# Patient Record
Sex: MALE | Race: ASIAN | NOT HISPANIC OR LATINO
[De-identification: names, ages, dates, MRNs, and addresses within clinical notes are randomized per-mention and may not be internally consistent; named-entity substitution may affect disease eponyms.]

---

## 2017-01-06 ENCOUNTER — APPOINTMENT (OUTPATIENT)
Dept: HEART AND VASCULAR | Facility: CLINIC | Age: 82
End: 2017-01-06

## 2017-01-06 VITALS
WEIGHT: 170 LBS | HEIGHT: 66 IN | HEART RATE: 71 BPM | BODY MASS INDEX: 27.32 KG/M2 | DIASTOLIC BLOOD PRESSURE: 75 MMHG | OXYGEN SATURATION: 98 % | SYSTOLIC BLOOD PRESSURE: 120 MMHG

## 2017-07-19 ENCOUNTER — APPOINTMENT (OUTPATIENT)
Dept: HEART AND VASCULAR | Facility: CLINIC | Age: 82
End: 2017-07-19

## 2017-07-19 VITALS
OXYGEN SATURATION: 97 % | SYSTOLIC BLOOD PRESSURE: 121 MMHG | DIASTOLIC BLOOD PRESSURE: 70 MMHG | HEIGHT: 66 IN | HEART RATE: 55 BPM

## 2017-07-19 RX ORDER — OXYBUTYNIN CHLORIDE 5 MG/1
5 TABLET ORAL
Refills: 0 | Status: ACTIVE | COMMUNITY

## 2017-07-19 RX ORDER — ZINC SULFATE 50(220)MG
CAPSULE ORAL
Refills: 0 | Status: ACTIVE | COMMUNITY

## 2017-07-19 RX ORDER — VITAMIN E ACID SUCCINATE 268 MG
TABLET ORAL
Refills: 0 | Status: ACTIVE | COMMUNITY

## 2017-07-19 RX ORDER — ASCORBIC ACID 500 MG
TABLET ORAL
Refills: 0 | Status: ACTIVE | COMMUNITY

## 2017-09-22 ENCOUNTER — APPOINTMENT (OUTPATIENT)
Dept: UROLOGY | Facility: CLINIC | Age: 82
End: 2017-09-22
Payer: MEDICARE

## 2017-09-22 VITALS
HEIGHT: 66 IN | DIASTOLIC BLOOD PRESSURE: 74 MMHG | BODY MASS INDEX: 27.32 KG/M2 | TEMPERATURE: 99.2 F | HEART RATE: 71 BPM | WEIGHT: 170 LBS | SYSTOLIC BLOOD PRESSURE: 151 MMHG

## 2017-09-22 PROCEDURE — 99204 OFFICE O/P NEW MOD 45 MIN: CPT | Mod: 25

## 2017-09-22 PROCEDURE — 51798 US URINE CAPACITY MEASURE: CPT

## 2017-09-22 RX ORDER — KETOCONAZOLE 20.5 MG/ML
2 SHAMPOO, SUSPENSION TOPICAL
Qty: 120 | Refills: 0 | Status: ACTIVE | COMMUNITY
Start: 2016-08-25

## 2017-09-22 RX ORDER — DORZOLAMIDE HYDROCHLORIDE TIMOLOL MALEATE 20; 5 MG/ML; MG/ML
22.3-6.8 SOLUTION/ DROPS OPHTHALMIC
Qty: 10 | Refills: 0 | Status: ACTIVE | COMMUNITY
Start: 2017-05-30

## 2017-09-22 RX ORDER — PREDNISOLONE ACETATE 10 MG/ML
1 SUSPENSION/ DROPS OPHTHALMIC
Qty: 5 | Refills: 0 | Status: ACTIVE | COMMUNITY
Start: 2017-04-28

## 2017-09-23 LAB
APPEARANCE: CLEAR
BACTERIA UR CULT: NORMAL
BACTERIA: NEGATIVE
BILIRUBIN URINE: NEGATIVE
BLOOD URINE: ABNORMAL
COLOR: YELLOW
GLUCOSE QUALITATIVE U: NORMAL MG/DL
KETONES URINE: NEGATIVE
LEUKOCYTE ESTERASE URINE: NEGATIVE
MICROSCOPIC-UA: NORMAL
NITRITE URINE: NEGATIVE
PH URINE: 6
PROTEIN URINE: 100 MG/DL
PSA FREE FLD-MCNC: 11.8
PSA FREE SERPL-MCNC: 0.31 NG/ML
PSA SERPL-MCNC: 2.62 NG/ML
RED BLOOD CELLS URINE: 2 /HPF
SPECIFIC GRAVITY URINE: 1.02
SQUAMOUS EPITHELIAL CELLS: 0 /HPF
UROBILINOGEN URINE: NORMAL MG/DL
WHITE BLOOD CELLS URINE: 0 /HPF

## 2017-12-20 ENCOUNTER — APPOINTMENT (OUTPATIENT)
Dept: UROLOGY | Facility: CLINIC | Age: 82
End: 2017-12-20
Payer: MEDICARE

## 2017-12-20 VITALS
BODY MASS INDEX: 27.32 KG/M2 | HEIGHT: 66 IN | DIASTOLIC BLOOD PRESSURE: 76 MMHG | OXYGEN SATURATION: 95 % | TEMPERATURE: 97.7 F | SYSTOLIC BLOOD PRESSURE: 122 MMHG | HEART RATE: 109 BPM | WEIGHT: 170 LBS

## 2017-12-20 PROCEDURE — 99213 OFFICE O/P EST LOW 20 MIN: CPT

## 2018-01-09 ENCOUNTER — APPOINTMENT (OUTPATIENT)
Dept: HEART AND VASCULAR | Facility: CLINIC | Age: 83
End: 2018-01-09
Payer: MEDICARE

## 2018-01-09 VITALS
BODY MASS INDEX: 29.03 KG/M2 | HEIGHT: 67 IN | DIASTOLIC BLOOD PRESSURE: 72 MMHG | OXYGEN SATURATION: 96 % | HEART RATE: 80 BPM | WEIGHT: 185 LBS | RESPIRATION RATE: 18 BRPM | SYSTOLIC BLOOD PRESSURE: 112 MMHG

## 2018-01-09 DIAGNOSIS — Z78.9 OTHER SPECIFIED HEALTH STATUS: ICD-10-CM

## 2018-01-09 DIAGNOSIS — R07.89 OTHER CHEST PAIN: ICD-10-CM

## 2018-01-09 PROCEDURE — 99214 OFFICE O/P EST MOD 30 MIN: CPT

## 2018-02-26 ENCOUNTER — TRANSCRIPTION ENCOUNTER (OUTPATIENT)
Age: 83
End: 2018-02-26

## 2018-06-18 ENCOUNTER — RX RENEWAL (OUTPATIENT)
Age: 83
End: 2018-06-18

## 2018-06-20 ENCOUNTER — APPOINTMENT (OUTPATIENT)
Dept: UROLOGY | Facility: CLINIC | Age: 83
End: 2018-06-20
Payer: MEDICARE

## 2018-06-20 VITALS
HEART RATE: 65 BPM | HEIGHT: 67 IN | TEMPERATURE: 98 F | WEIGHT: 185 LBS | SYSTOLIC BLOOD PRESSURE: 130 MMHG | OXYGEN SATURATION: 97 % | BODY MASS INDEX: 29.03 KG/M2 | DIASTOLIC BLOOD PRESSURE: 62 MMHG

## 2018-06-20 PROCEDURE — 99213 OFFICE O/P EST LOW 20 MIN: CPT

## 2018-07-10 ENCOUNTER — TRANSCRIPTION ENCOUNTER (OUTPATIENT)
Age: 83
End: 2018-07-10

## 2018-07-11 ENCOUNTER — APPOINTMENT (OUTPATIENT)
Dept: HEART AND VASCULAR | Facility: CLINIC | Age: 83
End: 2018-07-11

## 2018-07-12 ENCOUNTER — APPOINTMENT (OUTPATIENT)
Dept: ORTHOPEDIC SURGERY | Facility: CLINIC | Age: 83
End: 2018-07-12
Payer: MEDICARE

## 2018-07-12 VITALS — RESPIRATION RATE: 16 BRPM | WEIGHT: 180 LBS | BODY MASS INDEX: 28.93 KG/M2 | HEIGHT: 66 IN

## 2018-07-12 DIAGNOSIS — Z85.46 PERSONAL HISTORY OF MALIGNANT NEOPLASM OF PROSTATE: ICD-10-CM

## 2018-07-12 DIAGNOSIS — Z87.39 PERSONAL HISTORY OF OTHER DISEASES OF THE MUSCULOSKELETAL SYSTEM AND CONNECTIVE TISSUE: ICD-10-CM

## 2018-07-12 PROCEDURE — 99203 OFFICE O/P NEW LOW 30 MIN: CPT

## 2018-07-12 RX ORDER — CLOPIDOGREL BISULFATE 75 MG/1
75 TABLET, FILM COATED ORAL
Refills: 0 | Status: ACTIVE | COMMUNITY

## 2018-07-26 ENCOUNTER — APPOINTMENT (OUTPATIENT)
Dept: ORTHOPEDIC SURGERY | Facility: CLINIC | Age: 83
End: 2018-07-26
Payer: MEDICARE

## 2018-07-26 PROCEDURE — 99213 OFFICE O/P EST LOW 20 MIN: CPT

## 2018-07-26 PROCEDURE — 73140 X-RAY EXAM OF FINGER(S): CPT | Mod: F2

## 2018-08-01 ENCOUNTER — APPOINTMENT (OUTPATIENT)
Dept: HEART AND VASCULAR | Facility: CLINIC | Age: 83
End: 2018-08-01
Payer: MEDICARE

## 2018-08-01 VITALS
SYSTOLIC BLOOD PRESSURE: 109 MMHG | WEIGHT: 180 LBS | DIASTOLIC BLOOD PRESSURE: 59 MMHG | HEIGHT: 65 IN | HEART RATE: 65 BPM | BODY MASS INDEX: 29.99 KG/M2

## 2018-08-01 PROCEDURE — 99214 OFFICE O/P EST MOD 30 MIN: CPT | Mod: 25

## 2018-08-01 PROCEDURE — 93000 ELECTROCARDIOGRAM COMPLETE: CPT

## 2018-08-17 ENCOUNTER — APPOINTMENT (OUTPATIENT)
Dept: ORTHOPEDIC SURGERY | Facility: CLINIC | Age: 83
End: 2018-08-17
Payer: MEDICARE

## 2018-08-17 VITALS — HEIGHT: 65 IN | WEIGHT: 180 LBS | BODY MASS INDEX: 29.99 KG/M2 | RESPIRATION RATE: 16 BRPM

## 2018-08-17 DIAGNOSIS — M20.012 MALLET FINGER OF LEFT FINGER(S): ICD-10-CM

## 2018-08-17 PROCEDURE — 73140 X-RAY EXAM OF FINGER(S): CPT | Mod: F2

## 2018-08-17 PROCEDURE — 99214 OFFICE O/P EST MOD 30 MIN: CPT

## 2018-12-19 ENCOUNTER — APPOINTMENT (OUTPATIENT)
Dept: UROLOGY | Facility: CLINIC | Age: 83
End: 2018-12-19
Payer: MEDICARE

## 2018-12-19 VITALS
OXYGEN SATURATION: 99 % | SYSTOLIC BLOOD PRESSURE: 145 MMHG | TEMPERATURE: 97.7 F | BODY MASS INDEX: 29.99 KG/M2 | HEART RATE: 60 BPM | DIASTOLIC BLOOD PRESSURE: 72 MMHG | WEIGHT: 180 LBS | HEIGHT: 65 IN

## 2018-12-19 PROCEDURE — 99213 OFFICE O/P EST LOW 20 MIN: CPT

## 2018-12-19 RX ORDER — TAMSULOSIN HYDROCHLORIDE 0.4 MG/1
0.4 CAPSULE ORAL
Qty: 90 | Refills: 3 | Status: ACTIVE | COMMUNITY
Start: 1900-01-01 | End: 1900-01-01

## 2019-06-19 ENCOUNTER — APPOINTMENT (OUTPATIENT)
Dept: UROLOGY | Facility: CLINIC | Age: 84
End: 2019-06-19

## 2019-07-31 ENCOUNTER — APPOINTMENT (OUTPATIENT)
Dept: UROLOGY | Facility: CLINIC | Age: 84
End: 2019-07-31
Payer: MEDICARE

## 2019-07-31 VITALS
HEART RATE: 96 BPM | WEIGHT: 180 LBS | TEMPERATURE: 97.2 F | OXYGEN SATURATION: 97 % | SYSTOLIC BLOOD PRESSURE: 116 MMHG | HEIGHT: 65 IN | DIASTOLIC BLOOD PRESSURE: 72 MMHG | BODY MASS INDEX: 29.99 KG/M2

## 2019-07-31 PROCEDURE — 99213 OFFICE O/P EST LOW 20 MIN: CPT

## 2019-07-31 NOTE — LETTER BODY
[Dear  ___] : Dear  [unfilled], [Courtesy Letter:] : I had the pleasure of seeing your patient, [unfilled], in my office today. [Please see my note below.] : Please see my note below. [Sincerely,] : Sincerely, [Consult Closing:] : Thank you very much for allowing me to participate in the care of this patient.  If you have any questions, please do not hesitate to contact me. [FreeTextEntry3] : Ronal Tijerina MD [FreeTextEntry2] : Meeta Story MD\par [address]\[place] [address]

## 2019-07-31 NOTE — ASSESSMENT
[FreeTextEntry1] : 85yo male with reported history of prostate cancer on watchful waiting for several years, stable PSA\par Significant LUTS but on maximal medical therapy and poor surgical candidate\par Reviewed behavioral modification including avoidance of bladder irritants\par Recent labs reviewed, PSA essentially stable\par F/u 6 months

## 2019-07-31 NOTE — REVIEW OF SYSTEMS
[see HPI] : see HPI [Negative] : Gastrointestinal Paramedian Forehead Flap Text: A decision was made to reconstruct the defect utilizing an interpolation axial flap and a staged reconstruction.  A telfa template was made of the defect.  This telfa template was then used to outline the paramedian forehead pedicle flap.  The donor area for the pedicle flap was then injected with anesthesia.  The flap was excised through the skin and subcutaneous tissue down to the layer of the underlying musculature.  The pedicle flap was carefully excised within this deep plane to maintain its blood supply.  The edges of the donor site were undermined.   The donor site was closed in a primary fashion.  The pedicle was then rotated into position and sutured.  Once the tube was sutured into place, adequate blood supply was confirmed with blanching and refill.  The pedicle was then wrapped with xeroform gauze and dressed appropriately with a telfa and gauze bandage to ensure continued blood supply and protect the attached pedicle.

## 2019-07-31 NOTE — HISTORY OF PRESENT ILLNESS
[FreeTextEntry1] : This is an 83yo male with a history of prostate cancer on watchful waiting and urinary frequency here to establish care. He is here with his son. He was reportedly diagnosed with low risk prostate cancer more than 10 years ago. His PSA has been in 3-4 range. He takes tamsulosin, finasteride and oxybutynin for urinary frequency. He has significant comorbid conditions including a previous MI. \par \par 12/20/17 Here for f/u. No significant change in urinary symptoms, controlled with medications. Last PSA was 2.62, essentially stable. \par \par 6/20/18 Here for f/u. No significant change in urinary symptoms, controlled with medications. Last PSA was 2.62, essentially stable. \par \par 12/19/18 Here for f/u. No significant changes. Recent PSA is 3.8. \par \par 7/31/19 Here for f/u. Recent PSA = 4.9 on finasteride. Primary complaint is urinary frequency. Taking tamsulosin, finasteride, oxybutynin 5mg.  [Urinary Urgency] : urinary urgency [Urinary Frequency] : urinary frequency [Nocturia] : nocturia [Straining] : straining [None] : None

## 2019-07-31 NOTE — PHYSICAL EXAM
[General Appearance - Well Developed] : well developed [General Appearance - Well Nourished] : well nourished [Normal Appearance] : normal appearance [Well Groomed] : well groomed [General Appearance - In No Acute Distress] : no acute distress [Abdomen Soft] : soft [Abdomen Tenderness] : non-tender [Costovertebral Angle Tenderness] : no ~M costovertebral angle tenderness [FreeTextEntry1] : ONEL 6/2018 normal [Oriented To Time, Place, And Person] : oriented to person, place, and time [Affect] : the affect was normal [Mood] : the mood was normal [Not Anxious] : not anxious [Normal Station and Gait] : the gait and station were normal for the patient's age [No Focal Deficits] : no focal deficits

## 2019-11-07 ENCOUNTER — OUTPATIENT (OUTPATIENT)
Dept: OUTPATIENT SERVICES | Facility: HOSPITAL | Age: 84
LOS: 1 days | Discharge: HOME | End: 2019-11-07

## 2019-11-07 VITALS
HEART RATE: 88 BPM | WEIGHT: 154.32 LBS | OXYGEN SATURATION: 98 % | HEIGHT: 65 IN | TEMPERATURE: 97 F | DIASTOLIC BLOOD PRESSURE: 84 MMHG | SYSTOLIC BLOOD PRESSURE: 164 MMHG | RESPIRATION RATE: 18 BRPM

## 2019-11-07 DIAGNOSIS — H26.9 UNSPECIFIED CATARACT: Chronic | ICD-10-CM

## 2019-11-07 DIAGNOSIS — Z98.89 OTHER SPECIFIED POSTPROCEDURAL STATES: Chronic | ICD-10-CM

## 2019-11-07 NOTE — ED ADULT NURSE NOTE - OBJECTIVE STATEMENT
Patient was transfer from [facility] for observation s/p facial surgery with hematoma and bleeding.

## 2019-11-07 NOTE — ED ADULT NURSE NOTE - PMH
Acute myocardial infarction  Patient denies MI  Essential hypertension  Hypertension  Hyperlipidemia  Hyperlipidemia  Malignant neoplasm of prostate  Prostate cancer  Type 2 diabetes mellitus  Diabetes  Type 2 diabetes mellitus  DM (diabetes mellitus), type 2

## 2019-11-07 NOTE — ED CLERICAL - NS ED CLERK NOTE PRE-ARRIVAL INFORMATION; ADDITIONAL PRE-ARRIVAL INFORMATION
[place].  From [facility].  hematoma post blepharoplasty requiring lateral canthotomy.  persistent oozing,  rec elevated HOB, ice packs, obs til am

## 2019-11-08 ENCOUNTER — INPATIENT (INPATIENT)
Facility: HOSPITAL | Age: 84
LOS: 2 days | Discharge: HOME | DRG: 921 | End: 2019-11-11
Attending: SURGERY | Admitting: SURGERY
Payer: MEDICARE

## 2019-11-08 DIAGNOSIS — Z98.89 OTHER SPECIFIED POSTPROCEDURAL STATES: Chronic | ICD-10-CM

## 2019-11-08 DIAGNOSIS — Z98.890 OTHER SPECIFIED POSTPROCEDURAL STATES: Chronic | ICD-10-CM

## 2019-11-08 DIAGNOSIS — H26.9 UNSPECIFIED CATARACT: Chronic | ICD-10-CM

## 2019-11-08 LAB
BLD GP AB SCN SERPL QL: NEGATIVE — SIGNIFICANT CHANGE UP
GLUCOSE BLDC GLUCOMTR-MCNC: 103 MG/DL — HIGH (ref 70–99)
GLUCOSE BLDC GLUCOMTR-MCNC: 133 MG/DL — HIGH (ref 70–99)
GLUCOSE BLDC GLUCOMTR-MCNC: 138 MG/DL — HIGH (ref 70–99)
GLUCOSE BLDC GLUCOMTR-MCNC: 96 MG/DL — SIGNIFICANT CHANGE UP (ref 70–99)
RH IG SCN BLD-IMP: POSITIVE — SIGNIFICANT CHANGE UP

## 2019-11-08 PROCEDURE — 99285 EMERGENCY DEPT VISIT HI MDM: CPT

## 2019-11-08 RX ORDER — OXYCODONE AND ACETAMINOPHEN 5; 325 MG/1; MG/1
1 TABLET ORAL EVERY 6 HOURS
Refills: 0 | Status: DISCONTINUED | OUTPATIENT
Start: 2019-11-08 | End: 2019-11-11

## 2019-11-08 RX ORDER — CEPHALEXIN 500 MG
1000 CAPSULE ORAL EVERY 8 HOURS
Refills: 0 | Status: DISCONTINUED | OUTPATIENT
Start: 2019-11-08 | End: 2019-11-11

## 2019-11-08 RX ORDER — DEXTROSE 50 % IN WATER 50 %
25 SYRINGE (ML) INTRAVENOUS ONCE
Refills: 0 | Status: DISCONTINUED | OUTPATIENT
Start: 2019-11-08 | End: 2019-11-11

## 2019-11-08 RX ORDER — INSULIN LISPRO 100/ML
VIAL (ML) SUBCUTANEOUS
Refills: 0 | Status: DISCONTINUED | OUTPATIENT
Start: 2019-11-08 | End: 2019-11-11

## 2019-11-08 RX ORDER — LORATADINE 10 MG/1
10 TABLET ORAL DAILY
Refills: 0 | Status: DISCONTINUED | OUTPATIENT
Start: 2019-11-08 | End: 2019-11-11

## 2019-11-08 RX ORDER — FINASTERIDE 5 MG/1
5 TABLET, FILM COATED ORAL DAILY
Refills: 0 | Status: DISCONTINUED | OUTPATIENT
Start: 2019-11-08 | End: 2019-11-11

## 2019-11-08 RX ORDER — GLUCAGON INJECTION, SOLUTION 0.5 MG/.1ML
1 INJECTION, SOLUTION SUBCUTANEOUS ONCE
Refills: 0 | Status: DISCONTINUED | OUTPATIENT
Start: 2019-11-08 | End: 2019-11-11

## 2019-11-08 RX ORDER — SODIUM CHLORIDE 9 MG/ML
1000 INJECTION, SOLUTION INTRAVENOUS
Refills: 0 | Status: DISCONTINUED | OUTPATIENT
Start: 2019-11-08 | End: 2019-11-11

## 2019-11-08 RX ORDER — ONDANSETRON 8 MG/1
4 TABLET, FILM COATED ORAL EVERY 6 HOURS
Refills: 0 | Status: DISCONTINUED | OUTPATIENT
Start: 2019-11-08 | End: 2019-11-11

## 2019-11-08 RX ORDER — DEXTROSE 50 % IN WATER 50 %
12.5 SYRINGE (ML) INTRAVENOUS ONCE
Refills: 0 | Status: DISCONTINUED | OUTPATIENT
Start: 2019-11-08 | End: 2019-11-11

## 2019-11-08 RX ORDER — HYDROMORPHONE HYDROCHLORIDE 2 MG/ML
0.5 INJECTION INTRAMUSCULAR; INTRAVENOUS; SUBCUTANEOUS EVERY 6 HOURS
Refills: 0 | Status: DISCONTINUED | OUTPATIENT
Start: 2019-11-08 | End: 2019-11-11

## 2019-11-08 RX ORDER — ATORVASTATIN CALCIUM 80 MG/1
40 TABLET, FILM COATED ORAL AT BEDTIME
Refills: 0 | Status: DISCONTINUED | OUTPATIENT
Start: 2019-11-08 | End: 2019-11-11

## 2019-11-08 RX ORDER — TAMSULOSIN HYDROCHLORIDE 0.4 MG/1
0.4 CAPSULE ORAL AT BEDTIME
Refills: 0 | Status: DISCONTINUED | OUTPATIENT
Start: 2019-11-08 | End: 2019-11-11

## 2019-11-08 RX ORDER — METOPROLOL TARTRATE 50 MG
12.5 TABLET ORAL EVERY 12 HOURS
Refills: 0 | Status: DISCONTINUED | OUTPATIENT
Start: 2019-11-08 | End: 2019-11-11

## 2019-11-08 RX ORDER — DEXTROSE 50 % IN WATER 50 %
15 SYRINGE (ML) INTRAVENOUS ONCE
Refills: 0 | Status: DISCONTINUED | OUTPATIENT
Start: 2019-11-08 | End: 2019-11-11

## 2019-11-08 RX ADMIN — ATORVASTATIN CALCIUM 40 MILLIGRAM(S): 80 TABLET, FILM COATED ORAL at 22:17

## 2019-11-08 RX ADMIN — SODIUM CHLORIDE 50 MILLILITER(S): 9 INJECTION, SOLUTION INTRAVENOUS at 13:40

## 2019-11-08 RX ADMIN — Medication 1000 MILLIGRAM(S): at 15:22

## 2019-11-08 RX ADMIN — SODIUM CHLORIDE 100 MILLILITER(S): 9 INJECTION, SOLUTION INTRAVENOUS at 12:02

## 2019-11-08 RX ADMIN — FINASTERIDE 5 MILLIGRAM(S): 5 TABLET, FILM COATED ORAL at 16:58

## 2019-11-08 RX ADMIN — Medication 12.5 MILLIGRAM(S): at 18:47

## 2019-11-08 RX ADMIN — Medication 1000 MILLIGRAM(S): at 22:17

## 2019-11-08 RX ADMIN — TAMSULOSIN HYDROCHLORIDE 0.4 MILLIGRAM(S): 0.4 CAPSULE ORAL at 22:17

## 2019-11-08 RX ADMIN — SODIUM CHLORIDE 100 MILLILITER(S): 9 INJECTION, SOLUTION INTRAVENOUS at 04:52

## 2019-11-08 NOTE — ED PROVIDER NOTE - CLINICAL SUMMARY MEDICAL DECISION MAKING FREE TEXT BOX
s/p blepharoplasty complicated by hematoma, now with open incision, here for observation of bleeding.    HOB to 30deg, ice to swelling  will monitor

## 2019-11-08 NOTE — PACU DISCHARGE NOTE - COMMENTS
Patient meets criteria for patient discharge, A&Ox4, VSS, Surgical incisions around eyes clean, dry and intact, Small drain to side of left eye, Patient ambulated to the restroom with assistance and passed gas, No BM yet, Patient is eating and drinking with no nausea, Abdomen is round, soft with bowel sounds in all quadrants, No SOB, No chest pain, #22 gauge IV to right hand patent and intact receiving LRS at 50ml/hr, report given to Dmitry on [address], patient transported via bed.

## 2019-11-08 NOTE — ED PROVIDER NOTE - OBJECTIVE STATEMENT
86M hx htn, dm, high chol, prostate CA, transferred from [facility].  pt s/p b/l blepharoplasty, complicated by L periorbital hematoma and lateral canthotomy.  per Dr. Toussaint - sutures left open - pt transferred to ED for observation, recommending HOB elevation and ice.  pt takes plavix however stopped 5 days ago.

## 2019-11-08 NOTE — H&P ADULT - NSHPLABSRESULTS_GEN_ALL_CORE
Vital Signs Last 24 Hrs  T(C): 36.2 (08 Nov 2019 12:38), Max: 36.4 (08 Nov 2019 07:15)  T(F): 97.2 (08 Nov 2019 12:38), Max: 97.6 (08 Nov 2019 07:15)  HR: 81 (08 Nov 2019 13:50) (78 - 92)  BP: 121/73 (08 Nov 2019 13:50) (121/73 - 193/93)  BP(mean): 90 (08 Nov 2019 13:50) (90 - 127)  RR: 16 (08 Nov 2019 13:50) (13 - 24)  SpO2: 96% (08 Nov 2019 13:50) (96% - 100%)

## 2019-11-08 NOTE — H&P ADULT - NSHPPHYSICALEXAM_GEN_ALL_CORE
General: NAD, resting comfortably  Neuro: AAOX3  HEAD:  R orbit soft with moderate swelling, ecchymoses, minimal SS drainage. L orbit soft with moderate swelling, ecchymoses with lateral penrose drain in place.   Pulm: CTAB, Nonlabored breathing  CV: S1/S2 normal, no murmurs  Abdomen: soft, nondistended, nontender, no rebound, no guarding  Extremities: WWP, No LE edema b/l

## 2019-11-08 NOTE — ED PROVIDER NOTE - PROGRESS NOTE DETAILS
worsening hematoma to L superior orbital region.  spoke with Dr. Toussaint- will come back to ED to evaluate pt Dr. Toussaint to take pt back to OR for exploration and evacuation of hematoma

## 2019-11-08 NOTE — ED PROVIDER NOTE - PHYSICAL EXAMINATION
b/l blepharoplasty  R - CDI  L - incision open, blood oozing, +eyelid swelling, periorbital swelling

## 2019-11-08 NOTE — H&P ADULT - HISTORY OF PRESENT ILLNESS
86M PMH previous MI s/p CABG-on Plavix (last taken 5 d ago), HTN, DM2, prostate CA s/p TURP, transferred from [facility] overnight s/p b/l blepharoplasty (Dr. Toussaint) complicated by L periorbital hematoma and lateral canthotomy, transferred to [facility] ED for further observation, now s/p L orbital washout and admitted to Plastic surgery for further observation. Pt seen and examined postoperatively, afebrile, VSS, pain well controlled, denies dyspnea/chest pain, endorses improved vision, tolerating regular diet, denies nausea/emesis. Pt states he lives alone,  poor historian.

## 2019-11-08 NOTE — ED PROVIDER NOTE - PSH
Bilateral cataracts    H/O transurethral destruction of bladder lesion    Other postprocedural status  History of penile implant  S/p bilateral blepharoplasty    Status post aorto-coronary artery bypass graft  S/P CABG (coronary artery bypass graft)

## 2019-11-08 NOTE — H&P ADULT - NSICDXPASTSURGICALHX_GEN_ALL_CORE_FT
PAST SURGICAL HISTORY:  Bilateral cataracts     H/O transurethral destruction of bladder lesion     Other postprocedural status History of penile implant    S/p bilateral blepharoplasty     Status post aorto-coronary artery bypass graft S/P CABG (coronary artery bypass graft)

## 2019-11-08 NOTE — H&P ADULT - NSICDXPASTMEDICALHX_GEN_ALL_CORE_FT
PAST MEDICAL HISTORY:  Acute myocardial infarction Patient denies MI    Essential hypertension Hypertension    Hyperlipidemia Hyperlipidemia    Malignant neoplasm of prostate Prostate cancer    Type 2 diabetes mellitus DM (diabetes mellitus), type 2    Type 2 diabetes mellitus Diabetes

## 2019-11-08 NOTE — H&P ADULT - ASSESSMENT
86M PMH previous MI s/p CABG-on Plavix (last taken 5 d ago), HTN, DM2, prostate CA s/p TURP, transferred from [facility] overnight s/p b/l blepharoplasty (Dr. Toussaint) complicated by L periorbital hematoma and lateral canthotomy, transferred to [facility] ED for further observation, now s/p L orbital washout and admitted to Plastic surgery for further observation    Pain/nausea control as needed  Regular diet  Keep head of bed elevated greater than 45 degrees, cold compress to orbital area bilaterally  Keflex 1g every 8 hrs  Monitor vision b/l  Resume home medications, holding Losartan, Plavix and AC at this time given recent hematoma  Discussed with chief resident and Dr. Cano

## 2019-11-09 LAB
GLUCOSE BLDC GLUCOMTR-MCNC: 104 MG/DL — HIGH (ref 70–99)
GLUCOSE BLDC GLUCOMTR-MCNC: 118 MG/DL — HIGH (ref 70–99)
GLUCOSE BLDC GLUCOMTR-MCNC: 97 MG/DL — SIGNIFICANT CHANGE UP (ref 70–99)
GLUCOSE BLDC GLUCOMTR-MCNC: 99 MG/DL — SIGNIFICANT CHANGE UP (ref 70–99)
HBA1C BLD-MCNC: 5.9 % — HIGH (ref 4–5.6)

## 2019-11-09 RX ADMIN — FINASTERIDE 5 MILLIGRAM(S): 5 TABLET, FILM COATED ORAL at 13:14

## 2019-11-09 RX ADMIN — Medication 1000 MILLIGRAM(S): at 05:49

## 2019-11-09 RX ADMIN — TAMSULOSIN HYDROCHLORIDE 0.4 MILLIGRAM(S): 0.4 CAPSULE ORAL at 21:42

## 2019-11-09 RX ADMIN — Medication 1000 MILLIGRAM(S): at 21:42

## 2019-11-09 RX ADMIN — Medication 12.5 MILLIGRAM(S): at 17:34

## 2019-11-09 RX ADMIN — Medication 12.5 MILLIGRAM(S): at 05:48

## 2019-11-09 RX ADMIN — ATORVASTATIN CALCIUM 40 MILLIGRAM(S): 80 TABLET, FILM COATED ORAL at 21:42

## 2019-11-09 RX ADMIN — Medication 1000 MILLIGRAM(S): at 13:13

## 2019-11-09 NOTE — PROGRESS NOTE ADULT - SUBJECTIVE AND OBJECTIVE BOX
No acute events overnight.  Vision intact bilaterally, swelling stable.  Pain controlled.    NAD  EWOB  RRR  Canthotomy site clean, minimal oozing  Eccymosis and swelling stable  Vision intact b/l  EOMi

## 2019-11-10 LAB
GLUCOSE BLDC GLUCOMTR-MCNC: 102 MG/DL — HIGH (ref 70–99)
GLUCOSE BLDC GLUCOMTR-MCNC: 104 MG/DL — HIGH (ref 70–99)
GLUCOSE BLDC GLUCOMTR-MCNC: 110 MG/DL — HIGH (ref 70–99)
GLUCOSE BLDC GLUCOMTR-MCNC: 111 MG/DL — HIGH (ref 70–99)

## 2019-11-10 RX ORDER — SENNA PLUS 8.6 MG/1
1 TABLET ORAL
Refills: 0 | Status: DISCONTINUED | OUTPATIENT
Start: 2019-11-10 | End: 2019-11-11

## 2019-11-10 RX ORDER — POLYETHYLENE GLYCOL 3350 17 G/17G
17 POWDER, FOR SOLUTION ORAL EVERY 12 HOURS
Refills: 0 | Status: DISCONTINUED | OUTPATIENT
Start: 2019-11-10 | End: 2019-11-11

## 2019-11-10 RX ADMIN — Medication 1000 MILLIGRAM(S): at 21:18

## 2019-11-10 RX ADMIN — POLYETHYLENE GLYCOL 3350 17 GRAM(S): 17 POWDER, FOR SOLUTION ORAL at 06:22

## 2019-11-10 RX ADMIN — SENNA PLUS 1 TABLET(S): 8.6 TABLET ORAL at 06:22

## 2019-11-10 RX ADMIN — Medication 12.5 MILLIGRAM(S): at 17:55

## 2019-11-10 RX ADMIN — Medication 12.5 MILLIGRAM(S): at 06:21

## 2019-11-10 RX ADMIN — Medication 1000 MILLIGRAM(S): at 13:16

## 2019-11-10 RX ADMIN — POLYETHYLENE GLYCOL 3350 17 GRAM(S): 17 POWDER, FOR SOLUTION ORAL at 17:55

## 2019-11-10 RX ADMIN — TAMSULOSIN HYDROCHLORIDE 0.4 MILLIGRAM(S): 0.4 CAPSULE ORAL at 21:18

## 2019-11-10 RX ADMIN — SENNA PLUS 1 TABLET(S): 8.6 TABLET ORAL at 17:55

## 2019-11-10 RX ADMIN — ATORVASTATIN CALCIUM 40 MILLIGRAM(S): 80 TABLET, FILM COATED ORAL at 21:19

## 2019-11-10 RX ADMIN — FINASTERIDE 5 MILLIGRAM(S): 5 TABLET, FILM COATED ORAL at 12:14

## 2019-11-10 RX ADMIN — Medication 1000 MILLIGRAM(S): at 06:22

## 2019-11-11 ENCOUNTER — TRANSCRIPTION ENCOUNTER (OUTPATIENT)
Age: 84
End: 2019-11-11

## 2019-11-11 VITALS
OXYGEN SATURATION: 96 % | DIASTOLIC BLOOD PRESSURE: 73 MMHG | HEART RATE: 69 BPM | TEMPERATURE: 98 F | RESPIRATION RATE: 17 BRPM | SYSTOLIC BLOOD PRESSURE: 157 MMHG

## 2019-11-11 LAB — GLUCOSE BLDC GLUCOMTR-MCNC: 100 MG/DL — HIGH (ref 70–99)

## 2019-11-11 RX ORDER — TAMSULOSIN HYDROCHLORIDE 0.4 MG/1
1 CAPSULE ORAL
Qty: 0 | Refills: 0 | DISCHARGE
Start: 2019-11-11

## 2019-11-11 RX ADMIN — Medication 1000 MILLIGRAM(S): at 05:10

## 2019-11-11 RX ADMIN — FINASTERIDE 5 MILLIGRAM(S): 5 TABLET, FILM COATED ORAL at 11:09

## 2019-11-11 RX ADMIN — Medication 12.5 MILLIGRAM(S): at 05:11

## 2019-11-11 NOTE — DISCHARGE NOTE PROVIDER - NSDCMRMEDTOKEN_GEN_ALL_CORE_FT
atorvastatin 40 mg oral tablet: 1 tab(s) orally every 24 hours  Clarinex 5 mg oral tablet: 1 tab(s) orally once a day  dorzolamide 2% ophthalmic solution: 1 drop(s) to each affected eye 3 times a day  fexofenadine 60 mg oral tablet: 1 tab(s) orally 2 times a day  finasteride 5 mg oral tablet: 1 tab(s) orally once a day  Imdur 30 mg oral tablet, extended release: 0.5 tab(s) orally once a day (in the morning)  latanoprost 0.005% ophthalmic solution: 1 drop(s) to each affected eye once a day (in the evening)  loratadine 10 mg oral tablet: 1 tab(s) orally every 24 hours  losartan 25 mg oral tablet: 1 tab(s) orally once a day  Metoprolol Succinate ER 25 mg oral tablet, extended release: 1 tab(s) orally once a day  Plavix 75 mg oral tablet: 1 tab(s) orally once a day  tamsulosin 0.4 mg oral capsule: 1 cap(s) orally once a day (at bedtime)

## 2019-11-11 NOTE — PROGRESS NOTE ADULT - SUBJECTIVE AND OBJECTIVE BOX
SUBJECTIVE: Afebrile, VSS with no acute events overnight. Pain well controlled, eyelid swelling improved and vision intact.    cephalexin 1000 milliGRAM(s) Oral every 8 hours  metoprolol tartrate 12.5 milliGRAM(s) Oral every 12 hours  tamsulosin 0.4 milliGRAM(s) Oral at bedtime      Vital Signs Last 24 Hrs  T(C): 36.4 (11 Nov 2019 05:08), Max: 36.6 (10 Nov 2019 20:27)  T(F): 97.6 (11 Nov 2019 05:08), Max: 97.9 (10 Nov 2019 20:27)  HR: 71 (11 Nov 2019 05:08) (57 - 80)  BP: 170/81 (11 Nov 2019 05:08) (107/58 - 170/81)  BP(mean): --  RR: 17 (11 Nov 2019 05:08) (16 - 17)  SpO2: 96% (11 Nov 2019 05:08) (96% - 97%)     General: NAD, resting comfortably  	Neuro: AAOX3  	HEAD:  R orbit soft with moderate swelling, ecchymoses, minimal SS drainage. L orbit soft with mild swelling improved since yesterday, ecchymoses improving with lateral penrose drain in place.   	Pulm: CTAB, Nonlabored breathing  	CV: S1/S2 normal, no murmurs  	Abdomen: soft, nondistended, nontender, no rebound, no guarding

## 2019-11-11 NOTE — DISCHARGE NOTE PROVIDER - NSDCCPCAREPLAN_GEN_ALL_CORE_FT
PRINCIPAL DISCHARGE DIAGNOSIS  Diagnosis: Postoperative hematoma of left eye following ophthalmic procedure  Assessment and Plan of Treatment:

## 2019-11-11 NOTE — DISCHARGE NOTE PROVIDER - CARE PROVIDER_API CALL
Reji Toussaint (MD)  Ophthalmology  [address] [place] [id number]  Phone: [phone]  Fax: [phone]  Follow Up Time:

## 2019-11-11 NOTE — PHYSICAL THERAPY INITIAL EVALUATION ADULT - PERTINENT HX OF CURRENT PROBLEM, REHAB EVAL
86M PMH previous MI s/p CABG-on Plavix (last taken 5 d ago), HTN, DM2, prostate CA s/p TURP, transferred from [facility] overnight s/p b/l blepharoplasty (Dr. Toussaint) complicated by L periorbital hematoma and lateral canthotomy, transferred to [facility] ED for further observation, now s/p L orbital washout and admitted to Plastic surgery for further observation

## 2019-11-11 NOTE — PHYSICAL THERAPY INITIAL EVALUATION ADULT - GENERAL OBSERVATIONS, REHAB EVAL
pt received/returned semi-supine in bed +heplock, +B/L Eye swelling and ecchymosis, son present, in NAD

## 2019-11-11 NOTE — PROGRESS NOTE ADULT - ASSESSMENT
POD 3 canthotomy and washout L eye    -Continue postop monitoring, serial eye exams  -PT consult  -Likely DC home today pending PT recs

## 2019-11-11 NOTE — DISCHARGE NOTE NURSING/CASE MANAGEMENT/SOCIAL WORK - PATIENT PORTAL LINK FT
You can access the FollowMyHealth Patient Portal offered by [facility] by registering at the following website: [email or web address]. By joining [facility]’s FollowMyHealth portal, you will also be able to view your health information using other applications (apps) compatible with our system.

## 2019-11-11 NOTE — DISCHARGE NOTE PROVIDER - HOSPITAL COURSE
86M PMH previous MI s/p CABG-on Plavix (last taken 5 d ago), HTN, DM2, prostate CA s/p TURP, transferred from [facility] overnight s/p b/l blepharoplasty (Dr. Toussaint) complicated by L periorbital hematoma and lateral canthotomy, transferred to [facility] ED for further observation. The patient had serial exams over the weekend and his symptoms improved. His swelling went down enough that his vision was no longer impaired. On the day of the discharge, patient was evaluated by our team and PT and deemed in stable condition to be discharged to home. Follow up with Dr. Toussaint as directed.

## 2019-11-11 NOTE — PHYSICAL THERAPY INITIAL EVALUATION ADULT - ADDITIONAL COMMENTS
pt lives alone on the 3rd floor of a walk up apartment building. Uses a SC for ambulation. Son states that pt has had a couple falls over the past year. No home health aide
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
## 2019-11-13 DIAGNOSIS — L76.32 POSTPROCEDURAL HEMATOMA OF SKIN AND SUBCUTANEOUS TISSUE FOLLOWING OTHER PROCEDURE: ICD-10-CM

## 2019-11-13 DIAGNOSIS — Z95.1 PRESENCE OF AORTOCORONARY BYPASS GRAFT: ICD-10-CM

## 2019-11-13 DIAGNOSIS — E78.5 HYPERLIPIDEMIA, UNSPECIFIED: ICD-10-CM

## 2019-11-13 DIAGNOSIS — Z79.02 LONG TERM (CURRENT) USE OF ANTITHROMBOTICS/ANTIPLATELETS: ICD-10-CM

## 2019-11-13 DIAGNOSIS — Z88.6 ALLERGY STATUS TO ANALGESIC AGENT: ICD-10-CM

## 2019-11-13 DIAGNOSIS — I10 ESSENTIAL (PRIMARY) HYPERTENSION: ICD-10-CM

## 2019-11-13 DIAGNOSIS — E11.9 TYPE 2 DIABETES MELLITUS WITHOUT COMPLICATIONS: ICD-10-CM

## 2019-11-13 DIAGNOSIS — I25.2 OLD MYOCARDIAL INFARCTION: ICD-10-CM

## 2019-11-13 DIAGNOSIS — Z79.899 OTHER LONG TERM (CURRENT) DRUG THERAPY: ICD-10-CM

## 2019-11-20 PROCEDURE — 83036 HEMOGLOBIN GLYCOSYLATED A1C: CPT

## 2019-11-20 PROCEDURE — 99285 EMERGENCY DEPT VISIT HI MDM: CPT

## 2019-11-20 PROCEDURE — P9035: CPT

## 2019-11-20 PROCEDURE — 36430 TRANSFUSION BLD/BLD COMPNT: CPT

## 2019-11-20 PROCEDURE — 97161 PT EVAL LOW COMPLEX 20 MIN: CPT

## 2019-11-20 PROCEDURE — 86900 BLOOD TYPING SEROLOGIC ABO: CPT

## 2019-11-20 PROCEDURE — 82962 GLUCOSE BLOOD TEST: CPT

## 2019-11-20 PROCEDURE — 86850 RBC ANTIBODY SCREEN: CPT

## 2019-11-20 PROCEDURE — 36415 COLL VENOUS BLD VENIPUNCTURE: CPT

## 2019-11-20 PROCEDURE — 86901 BLOOD TYPING SEROLOGIC RH(D): CPT

## 2019-12-06 ENCOUNTER — INPATIENT (INPATIENT)
Facility: HOSPITAL | Age: 84
LOS: 4 days | Discharge: HOME | DRG: 645 | End: 2019-12-11
Attending: STUDENT IN AN ORGANIZED HEALTH CARE EDUCATION/TRAINING PROGRAM | Admitting: STUDENT IN AN ORGANIZED HEALTH CARE EDUCATION/TRAINING PROGRAM
Payer: MEDICARE

## 2019-12-06 VITALS
HEIGHT: 66 IN | OXYGEN SATURATION: 97 % | SYSTOLIC BLOOD PRESSURE: 129 MMHG | HEART RATE: 95 BPM | DIASTOLIC BLOOD PRESSURE: 70 MMHG | RESPIRATION RATE: 16 BRPM | WEIGHT: 169.98 LBS | TEMPERATURE: 98 F

## 2019-12-06 DIAGNOSIS — H26.9 UNSPECIFIED CATARACT: Chronic | ICD-10-CM

## 2019-12-06 DIAGNOSIS — Z98.890 OTHER SPECIFIED POSTPROCEDURAL STATES: Chronic | ICD-10-CM

## 2019-12-06 DIAGNOSIS — Z98.89 OTHER SPECIFIED POSTPROCEDURAL STATES: Chronic | ICD-10-CM

## 2019-12-06 LAB
ALBUMIN SERPL ELPH-MCNC: 3.6 G/DL — SIGNIFICANT CHANGE UP (ref 3.3–5)
ALP SERPL-CCNC: 62 U/L — SIGNIFICANT CHANGE UP (ref 40–120)
ALT FLD-CCNC: 27 U/L — SIGNIFICANT CHANGE UP (ref 10–45)
ANION GAP SERPL CALC-SCNC: 12 MMOL/L — SIGNIFICANT CHANGE UP (ref 5–17)
AST SERPL-CCNC: 25 U/L — SIGNIFICANT CHANGE UP (ref 10–40)
BASOPHILS # BLD AUTO: 0.02 K/UL — SIGNIFICANT CHANGE UP (ref 0–0.2)
BASOPHILS NFR BLD AUTO: 0.3 % — SIGNIFICANT CHANGE UP (ref 0–2)
BILIRUB SERPL-MCNC: 0.6 MG/DL — SIGNIFICANT CHANGE UP (ref 0.2–1.2)
BUN SERPL-MCNC: 17 MG/DL — SIGNIFICANT CHANGE UP (ref 7–23)
CALCIUM SERPL-MCNC: 9.5 MG/DL — SIGNIFICANT CHANGE UP (ref 8.4–10.5)
CHLORIDE SERPL-SCNC: 89 MMOL/L — LOW (ref 96–108)
CO2 SERPL-SCNC: 21 MMOL/L — LOW (ref 22–31)
CREAT SERPL-MCNC: 1.03 MG/DL — SIGNIFICANT CHANGE UP (ref 0.5–1.3)
EOSINOPHIL # BLD AUTO: 0.02 K/UL — SIGNIFICANT CHANGE UP (ref 0–0.5)
EOSINOPHIL NFR BLD AUTO: 0.3 % — SIGNIFICANT CHANGE UP (ref 0–6)
EXTRA BLUE TOP TUBE: SIGNIFICANT CHANGE UP
GLUCOSE SERPL-MCNC: 114 MG/DL — HIGH (ref 70–99)
HCT VFR BLD CALC: 35.8 % — LOW (ref 39–50)
HGB BLD-MCNC: 12.6 G/DL — LOW (ref 13–17)
IMM GRANULOCYTES NFR BLD AUTO: 0.7 % — SIGNIFICANT CHANGE UP (ref 0–1.5)
LIDOCAIN IGE QN: 62 U/L — HIGH (ref 7–60)
LYMPHOCYTES # BLD AUTO: 0.77 K/UL — LOW (ref 1–3.3)
LYMPHOCYTES # BLD AUTO: 11.3 % — LOW (ref 13–44)
MCHC RBC-ENTMCNC: 28.8 PG — SIGNIFICANT CHANGE UP (ref 27–34)
MCHC RBC-ENTMCNC: 35.2 GM/DL — SIGNIFICANT CHANGE UP (ref 32–36)
MCV RBC AUTO: 81.9 FL — SIGNIFICANT CHANGE UP (ref 80–100)
MONOCYTES # BLD AUTO: 0.81 K/UL — SIGNIFICANT CHANGE UP (ref 0–0.9)
MONOCYTES NFR BLD AUTO: 11.8 % — SIGNIFICANT CHANGE UP (ref 2–14)
NEUTROPHILS # BLD AUTO: 5.17 K/UL — SIGNIFICANT CHANGE UP (ref 1.8–7.4)
NEUTROPHILS NFR BLD AUTO: 75.6 % — SIGNIFICANT CHANGE UP (ref 43–77)
NRBC # BLD: 0 /100 WBCS — SIGNIFICANT CHANGE UP (ref 0–0)
PLATELET # BLD AUTO: 315 K/UL — SIGNIFICANT CHANGE UP (ref 150–400)
POTASSIUM SERPL-MCNC: 5 MMOL/L — SIGNIFICANT CHANGE UP (ref 3.5–5.3)
POTASSIUM SERPL-SCNC: 5 MMOL/L — SIGNIFICANT CHANGE UP (ref 3.5–5.3)
PROT SERPL-MCNC: 6.9 G/DL — SIGNIFICANT CHANGE UP (ref 6–8.3)
RBC # BLD: 4.37 M/UL — SIGNIFICANT CHANGE UP (ref 4.2–5.8)
RBC # FLD: 12.3 % — SIGNIFICANT CHANGE UP (ref 10.3–14.5)
SODIUM SERPL-SCNC: 122 MMOL/L — LOW (ref 135–145)
WBC # BLD: 6.84 K/UL — SIGNIFICANT CHANGE UP (ref 3.8–10.5)
WBC # FLD AUTO: 6.84 K/UL — SIGNIFICANT CHANGE UP (ref 3.8–10.5)

## 2019-12-06 PROCEDURE — 99222 1ST HOSP IP/OBS MODERATE 55: CPT | Mod: GC

## 2019-12-06 PROCEDURE — 99285 EMERGENCY DEPT VISIT HI MDM: CPT

## 2019-12-06 PROCEDURE — 74176 CT ABD & PELVIS W/O CONTRAST: CPT | Mod: 26

## 2019-12-06 RX ORDER — IOHEXOL 300 MG/ML
30 INJECTION, SOLUTION INTRAVENOUS ONCE
Refills: 0 | Status: COMPLETED | OUTPATIENT
Start: 2019-12-06 | End: 2019-12-06

## 2019-12-06 RX ORDER — SODIUM CHLORIDE 9 MG/ML
500 INJECTION INTRAMUSCULAR; INTRAVENOUS; SUBCUTANEOUS ONCE
Refills: 0 | Status: COMPLETED | OUTPATIENT
Start: 2019-12-06 | End: 2019-12-06

## 2019-12-06 RX ORDER — ENOXAPARIN SODIUM 100 MG/ML
40 INJECTION SUBCUTANEOUS EVERY 24 HOURS
Refills: 0 | Status: DISCONTINUED | OUTPATIENT
Start: 2019-12-06 | End: 2019-12-11

## 2019-12-06 RX ORDER — SODIUM CHLORIDE 9 MG/ML
1000 INJECTION INTRAMUSCULAR; INTRAVENOUS; SUBCUTANEOUS
Refills: 0 | Status: DISCONTINUED | OUTPATIENT
Start: 2019-12-06 | End: 2019-12-07

## 2019-12-06 RX ADMIN — IOHEXOL 30 MILLILITER(S): 300 INJECTION, SOLUTION INTRAVENOUS at 16:17

## 2019-12-06 RX ADMIN — SODIUM CHLORIDE 100 MILLILITER(S): 9 INJECTION INTRAMUSCULAR; INTRAVENOUS; SUBCUTANEOUS at 16:22

## 2019-12-06 RX ADMIN — SODIUM CHLORIDE 1000 MILLILITER(S): 9 INJECTION INTRAMUSCULAR; INTRAVENOUS; SUBCUTANEOUS at 18:36

## 2019-12-06 NOTE — CONSULT NOTE ADULT - ATTENDING COMMENTS
See and examined in ER.  Denies any abdominal complaints at the moment.  Just had a large BM while in ER.  No nausea or vomiting.  Passing flatus.  Abdominal symptoms resolved.  Hyponatremia.  No acute surgical intervention form general surgery at the moment.  Please recall us if any assistance is required.

## 2019-12-06 NOTE — ED PROVIDER NOTE - OBJECTIVE STATEMENT
Pt w/ PMhx CAD s/p PCI / CABG on plavix, HTN, HLD, BPH, DM, PSHx ocular surgery for ingrown eyelashes a few weeks ago w/ susequent bleeding and bruising (still present) p/w 3 day of intermittent diffuse abd pain, abd bloating, constipation. + nausea and dec appetite. No vomiting, diarrhea. + constipation. pt has been unable to move his bowels. denies rectal pain / pressure. Pt has given himself an enema w/o relief. Pt not on pain meds. Pt fell a few weeks ago but was evaluated at OSH at that time, had CT imaging at that time, w/o injuries, but admitted for hyponatremia

## 2019-12-06 NOTE — ED PROVIDER NOTE - CARE PLAN
Principal Discharge DX:	Hyponatremia  Secondary Diagnosis:	Constipation, unspecified constipation type

## 2019-12-06 NOTE — H&P ADULT - PROBLEM SELECTOR PLAN 3
Pt with hx of HTN, presented normotensive then slightly hypertensive prior to BM   CW losartan and Toprol at this time

## 2019-12-06 NOTE — H&P ADULT - HISTORY OF PRESENT ILLNESS
This is a 86 year old male with pmhx of CAD s/p PCI/CABG on plavix, HTN,    86M with PMH of CAD s/p PCI/CABG on plavix, HTN, HLD, BPH, DM, Prostate ca s/p TURP, recent ocular surgery, penile implants presents with     Pt w/ PMhx CAD s/p PCI / CABG on plavix, HTN, HLD, BPH, DM, PSHx ocular surgery for ingrown eyelashes a few weeks ago w/ susequent bleeding and bruising (still present) p/w 3 day of intermittent diffuse abd pain, abd bloating, constipation. + nausea and dec appetite. No vomiting, diarrhea. + constipation. pt has been unable to move his bowels. denies rectal pain / pressure. Pt has given himself an enema w/o relief. Pt not on pain meds. Pt fell a few weeks ago but was evaluated at OSH at that time, had CT imaging at that time, w/o injuries, but admitted for hyponatremia This is a 86 year old male with pmhx of CAD s/p PCI/CABG on plavix, HTN, HLD, BPH     86M with PMH of CAD s/p PCI/CABG on plavix, HTN, HLD, BPH, DM, Prostate ca s/p TURP, recent ocular surgery, penile implants presents with     Pt w/ PMhx CAD s/p PCI / CABG on plavix, HTN, HLD, BPH, DM, PSHx ocular surgery for ingrown eyelashes a few weeks ago w/ susequent bleeding and bruising (still present) p/w 3 day of intermittent diffuse abd pain, abd bloating, constipation. + nausea and dec appetite. No vomiting, diarrhea. + constipation. pt has been unable to move his bowels. denies rectal pain / pressure. Pt has given himself an enema w/o relief. Pt not on pain meds. Pt fell a few weeks ago but was evaluated at OSH at that time, had CT imaging at that time, w/o injuries, but admitted for hyponatremia This is a 86 year old male with pmhx of CAD s/p PCI/CABG on plavix, HTN, HLD, BPH, Prostate ca s/p TURP, recent ocular surgery, penile implants presents with abdominal pain and constipation. A few weeks ago, the patient underwent a lip lid for ingrown eyelashes. He was s/p b/l blepharoplasty (Dr. Toussaint) complicated by L periorbital hematoma and lateral canthotomy and transferred to [facility]  for L orbital washout on 11/08 and discharged on 11/11. He then went home, and one day was walking around when he fell onto his face. He states the fall was mechanical he believed but he went to [facility]. There CT scans were performed of his face and orbit      Pt w/ PMhx CAD s/p PCI / CABG on plavix, HTN, HLD, BPH, DM, PSHx ocular surgery for ingrown eyelashes a few weeks ago w/ susequent bleeding and bruising (still present) p/w 3 day of intermittent diffuse abd pain, abd bloating, constipation. + nausea and dec appetite. No vomiting, diarrhea. + constipation. pt has been unable to move his bowels. denies rectal pain / pressure. Pt has given himself an enema w/o relief. Pt not on pain meds. Pt fell a few weeks ago but was evaluated at OSH at that time, had CT imaging at that time, w/o injuries, but admitted for hyponatremia This is a 86 year old male with pmhx of CAD s/p PCI/CABG on plavix, HTN, HLD, BPH, Prostate ca s/p TURP, recent ocular surgery, penile implants presents with abdominal pain and constipation. A few weeks ago, the patient underwent a lip lid for ingrown eyelashes. He was s/p b/l blepharoplasty (Dr. Toussaint) complicated by L periorbital hematoma and lateral canthotomy and transferred to [facility]  for L orbital washout on 11/08 and discharged on 11/11. He then went home, and one day was walking around when he fell onto his face. He states the fall was mechanical he believed but he went to [facility]. There CT scans were performed of his face and orbit which were negative for acute pathology. Upon admission at [facility] his Na was 123. Renal was following and recommended fluid restriction, and after 4 days Na was 128 and he was discharged with outpt renal follow up on 10/22. The thought was possible 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s. He then went home and was in his general state of health when he noticed that over the last 3 days he has had abdominal pain which was cramping in nature diffusely but worse in lower quadrant, nausea, bloating, constipation. He tried an enema which didnt work. He denies any vomiting, melena, CP, fever, chills, SOB, cough, rhinorrhea, LE edema, urinary changes. He endorses passing gas. He presented to the hospital in the setting of constipation.     At [facility] ED: T 97.6, HR 95, /70, RR 16, SPO2 97%. Labs sig for 12.6 Hgb, Na 122, Cl 89, CO2 21. CT with There is a small bowel obstruction. The obstruction appears low-grade. The cause of obstruction could be adhesions. Severe atherosclerotic disease, including heavy coronary artery calcification. Large prostate. Surgery consulted however had large BM in interval and was then asx. Pt admitted for hyponatremia.

## 2019-12-06 NOTE — H&P ADULT - NSHPLABSRESULTS_GEN_ALL_CORE
.  LABS:                         12.6   6.84  )-----------( 315      ( 06 Dec 2019 16:15 )             35.8     12-06    122<L>  |  89<L>  |  17  ----------------------------<  114<H>  5.0   |  21<L>  |  1.03    Ca    9.5      06 Dec 2019 16:15    TPro  6.9  /  Alb  3.6  /  TBili  0.6  /  DBili  x   /  AST  25  /  ALT  27  /  AlkPhos  62  12-06                  RADIOLOGY, EKG & ADDITIONAL TESTS: Reviewed.

## 2019-12-06 NOTE — H&P ADULT - PROBLEM SELECTOR PLAN 6
Pt with hx of PCI and s/p CABG  NO active CP  FU EKG   CW plavix and statin Pt with hx of PCI and s/p CABG  NO active CP at this time   FU EKG   CW plavix and statin at this time

## 2019-12-06 NOTE — H&P ADULT - NSHPPHYSICALEXAM_GEN_ALL_CORE
.  VITAL SIGNS:  T(F): 98.3 (12-07-19 @ 00:04), Max: 98.3 (12-07-19 @ 00:04)  HR: 87 (12-07-19 @ 00:04) (87 - 95)  BP: 158/80 (12-07-19 @ 00:04) (129/70 - 158/80)  BP(mean): --  RR: 18 (12-07-19 @ 00:04) (16 - 18)  SpO2: 97% (12-07-19 @ 00:04) (97% - 97%)    PHYSICAL EXAM:    Constitutional: WDWN resting comfortably in bed; NAD  HEENT: NC/AT, PERRL, b/l ecchymotic regions under b/l eyes, nontender to touch   Neck: supple; no JVD or thyromegaly  Respiratory: CTA B/L; no W/R/R, no retractions  Cardiac: +S1/S2; RRR; no M/R/G; PMI non-displaced  Gastrointestinal: soft, NT/ND; no rebound or guarding; +BSx4, Suprapubic horizontal incision for penile implants. RUQ incision   Back: spine midline, no bony tenderness or step-offs; no CVAT B/L  Extremities: WWP, no clubbing or cyanosis; no peripheral edema  Musculoskeletal: NROM x4; no joint swelling, tenderness or erythema  Vascular: 2+ radial, DP/PT pulses B/L  Dermatologic: skin warm, dry and intact; no rashes, wounds, or scars  Lymphatic: no submandibular or cervical LAD  Neurologic: AAOx3; CNII-XII grossly intact; no focal deficits

## 2019-12-06 NOTE — ED PROVIDER NOTE - PHYSICAL EXAMINATION
Constitutional: Well appearing, well nourished, awake, alert, oriented to person, place, time/situation and in no apparent distress.  ENMT: Airway patent. Normal MM  Eyes: Clear bilaterally b/l infraorbital ecchymosis, present for weeks a/p family and s/p surgery  Cardiac: Normal rate, regular rhythm.  Heart sounds S1, S2.  No murmurs, rubs or gallops.  Respiratory: Breaths sounds equal and clear b/l. No increased WOB, tachypnea, hypoxia, or accessory mm use. Pt speaks in full sentences.   Gastrointestinal: Abd soft, ND, NABS. mild lower abd ttp b/l to deep palpation. No guarding, rebound, or rigidity. No pulsatile abdominal masses. No organomegaly appreciated. No CVAT   Rectal: no impaction. small amount of brown stool in the vault  Musculoskeletal: Range of motion is not limited  Neuro: Alert and oriented x 3, face symmetric and speech fluent. Strength 5/5 x 4 ext and symmetric, nml gross motor movement, nml gait. No focal deficits noted.  Skin: Skin normal color for race, warm, dry and intact. No evidence of rash.  Psych: Alert and oriented to person, place, time/situation. normal mood and affect. no apparent risk to self or others.

## 2019-12-06 NOTE — H&P ADULT - PROBLEM SELECTOR PLAN 4
Pt with severe BPH, not surgical candidate per outpatient notes   AT this time will continue with home medications to prevent urinary retention

## 2019-12-06 NOTE — H&P ADULT - ASSESSMENT
This is a 86 year old male with pmhx of CAD s/p PCI/CABG on plavix, HTN, HLD, BPH, Prostate ca s/p TURP, recent ocular surgery, penile implants presents with abdominal pain and constipation. Admitted for hyponatremia

## 2019-12-06 NOTE — CONSULT NOTE ADULT - ASSESSMENT
86M with PMH of CAD s/p PCI/CABG on plavix, HTN, HLD, BPH, DM, Prostate ca s/p TURP, recent ocular surgery, penile implants presents with 3 day history of lower abdominal pain with bloating, nausea and inability to defecate.    Plan 86M with PMH of CAD s/p PCI/CABG on plavix, HTN, HLD, BPH, DM, Prostate ca s/p TURP, recent ocular surgery, penile implants presents with 3 day history of lower abdominal pain with bloating, nausea and inability to defecate.    Interval history - pt felt a lot better, no nausea, had a BM and wants to go home    Plan  No acute surgical intervention  Medicine consult for hyponatraemia  Re-consult if needed.

## 2019-12-06 NOTE — CONSULT NOTE ADULT - SUBJECTIVE AND OBJECTIVE BOX
86M with PMH of CAD s/p PCI/CABG on plavix, HTN, HLD, BPH, DM, Prostate ca s/p TURP, recent ocular surgery, penile implants presents with 3 day history of lower abdominal pain with bloating, nausea and inability to defecate. Pain described as crampy pain, lower abd b/l, radiating across the abd, comes and goes, 7/10, associated with nausea and vomiting after drinking contrast. Pt denies fever, chills. Passing flatus this morning but no bowel movement for 3 days. Denies CP, SOB or palpitations, constipation or diarrhoea. Last colonoscopy 10 years ago pt unclear of the result.       PAST MEDICAL & SURGICAL HISTORY:  Essential hypertension: Hypertension  Type 2 diabetes mellitus: Diabetes  Hyperlipidemia: Hyperlipidemia  Type 2 diabetes mellitus: DM (diabetes mellitus), type 2  Malignant neoplasm of prostate: Prostate cancer  Acute myocardial infarction: Patient denies MI  S/p bilateral blepharoplasty  H/O transurethral destruction of bladder lesion  Bilateral cataracts  Status post aorto-coronary artery bypass graft: S/P CABG (coronary artery bypass graft)  Other postprocedural status: History of penile implant    Home Medications:  Clarinex 5 mg oral tablet: 1 tab(s) orally once a day (08 Sep 2016 09:18)  dorzolamide 2% ophthalmic solution: 1 drop(s) to each affected eye 3 times a day (08 Sep 2016 09:18)  fexofenadine 60 mg oral tablet: 1 tab(s) orally 2 times a day (08 Sep 2016 09:18)  finasteride 5 mg oral tablet: 1 tab(s) orally once a day (08 Sep 2016 09:18)  latanoprost 0.005% ophthalmic solution: 1 drop(s) to each affected eye once a day (in the evening) (08 Sep 2016 09:18)  loratadine 10 mg oral tablet: 1 tab(s) orally every 24 hours (09 Sep 2016 11:15)  tamsulosin 0.4 mg oral capsule: 1 cap(s) orally once a day (at bedtime) (11 Nov 2019 12:14)      Allergies    aspirin (Other)  NSAIDs (Unknown)  Originally Entered as [Rash-General] reaction to [Other][[place]] (Rash)  Originally Entered as [Rash-General] reaction to [Other][Ragweed] (Rash)  ragweed and birch (Unknown)    Intolerances        FAMILY HISTORY:  Denies    SH  Denies toxic habits    ROS: otherwise negative.    Vital Signs Last 24 Hrs  T(C): 36.4 (06 Dec 2019 15:05), Max: 36.4 (06 Dec 2019 15:05)  T(F): 97.6 (06 Dec 2019 15:05), Max: 97.6 (06 Dec 2019 15:05)  HR: 95 (06 Dec 2019 15:05) (95 - 95)  BP: 129/70 (06 Dec 2019 15:05) (129/70 - 129/70)  BP(mean): --  RR: 16 (06 Dec 2019 15:05) (16 - 16)  SpO2: 97% (06 Dec 2019 15:05) (97% - 97%)    I&O's Summary      Physical Exam:  General: NAD, resting comfortably in bed  HEENT: MMM  Pulmonary: nonlabored breathing, normal resp effort  Cardiovascular: RRR  Abdominal: soft, nontender, slightly distended, no rebound tenderness. Suprapubic horizontal incision for penile implants. RUQ incision unclear if it's from previous surgery  Extremities: WWP, no edema, no calf tenderness  Neuro: no focal deficits  Psych: pleasant    LABS:                        12.6   6.84  )-----------( 315      ( 06 Dec 2019 16:15 )             35.8     12-06    122<L>  |  89<L>  |  17  ----------------------------<  114<H>  5.0   |  21<L>  |  1.03    Ca    9.5      06 Dec 2019 16:15    TPro  6.9  /  Alb  3.6  /  TBili  0.6  /  DBili  x   /  AST  25  /  ALT  27  /  AlkPhos  62  12-06        CAPILLARY BLOOD GLUCOSE        LIVER FUNCTIONS - ( 06 Dec 2019 16:15 )  Alb: 3.6 g/dL / Pro: 6.9 g/dL / ALK PHOS: 62 U/L / ALT: 27 U/L / AST: 25 U/L / GGT: x             Cultures:      RADIOLOGY & ADDITIONAL STUDIES:  < from: CT Abdomen and Pelvis w/ Oral Cont (12.06.19 @ 18:39) >  Impression: 1. There is a small bowel obstruction. The obstruction   appears low-grade. The cause of obstruction could be adhesions.    2. Severe atherosclerotic disease, including heavy coronary artery   calcification.    3. Large prostate.      < end of copied text >

## 2019-12-06 NOTE — ED PROVIDER NOTE - PROGRESS NOTE DETAILS
Surgery consulted and will see the pt Pt had large BM. Pt seen in the ED by surgical team, attending Dr Chao - venessa. no obstruction. No surgical intervention. Continued hyponatremia, recent prior admission Na 128 on d/c. Will admit for further eval and continued hydration

## 2019-12-06 NOTE — ED ADULT NURSE NOTE - OBJECTIVE STATEMENT
pt is a 85 y/o male who came in to ED for evaluation of abdominal pain and constipation. Pt endorses mild nausea, concern of being obstructed. Pt has cardiac hx of stents and recent fall, noted bruising under eyes. Pt had CT scan previously.

## 2019-12-06 NOTE — ED ADULT TRIAGE NOTE - CHIEF COMPLAINT QUOTE
Patient c/o of abdominal pain and distention w/ no bowel movement X 3 days, no nausea/vomitting.  Went to PMD today and sent to ED to r/o bowel obstruction.

## 2019-12-06 NOTE — H&P ADULT - NSHPSOCIALHISTORY_GEN_ALL_CORE
Lives home alone  Sober for 31 years  No tobacco in 40 years   No illicit drugs   Walks with cane, performs ADLs

## 2019-12-06 NOTE — ED PROVIDER NOTE - NS ED ROS FT
Constitutional: No fever or chills.   Eyes: No pain, blurry vision, or discharge.  ENMT: No hearing changes, pain, discharge or infections. No neck pain or stiffness.  Cardiac: No chest pain, SOB or edema. No chest pain with exertion.  Respiratory: No cough or respiratory distress. No hemoptysis. No history of asthma or RAD.  GI: See HPI  : No dysuria, frequency or burning.  MS: No myalgia, muscle weakness, joint pain or back pain.  Neuro: No headache or weakness. No LOC.  Skin: No skin rash.   Endocrine: No history of thyroid disease or diabetes.  Except as documented in the HPI, all other systems are negative.

## 2019-12-06 NOTE — ED ADULT NURSE NOTE - NSIMPLEMENTINTERV_GEN_ALL_ED
Implemented All Fall Risk Interventions:  [place] to call system. Call bell, personal items and telephone within reach. Instruct patient to call for assistance. Room bathroom lighting operational. Non-slip footwear when patient is off stretcher. Physically safe environment: no spills, clutter or unnecessary equipment. Stretcher in lowest position, wheels locked, appropriate side rails in place. Provide visual cue, wrist band, yellow gown, etc. Monitor gait and stability. Monitor for mental status changes and reorient to person, place, and time. Review medications for side effects contributing to fall risk. Reinforce activity limits and safety measures with patient and family.

## 2019-12-06 NOTE — H&P ADULT - PROBLEM SELECTOR PLAN 1
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 as he is on escitalopram   At this time will:   Fluid restrict as he is euvolemic and assess Na   Follow up urine lytes for etiology of hyponatremia   Continue to monitor BMP  HOld escitalopram and lasix at this time (low liklihood of cause but will hold at this time and restart as tolerated) Patient presented from home with Na of 122  Was recently discharged from [place] with hyponatremia, presented at 123 and dc at 128, with the thought being 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 and was difficult to correct as patient was noncompliant with fluid restriction   Here, patient is euvolemic on exam   Unlikely polydipsia- he does not endorse increased water intake   Unlikely beer potomania as he has a wide range in his diet and it is heavy in soy sauce   Unclear if malignancy present however per collateral obtained from [place], CT head, face was negative for any pathology   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 as he is on escitalopram   At this time will:   Fluid restrict as he is euvolemic and assess Na   Follow up urine lytes for etiology of hyponatremia   Continue to monitor BMP  HOld escitalopram and lasix at this time (low liklihood of cause but will hold at this time and restart as tolerated)  AM cortisol  FU serum osm and urine osm   Renal consult in AM Patient presented from home with Na of 122  Was recently discharged from [place] with hyponatremia, presented at 123 and dc at 128, with the thought being 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 and was difficult to correct as patient was noncompliant with fluid restriction   Here, patient is euvolemic on exam   Unlikely polydipsia- he does not endorse increased water intake   Unlikely beer potomania as he has a wide range in his diet and it is heavy in soy sauce   Unclear if malignancy present however per collateral obtained from [place], CT head, face was negative for any pathology   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

## 2019-12-06 NOTE — ED PROVIDER NOTE - CLINICAL SUMMARY MEDICAL DECISION MAKING FREE TEXT BOX
Pt p/w constipation, int abd pain. Pt reports flatus, and no vomiting, but nausea and loss of appetite. No abd surgeries. DDx includes but not limited to constipation, partial SOB, diverticulitis, colitis, less likely intra-abd injury from fall weeks ago, other pathology. Check labs, CT a/p, IVF. Dispo pending w/u and clinical status

## 2019-12-07 DIAGNOSIS — Z29.9 ENCOUNTER FOR PROPHYLACTIC MEASURES, UNSPECIFIED: ICD-10-CM

## 2019-12-07 DIAGNOSIS — I25.10 ATHEROSCLEROTIC HEART DISEASE OF NATIVE CORONARY ARTERY WITHOUT ANGINA PECTORIS: ICD-10-CM

## 2019-12-07 DIAGNOSIS — I10 ESSENTIAL (PRIMARY) HYPERTENSION: ICD-10-CM

## 2019-12-07 DIAGNOSIS — S05.90XA UNSPECIFIED INJURY OF UNSPECIFIED EYE AND ORBIT, INITIAL ENCOUNTER: ICD-10-CM

## 2019-12-07 DIAGNOSIS — C61 MALIGNANT NEOPLASM OF PROSTATE: ICD-10-CM

## 2019-12-07 DIAGNOSIS — R46.89 OTHER SYMPTOMS AND SIGNS INVOLVING APPEARANCE AND BEHAVIOR: ICD-10-CM

## 2019-12-07 DIAGNOSIS — K59.00 CONSTIPATION, UNSPECIFIED: ICD-10-CM

## 2019-12-07 DIAGNOSIS — E78.5 HYPERLIPIDEMIA, UNSPECIFIED: ICD-10-CM

## 2019-12-07 DIAGNOSIS — K56.609 UNSPECIFIED INTESTINAL OBSTRUCTION, UNSPECIFIED AS TO PARTIAL VERSUS COMPLETE OBSTRUCTION: ICD-10-CM

## 2019-12-07 DIAGNOSIS — Z91.89 OTHER SPECIFIED PERSONAL RISK FACTORS, NOT ELSEWHERE CLASSIFIED: ICD-10-CM

## 2019-12-07 DIAGNOSIS — E87.1 HYPO-OSMOLALITY AND HYPONATREMIA: ICD-10-CM

## 2019-12-07 DIAGNOSIS — R63.8 OTHER SYMPTOMS AND SIGNS CONCERNING FOOD AND FLUID INTAKE: ICD-10-CM

## 2019-12-07 LAB
ANION GAP SERPL CALC-SCNC: 10 MMOL/L — SIGNIFICANT CHANGE UP (ref 5–17)
ANION GAP SERPL CALC-SCNC: 9 MMOL/L — SIGNIFICANT CHANGE UP (ref 5–17)
APPEARANCE UR: CLEAR — SIGNIFICANT CHANGE UP
BILIRUB UR-MCNC: NEGATIVE — SIGNIFICANT CHANGE UP
BUN SERPL-MCNC: 14 MG/DL — SIGNIFICANT CHANGE UP (ref 7–23)
BUN SERPL-MCNC: 16 MG/DL — SIGNIFICANT CHANGE UP (ref 7–23)
CALCIUM SERPL-MCNC: 9 MG/DL — SIGNIFICANT CHANGE UP (ref 8.4–10.5)
CALCIUM SERPL-MCNC: 9 MG/DL — SIGNIFICANT CHANGE UP (ref 8.4–10.5)
CHLORIDE SERPL-SCNC: 90 MMOL/L — LOW (ref 96–108)
CHLORIDE SERPL-SCNC: 94 MMOL/L — LOW (ref 96–108)
CO2 SERPL-SCNC: 21 MMOL/L — LOW (ref 22–31)
CO2 SERPL-SCNC: 21 MMOL/L — LOW (ref 22–31)
COLOR SPEC: YELLOW — SIGNIFICANT CHANGE UP
CORTIS AM PEAK SERPL-MCNC: 8.3 UG/DL — SIGNIFICANT CHANGE UP (ref 3.9–37.5)
CREAT SERPL-MCNC: 0.9 MG/DL — SIGNIFICANT CHANGE UP (ref 0.5–1.3)
CREAT SERPL-MCNC: 0.93 MG/DL — SIGNIFICANT CHANGE UP (ref 0.5–1.3)
DIFF PNL FLD: ABNORMAL
GLUCOSE SERPL-MCNC: 108 MG/DL — HIGH (ref 70–99)
GLUCOSE SERPL-MCNC: 123 MG/DL — HIGH (ref 70–99)
GLUCOSE UR QL: NEGATIVE — SIGNIFICANT CHANGE UP
HCT VFR BLD CALC: 35.5 % — LOW (ref 39–50)
HGB BLD-MCNC: 12.3 G/DL — LOW (ref 13–17)
KETONES UR-MCNC: NEGATIVE — SIGNIFICANT CHANGE UP
LEUKOCYTE ESTERASE UR-ACNC: NEGATIVE — SIGNIFICANT CHANGE UP
MAGNESIUM SERPL-MCNC: 1.7 MG/DL — SIGNIFICANT CHANGE UP (ref 1.6–2.6)
MCHC RBC-ENTMCNC: 28.5 PG — SIGNIFICANT CHANGE UP (ref 27–34)
MCHC RBC-ENTMCNC: 34.6 GM/DL — SIGNIFICANT CHANGE UP (ref 32–36)
MCV RBC AUTO: 82.4 FL — SIGNIFICANT CHANGE UP (ref 80–100)
NITRITE UR-MCNC: NEGATIVE — SIGNIFICANT CHANGE UP
NRBC # BLD: 0 /100 WBCS — SIGNIFICANT CHANGE UP (ref 0–0)
OSMOLALITY SERPL: 256 MOSMOL/KG — LOW (ref 280–301)
OSMOLALITY UR: 422 MOSM/KG — SIGNIFICANT CHANGE UP (ref 50–1200)
PH UR: 7 — SIGNIFICANT CHANGE UP (ref 5–8)
PLATELET # BLD AUTO: 310 K/UL — SIGNIFICANT CHANGE UP (ref 150–400)
POTASSIUM SERPL-MCNC: 4.6 MMOL/L — SIGNIFICANT CHANGE UP (ref 3.5–5.3)
POTASSIUM SERPL-MCNC: 4.8 MMOL/L — SIGNIFICANT CHANGE UP (ref 3.5–5.3)
POTASSIUM SERPL-SCNC: 4.6 MMOL/L — SIGNIFICANT CHANGE UP (ref 3.5–5.3)
POTASSIUM SERPL-SCNC: 4.8 MMOL/L — SIGNIFICANT CHANGE UP (ref 3.5–5.3)
PROT UR-MCNC: 30 MG/DL
RBC # BLD: 4.31 M/UL — SIGNIFICANT CHANGE UP (ref 4.2–5.8)
RBC # FLD: 12.3 % — SIGNIFICANT CHANGE UP (ref 10.3–14.5)
SODIUM SERPL-SCNC: 121 MMOL/L — LOW (ref 135–145)
SODIUM SERPL-SCNC: 124 MMOL/L — LOW (ref 135–145)
SP GR SPEC: 1.01 — SIGNIFICANT CHANGE UP (ref 1–1.03)
UROBILINOGEN FLD QL: 0.2 E.U./DL — SIGNIFICANT CHANGE UP
WBC # BLD: 5.06 K/UL — SIGNIFICANT CHANGE UP (ref 3.8–10.5)
WBC # FLD AUTO: 5.06 K/UL — SIGNIFICANT CHANGE UP (ref 3.8–10.5)

## 2019-12-07 PROCEDURE — 99233 SBSQ HOSP IP/OBS HIGH 50: CPT | Mod: GC

## 2019-12-07 RX ORDER — OXYBUTYNIN CHLORIDE 5 MG
5 TABLET ORAL THREE TIMES A DAY
Refills: 0 | Status: DISCONTINUED | OUTPATIENT
Start: 2019-12-07 | End: 2019-12-11

## 2019-12-07 RX ORDER — MULTIVIT WITH MIN/MFOLATE/K2 340-15/3 G
1 POWDER (GRAM) ORAL ONCE
Refills: 0 | Status: COMPLETED | OUTPATIENT
Start: 2019-12-07 | End: 2019-12-07

## 2019-12-07 RX ORDER — LOSARTAN POTASSIUM 100 MG/1
25 TABLET, FILM COATED ORAL DAILY
Refills: 0 | Status: DISCONTINUED | OUTPATIENT
Start: 2019-12-07 | End: 2019-12-11

## 2019-12-07 RX ORDER — DORZOLAMIDE HYDROCHLORIDE TIMOLOL MALEATE 20; 5 MG/ML; MG/ML
1 SOLUTION/ DROPS OPHTHALMIC
Refills: 0 | Status: DISCONTINUED | OUTPATIENT
Start: 2019-12-07 | End: 2019-12-11

## 2019-12-07 RX ORDER — SODIUM CHLORIDE 9 MG/ML
1 INJECTION INTRAMUSCULAR; INTRAVENOUS; SUBCUTANEOUS EVERY 12 HOURS
Refills: 0 | Status: DISCONTINUED | OUTPATIENT
Start: 2019-12-07 | End: 2019-12-07

## 2019-12-07 RX ORDER — FUROSEMIDE 40 MG
40 TABLET ORAL EVERY 12 HOURS
Refills: 0 | Status: DISCONTINUED | OUTPATIENT
Start: 2019-12-08 | End: 2019-12-08

## 2019-12-07 RX ORDER — TAMSULOSIN HYDROCHLORIDE 0.4 MG/1
0.4 CAPSULE ORAL AT BEDTIME
Refills: 0 | Status: DISCONTINUED | OUTPATIENT
Start: 2019-12-07 | End: 2019-12-11

## 2019-12-07 RX ORDER — BRIMONIDINE TARTRATE 2 MG/MG
1 SOLUTION/ DROPS OPHTHALMIC THREE TIMES A DAY
Refills: 0 | Status: DISCONTINUED | OUTPATIENT
Start: 2019-12-07 | End: 2019-12-11

## 2019-12-07 RX ORDER — ACETAMINOPHEN 500 MG
650 TABLET ORAL EVERY 6 HOURS
Refills: 0 | Status: DISCONTINUED | OUTPATIENT
Start: 2019-12-07 | End: 2019-12-11

## 2019-12-07 RX ORDER — FINASTERIDE 5 MG/1
5 TABLET, FILM COATED ORAL DAILY
Refills: 0 | Status: DISCONTINUED | OUTPATIENT
Start: 2019-12-07 | End: 2019-12-11

## 2019-12-07 RX ORDER — METOPROLOL TARTRATE 50 MG
25 TABLET ORAL DAILY
Refills: 0 | Status: DISCONTINUED | OUTPATIENT
Start: 2019-12-07 | End: 2019-12-11

## 2019-12-07 RX ORDER — SODIUM CHLORIDE 9 MG/ML
2 INJECTION INTRAMUSCULAR; INTRAVENOUS; SUBCUTANEOUS EVERY 12 HOURS
Refills: 0 | Status: DISCONTINUED | OUTPATIENT
Start: 2019-12-08 | End: 2019-12-08

## 2019-12-07 RX ORDER — ATORVASTATIN CALCIUM 80 MG/1
40 TABLET, FILM COATED ORAL AT BEDTIME
Refills: 0 | Status: DISCONTINUED | OUTPATIENT
Start: 2019-12-07 | End: 2019-12-11

## 2019-12-07 RX ORDER — CLOPIDOGREL BISULFATE 75 MG/1
75 TABLET, FILM COATED ORAL DAILY
Refills: 0 | Status: DISCONTINUED | OUTPATIENT
Start: 2019-12-07 | End: 2019-12-11

## 2019-12-07 RX ORDER — FUROSEMIDE 40 MG
40 TABLET ORAL DAILY
Refills: 0 | Status: DISCONTINUED | OUTPATIENT
Start: 2019-12-07 | End: 2019-12-07

## 2019-12-07 RX ORDER — SODIUM CHLORIDE 9 MG/ML
1 INJECTION INTRAMUSCULAR; INTRAVENOUS; SUBCUTANEOUS ONCE
Refills: 0 | Status: COMPLETED | OUTPATIENT
Start: 2019-12-07 | End: 2019-12-07

## 2019-12-07 RX ORDER — POLYETHYLENE GLYCOL 3350 17 G/17G
17 POWDER, FOR SOLUTION ORAL DAILY
Refills: 0 | Status: DISCONTINUED | OUTPATIENT
Start: 2019-12-07 | End: 2019-12-10

## 2019-12-07 RX ORDER — SENNA PLUS 8.6 MG/1
2 TABLET ORAL AT BEDTIME
Refills: 0 | Status: DISCONTINUED | OUTPATIENT
Start: 2019-12-07 | End: 2019-12-11

## 2019-12-07 RX ORDER — FUROSEMIDE 40 MG
20 TABLET ORAL DAILY
Refills: 0 | Status: DISCONTINUED | OUTPATIENT
Start: 2019-12-07 | End: 2019-12-07

## 2019-12-07 RX ADMIN — TAMSULOSIN HYDROCHLORIDE 0.4 MILLIGRAM(S): 0.4 CAPSULE ORAL at 22:49

## 2019-12-07 RX ADMIN — BRIMONIDINE TARTRATE 1 DROP(S): 2 SOLUTION/ DROPS OPHTHALMIC at 06:38

## 2019-12-07 RX ADMIN — Medication 650 MILLIGRAM(S): at 09:17

## 2019-12-07 RX ADMIN — CLOPIDOGREL BISULFATE 75 MILLIGRAM(S): 75 TABLET, FILM COATED ORAL at 11:03

## 2019-12-07 RX ADMIN — Medication 5 MILLIGRAM(S): at 14:03

## 2019-12-07 RX ADMIN — LOSARTAN POTASSIUM 25 MILLIGRAM(S): 100 TABLET, FILM COATED ORAL at 06:36

## 2019-12-07 RX ADMIN — SODIUM CHLORIDE 1 GRAM(S): 9 INJECTION INTRAMUSCULAR; INTRAVENOUS; SUBCUTANEOUS at 17:05

## 2019-12-07 RX ADMIN — DORZOLAMIDE HYDROCHLORIDE TIMOLOL MALEATE 1 DROP(S): 20; 5 SOLUTION/ DROPS OPHTHALMIC at 17:05

## 2019-12-07 RX ADMIN — Medication 1 BOTTLE: at 17:05

## 2019-12-07 RX ADMIN — Medication 650 MILLIGRAM(S): at 09:45

## 2019-12-07 RX ADMIN — ENOXAPARIN SODIUM 40 MILLIGRAM(S): 100 INJECTION SUBCUTANEOUS at 06:36

## 2019-12-07 RX ADMIN — Medication 5 MILLIGRAM(S): at 22:49

## 2019-12-07 RX ADMIN — Medication 40 MILLIGRAM(S): at 17:05

## 2019-12-07 RX ADMIN — POLYETHYLENE GLYCOL 3350 17 GRAM(S): 17 POWDER, FOR SOLUTION ORAL at 11:03

## 2019-12-07 RX ADMIN — FINASTERIDE 5 MILLIGRAM(S): 5 TABLET, FILM COATED ORAL at 11:03

## 2019-12-07 RX ADMIN — Medication 5 MILLIGRAM(S): at 06:36

## 2019-12-07 RX ADMIN — ATORVASTATIN CALCIUM 40 MILLIGRAM(S): 80 TABLET, FILM COATED ORAL at 22:49

## 2019-12-07 RX ADMIN — SODIUM CHLORIDE 1 GRAM(S): 9 INJECTION INTRAMUSCULAR; INTRAVENOUS; SUBCUTANEOUS at 18:32

## 2019-12-07 RX ADMIN — BRIMONIDINE TARTRATE 1 DROP(S): 2 SOLUTION/ DROPS OPHTHALMIC at 14:04

## 2019-12-07 RX ADMIN — DORZOLAMIDE HYDROCHLORIDE TIMOLOL MALEATE 1 DROP(S): 20; 5 SOLUTION/ DROPS OPHTHALMIC at 06:38

## 2019-12-07 RX ADMIN — Medication 25 MILLIGRAM(S): at 06:36

## 2019-12-07 NOTE — CONSULT NOTE ADULT - ASSESSMENT
A/p  85 y/o M with PMHx of CAD s/p CABG, HTN, BPH and prostate Ca s/p TURP currently admitted for abdominal pain and constipation, nephrology consulted for Hyponatremia.

## 2019-12-07 NOTE — CONSULT NOTE ADULT - SUBJECTIVE AND OBJECTIVE BOX
HPI:  85 y/o m with pmhx of CAD s/p PCI/CABG, HTN, BPH, Prostate ca s/p TURP admitted for abdominal pain and constipation. nephrology consulted for hyponatremia, patient had a recent admission in [place] last week after sustaining a mechanical fall, he was followed by Renal there as his sNa was 123 upon admission with impression of SIADH( unclear etiology) his Na improved to 128 with fluid restriction in 4 days and he was discharged home with plan to continue with fluid restriction and nephrology follow up  currently his Na is down to 122/ he is asymptomatic and mentally intact, lives alone and does not exactly know his meds, he is on Lexapro 10 mg which he does not even know he is taking it/ also is taking 20 mg Laix, his Kidney function is almost intact. his bp is 160/70 mmhg and appears euvolemic on exam.    ROS: as above      Allergies and Intolerances:        Allergies:  	aspirin: Drug, Other, GI BLEED  	NSAIDs: Drug Category, Unknown  	ragweed and birch: Miscellaneous, Unknown, ragweed and birch    Home Medications:   not reconciled     Patient History:    Past Medical, Past Surgical, and Family History:  PAST MEDICAL HISTORY:  Acute myocardial infarction Patient denies MI  Essential hypertension   Hyperlipidemia  Malignant neoplasm of prostate Prostate cancer  Type 2 diabetes mellitus DM (diabetes mellitus), type 2    PAST SURGICAL HISTORY:  Bilateral cataracts   H/O transurethral destruction of bladder lesion   History of penile implant  S/p bilateral blepharoplasty   CABG     Social History:  Social History (marital status, living situation, occupation, tobacco use, alcohol and drug use, and sexual history): Lives home alone, Sober 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 years, No tobacco in 40 years   No illicit drugs 	    LABS:                         12.3   5.06  )-----------( 310      ( 07 Dec 2019 10:55 )             35.5     12-    121<L>  |  90<L>  |  14  ----------------------------<  123<H>  4.6   |  21<L>  |  0.90    Ca    9.0      07 Dec 2019 10:55  Mg     1.7     12-    TPro  6.9  /  Alb  3.6  /  TBili  0.6  /  DBili  x   /  AST  25  /  ALT  27  /  AlkPhos  62  12-      Urinalysis Basic - ( 07 Dec 2019 02:01 )    Color: Yellow / Appearance: Clear / S.015 / pH: x  Gluc: x / Ketone: NEGATIVE  / Bili: Negative / Urobili: 0.2 E.U./dL   Blood: x / Protein: 30 mg/dL / Nitrite: NEGATIVE   Leuk Esterase: NEGATIVE / RBC: < 5 /HPF / WBC < 5 /HPF   Sq Epi: x / Non Sq Epi: 0-5 /HPF / Bacteria: None /HPF    RADIOLOGY, EKG & ADDITIONAL TESTS: Reviewed. HPI:  85 y/o m with pmhx of CAD s/p PCI/CABG, HTN, BPH, Prostate ca s/p TURP admitted for abdominal pain and constipation. nephrology consulted for hyponatremia, patient had a recent admission in [place] last week after sustaining a mechanical fall, he was followed by Renal there as his sNa was 123 upon admission with impression of SIADH( unclear etiology) his Na improved to 128 with fluid restriction in 4 days and he was discharged home with plan to continue with fluid restriction and nephrology follow up  currently his Na is down to 122/ he is asymptomatic and mentally intact, lives alone and does not exactly know his meds, he is on Lexapro 10 mg which he does not even know he is taking it/ also is taking 20 mg Laix, his Kidney function is almost intact. his bp is 160/70 mmhg and appears euvolemic on exam.    ROS: as above      Allergies and Intolerances:        Allergies:  	aspirin: Drug, Other, GI BLEED  	NSAIDs: Drug Category, Unknown  	ragweed and birch: Miscellaneous, Unknown, ragweed and birch    Home Medications:   not reconciled     Patient History:    Past Medical, Past Surgical, and Family History:  PAST MEDICAL HISTORY:  Acute myocardial infarction Patient denies MI  Essential hypertension   Hyperlipidemia  Malignant neoplasm of prostate Prostate cancer  Type 2 diabetes mellitus DM (diabetes mellitus), type 2    PAST SURGICAL HISTORY:  Bilateral cataracts   H/O transurethral destruction of bladder lesion   History of penile implant  S/p bilateral blepharoplasty   CABG     Social History:  Social History (marital status, living situation, occupation, tobacco use, alcohol and drug use, and sexual history): Lives home alone, Sober for 31 years, No tobacco in 40 years   No illicit drugs 	    Physical exam:  General: A&Ox 3; NAD  Neck:no JVD  Respiratory: CTA B/L  Cardiovascular: Regular rhythm/rate; S1/S2, no MGR  Gastrointestinal: Soft; distended mildly tender, no rebound or guarding   Extremities: WWP; no edema   Neurological: non focal    LABS:                         12.3   5.06  )-----------( 310      ( 07 Dec 2019 10:55 )             35.5     12-    121<L>  |  90<L>  |  14  ----------------------------<  123<H>  4.6   |  21<L>  |  0.90    Ca    9.0      07 Dec 2019 10:55  Mg     1.7         TPro  6.9  /  Alb  3.6  /  TBili  0.6  /  DBili  x   /  AST  25  /  ALT  27  /  AlkPhos  62        Urinalysis Basic - ( 07 Dec 2019 02:01 )    Color: Yellow / Appearance: Clear / S.015 / pH: x  Gluc: x / Ketone: NEGATIVE  / Bili: Negative / Urobili: 0.2 E.U./dL   Blood: x / Protein: 30 mg/dL / Nitrite: NEGATIVE   Leuk Esterase: NEGATIVE / RBC: < 5 /HPF / WBC < 5 /HPF   Sq Epi: x / Non Sq Epi: 0-5 /HPF / Bacteria: None /HPF    RADIOLOGY, EKG & ADDITIONAL TESTS: Reviewed.

## 2019-12-07 NOTE — PROGRESS NOTE ADULT - SUBJECTIVE AND OBJECTIVE BOX
Patient is a 86y old  Male who presents with a chief complaint of Hyponatremia (07 Dec 2019 18:18)      INTERVAL HPI/OVERNIGHT EVENTS:    feels better  wants to leave  no abd pain, n/v, but does feel constipated still      ROS  (- ) headache  ( -  )fevers/chills  ( - ) dyspnea  (  - ) cough  (  - ) chest pain  (  - ) palpatations  ( - ) dizziness/lightheadedness  (  - ) nausea/vomiting  (  - ) abd pain  (  - ) diarrhea  (  - ) melena  (  - ) hematochezia  (  - ) dysuria   ( - ) hematuria     ROS: 12 point review of systems otherwise negative              MEDICATIONS  (STANDING):  atorvastatin 40 milliGRAM(s) Oral at bedtime  brimonidine 0.2% Ophthalmic Solution 1 Drop(s) Both EYES three times a day  clopidogrel Tablet 75 milliGRAM(s) Oral daily  dorzolamide 2%/timolol 0.5% Ophthalmic Solution 1 Drop(s) Both EYES two times a day  enoxaparin Injectable 40 milliGRAM(s) SubCutaneous every 24 hours  finasteride 5 milliGRAM(s) Oral daily  losartan 25 milliGRAM(s) Oral daily  metoprolol succinate ER 25 milliGRAM(s) Oral daily  oxybutynin 5 milliGRAM(s) Oral three times a day  polyethylene glycol 3350 17 Gram(s) Oral daily  senna 2 Tablet(s) Oral at bedtime  tamsulosin 0.4 milliGRAM(s) Oral at bedtime    MEDICATIONS  (PRN):  acetaminophen   Tablet .. 650 milliGRAM(s) Oral every 6 hours PRN Moderate Pain (4 - 6)      Allergies    aspirin (Other)  NSAIDs (Unknown)  Originally Entered as [Rash-General] reaction to [Other][[place]] (Rash)  Originally Entered as [Rash-General] reaction to [Other][Ragweed] (Rash)  ragweed and birch (Unknown)    Intolerances          Vital Signs Last 24 Hrs  T(C): 36.6 (07 Dec 2019 11:10), Max: 36.8 (07 Dec 2019 00:04)  T(F): 97.8 (07 Dec 2019 11:10), Max: 98.3 (07 Dec 2019 00:04)  HR: 73 (07 Dec 2019 11:10) (73 - 88)  BP: 162/77 (07 Dec 2019 11:10) (158/80 - 166/80)  BP(mean): --  RR: 16 (07 Dec 2019 11:10) (16 - 18)  SpO2: 96% (07 Dec 2019 11:10) (96% - 99%)  CAPILLARY BLOOD GLUCOSE      POCT Blood Glucose.: 122 mg/dL (07 Dec 2019 17:16)  POCT Blood Glucose.: 111 mg/dL (07 Dec 2019 12:03)       @ 07:01  -   @ 07:00  --------------------------------------------------------  IN: 240 mL / OUT: 0 mL / NET: 240 mL        Physical Exam:    Daily Height in cm: 167.64 (07 Dec 2019 03:17)    Daily Weight in k.2 (07 Dec 2019 03:17)  General:  Well appearing   HEENT:  periorbital ecchymosis, Nonicteric, PERRLA, oral pharynx w/o erythema/exudate,  neck supple  CV:  [id number] , RRR,  no JVD  Lungs: rales at bases bilat, breathing comfortably, no w/r   Abdomen:  positive BS, Soft, non-tender, non distended, no hepatosplenomegaly  Extremities:  2+ DP/radial pulses b/l, no cyanosis, no edema  Back: no cva or midline tenderness  Skin:  Warm and dry   :  No laguna  Neuro:  AAOx3,  CN II-XII grossly intact, 5/5 str all 4 ext, sensation intact, (-) dysmetria b/l (-) dysdiadochokinesia bl  No Restraints    LABS:                        12.3   5.06  )-----------( 310      ( 07 Dec 2019 10:55 )             35.5         121<L>  |  90<L>  |  14  ----------------------------<  123<H>  4.6   |  21<L>  |  0.90    Ca    9.0      07 Dec 2019 10:55  Mg     1.7         TPro  6.9  /  Alb  3.6  /  TBili  0.6  /  DBili  x   /  AST  25  /  ALT  27  /  AlkPhos  62  12-06      Urinalysis Basic - ( 07 Dec 2019 02:01 )    Color: Yellow / Appearance: Clear / S.015 / pH: x  Gluc: x / Ketone: NEGATIVE  / Bili: Negative / Urobili: 0.2 E.U./dL   Blood: x / Protein: 30 mg/dL / Nitrite: NEGATIVE   Leuk Esterase: NEGATIVE / RBC: < 5 /HPF / WBC < 5 /HPF   Sq Epi: x / Non Sq Epi: 0-5 /HPF / Bacteria: None /HPF          RADIOLOGY & ADDITIONAL TESTS:

## 2019-12-07 NOTE — CONSULT NOTE ADULT - PROBLEM SELECTOR RECOMMENDATION 9
based on urine Na and Osm, cw SIADH  ? Hx of SIADH with recent admission at [place]  unclear cause, could be from SSRI, currently on hold/ TSH WNL/ not on thiazides   - cw fluid restriction 1 L per day  - please start lasix 40 mg PO BID and Salt tabs 2g BID  - repeat pm labs  - please obtain psychiatry consult if plan to hold lexapro     will follow

## 2019-12-07 NOTE — BEHAVIORAL HEALTH ASSESSMENT NOTE - NSBHCHARTREVIEWVS_PSY_A_CORE FT
Vital Signs Last 24 Hrs  T(C): 36.8 (08 Dec 2019 05:15), Max: 36.8 (08 Dec 2019 05:15)  T(F): 98.2 (08 Dec 2019 05:15), Max: 98.2 (08 Dec 2019 05:15)  HR: 78 (08 Dec 2019 05:15) (58 - 78)  BP: 169/81 (08 Dec 2019 05:15) (146/70 - 169/81)  BP(mean): --  RR: 17 (08 Dec 2019 05:15) (16 - 17)  SpO2: 98% (08 Dec 2019 05:15) (98% - 98%)

## 2019-12-07 NOTE — BEHAVIORAL HEALTH ASSESSMENT NOTE - HPI (INCLUDE ILLNESS QUALITY, SEVERITY, DURATION, TIMING, CONTEXT, MODIFYING FACTORS, ASSOCIATED SIGNS AND SYMPTOMS)
Patient is an 86 year old domiciled, , male with no past psych hx, no IPP/SA hx, extensive medical history including CAD s/p PCI/CABG on plavix, HTN, HLD, BPH, Prostate ca s/p TURP, recent ocular surgery, recent fall causing injury to face admitted to the hospital for hyponatremia. Psychiatry consult called for evaluation of suicidality. Upon approach patient is calm, cooperative lying down in hospital bed. Patient reports that he "doesn't know how I fell." Patient reports that he lives alone and cares for himself. Patient reports that he receives meals "once a day from a service." Patient reports that his son is involved with his care but does not see him daily. Patient also reports that his girlfriend passed a number of years ago patient has been feeling "lonely, hopeless and sad." Patient denies any active or passive SI. Patient denies any changes in sleep and/or appetite. Patient reports that he feels that he often has mood swings. He reports that one minute he is calm and the next he can become angry and has been having trouble being patient with things. Patient denies any episodes of grandiosity, decreased need for sleep and increased risk taking behavior. Patient denies any HI/AVH. Patient reports that in past about 20 years ago he saw a psychiatrist outpatient but did not follow up regularly. Patient denies any substance use.

## 2019-12-07 NOTE — BEHAVIORAL HEALTH ASSESSMENT NOTE - SUMMARY
Patient is an 86 year old male with no reported past psych history, home meds of Lexapro 10 mg q24, extensive medical conditions, admitted to the hospital for hyponatremia and seen by Psychiatry CL for evaluation of suicidality. Upon evaluation although patient denies any SI, patient does report depressive symptoms such as low mood, hopelessness and mood swings. Upon exam, patient is linear, congruent with no clear evidence for thought/perceptual abnormalities. It is likely that patient's mood swings may be secondary to anxiety given active medical issues. Patient may benefit from increasing SSRI dose to target anxiety symptoms once hyponatremia/underlying medical issues resolve. C/L team to follow up patient, collateral information needed from son.

## 2019-12-07 NOTE — PROGRESS NOTE ADULT - PROBLEM SELECTOR PLAN 1
suspect siadh  fluid restrict  d/w nephro - lasix + salt tabs  trend na  will need collateral re: anti-depressant meds

## 2019-12-08 DIAGNOSIS — F41.9 ANXIETY DISORDER, UNSPECIFIED: ICD-10-CM

## 2019-12-08 LAB
ANION GAP SERPL CALC-SCNC: 10 MMOL/L — SIGNIFICANT CHANGE UP (ref 5–17)
ANION GAP SERPL CALC-SCNC: 12 MMOL/L — SIGNIFICANT CHANGE UP (ref 5–17)
ANION GAP SERPL CALC-SCNC: 12 MMOL/L — SIGNIFICANT CHANGE UP (ref 5–17)
BUN SERPL-MCNC: 14 MG/DL — SIGNIFICANT CHANGE UP (ref 7–23)
BUN SERPL-MCNC: 19 MG/DL — SIGNIFICANT CHANGE UP (ref 7–23)
BUN SERPL-MCNC: 23 MG/DL — SIGNIFICANT CHANGE UP (ref 7–23)
CALCIUM SERPL-MCNC: 8.8 MG/DL — SIGNIFICANT CHANGE UP (ref 8.4–10.5)
CALCIUM SERPL-MCNC: 9.2 MG/DL — SIGNIFICANT CHANGE UP (ref 8.4–10.5)
CALCIUM SERPL-MCNC: 9.2 MG/DL — SIGNIFICANT CHANGE UP (ref 8.4–10.5)
CHLORIDE SERPL-SCNC: 94 MMOL/L — LOW (ref 96–108)
CHLORIDE SERPL-SCNC: 96 MMOL/L — SIGNIFICANT CHANGE UP (ref 96–108)
CHLORIDE SERPL-SCNC: 98 MMOL/L — SIGNIFICANT CHANGE UP (ref 96–108)
CO2 SERPL-SCNC: 20 MMOL/L — LOW (ref 22–31)
CO2 SERPL-SCNC: 22 MMOL/L — SIGNIFICANT CHANGE UP (ref 22–31)
CO2 SERPL-SCNC: 22 MMOL/L — SIGNIFICANT CHANGE UP (ref 22–31)
CREAT SERPL-MCNC: 0.95 MG/DL — SIGNIFICANT CHANGE UP (ref 0.5–1.3)
CREAT SERPL-MCNC: 1.08 MG/DL — SIGNIFICANT CHANGE UP (ref 0.5–1.3)
CREAT SERPL-MCNC: 1.16 MG/DL — SIGNIFICANT CHANGE UP (ref 0.5–1.3)
GLUCOSE BLDC GLUCOMTR-MCNC: 121 MG/DL — HIGH (ref 70–99)
GLUCOSE BLDC GLUCOMTR-MCNC: 122 MG/DL — HIGH (ref 70–99)
GLUCOSE BLDC GLUCOMTR-MCNC: 122 MG/DL — HIGH (ref 70–99)
GLUCOSE SERPL-MCNC: 110 MG/DL — HIGH (ref 70–99)
GLUCOSE SERPL-MCNC: 119 MG/DL — HIGH (ref 70–99)
GLUCOSE SERPL-MCNC: 129 MG/DL — HIGH (ref 70–99)
HCT VFR BLD CALC: 35 % — LOW (ref 39–50)
HGB BLD-MCNC: 12 G/DL — LOW (ref 13–17)
MAGNESIUM SERPL-MCNC: 2 MG/DL — SIGNIFICANT CHANGE UP (ref 1.6–2.6)
MCHC RBC-ENTMCNC: 28.5 PG — SIGNIFICANT CHANGE UP (ref 27–34)
MCHC RBC-ENTMCNC: 34.3 GM/DL — SIGNIFICANT CHANGE UP (ref 32–36)
MCV RBC AUTO: 83.1 FL — SIGNIFICANT CHANGE UP (ref 80–100)
NRBC # BLD: 0 /100 WBCS — SIGNIFICANT CHANGE UP (ref 0–0)
OSMOLALITY SERPL: 256 MOSMOL/KG — LOW (ref 280–301)
PLATELET # BLD AUTO: 312 K/UL — SIGNIFICANT CHANGE UP (ref 150–400)
POTASSIUM SERPL-MCNC: 4.1 MMOL/L — SIGNIFICANT CHANGE UP (ref 3.5–5.3)
POTASSIUM SERPL-MCNC: 4.6 MMOL/L — SIGNIFICANT CHANGE UP (ref 3.5–5.3)
POTASSIUM SERPL-MCNC: 4.7 MMOL/L — SIGNIFICANT CHANGE UP (ref 3.5–5.3)
POTASSIUM SERPL-SCNC: 4.1 MMOL/L — SIGNIFICANT CHANGE UP (ref 3.5–5.3)
POTASSIUM SERPL-SCNC: 4.6 MMOL/L — SIGNIFICANT CHANGE UP (ref 3.5–5.3)
POTASSIUM SERPL-SCNC: 4.7 MMOL/L — SIGNIFICANT CHANGE UP (ref 3.5–5.3)
RBC # BLD: 4.21 M/UL — SIGNIFICANT CHANGE UP (ref 4.2–5.8)
RBC # FLD: 12.4 % — SIGNIFICANT CHANGE UP (ref 10.3–14.5)
SODIUM SERPL-SCNC: 126 MMOL/L — LOW (ref 135–145)
SODIUM SERPL-SCNC: 130 MMOL/L — LOW (ref 135–145)
SODIUM SERPL-SCNC: 130 MMOL/L — LOW (ref 135–145)
WBC # BLD: 4.14 K/UL — SIGNIFICANT CHANGE UP (ref 3.8–10.5)
WBC # FLD AUTO: 4.14 K/UL — SIGNIFICANT CHANGE UP (ref 3.8–10.5)

## 2019-12-08 PROCEDURE — 99222 1ST HOSP IP/OBS MODERATE 55: CPT | Mod: GC

## 2019-12-08 PROCEDURE — 99233 SBSQ HOSP IP/OBS HIGH 50: CPT | Mod: GC

## 2019-12-08 RX ADMIN — BRIMONIDINE TARTRATE 1 DROP(S): 2 SOLUTION/ DROPS OPHTHALMIC at 14:41

## 2019-12-08 RX ADMIN — LOSARTAN POTASSIUM 25 MILLIGRAM(S): 100 TABLET, FILM COATED ORAL at 06:27

## 2019-12-08 RX ADMIN — Medication 25 MILLIGRAM(S): at 06:27

## 2019-12-08 RX ADMIN — SENNA PLUS 2 TABLET(S): 8.6 TABLET ORAL at 23:00

## 2019-12-08 RX ADMIN — CLOPIDOGREL BISULFATE 75 MILLIGRAM(S): 75 TABLET, FILM COATED ORAL at 12:21

## 2019-12-08 RX ADMIN — SODIUM CHLORIDE 2 GRAM(S): 9 INJECTION INTRAMUSCULAR; INTRAVENOUS; SUBCUTANEOUS at 06:27

## 2019-12-08 RX ADMIN — FINASTERIDE 5 MILLIGRAM(S): 5 TABLET, FILM COATED ORAL at 12:21

## 2019-12-08 RX ADMIN — DORZOLAMIDE HYDROCHLORIDE TIMOLOL MALEATE 1 DROP(S): 20; 5 SOLUTION/ DROPS OPHTHALMIC at 17:46

## 2019-12-08 RX ADMIN — DORZOLAMIDE HYDROCHLORIDE TIMOLOL MALEATE 1 DROP(S): 20; 5 SOLUTION/ DROPS OPHTHALMIC at 07:35

## 2019-12-08 RX ADMIN — Medication 650 MILLIGRAM(S): at 09:47

## 2019-12-08 RX ADMIN — BRIMONIDINE TARTRATE 1 DROP(S): 2 SOLUTION/ DROPS OPHTHALMIC at 00:08

## 2019-12-08 RX ADMIN — TAMSULOSIN HYDROCHLORIDE 0.4 MILLIGRAM(S): 0.4 CAPSULE ORAL at 23:00

## 2019-12-08 RX ADMIN — Medication 5 MILLIGRAM(S): at 14:42

## 2019-12-08 RX ADMIN — BRIMONIDINE TARTRATE 1 DROP(S): 2 SOLUTION/ DROPS OPHTHALMIC at 06:27

## 2019-12-08 RX ADMIN — Medication 5 MILLIGRAM(S): at 06:28

## 2019-12-08 RX ADMIN — Medication 5 MILLIGRAM(S): at 23:00

## 2019-12-08 RX ADMIN — BRIMONIDINE TARTRATE 1 DROP(S): 2 SOLUTION/ DROPS OPHTHALMIC at 23:00

## 2019-12-08 RX ADMIN — Medication 40 MILLIGRAM(S): at 06:27

## 2019-12-08 RX ADMIN — ATORVASTATIN CALCIUM 40 MILLIGRAM(S): 80 TABLET, FILM COATED ORAL at 23:00

## 2019-12-08 RX ADMIN — ENOXAPARIN SODIUM 40 MILLIGRAM(S): 100 INJECTION SUBCUTANEOUS at 06:27

## 2019-12-08 RX ADMIN — Medication 650 MILLIGRAM(S): at 08:37

## 2019-12-08 NOTE — PROGRESS NOTE ADULT - PROBLEM SELECTOR PLAN 1
cw SIADH  ? Hx of SIADH with recent admission at [place]  unclear cause, could be from SSRI, currently on hold/ TSH WNL/ not on thiazides   - cw fluid restriction 1 L per day  - please stop lasix and salt tabs and obtain urine Na and Urine osm  - please obtain psychiatry consult if plan to hold lexapro     will follow

## 2019-12-08 NOTE — PROGRESS NOTE ADULT - PROBLEM SELECTOR PLAN 1
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 Patient presented from home with Na of 122  Was recently discharged from [facility] with hyponatremia, presented at 123 and dc at 128, with the thought being 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 and was difficult to correct as patient was noncompliant with fluid restriction   Here, patient is euvolemic on exam   Unlikely polydipsia- he does not endorse increased water intake   Unlikely beer potomania as he has a wide range in his diet and it is heavy in soy sauce   Unclear if malignancy present however per collateral obtained from [place], CT head, face was negative for any pathology   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 Placed on 1:1 observations   -Pyjerman consulted, may need to increase dose of SSRI, add back SSRI once hyponatremia resolves

## 2019-12-08 NOTE — PROGRESS NOTE ADULT - SUBJECTIVE AND OBJECTIVE BOX
OVERNIGHT EVENTS:    SUBJECTIVE / INTERVAL HPI: Patient seen and examined at bedside.     VITAL SIGNS:  Vital Signs Last 24 Hrs  T(C): 36.8 (08 Dec 2019 05:15), Max: 36.8 (08 Dec 2019 05:15)  T(F): 98.2 (08 Dec 2019 05:15), Max: 98.2 (08 Dec 2019 05:15)  HR: 78 (08 Dec 2019 05:15) (58 - 78)  BP: 169/81 (08 Dec 2019 05:15) (146/70 - 169/81)  BP(mean): --  RR: 17 (08 Dec 2019 05:15) (16 - 17)  SpO2: 98% (08 Dec 2019 05:15) (96% - 99%)    PHYSICAL EXAM:    General: WDWN  HEENT: NC/AT; PERRL, anicteric sclera; MMM  Neck: supple  Cardiovascular: +S1/S2; RRR  Respiratory: CTA B/L; no W/R/R  Gastrointestinal: soft, NT/ND; +BSx4  Extremities: WWP; no edema, clubbing or cyanosis  Vascular: 2+ radial, DP/PT pulses B/L  Neurological: AAOx3; no focal deficits    MEDICATIONS:  MEDICATIONS  (STANDING):  atorvastatin 40 milliGRAM(s) Oral at bedtime  brimonidine 0.2% Ophthalmic Solution 1 Drop(s) Both EYES three times a day  clopidogrel Tablet 75 milliGRAM(s) Oral daily  dorzolamide 2%/timolol 0.5% Ophthalmic Solution 1 Drop(s) Both EYES two times a day  enoxaparin Injectable 40 milliGRAM(s) SubCutaneous every 24 hours  finasteride 5 milliGRAM(s) Oral daily  furosemide    Tablet 40 milliGRAM(s) Oral every 12 hours  losartan 25 milliGRAM(s) Oral daily  metoprolol succinate ER 25 milliGRAM(s) Oral daily  oxybutynin 5 milliGRAM(s) Oral three times a day  polyethylene glycol 3350 17 Gram(s) Oral daily  senna 2 Tablet(s) Oral at bedtime  sodium chloride 2 Gram(s) Oral every 12 hours  tamsulosin 0.4 milliGRAM(s) Oral at bedtime    MEDICATIONS  (PRN):  acetaminophen   Tablet .. 650 milliGRAM(s) Oral every 6 hours PRN Moderate Pain (4 - 6)      ALLERGIES:  Allergies    aspirin (Other)  NSAIDs (Unknown)  Originally Entered as [Rash-General] reaction to [Other][[place]] (Rash)  Originally Entered as [Rash-General] reaction to [Other][Ragweed] (Rash)  ragweed and birch (Unknown)    Intolerances        LABS:                        12.3   5.06  )-----------( 310      ( 07 Dec 2019 10:55 )             35.5     12    124<L>  |  94<L>  |  16  ----------------------------<  108<H>  4.8   |  21<L>  |  0.93    Ca    9.0      07 Dec 2019 20:25  Mg     1.7         TPro  6.9  /  Alb  3.6  /  TBili  0.6  /  DBili  x   /  AST  25  /  ALT  27  /  AlkPhos  62        Urinalysis Basic - ( 07 Dec 2019 02:01 )    Color: Yellow / Appearance: Clear / S.015 / pH: x  Gluc: x / Ketone: NEGATIVE  / Bili: Negative / Urobili: 0.2 E.U./dL   Blood: x / Protein: 30 mg/dL / Nitrite: NEGATIVE   Leuk Esterase: NEGATIVE / RBC: < 5 /HPF / WBC < 5 /HPF   Sq Epi: x / Non Sq Epi: 0-5 /HPF / Bacteria: None /HPF      CAPILLARY BLOOD GLUCOSE      POCT Blood Glucose.: 104 mg/dL (07 Dec 2019 21:20)      RADIOLOGY & ADDITIONAL TESTS: Reviewed. OVERNIGHT EVENTS:    SUBJECTIVE / INTERVAL HPI: Patient seen and examined at bedside.     VITAL SIGNS:  Vital Signs Last 24 Hrs  T(C): 36.8 (08 Dec 2019 05:15), Max: 36.8 (08 Dec 2019 05:15)  T(F): 98.2 (08 Dec 2019 05:15), Max: 98.2 (08 Dec 2019 05:15)  HR: 78 (08 Dec 2019 05:15) (58 - 78)  BP: 169/81 (08 Dec 2019 05:15) (146/70 - 169/81)  BP(mean): --  RR: 17 (08 Dec 2019 05:15) (16 - 17)  SpO2: 98% (08 Dec 2019 05:15) (96% - 99%)    PHYSICAL EXAM:    General: WDWN  HEENT: NC/AT; PERRL, anicteric sclera; MMM  Neck: supple  Cardiovascular: +S1/S2; RRR  Respiratory: CTA B/L; no W/R/R  Gastrointestinal: soft, NT/ND; +BSx4  Extremities: WWP; no edema, clubbing or cyanosis  Vascular: 2+ radial, DP/PT pulses B/L  Neurological: AAOx3; cn ii-xii intact, 5/5 str all 4 ext, sens intact, no dysmetria,  no focal deficits    MEDICATIONS:  MEDICATIONS  (STANDING):  atorvastatin 40 milliGRAM(s) Oral at bedtime  brimonidine 0.2% Ophthalmic Solution 1 Drop(s) Both EYES three times a day  clopidogrel Tablet 75 milliGRAM(s) Oral daily  dorzolamide 2%/timolol 0.5% Ophthalmic Solution 1 Drop(s) Both EYES two times a day  enoxaparin Injectable 40 milliGRAM(s) SubCutaneous every 24 hours  finasteride 5 milliGRAM(s) Oral daily  furosemide    Tablet 40 milliGRAM(s) Oral every 12 hours  losartan 25 milliGRAM(s) Oral daily  metoprolol succinate ER 25 milliGRAM(s) Oral daily  oxybutynin 5 milliGRAM(s) Oral three times a day  polyethylene glycol 3350 17 Gram(s) Oral daily  senna 2 Tablet(s) Oral at bedtime  sodium chloride 2 Gram(s) Oral every 12 hours  tamsulosin 0.4 milliGRAM(s) Oral at bedtime    MEDICATIONS  (PRN):  acetaminophen   Tablet .. 650 milliGRAM(s) Oral every 6 hours PRN Moderate Pain (4 - 6)      ALLERGIES:  Allergies    aspirin (Other)  NSAIDs (Unknown)  Originally Entered as [Rash-General] reaction to [Other][[place]] (Rash)  Originally Entered as [Rash-General] reaction to [Other][Ragweed] (Rash)  ragweed and birch (Unknown)    Intolerances        LABS:                        12.3   5.06  )-----------( 310      ( 07 Dec 2019 10:55 )             35.5     12    124<L>  |  94<L>  |  16  ----------------------------<  108<H>  4.8   |  21<L>  |  0.93    Ca    9.0      07 Dec 2019 20:25  Mg     1.7         TPro  6.9  /  Alb  3.6  /  TBili  0.6  /  DBili  x   /  AST  25  /  ALT  27  /  AlkPhos  62        Urinalysis Basic - ( 07 Dec 2019 02:01 )    Color: Yellow / Appearance: Clear / S.015 / pH: x  Gluc: x / Ketone: NEGATIVE  / Bili: Negative / Urobili: 0.2 E.U./dL   Blood: x / Protein: 30 mg/dL / Nitrite: NEGATIVE   Leuk Esterase: NEGATIVE / RBC: < 5 /HPF / WBC < 5 /HPF   Sq Epi: x / Non Sq Epi: 0-5 /HPF / Bacteria: None /HPF      CAPILLARY BLOOD GLUCOSE      POCT Blood Glucose.: 104 mg/dL (07 Dec 2019 21:20)      RADIOLOGY & ADDITIONAL TESTS: Reviewed. OVERNIGHT EVENTS: None     SUBJECTIVE / INTERVAL HPI: Patient seen and examined at bedside. Patient resting in bed saying he feels good. Denies abdominal pain, had a BM yesterday and reports diarrhea in the AM. Also reporting L shoulder pain. Denies chest pain, sob, nausea, vomiting or thoughts of hurting himself.     VITAL SIGNS:  Vital Signs Last 24 Hrs  T(C): 36.8 (08 Dec 2019 05:15), Max: 36.8 (08 Dec 2019 05:15)  T(F): 98.2 (08 Dec 2019 05:15), Max: 98.2 (08 Dec 2019 05:15)  HR: 78 (08 Dec 2019 05:15) (58 - 78)  BP: 169/81 (08 Dec 2019 05:15) (146/70 - 169/81)  BP(mean): --  RR: 17 (08 Dec 2019 05:15) (16 - 17)  SpO2: 98% (08 Dec 2019 05:15) (96% - 99%)    PHYSICAL EXAM:    General: WDWN, resting comfortably in bed, NAD   HEENT: NC/AT; PERRL, anicteric sclera; MMM  Neck: supple  Cardiovascular: +S1/S2; RRR  Respiratory: CTA B/L; no W/R/R  Gastrointestinal: soft, NT/ND; +BSx4  Extremities: WWP; no edema, clubbing or cyanosis  Vascular: 2+ radial, DP/PT pulses B/L  Neurological: AAOx3; cn ii-xii intact, 5/5 str all 4 ext, sens intact, no dysmetria,  no focal deficits    MEDICATIONS:  MEDICATIONS  (STANDING):  atorvastatin 40 milliGRAM(s) Oral at bedtime  brimonidine 0.2% Ophthalmic Solution 1 Drop(s) Both EYES three times a day  clopidogrel Tablet 75 milliGRAM(s) Oral daily  dorzolamide 2%/timolol 0.5% Ophthalmic Solution 1 Drop(s) Both EYES two times a day  enoxaparin Injectable 40 milliGRAM(s) SubCutaneous every 24 hours  finasteride 5 milliGRAM(s) Oral daily  furosemide    Tablet 40 milliGRAM(s) Oral every 12 hours  losartan 25 milliGRAM(s) Oral daily  metoprolol succinate ER 25 milliGRAM(s) Oral daily  oxybutynin 5 milliGRAM(s) Oral three times a day  polyethylene glycol 3350 17 Gram(s) Oral daily  senna 2 Tablet(s) Oral at bedtime  sodium chloride 2 Gram(s) Oral every 12 hours  tamsulosin 0.4 milliGRAM(s) Oral at bedtime    MEDICATIONS  (PRN):  acetaminophen   Tablet .. 650 milliGRAM(s) Oral every 6 hours PRN Moderate Pain (4 - 6)      ALLERGIES:  Allergies    aspirin (Other)  NSAIDs (Unknown)  Originally Entered as [Rash-General] reaction to [Other][[place]] (Rash)  Originally Entered as [Rash-General] reaction to [Other][Ragweed] (Rash)  ragweed and birch (Unknown)    Intolerances        LABS:                        12.3   5.06  )-----------( 310      ( 07 Dec 2019 10:55 )             35.5     12    124<L>  |  94<L>  |  16  ----------------------------<  108<H>  4.8   |  21<L>  |  0.93    Ca    9.0      07 Dec 2019 20:25  Mg     1.7         TPro  6.9  /  Alb  3.6  /  TBili  0.6  /  DBili  x   /  AST  25  /  ALT  27  /  AlkPhos  62  12      Urinalysis Basic - ( 07 Dec 2019 02:01 )    Color: Yellow / Appearance: Clear / S.015 / pH: x  Gluc: x / Ketone: NEGATIVE  / Bili: Negative / Urobili: 0.2 E.U./dL   Blood: x / Protein: 30 mg/dL / Nitrite: NEGATIVE   Leuk Esterase: NEGATIVE / RBC: < 5 /HPF / WBC < 5 /HPF   Sq Epi: x / Non Sq Epi: 0-5 /HPF / Bacteria: None /HPF      CAPILLARY BLOOD GLUCOSE      POCT Blood Glucose.: 104 mg/dL (07 Dec 2019 21:20)      RADIOLOGY & ADDITIONAL TESTS: Reviewed.

## 2019-12-08 NOTE — PROGRESS NOTE ADULT - PROBLEM SELECTOR PLAN 6
Pt with hx of PCI and s/p CABG  NO active CP at this time   FU EKG   CW plavix and statin at this time Pt with hx of PCI and s/p CABG  NO active CP at this time   CW plavix and statin at this time

## 2019-12-08 NOTE — PROGRESS NOTE BEHAVIORAL HEALTH - SUMMARY
Patient is an 86 year old male with no reported past psych history, home meds of Lexapro 10 mg q24, extensive medical conditions, admitted to the hospital for hyponatremia and seen by Psychiatry CL for evaluation of suicidality. Upon evaluation although patient denies any SI, patient does report depressive symptoms such as low mood, hopelessness and mood swings. Upon exam, patient is linear, congruent with no clear evidence for thought/perceptual abnormalities. It is likely that patient's mood swings may be secondary to anxiety given active medical issues. Patient may benefit from increasing SSRI dose to target anxiety symptoms once hyponatremia/underlying medical issues resolve.    - Patient likely not an acute suicidal risk, however would consider obtaining collateral from family member prior to discharge if any concerns for safety

## 2019-12-08 NOTE — PROGRESS NOTE ADULT - PROBLEM SELECTOR PLAN 2
Pt presented with Constipation and CT with SBO however had large BM in ED and no longer symptomatic in nature   Benign abdomen on exam  CW bowel regimen

## 2019-12-08 NOTE — PROGRESS NOTE ADULT - SUBJECTIVE AND OBJECTIVE BOX
O/N Events: PAUL  Subjective:  feels good, denies any complaints, sNa improved to 126 in am and 130 in the afternoon  abdominal pain improving, having small BMs    VITALS  Vital Signs Last 24 Hrs  T(C): 36.7 (08 Dec 2019 13:56), Max: 36.8 (08 Dec 2019 05:15)  T(F): 98 (08 Dec 2019 13:56), Max: 98.2 (08 Dec 2019 05:15)  HR: 70 (08 Dec 2019 13:56) (58 - 78)  BP: 143/64 (08 Dec 2019 13:56) (143/64 - 169/81)  RR: 17 (08 Dec 2019 13:56) (16 - 17)  SpO2: 97% (08 Dec 2019 13:56) (97% - 98%)    PHYSICAL EXAM  General: A&Ox 3; NAD  Neck:no JVD  Respiratory: CTA B/L  Cardiovascular: Regular rhythm/rate; S1/S2, no MGR  Gastrointestinal: Soft; mildly distended, non tender   Extremities: WWP; no edema   Neurological: non focal    MEDICATIONS  (STANDING):  atorvastatin 40 milliGRAM(s) Oral at bedtime  brimonidine 0.2% Ophthalmic Solution 1 Drop(s) Both EYES three times a day  clopidogrel Tablet 75 milliGRAM(s) Oral daily  dorzolamide 2%/timolol 0.5% Ophthalmic Solution 1 Drop(s) Both EYES two times a day  enoxaparin Injectable 40 milliGRAM(s) SubCutaneous every 24 hours  finasteride 5 milliGRAM(s) Oral daily  furosemide    Tablet 40 milliGRAM(s) Oral every 12 hours  losartan 25 milliGRAM(s) Oral daily  metoprolol succinate ER 25 milliGRAM(s) Oral daily  oxybutynin 5 milliGRAM(s) Oral three times a day  polyethylene glycol 3350 17 Gram(s) Oral daily  senna 2 Tablet(s) Oral at bedtime  sodium chloride 2 Gram(s) Oral every 12 hours  tamsulosin 0.4 milliGRAM(s) Oral at bedtime    MEDICATIONS  (PRN):  acetaminophen   Tablet .. 650 milliGRAM(s) Oral every 6 hours PRN Moderate Pain (4 - 6)      LABS                        12.0   4.14  )-----------( 312      ( 08 Dec 2019 07:09 )             35.0     12-08    130<L>  |  96  |  19  ----------------------------<  110<H>  4.6   |  22  |  1.08    Ca    9.2      08 Dec 2019 15:24  Mg     2.0     12-08          Urinalysis Basic - ( 07 Dec 2019 02:01 )    Color: Yellow / Appearance: Clear / S.015 / pH: x  Gluc: x / Ketone: NEGATIVE  / Bili: Negative / Urobili: 0.2 E.U./dL   Blood: x / Protein: 30 mg/dL / Nitrite: NEGATIVE   Leuk Esterase: NEGATIVE / RBC: < 5 /HPF / WBC < 5 /HPF   Sq Epi: x / Non Sq Epi: 0-5 /HPF / Bacteria: None /HPF

## 2019-12-08 NOTE — PROGRESS NOTE BEHAVIORAL HEALTH - NSBHFUPINTERVALHXFT_PSY_A_CORE
Patient calm, cooperative, pleasant, in no acute distress. Denies SI/HI/AVH/PI, denies psychiatric symptoms, endorses that he does at times feels frustrated with his medical condition but denies overt thoughts of wanting to hurt himself or others.

## 2019-12-09 DIAGNOSIS — F10.11 ALCOHOL ABUSE, IN REMISSION: ICD-10-CM

## 2019-12-09 DIAGNOSIS — F43.21 ADJUSTMENT DISORDER WITH DEPRESSED MOOD: ICD-10-CM

## 2019-12-09 LAB
ANION GAP SERPL CALC-SCNC: 10 MMOL/L — SIGNIFICANT CHANGE UP (ref 5–17)
ANION GAP SERPL CALC-SCNC: 11 MMOL/L — SIGNIFICANT CHANGE UP (ref 5–17)
ANION GAP SERPL CALC-SCNC: 9 MMOL/L — SIGNIFICANT CHANGE UP (ref 5–17)
BASOPHILS # BLD AUTO: 0.03 K/UL — SIGNIFICANT CHANGE UP (ref 0–0.2)
BASOPHILS NFR BLD AUTO: 0.6 % — SIGNIFICANT CHANGE UP (ref 0–2)
BUN SERPL-MCNC: 18 MG/DL — SIGNIFICANT CHANGE UP (ref 7–23)
BUN SERPL-MCNC: 20 MG/DL — SIGNIFICANT CHANGE UP (ref 7–23)
BUN SERPL-MCNC: 21 MG/DL — SIGNIFICANT CHANGE UP (ref 7–23)
CALCIUM SERPL-MCNC: 8.8 MG/DL — SIGNIFICANT CHANGE UP (ref 8.4–10.5)
CALCIUM SERPL-MCNC: 8.9 MG/DL — SIGNIFICANT CHANGE UP (ref 8.4–10.5)
CALCIUM SERPL-MCNC: 9.1 MG/DL — SIGNIFICANT CHANGE UP (ref 8.4–10.5)
CHLORIDE SERPL-SCNC: 91 MMOL/L — LOW (ref 96–108)
CHLORIDE SERPL-SCNC: 92 MMOL/L — LOW (ref 96–108)
CHLORIDE SERPL-SCNC: 96 MMOL/L — SIGNIFICANT CHANGE UP (ref 96–108)
CO2 SERPL-SCNC: 19 MMOL/L — LOW (ref 22–31)
CO2 SERPL-SCNC: 19 MMOL/L — LOW (ref 22–31)
CO2 SERPL-SCNC: 23 MMOL/L — SIGNIFICANT CHANGE UP (ref 22–31)
CREAT ?TM UR-MCNC: 83 MG/DL — SIGNIFICANT CHANGE UP
CREAT SERPL-MCNC: 0.91 MG/DL — SIGNIFICANT CHANGE UP (ref 0.5–1.3)
CREAT SERPL-MCNC: 0.96 MG/DL — SIGNIFICANT CHANGE UP (ref 0.5–1.3)
CREAT SERPL-MCNC: 0.97 MG/DL — SIGNIFICANT CHANGE UP (ref 0.5–1.3)
EOSINOPHIL # BLD AUTO: 0.12 K/UL — SIGNIFICANT CHANGE UP (ref 0–0.5)
EOSINOPHIL NFR BLD AUTO: 2.4 % — SIGNIFICANT CHANGE UP (ref 0–6)
GLUCOSE BLDC GLUCOMTR-MCNC: 107 MG/DL — HIGH (ref 70–99)
GLUCOSE BLDC GLUCOMTR-MCNC: 124 MG/DL — HIGH (ref 70–99)
GLUCOSE BLDC GLUCOMTR-MCNC: 96 MG/DL — SIGNIFICANT CHANGE UP (ref 70–99)
GLUCOSE SERPL-MCNC: 115 MG/DL — HIGH (ref 70–99)
GLUCOSE SERPL-MCNC: 139 MG/DL — HIGH (ref 70–99)
GLUCOSE SERPL-MCNC: 99 MG/DL — SIGNIFICANT CHANGE UP (ref 70–99)
HCT VFR BLD CALC: 34 % — LOW (ref 39–50)
HGB BLD-MCNC: 11.7 G/DL — LOW (ref 13–17)
IMM GRANULOCYTES NFR BLD AUTO: 0.6 % — SIGNIFICANT CHANGE UP (ref 0–1.5)
LYMPHOCYTES # BLD AUTO: 0.99 K/UL — LOW (ref 1–3.3)
LYMPHOCYTES # BLD AUTO: 19.5 % — SIGNIFICANT CHANGE UP (ref 13–44)
MAGNESIUM SERPL-MCNC: 1.9 MG/DL — SIGNIFICANT CHANGE UP (ref 1.6–2.6)
MCHC RBC-ENTMCNC: 28.7 PG — SIGNIFICANT CHANGE UP (ref 27–34)
MCHC RBC-ENTMCNC: 34.4 GM/DL — SIGNIFICANT CHANGE UP (ref 32–36)
MCV RBC AUTO: 83.3 FL — SIGNIFICANT CHANGE UP (ref 80–100)
MONOCYTES # BLD AUTO: 0.56 K/UL — SIGNIFICANT CHANGE UP (ref 0–0.9)
MONOCYTES NFR BLD AUTO: 11 % — SIGNIFICANT CHANGE UP (ref 2–14)
NEUTROPHILS # BLD AUTO: 3.35 K/UL — SIGNIFICANT CHANGE UP (ref 1.8–7.4)
NEUTROPHILS NFR BLD AUTO: 65.9 % — SIGNIFICANT CHANGE UP (ref 43–77)
NRBC # BLD: 0 /100 WBCS — SIGNIFICANT CHANGE UP (ref 0–0)
OSMOLALITY UR: 474 MOSM/KG — SIGNIFICANT CHANGE UP (ref 50–1200)
PLATELET # BLD AUTO: 311 K/UL — SIGNIFICANT CHANGE UP (ref 150–400)
POTASSIUM SERPL-MCNC: 4.3 MMOL/L — SIGNIFICANT CHANGE UP (ref 3.5–5.3)
POTASSIUM SERPL-MCNC: 4.3 MMOL/L — SIGNIFICANT CHANGE UP (ref 3.5–5.3)
POTASSIUM SERPL-MCNC: 4.6 MMOL/L — SIGNIFICANT CHANGE UP (ref 3.5–5.3)
POTASSIUM SERPL-SCNC: 4.3 MMOL/L — SIGNIFICANT CHANGE UP (ref 3.5–5.3)
POTASSIUM SERPL-SCNC: 4.3 MMOL/L — SIGNIFICANT CHANGE UP (ref 3.5–5.3)
POTASSIUM SERPL-SCNC: 4.6 MMOL/L — SIGNIFICANT CHANGE UP (ref 3.5–5.3)
RBC # BLD: 4.08 M/UL — LOW (ref 4.2–5.8)
RBC # FLD: 12.3 % — SIGNIFICANT CHANGE UP (ref 10.3–14.5)
SODIUM SERPL-SCNC: 121 MMOL/L — LOW (ref 135–145)
SODIUM SERPL-SCNC: 123 MMOL/L — LOW (ref 135–145)
SODIUM SERPL-SCNC: 126 MMOL/L — LOW (ref 135–145)
SODIUM UR-SCNC: 64 MMOL/L — SIGNIFICANT CHANGE UP
WBC # BLD: 5.08 K/UL — SIGNIFICANT CHANGE UP (ref 3.8–10.5)
WBC # FLD AUTO: 5.08 K/UL — SIGNIFICANT CHANGE UP (ref 3.8–10.5)

## 2019-12-09 PROCEDURE — 99233 SBSQ HOSP IP/OBS HIGH 50: CPT | Mod: GC

## 2019-12-09 PROCEDURE — 99233 SBSQ HOSP IP/OBS HIGH 50: CPT

## 2019-12-09 PROCEDURE — 73030 X-RAY EXAM OF SHOULDER: CPT | Mod: 26,LT

## 2019-12-09 RX ORDER — FUROSEMIDE 40 MG
40 TABLET ORAL ONCE
Refills: 0 | Status: COMPLETED | OUTPATIENT
Start: 2019-12-09 | End: 2019-12-09

## 2019-12-09 RX ORDER — FUROSEMIDE 40 MG
20 TABLET ORAL ONCE
Refills: 0 | Status: COMPLETED | OUTPATIENT
Start: 2019-12-09 | End: 2019-12-09

## 2019-12-09 RX ORDER — SODIUM CHLORIDE 9 MG/ML
2 INJECTION INTRAMUSCULAR; INTRAVENOUS; SUBCUTANEOUS EVERY 12 HOURS
Refills: 0 | Status: DISCONTINUED | OUTPATIENT
Start: 2019-12-09 | End: 2019-12-11

## 2019-12-09 RX ADMIN — Medication 5 MILLIGRAM(S): at 22:24

## 2019-12-09 RX ADMIN — Medication 650 MILLIGRAM(S): at 00:07

## 2019-12-09 RX ADMIN — Medication 650 MILLIGRAM(S): at 07:03

## 2019-12-09 RX ADMIN — CLOPIDOGREL BISULFATE 75 MILLIGRAM(S): 75 TABLET, FILM COATED ORAL at 13:12

## 2019-12-09 RX ADMIN — ENOXAPARIN SODIUM 40 MILLIGRAM(S): 100 INJECTION SUBCUTANEOUS at 05:28

## 2019-12-09 RX ADMIN — DORZOLAMIDE HYDROCHLORIDE TIMOLOL MALEATE 1 DROP(S): 20; 5 SOLUTION/ DROPS OPHTHALMIC at 05:31

## 2019-12-09 RX ADMIN — Medication 20 MILLIGRAM(S): at 10:15

## 2019-12-09 RX ADMIN — LOSARTAN POTASSIUM 25 MILLIGRAM(S): 100 TABLET, FILM COATED ORAL at 05:28

## 2019-12-09 RX ADMIN — SENNA PLUS 2 TABLET(S): 8.6 TABLET ORAL at 22:24

## 2019-12-09 RX ADMIN — FINASTERIDE 5 MILLIGRAM(S): 5 TABLET, FILM COATED ORAL at 13:12

## 2019-12-09 RX ADMIN — Medication 650 MILLIGRAM(S): at 01:00

## 2019-12-09 RX ADMIN — BRIMONIDINE TARTRATE 1 DROP(S): 2 SOLUTION/ DROPS OPHTHALMIC at 13:13

## 2019-12-09 RX ADMIN — Medication 650 MILLIGRAM(S): at 05:28

## 2019-12-09 RX ADMIN — Medication 5 MILLIGRAM(S): at 13:13

## 2019-12-09 RX ADMIN — DORZOLAMIDE HYDROCHLORIDE TIMOLOL MALEATE 1 DROP(S): 20; 5 SOLUTION/ DROPS OPHTHALMIC at 17:33

## 2019-12-09 RX ADMIN — POLYETHYLENE GLYCOL 3350 17 GRAM(S): 17 POWDER, FOR SOLUTION ORAL at 13:17

## 2019-12-09 RX ADMIN — SODIUM CHLORIDE 2 GRAM(S): 9 INJECTION INTRAMUSCULAR; INTRAVENOUS; SUBCUTANEOUS at 17:33

## 2019-12-09 RX ADMIN — Medication 25 MILLIGRAM(S): at 05:28

## 2019-12-09 RX ADMIN — BRIMONIDINE TARTRATE 1 DROP(S): 2 SOLUTION/ DROPS OPHTHALMIC at 05:31

## 2019-12-09 RX ADMIN — BRIMONIDINE TARTRATE 1 DROP(S): 2 SOLUTION/ DROPS OPHTHALMIC at 22:23

## 2019-12-09 RX ADMIN — Medication 5 MILLIGRAM(S): at 05:28

## 2019-12-09 RX ADMIN — TAMSULOSIN HYDROCHLORIDE 0.4 MILLIGRAM(S): 0.4 CAPSULE ORAL at 22:26

## 2019-12-09 RX ADMIN — Medication 40 MILLIGRAM(S): at 17:33

## 2019-12-09 RX ADMIN — ATORVASTATIN CALCIUM 40 MILLIGRAM(S): 80 TABLET, FILM COATED ORAL at 22:24

## 2019-12-09 NOTE — PHYSICAL THERAPY INITIAL EVALUATION ADULT - ACTIVE RANGE OF MOTION EXAMINATION, REHAB EVAL
bilateral  lower extremity Active ROM was WFL (within functional limits)/bilateral upper extremity Active ROM was WFL (within functional limits)/except L shoulder flexion/abduction ~90 degrees + shoulder shrug

## 2019-12-09 NOTE — PROGRESS NOTE ADULT - PROBLEM SELECTOR PLAN 2
Pt presented with Constipation and CT with SBO however had large BM in ED and no longer symptomatic in nature   Benign abdomen on exam  CW bowel regimen Pt presented with constipation and CT with SBO however had large BM in ED and no longer symptomatic in nature   Benign abdomen on exam  CW bowel regimen

## 2019-12-09 NOTE — PROGRESS NOTE BEHAVIORAL HEALTH - SUMMARY
Patient is an 86 year old male with no reported past psych history, home meds of Lexapro 10 mg q24, extensive medical conditions, admitted to the hospital for hyponatremia and seen by Psychiatry CL for evaluation of suicidality. Upon evaluation although patient denies any SI, patient does report depressive symptoms such as low mood, hopelessness and mood swings. Upon exam, patient is linear, congruent with no clear evidence for thought/perceptual abnormalities. It is likely that patient's mood swings may be secondary to anxiety given active medical issues. Patient may benefit from increasing SSRI dose to target anxiety symptoms once hyponatremia/underlying medical issues resolve. C/L team to follow up patient, collateral information needed from son.    12/9: Patient given referral for [facility] for talk therapy. Antidepressant should not be restarted in setting of persistent hyponatremia. Patient is an 86 year old male with no reported past psych history, home meds of Lexapro 10 mg q24, extensive medical conditions, admitted to the hospital for hyponatremia and seen by Psychiatry CL for evaluation of suicidality. Upon evaluation although patient denies any SI, patient does report depressive symptoms such as low mood, hopelessness and mood swings.  It is likely that patient's mood swings may be secondary to anxiety given active medical issues. Due to ongoing hyponatremia, patient is not a candidate for an SSRI at this time.    Plan:  -patient is not currently a candidate for an antidepressant due to hyponatremia  - Patient given referral for [facility] for outpatient mental health treatment

## 2019-12-09 NOTE — PROGRESS NOTE ADULT - PROBLEM SELECTOR PLAN 1
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 Placed on 1:1 observations   -Pyjerman consulted, may need to increase dose of SSRI, add back SSRI once hyponatremia resolves Patient presented from home with Na of 122  Was recently discharged from [place] with hyponatremia, presented at 123 and dc at 128, with the thought being 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 and was difficult to correct as patient was noncompliant with fluid restriction   Here, patient is euvolemic on exam   Unlikely polydipsia- he does not endorse increased water intake   Unlikely beer potomania as he has a wide range in his diet and it is heavy in soy sauce   Unclear if malignancy present however per collateral obtained from [place], CT head, face was negative for any pathology   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 Placed on 1:1 observations. Denies SI, can DC 1:1.   -Pyjerman consulted, SSRIs should not be restarted or uptitrated in setting of persistent hypernatremia. [phone]).

## 2019-12-09 NOTE — PROGRESS NOTE ADULT - ASSESSMENT
This is a 86 year old male with pmhx of CAD s/p PCI/CABG on plavix, HTN, HLD, BPH, Prostate ca s/p TURP, recent ocular surgery, penile implants presents with abdominal pain and constipation. Admitted for hyponatremia This is a 86 year old male with pmhx of CAD s/p PCI/CABG on plavix, HTN, HLD, BPH, Prostate ca s/p TURP, recent ocular surgery, penile implants presents with abdominal pain and constipation. Admitted for hyponatremia.

## 2019-12-09 NOTE — CONSULT NOTE ADULT - ASSESSMENT
per Internal Medicine    This is a 86 year old male with pmhx of CAD s/p PCI/CABG on plavix, HTN, HLD, BPH, Prostate ca s/p TURP, recent ocular surgery, penile implants presents with abdominal pain and constipation. Admitted for hyponatremia    Problem/Plan - 1:  ·  Problem: Hyponatremia.  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 Placed on 1:1 observations   -Pyjerman consulted, may need to increase dose of SSRI, add back SSRI once hyponatremia resolves.     Problem/Plan - 2:  ·  Problem: Constipation, unspecified constipation type.  Plan: Pt presented with Constipation and CT with SBO however had large BM in ED and no longer symptomatic in nature   Benign abdomen on exam  CW bowel regimen.     Problem/Plan - 3:  ·  Problem: Essential hypertension.  Plan: Pt with hx of HTN, presented normotensive then slightly hypertensive prior to BM   CW losartan and Toprol at this time.     Problem/Plan - 4:  ·  Problem: Malignant neoplasm of prostate.  Plan: Pt with severe BPH, not surgical candidate per outpatient notes   AT this time will continue with home medications to prevent urinary retention.     Problem/Plan - 5:  ·  Problem: Hyperlipidemia.  Plan: Pt with hx HLD, WIll continue with home medications.     Problem/Plan - 6:  Problem: CAD (coronary artery disease). Plan: Pt with hx of PCI and s/p CABG  NO active CP at this time   CW plavix and statin at this time.    Problem/Plan - 7:  ·  Problem: Ocular injury.  Plan: Pt with ocular surgery  No active bleeding or changes present   Nothing to do, outpatient follow up.     Problem/Plan - 8:  ·  Problem: Nutrition, metabolism, and development symptoms.  Plan: F: No IVF, Fluid restriction   E: Replete PRN  N: Regular Diet/ Fluid Restriction.     Problem/Plan - 9:  ·  Problem: Prophylactic measure.  Plan: DVT: Lovenox QD    FULL CODE.

## 2019-12-09 NOTE — PROGRESS NOTE BEHAVIORAL HEALTH - NSBHCHARTREVIEWLAB_PSY_A_CORE FT
Comprehensive Metabolic Panel (12.06.19 @ 16:15)    Sodium, Serum: 122 mmol/L    Potassium, Serum: 5.0 mmol/L    Chloride, Serum: 89 mmol/L    Carbon Dioxide, Serum: 21 mmol/L    Anion Gap, Serum: 12 mmol/L    Blood Urea Nitrogen, Serum: 17 mg/dL    Creatinine, Serum: 1.03 mg/dL    Glucose, Serum: 114 mg/dL    Calcium, Total Serum: 9.5 mg/dL    Protein Total, Serum: 6.9 g/dL    Albumin, Serum: 3.6 g/dL    Bilirubin Total, Serum: 0.6 mg/dL    Alkaline Phosphatase, Serum: 62 U/L    Aspartate Aminotransferase (AST/SGOT): 25 U/L    Alanine Aminotransferase (ALT/SGPT): 27 U/L

## 2019-12-09 NOTE — PROGRESS NOTE ADULT - PROBLEM SELECTOR PLAN 5
Pt with hx HLD, WIll continue with home medications Pt with hx HLD, WIll continue with home medications    #L Shoulder Pain   Patient reports recent injury when reaching for something at home. Tenderness to palpation at the biceps tendon.   -F/u L shoulder xray. Should f/u outpt with PCP for this

## 2019-12-09 NOTE — PROGRESS NOTE ADULT - PROBLEM SELECTOR PLAN 1
cw SIADH  ? Hx of SIADH with recent admission at [facility]  unclear cause, could be from SSRI, currently on hold/ TSH WNL/ not on thiazides   - cw fluid restriction 1 L per day  -resume home lasix 20 mg PO daily  - resume salt tabs 2g BID   - repeat BMP at 4 pm  - please obtain psychiatry input if plan to hold lexapro     Will follow

## 2019-12-09 NOTE — PROGRESS NOTE ADULT - ASSESSMENT
A/p  85 y/o M with PMHx of CAD s/p CABG, HTN, BPH and prostate Ca s/p TURP currently admitted for abdominal pain and constipation, nephrology consulted for Hyponatremia

## 2019-12-09 NOTE — PHYSICAL THERAPY INITIAL EVALUATION ADULT - ADDITIONAL COMMENTS
Pt reports living alone in a 4th floor walk up apartment with HRs. Pt reports being completely independent with all forms of mobility using a SAC and ADL's, however pt's son performs grocery shopping. Pt reports recent difficulty with L shoulder movement, claiming he was reaching for something overhead and injured it, however never had it examined.

## 2019-12-09 NOTE — PROGRESS NOTE ADULT - SUBJECTIVE AND OBJECTIVE BOX
OVERNIGHT EVENTS:    SUBJECTIVE / INTERVAL HPI: Patient seen and examined at bedside.     VITAL SIGNS:  Vital Signs Last 24 Hrs  T(C): 36.3 (09 Dec 2019 10:12), Max: 36.3 (09 Dec 2019 10:12)  T(F): 97.4 (09 Dec 2019 10:12), Max: 97.4 (09 Dec 2019 10:12)  HR: 64 (09 Dec 2019 10:12) (60 - 64)  BP: 147/66 (09 Dec 2019 10:12) (135/66 - 147/66)  BP(mean): --  RR: 15 (09 Dec 2019 10:12) (15 - 18)  SpO2: 98% (09 Dec 2019 10:12) (97% - 98%)    PHYSICAL EXAM:    General: WDWN  HEENT: NC/AT; PERRL, anicteric sclera; MMM  Neck: supple  Cardiovascular: +S1/S2; RRR  Respiratory: CTA B/L; no W/R/R  Gastrointestinal: soft, NT/ND; +BSx4  Extremities: WWP; no edema, clubbing or cyanosis  Vascular: 2+ radial, DP/PT pulses B/L  Neurological: AAOx3; no focal deficits    MEDICATIONS:  MEDICATIONS  (STANDING):  atorvastatin 40 milliGRAM(s) Oral at bedtime  brimonidine 0.2% Ophthalmic Solution 1 Drop(s) Both EYES three times a day  clopidogrel Tablet 75 milliGRAM(s) Oral daily  dorzolamide 2%/timolol 0.5% Ophthalmic Solution 1 Drop(s) Both EYES two times a day  enoxaparin Injectable 40 milliGRAM(s) SubCutaneous every 24 hours  finasteride 5 milliGRAM(s) Oral daily  losartan 25 milliGRAM(s) Oral daily  metoprolol succinate ER 25 milliGRAM(s) Oral daily  oxybutynin 5 milliGRAM(s) Oral three times a day  polyethylene glycol 3350 17 Gram(s) Oral daily  senna 2 Tablet(s) Oral at bedtime  sodium chloride 2 Gram(s) Oral every 12 hours  tamsulosin 0.4 milliGRAM(s) Oral at bedtime    MEDICATIONS  (PRN):  acetaminophen   Tablet .. 650 milliGRAM(s) Oral every 6 hours PRN Moderate Pain (4 - 6)      ALLERGIES:  Allergies    aspirin (Other)  NSAIDs (Unknown)  Originally Entered as [Rash-General] reaction to [Other][[place]] (Rash)  Originally Entered as [Rash-General] reaction to [Other][Ragweed] (Rash)  ragweed and birch (Unknown)    Intolerances        LABS:                        11.7   5.08  )-----------( 311      ( 09 Dec 2019 06:36 )             34.0     12-09    126<L>  |  96  |  21  ----------------------------<  99  4.3   |  19<L>  |  0.97    Ca    8.9      09 Dec 2019 06:36  Mg     1.9     12-09          CAPILLARY BLOOD GLUCOSE      POCT Blood Glucose.: 107 mg/dL (09 Dec 2019 12:03)      RADIOLOGY & ADDITIONAL TESTS: Reviewed. OVERNIGHT EVENTS: None     SUBJECTIVE / INTERVAL HPI: Patient seen and examined at bedside. Patient seen eating breakfast, reports abdominal bloating and constipation. His last BM was yesterday morning. He is requesting more laxatives. Denies chest pain, sob, abdominal pain, nausea, vomiting.     VITAL SIGNS:  Vital Signs Last 24 Hrs  T(C): 36.3 (09 Dec 2019 10:12), Max: 36.3 (09 Dec 2019 10:12)  T(F): 97.4 (09 Dec 2019 10:12), Max: 97.4 (09 Dec 2019 10:12)  HR: 64 (09 Dec 2019 10:12) (60 - 64)  BP: 147/66 (09 Dec 2019 10:12) (135/66 - 147/66)  BP(mean): --  RR: 15 (09 Dec 2019 10:12) (15 - 18)  SpO2: 98% (09 Dec 2019 10:12) (97% - 98%)    PHYSICAL EXAM:    General: WDWN, sitting up comfortably in bed, NAD   HEENT: NC/AT; PERRL, anicteric sclera; MMM  Neck: supple  Cardiovascular: +S1/S2; RRR  Respiratory: CTA B/L; no W/R/R  Gastrointestinal: soft, NT/ND; hypoactive bowel sounds   Extremities: WWP; no edema, clubbing or cyanosis  Vascular: 2+ radial, DP/PT pulses B/L  Neurological: AAOx3     MEDICATIONS:  MEDICATIONS  (STANDING):  atorvastatin 40 milliGRAM(s) Oral at bedtime  brimonidine 0.2% Ophthalmic Solution 1 Drop(s) Both EYES three times a day  clopidogrel Tablet 75 milliGRAM(s) Oral daily  dorzolamide 2%/timolol 0.5% Ophthalmic Solution 1 Drop(s) Both EYES two times a day  enoxaparin Injectable 40 milliGRAM(s) SubCutaneous every 24 hours  finasteride 5 milliGRAM(s) Oral daily  losartan 25 milliGRAM(s) Oral daily  metoprolol succinate ER 25 milliGRAM(s) Oral daily  oxybutynin 5 milliGRAM(s) Oral three times a day  polyethylene glycol 3350 17 Gram(s) Oral daily  senna 2 Tablet(s) Oral at bedtime  sodium chloride 2 Gram(s) Oral every 12 hours  tamsulosin 0.4 milliGRAM(s) Oral at bedtime    MEDICATIONS  (PRN):  acetaminophen   Tablet .. 650 milliGRAM(s) Oral every 6 hours PRN Moderate Pain (4 - 6)      ALLERGIES:  Allergies    aspirin (Other)  NSAIDs (Unknown)  Originally Entered as [Rash-General] reaction to [Other][[place]] (Rash)  Originally Entered as [Rash-General] reaction to [Other][Ragweed] (Rash)  ragweed and birch (Unknown)    Intolerances        LABS:                        11.7   5.08  )-----------( 311      ( 09 Dec 2019 06:36 )             34.0     12-09    126<L>  |  96  |  21  ----------------------------<  99  4.3   |  19<L>  |  0.97    Ca    8.9      09 Dec 2019 06:36  Mg     1.9     12-09          CAPILLARY BLOOD GLUCOSE      POCT Blood Glucose.: 107 mg/dL (09 Dec 2019 12:03)      RADIOLOGY & ADDITIONAL TESTS: Reviewed.

## 2019-12-09 NOTE — PROGRESS NOTE BEHAVIORAL HEALTH - NSBHCHARTREVIEWVS_PSY_A_CORE FT
Vital Signs Last 24 Hrs  T(C): 36.3 (09 Dec 2019 10:12), Max: 36.3 (09 Dec 2019 10:12)  T(F): 97.4 (09 Dec 2019 10:12), Max: 97.4 (09 Dec 2019 10:12)  HR: 64 (09 Dec 2019 10:12) (60 - 64)  BP: 147/66 (09 Dec 2019 10:12) (135/66 - 147/66)  BP(mean): --  RR: 15 (09 Dec 2019 10:12) (15 - 18)  SpO2: 98% (09 Dec 2019 10:12) (97% - 98%)

## 2019-12-09 NOTE — PROGRESS NOTE BEHAVIORAL HEALTH - NSBHCONSULTRECOMMENDOTHER_PSY_A_CORE FT
- Referral to [facility] [phone]  - Antidepressants should not be uptitrated or restarted in setting of persistent hyponatremia

## 2019-12-09 NOTE — CONSULT NOTE ADULT - SUBJECTIVE AND OBJECTIVE BOX
Patient is a 86y old  Male who presents with a chief complaint of Hyponatremia (09 Dec 2019 14:05)       HPI:  This is a 86 year old male with pmhx of CAD s/p PCI/CABG on plavix, HTN, HLD, BPH, Prostate ca s/p TURP, recent ocular surgery, penile implants presents with abdominal pain and constipation. A few weeks ago, the patient underwent a lip lid for ingrown eyelashes. He was s/p b/l blepharoplasty (Dr. Toussaint) complicated by L periorbital hematoma and lateral canthotomy and transferred to [facility]  for L orbital washout on 11/08 and discharged on 11/11. He then went home, and one day was walking around when he fell onto his face. He states the fall was mechanical he believed but he went to [facility]. There CT scans were performed of his face and orbit which were negative for acute pathology. Upon admission at [facility] his Na was 123. Renal was following and recommended fluid restriction, and after 4 days Na was 128 and he was discharged with outpt renal follow up on 10/22. The thought was possible 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s. He then went home and was in his general state of health when he noticed that over the last 3 days he has had abdominal pain which was cramping in nature diffusely but worse in lower quadrant, nausea, bloating, constipation. He tried an enema which didnt work. He denies any vomiting, melena, CP, fever, chills, SOB, cough, rhinorrhea, LE edema, urinary changes. He endorses passing gas. He presented to the hospital in the setting of constipation.     At [facility] ED: T 97.6, HR 95, /70, RR 16, SPO2 97%. Labs sig for 12.6 Hgb, Na 122, Cl 89, CO2 21. CT with There is a small bowel obstruction. The obstruction appears low-grade. The cause of obstruction could be adhesions. Severe atherosclerotic disease, including heavy coronary artery calcification. Large prostate. Surgery consulted however had large BM in interval and was then asx. Pt admitted for hyponatremia. (06 Dec 2019 23:19)      PAST MEDICAL & SURGICAL HISTORY:  Essential hypertension: Hypertension  Type 2 diabetes mellitus: Diabetes  Hyperlipidemia: Hyperlipidemia  Type 2 diabetes mellitus: DM (diabetes mellitus), type 2  Malignant neoplasm of prostate: Prostate cancer  Acute myocardial infarction: Patient denies MI  S/p bilateral blepharoplasty  H/O transurethral destruction of bladder lesion  Bilateral cataracts  Status post aorto-coronary artery bypass graft: S/P CABG (coronary artery bypass graft)  Other postprocedural status: History of penile implant      MEDICATIONS  (STANDING):  atorvastatin 40 milliGRAM(s) Oral at bedtime  brimonidine 0.2% Ophthalmic Solution 1 Drop(s) Both EYES three times a day  clopidogrel Tablet 75 milliGRAM(s) Oral daily  dorzolamide 2%/timolol 0.5% Ophthalmic Solution 1 Drop(s) Both EYES two times a day  enoxaparin Injectable 40 milliGRAM(s) SubCutaneous every 24 hours  finasteride 5 milliGRAM(s) Oral daily  losartan 25 milliGRAM(s) Oral daily  metoprolol succinate ER 25 milliGRAM(s) Oral daily  oxybutynin 5 milliGRAM(s) Oral three times a day  polyethylene glycol 3350 17 Gram(s) Oral daily  senna 2 Tablet(s) Oral at bedtime  sodium chloride 2 Gram(s) Oral every 12 hours  tamsulosin 0.4 milliGRAM(s) Oral at bedtime    MEDICATIONS  (PRN):  acetaminophen   Tablet .. 650 milliGRAM(s) Oral every 6 hours PRN Moderate Pain (4 - 6)      Social History:            -  Occupation: X           -  Home Living Status: lives alone in a 4th floor walkup apartment           -  Prior Home Care Services:  X    Functional Level Prior to Admission:           - ADL's:  independent           - ambulates with a cane    FAMILY HISTORY:      CBC Full  -  ( 09 Dec 2019 06:36 )  WBC Count : 5.08 K/uL  RBC Count : 4.08 M/uL  Hemoglobin : 11.7 g/dL  Hematocrit : 34.0 %  Platelet Count - Automated : 311 K/uL  Mean Cell Volume : 83.3 fl  Mean Cell Hemoglobin : 28.7 pg  Mean Cell Hemoglobin Concentration : 34.4 gm/dL  Auto Neutrophil # : 3.35 K/uL  Auto Lymphocyte # : 0.99 K/uL  Auto Monocyte # : 0.56 K/uL  Auto Eosinophil # : 0.12 K/uL  Auto Basophil # : 0.03 K/uL  Auto Neutrophil % : 65.9 %  Auto Lymphocyte % : 19.5 %  Auto Monocyte % : 11.0 %  Auto Eosinophil % : 2.4 %  Auto Basophil % : 0.6 %      12-09    126<L>  |  96  |  21  ----------------------------<  99  4.3   |  19<L>  |  0.97    Ca    8.9      09 Dec 2019 06:36  Mg     1.9     12-09              Radiology:    < from: CT Abdomen and Pelvis w/ Oral Cont (12.06.19 @ 18:39) >    EXAM:  CT ABDOMEN AND PELVIS OC                          PROCEDURE DATE:  12/06/2019          INTERPRETATION:  CT SCAN OF ABDOMEN AND PELVIS    History: Abdominal pain. Constipation.    Technique: CT scan of abdomen and pelvis was performed from lung bases   through symphysis pubis. Intravenous contrast material not administered,   as per ordering physician. Oral contrast was utilized. Axial, sagittal   and coronal reformatted images were reviewed.    Comparison: CT scan of the coronary arteries from 5/1/2014.    Findings:     Lower chest: There is heavy coronary artery calcification. Bronchiectasis   and mild emphysema again noted.     Liver:  Normal.    Gallbladder: No radiopaque stones gallbladder.    Spleen:  Normal.    Pancreas:  Thereare two punctate calcifications in the pancreatic head.   It is uncertain if these are vascular or due to chronic pancreatitis.    Adrenal glands:  Normal.    Kidneys: 3.2 cm low-density lesion upper pole left kidney is probably a   cyst. Right kidney unremarkable.    Adenopathy:  No lymphadenopathy in abdomen or pelvis.    Ascites: None.    Gastrointestinal tract: There are several mildly dilated (3.4 cm) small   bowel loops with transition point to nondilated small bowel in the   midabdomen, probably in the mid ileum (series 3, image 63). No mass   identified. Normal appendix. Stool present throughout colon. No abscess   or free air.    Vessels: Large amount of calcified plaque present.    Pelvic organs: Enlarged prostate projects into baseof bladder.    Soft tissues: Normal.    Bones: Degenerative changes of spine. Mild scoliosis.    Impression: 1. There is a small bowel obstruction. The obstruction   appears low-grade. The cause of obstruction could be adhesions.    2. Severe atherosclerotic disease, including heavy coronary artery   calcification.    3. Large prostate.            Vital Signs Last 24 Hrs  T(C): 36.3 (09 Dec 2019 10:12), Max: 36.3 (09 Dec 2019 10:12)  T(F): 97.4 (09 Dec 2019 10:12), Max: 97.4 (09 Dec 2019 10:12)  HR: 64 (09 Dec 2019 10:12) (60 - 64)  BP: 147/66 (09 Dec 2019 10:12) (135/66 - 147/66)  BP(mean): --  RR: 15 (09 Dec 2019 10:12) (15 - 18)  SpO2: 98% (09 Dec 2019 10:12) (97% - 98%)    REVIEW OF SYSTEMS:    CONSTITUTIONAL:   fatigue  EYES: No eye pain, visual disturbances, or discharge  ENMT:  No difficulty hearing, tinnitus, vertigo; No sinus or throat pain  NECK: No pain or stiffness  BREASTS: No pain, masses, or nipple discharge  RESPIRATORY: No cough, wheezing, chills or hemoptysis; No shortness of breath  CARDIOVASCULAR: No chest pain, palpitations, dizziness, or leg swelling  GASTROINTESTINAL: No abdominal or epigastric pain. No nausea, vomiting, or hematemesis; No diarrhea or constipation. No melena or hematochezia.  GENITOURINARY: No dysuria, frequency, hematuria, or incontinence  NEUROLOGICAL: No headaches, memory loss, loss of strength, numbness, or tremors  SKIN: No itching, burning, rashes, or lesions   LYMPH NODES: No enlarged glands  ENDOCRINE: No heat or cold intolerance; No hair loss  MUSCULOSKELETAL: left shoulder pain  PSYCHIATRIC: No depression, anxiety, mood swings, or difficulty sleeping  HEME/LYMPH: No easy bruising, or bleeding gums  ALLERGY AND IMMUNOLOGIC: No hives or eczema  VASCULAR: no swelling, erythema        Physical Exam:  87 yo  gentleman lying in semi Quinonez's position, c/o left shoulder pain    Head: normocephalic, periorbital ecchymosis    Eyes: PERRLA, EOMI, no nystagmus, sclera anicteric    ENT: nasal discharge, uvula midline, no oropharyngeal erythema/exudate    Neck: supple, negative JVD, negative carotid bruits, no thyromegaly    Chest: CTA bilaterally, neg wheeze/ rhonchi/ rales/ crackles/ egophany    Cardiovascular: regular rate and rhythm, neg murmurs/rubs/gallops    Abdomen: soft, non distended, non tender, negative rebound/guarding, normal bowel sounds, neg hepatosplenomegaly    Extremities: WWP, neg cyanosis/clubbing/edema, negative calf tenderness to palpation, negative Tori's sign    Left shoulder: limited ROM sec to pain    :     Neurologic Exam:    Alert and oriented to person, place, date/year, speech fluent w/o dysarthria, recent and remote memory intact, repetition intact, comprehension intact    Cranial Nerves:     II:                       pupils equal, round and reactive to light, visual fields intact   III/ IV/VI:            extraocular movements intact, neg nystagmus, neg ptosis  V:                       facial sensation intact, V1-3 normal  VII:                     face symmetric, no droop, normal eye closure and smile  VIII:                    hearing intact to finger rub bilaterally  IX/ X:                 soft palate rise symmetrical  XI:                      head turning, shoulder shrug normal  XII:                     tongue midline    Motor Exam:    Upper Extremities:     RIght:   5/5   /intrinsics               5/5  wrist flexors/extensors               5/5  biceps/triceps               5/5  deltoid               negative drift    Left :    5/5  /intrinsics               5/5  wrist flexors/extensors               5/5 biceps/triceps               3+/5 deltoid sec to pain               negative drift    Lower Extremities:                 Right:     5/5  DF/PF/ evertors/ invertors                5/5  Quad/ hamstrings                5/5  hip flexors/adductors/abductors                 Left:       5/5  DF/PF/ evertors/ invertors                5/5  Quad/ hamstrings                5/5  hip flexors/adductors/abductors                       Sensory:    intact to LT/PP in all UE/LE dermatomes    DTR:            = biceps/     triceps/     brachioradialis                      = patella/   medial hamstring/ankle                      neg clonus                      neg Babinski                        Finger to Nose:  wnl    Heel to Shin:  wnl    Rapid Alternating movements:  wnl    Joint Position Sense:  intact    Romberg:  not tested    Tandem Walking:  not tested    Gait:  not tested        PM&R Impression:    1) deconditioned  2) no focal weakness  3) L shoulder pain/ rec XR L shoulder        Recommendations:    1) Physical therapy focusing on therapeutic exercises, bed mobility/transfer out of bed evaluation, progressive ambulation with assistive devices prn.    2) Disposition Plan/Recs:  d/c home, outpatient physical therapy

## 2019-12-09 NOTE — PROGRESS NOTE BEHAVIORAL HEALTH - CASE SUMMARY
86M with history of alcohol use disorder in full sustained remission and  extensive PMH currently admitted for hyponatremia. Psych consulted for expression of SI. Patient reports that he expressed passive SI in context of feeling frustrated with his current medical problems. He acknowledges some depressive symptoms in context of losing his GF 3 years ago and impact of medical problems on his functioning. He is not currently a candidate for an antidepressant due to ongoing hyponatremia but is interested in starting outpatient mental health treatment (referral info provided).

## 2019-12-09 NOTE — PROGRESS NOTE ADULT - SUBJECTIVE AND OBJECTIVE BOX
O/N Events: PAUL  Subjective:  appears good, working with PT, salt tabs and lasix stopped yesteday afternoon as sNa corrected to 24 hour goal and wanted to avoid overcorrection  trended back down in am to 126    VITALS  Vital Signs Last 24 Hrs  T(C): 36.3 (09 Dec 2019 10:12), Max: 36.7 (08 Dec 2019 13:56)  T(F): 97.4 (09 Dec 2019 10:12), Max: 98 (08 Dec 2019 13:56)  HR: 64 (09 Dec 2019 10:12) (60 - 70)  BP: 147/66 (09 Dec 2019 10:12) (135/66 - 147/66)  RR: 15 (09 Dec 2019 10:12) (15 - 18)  SpO2: 98% (09 Dec 2019 10:12) (97% - 98%)    PHYSICAL EXAM  General: A&Ox 3; NAD  Neck:no JVD  Respiratory: CTA B/L  Cardiovascular: Regular rhythm/rate; S1/S2, no MGR  Gastrointestinal: Soft; mildly distended, non tender   Extremities: WWP; no edema   Neurological: non focal    MEDICATIONS  (STANDING):  atorvastatin 40 milliGRAM(s) Oral at bedtime  brimonidine 0.2% Ophthalmic Solution 1 Drop(s) Both EYES three times a day  clopidogrel Tablet 75 milliGRAM(s) Oral daily  dorzolamide 2%/timolol 0.5% Ophthalmic Solution 1 Drop(s) Both EYES two times a day  enoxaparin Injectable 40 milliGRAM(s) SubCutaneous every 24 hours  finasteride 5 milliGRAM(s) Oral daily  losartan 25 milliGRAM(s) Oral daily  metoprolol succinate ER 25 milliGRAM(s) Oral daily  oxybutynin 5 milliGRAM(s) Oral three times a day  polyethylene glycol 3350 17 Gram(s) Oral daily  senna 2 Tablet(s) Oral at bedtime  sodium chloride 2 Gram(s) Oral every 12 hours  tamsulosin 0.4 milliGRAM(s) Oral at bedtime    MEDICATIONS  (PRN):  acetaminophen   Tablet .. 650 milliGRAM(s) Oral every 6 hours PRN Moderate Pain (4 - 6)      LABS                        11.7   5.08  )-----------( 311      ( 09 Dec 2019 06:36 )             34.0     12-09    126<L>  |  96  |  21  ----------------------------<  99  4.3   |  19<L>  |  0.97    Ca    8.9      09 Dec 2019 06:36  Mg     1.9     12-09

## 2019-12-10 ENCOUNTER — TRANSCRIPTION ENCOUNTER (OUTPATIENT)
Age: 84
End: 2019-12-10

## 2019-12-10 LAB
ANION GAP SERPL CALC-SCNC: 10 MMOL/L — SIGNIFICANT CHANGE UP (ref 5–17)
ANION GAP SERPL CALC-SCNC: 11 MMOL/L — SIGNIFICANT CHANGE UP (ref 5–17)
ANION GAP SERPL CALC-SCNC: 8 MMOL/L — SIGNIFICANT CHANGE UP (ref 5–17)
BUN SERPL-MCNC: 16 MG/DL — SIGNIFICANT CHANGE UP (ref 7–23)
BUN SERPL-MCNC: 17 MG/DL — SIGNIFICANT CHANGE UP (ref 7–23)
BUN SERPL-MCNC: 18 MG/DL — SIGNIFICANT CHANGE UP (ref 7–23)
CALCIUM SERPL-MCNC: 8.5 MG/DL — SIGNIFICANT CHANGE UP (ref 8.4–10.5)
CALCIUM SERPL-MCNC: 8.6 MG/DL — SIGNIFICANT CHANGE UP (ref 8.4–10.5)
CALCIUM SERPL-MCNC: 8.9 MG/DL — SIGNIFICANT CHANGE UP (ref 8.4–10.5)
CHLORIDE SERPL-SCNC: 94 MMOL/L — LOW (ref 96–108)
CHLORIDE SERPL-SCNC: 94 MMOL/L — LOW (ref 96–108)
CHLORIDE SERPL-SCNC: 96 MMOL/L — SIGNIFICANT CHANGE UP (ref 96–108)
CO2 SERPL-SCNC: 19 MMOL/L — LOW (ref 22–31)
CO2 SERPL-SCNC: 20 MMOL/L — LOW (ref 22–31)
CO2 SERPL-SCNC: 21 MMOL/L — LOW (ref 22–31)
CREAT SERPL-MCNC: 0.8 MG/DL — SIGNIFICANT CHANGE UP (ref 0.5–1.3)
CREAT SERPL-MCNC: 0.83 MG/DL — SIGNIFICANT CHANGE UP (ref 0.5–1.3)
CREAT SERPL-MCNC: 0.93 MG/DL — SIGNIFICANT CHANGE UP (ref 0.5–1.3)
GLUCOSE SERPL-MCNC: 122 MG/DL — HIGH (ref 70–99)
GLUCOSE SERPL-MCNC: 125 MG/DL — HIGH (ref 70–99)
GLUCOSE SERPL-MCNC: 98 MG/DL — SIGNIFICANT CHANGE UP (ref 70–99)
HCT VFR BLD CALC: 34.3 % — LOW (ref 39–50)
HGB BLD-MCNC: 11.7 G/DL — LOW (ref 13–17)
MAGNESIUM SERPL-MCNC: 1.7 MG/DL — SIGNIFICANT CHANGE UP (ref 1.6–2.6)
MCHC RBC-ENTMCNC: 28.5 PG — SIGNIFICANT CHANGE UP (ref 27–34)
MCHC RBC-ENTMCNC: 34.1 GM/DL — SIGNIFICANT CHANGE UP (ref 32–36)
MCV RBC AUTO: 83.5 FL — SIGNIFICANT CHANGE UP (ref 80–100)
NRBC # BLD: 0 /100 WBCS — SIGNIFICANT CHANGE UP (ref 0–0)
OSMOLALITY UR: 295 MOSM/KG — SIGNIFICANT CHANGE UP (ref 50–1200)
PLATELET # BLD AUTO: 284 K/UL — SIGNIFICANT CHANGE UP (ref 150–400)
POTASSIUM SERPL-MCNC: 4 MMOL/L — SIGNIFICANT CHANGE UP (ref 3.5–5.3)
POTASSIUM SERPL-MCNC: 4.6 MMOL/L — SIGNIFICANT CHANGE UP (ref 3.5–5.3)
POTASSIUM SERPL-MCNC: SIGNIFICANT CHANGE UP MMOL/L (ref 3.5–5.3)
POTASSIUM SERPL-SCNC: 4 MMOL/L — SIGNIFICANT CHANGE UP (ref 3.5–5.3)
POTASSIUM SERPL-SCNC: 4.6 MMOL/L — SIGNIFICANT CHANGE UP (ref 3.5–5.3)
POTASSIUM SERPL-SCNC: SIGNIFICANT CHANGE UP MMOL/L (ref 3.5–5.3)
RBC # BLD: 4.11 M/UL — LOW (ref 4.2–5.8)
RBC # FLD: 12.1 % — SIGNIFICANT CHANGE UP (ref 10.3–14.5)
SODIUM SERPL-SCNC: 123 MMOL/L — LOW (ref 135–145)
SODIUM SERPL-SCNC: 125 MMOL/L — LOW (ref 135–145)
SODIUM SERPL-SCNC: 125 MMOL/L — LOW (ref 135–145)
SODIUM UR-SCNC: 89 MMOL/L — SIGNIFICANT CHANGE UP
WBC # BLD: 4.51 K/UL — SIGNIFICANT CHANGE UP (ref 3.8–10.5)
WBC # FLD AUTO: 4.51 K/UL — SIGNIFICANT CHANGE UP (ref 3.8–10.5)

## 2019-12-10 PROCEDURE — 99233 SBSQ HOSP IP/OBS HIGH 50: CPT | Mod: GC

## 2019-12-10 RX ORDER — MAGNESIUM SULFATE 500 MG/ML
2 VIAL (ML) INJECTION ONCE
Refills: 0 | Status: COMPLETED | OUTPATIENT
Start: 2019-12-10 | End: 2019-12-10

## 2019-12-10 RX ORDER — FUROSEMIDE 40 MG
40 TABLET ORAL ONCE
Refills: 0 | Status: COMPLETED | OUTPATIENT
Start: 2019-12-10 | End: 2019-12-10

## 2019-12-10 RX ORDER — POLYETHYLENE GLYCOL 3350 17 G/17G
17 POWDER, FOR SOLUTION ORAL EVERY 12 HOURS
Refills: 0 | Status: DISCONTINUED | OUTPATIENT
Start: 2019-12-10 | End: 2019-12-11

## 2019-12-10 RX ORDER — LANOLIN ALCOHOL/MO/W.PET/CERES
3 CREAM (GRAM) TOPICAL ONCE
Refills: 0 | Status: COMPLETED | OUTPATIENT
Start: 2019-12-10 | End: 2019-12-10

## 2019-12-10 RX ADMIN — Medication 25 MILLIGRAM(S): at 06:19

## 2019-12-10 RX ADMIN — Medication 50 GRAM(S): at 14:05

## 2019-12-10 RX ADMIN — Medication 5 MILLIGRAM(S): at 14:04

## 2019-12-10 RX ADMIN — TAMSULOSIN HYDROCHLORIDE 0.4 MILLIGRAM(S): 0.4 CAPSULE ORAL at 23:35

## 2019-12-10 RX ADMIN — Medication 5 MILLIGRAM(S): at 23:36

## 2019-12-10 RX ADMIN — ENOXAPARIN SODIUM 40 MILLIGRAM(S): 100 INJECTION SUBCUTANEOUS at 06:19

## 2019-12-10 RX ADMIN — Medication 3 MILLIGRAM(S): at 02:26

## 2019-12-10 RX ADMIN — Medication 5 MILLIGRAM(S): at 06:18

## 2019-12-10 RX ADMIN — POLYETHYLENE GLYCOL 3350 17 GRAM(S): 17 POWDER, FOR SOLUTION ORAL at 12:22

## 2019-12-10 RX ADMIN — LOSARTAN POTASSIUM 25 MILLIGRAM(S): 100 TABLET, FILM COATED ORAL at 06:19

## 2019-12-10 RX ADMIN — DORZOLAMIDE HYDROCHLORIDE TIMOLOL MALEATE 1 DROP(S): 20; 5 SOLUTION/ DROPS OPHTHALMIC at 06:19

## 2019-12-10 RX ADMIN — Medication 40 MILLIGRAM(S): at 19:06

## 2019-12-10 RX ADMIN — SODIUM CHLORIDE 2 GRAM(S): 9 INJECTION INTRAMUSCULAR; INTRAVENOUS; SUBCUTANEOUS at 19:06

## 2019-12-10 RX ADMIN — DORZOLAMIDE HYDROCHLORIDE TIMOLOL MALEATE 1 DROP(S): 20; 5 SOLUTION/ DROPS OPHTHALMIC at 19:06

## 2019-12-10 RX ADMIN — BRIMONIDINE TARTRATE 1 DROP(S): 2 SOLUTION/ DROPS OPHTHALMIC at 06:19

## 2019-12-10 RX ADMIN — ATORVASTATIN CALCIUM 40 MILLIGRAM(S): 80 TABLET, FILM COATED ORAL at 23:35

## 2019-12-10 RX ADMIN — FINASTERIDE 5 MILLIGRAM(S): 5 TABLET, FILM COATED ORAL at 12:22

## 2019-12-10 RX ADMIN — SENNA PLUS 2 TABLET(S): 8.6 TABLET ORAL at 23:36

## 2019-12-10 RX ADMIN — BRIMONIDINE TARTRATE 1 DROP(S): 2 SOLUTION/ DROPS OPHTHALMIC at 23:40

## 2019-12-10 RX ADMIN — CLOPIDOGREL BISULFATE 75 MILLIGRAM(S): 75 TABLET, FILM COATED ORAL at 12:22

## 2019-12-10 RX ADMIN — SODIUM CHLORIDE 2 GRAM(S): 9 INJECTION INTRAMUSCULAR; INTRAVENOUS; SUBCUTANEOUS at 06:20

## 2019-12-10 NOTE — PROGRESS NOTE ADULT - SUBJECTIVE AND OBJECTIVE BOX
OVERNIGHT EVENTS:    SUBJECTIVE / INTERVAL HPI: Patient seen and examined at bedside.     VITAL SIGNS:  Vital Signs Last 24 Hrs  T(C): 36.9 (10 Dec 2019 06:05), Max: 36.9 (10 Dec 2019 06:05)  T(F): 98.4 (10 Dec 2019 06:05), Max: 98.4 (10 Dec 2019 06:05)  HR: 69 (10 Dec 2019 06:05) (64 - 84)  BP: 157/78 (10 Dec 2019 06:05) (138/72 - 163/80)  BP(mean): --  RR: 15 (10 Dec 2019 06:05) (15 - 16)  SpO2: 95% (10 Dec 2019 06:05) (95% - 99%)    PHYSICAL EXAM:    General: WDWN  HEENT: NC/AT; PERRL, anicteric sclera; MMM  Neck: supple  Cardiovascular: +S1/S2; RRR  Respiratory: CTA B/L; no W/R/R  Gastrointestinal: soft, NT/ND; +BSx4  Extremities: WWP; no edema, clubbing or cyanosis  Vascular: 2+ radial, DP/PT pulses B/L  Neurological: AAOx3; no focal deficits    MEDICATIONS:  MEDICATIONS  (STANDING):  atorvastatin 40 milliGRAM(s) Oral at bedtime  brimonidine 0.2% Ophthalmic Solution 1 Drop(s) Both EYES three times a day  clopidogrel Tablet 75 milliGRAM(s) Oral daily  dorzolamide 2%/timolol 0.5% Ophthalmic Solution 1 Drop(s) Both EYES two times a day  enoxaparin Injectable 40 milliGRAM(s) SubCutaneous every 24 hours  finasteride 5 milliGRAM(s) Oral daily  losartan 25 milliGRAM(s) Oral daily  metoprolol succinate ER 25 milliGRAM(s) Oral daily  oxybutynin 5 milliGRAM(s) Oral three times a day  polyethylene glycol 3350 17 Gram(s) Oral daily  senna 2 Tablet(s) Oral at bedtime  sodium chloride 2 Gram(s) Oral every 12 hours  tamsulosin 0.4 milliGRAM(s) Oral at bedtime    MEDICATIONS  (PRN):  acetaminophen   Tablet .. 650 milliGRAM(s) Oral every 6 hours PRN Moderate Pain (4 - 6)      ALLERGIES:  Allergies    aspirin (Other)  NSAIDs (Unknown)  Originally Entered as [Rash-General] reaction to [Other][[place]] (Rash)  Originally Entered as [Rash-General] reaction to [Other][Ragweed] (Rash)  ragweed and birch (Unknown)    Intolerances        LABS:                        11.7   4.51  )-----------( 284      ( 10 Dec 2019 06:25 )             34.3     12-09    121<L>  |  92<L>  |  18  ----------------------------<  115<H>  4.3   |  19<L>  |  0.91    Ca    8.8      09 Dec 2019 22:51  Mg     1.9     12-09          CAPILLARY BLOOD GLUCOSE      POCT Blood Glucose.: 124 mg/dL (09 Dec 2019 17:22)      RADIOLOGY & ADDITIONAL TESTS: Reviewed. OVERNIGHT EVENTS: 10 Na+ 121. Debora increased. Asymptomatic, renal fellow called, no intervention for now. Given Melatonin.     SUBJECTIVE / INTERVAL HPI: Patient seen and examined at bedside. Patient resting in bed saying he still feels constipated, no BM yesterday or this morning. Denies abdominal pain and has been passing gas.     VITAL SIGNS:  Vital Signs Last 24 Hrs  T(C): 36.9 (10 Dec 2019 06:05), Max: 36.9 (10 Dec 2019 06:05)  T(F): 98.4 (10 Dec 2019 06:05), Max: 98.4 (10 Dec 2019 06:05)  HR: 69 (10 Dec 2019 06:05) (64 - 84)  BP: 157/78 (10 Dec 2019 06:05) (138/72 - 163/80)  BP(mean): --  RR: 15 (10 Dec 2019 06:05) (15 - 16)  SpO2: 95% (10 Dec 2019 06:05) (95% - 99%)    PHYSICAL EXAM:    General: WDWN, resting comfortably in bed, NAD   HEENT: NC/AT; PERRL, anicteric sclera; MMM  Neck: supple  Cardiovascular: +S1/S2; RRR  Respiratory: CTA B/L; no W/R/R  Gastrointestinal: soft, NT, distended; +BSx4  Extremities: WWP; no edema, clubbing or cyanosis; L shoulder pain at biceps muscle with likely "daniela sign"; pain with active/passive ROM of shoulder (worse in flexion)   Vascular: 2+ radial, DP/PT pulses B/L  Neurological: AAOx3, sensation intact in extremities     MEDICATIONS:  MEDICATIONS  (STANDING):  atorvastatin 40 milliGRAM(s) Oral at bedtime  brimonidine 0.2% Ophthalmic Solution 1 Drop(s) Both EYES three times a day  clopidogrel Tablet 75 milliGRAM(s) Oral daily  dorzolamide 2%/timolol 0.5% Ophthalmic Solution 1 Drop(s) Both EYES two times a day  enoxaparin Injectable 40 milliGRAM(s) SubCutaneous every 24 hours  finasteride 5 milliGRAM(s) Oral daily  losartan 25 milliGRAM(s) Oral daily  metoprolol succinate ER 25 milliGRAM(s) Oral daily  oxybutynin 5 milliGRAM(s) Oral three times a day  polyethylene glycol 3350 17 Gram(s) Oral daily  senna 2 Tablet(s) Oral at bedtime  sodium chloride 2 Gram(s) Oral every 12 hours  tamsulosin 0.4 milliGRAM(s) Oral at bedtime    MEDICATIONS  (PRN):  acetaminophen   Tablet .. 650 milliGRAM(s) Oral every 6 hours PRN Moderate Pain (4 - 6)      ALLERGIES:  Allergies    aspirin (Other)  NSAIDs (Unknown)  Originally Entered as [Rash-General] reaction to [Other][[place]] (Rash)  Originally Entered as [Rash-General] reaction to [Other][Ragweed] (Rash)  ragweed and birch (Unknown)    Intolerances        LABS:                        11.7   4.51  )-----------( 284      ( 10 Dec 2019 06:25 )             34.3     12-09    121<L>  |  92<L>  |  18  ----------------------------<  115<H>  4.3   |  19<L>  |  0.91    Ca    8.8      09 Dec 2019 22:51  Mg     1.9     12-09          CAPILLARY BLOOD GLUCOSE      POCT Blood Glucose.: 124 mg/dL (09 Dec 2019 17:22)      RADIOLOGY & ADDITIONAL TESTS: Reviewed.

## 2019-12-10 NOTE — PROGRESS NOTE ADULT - PROBLEM SELECTOR PLAN 1
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 Placed on 1:1 observations. Denies SI, can DC 1:1.   -Pyjerman consulted, SSRIs should not be restarted or uptitrated in setting of persistent hypernatremia. Should f/u outpt at [facility] for psychotherapy ([phone]). 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 ,continue to monitor   - Sosm 256, Debora 76- euvolemic hypotonic hyponatremia (SIADH)  - Continue to monitor BMP   - Hold escitalopram  - AM cortisol 8.3   - Per Dr. Story's office, he had a recent f/u for a CT chest that showed a ground glass nodule 3.1cm in right apex, in setting of SIADH, malignancy will need to be ruled out, will f/u outpt for this   - Nephro consulted, f/u recs, C/w salt tabs 2g BID, Lasix 40mg x1    #Depression   Depressive symptoms with thoughts of hurting self. Placed on 1:1 observations. Denies SI, can DC 1:1.   -Marce consulted, SSRIs should not be restarted or uptitrated in setting of persistent hypernatremia. All anxiolytics can cause some degree of hyponatremia so will not start any for now. Should f/u outpt at [facility] for psychotherapy ([phone]).

## 2019-12-10 NOTE — DISCHARGE NOTE PROVIDER - CARE PROVIDERS DIRECT ADDRESSES
[email or web address]

## 2019-12-10 NOTE — PROGRESS NOTE ADULT - PROBLEM SELECTOR PLAN 1
cw SIADH  ? Hx of SIADH with recent admission at [facility]  unclear cause, could be from SSRI, currently on hold vs pain from recent shoulder injury/ TSH WNL  - cw fluid restriction 1 L per day  - increase lasix to 40 mg PO BID   - please repeat Pm BMP with urine Na and Urine Osm   Will follow

## 2019-12-10 NOTE — PROGRESS NOTE ADULT - ATTENDING COMMENTS
I independently performed the key portions of the evaluation and management service provided. I agree with the above history, physical, and plan which I have reviewed and edited where appropriate. I find hyponatrema, improved today. Follow SNa. Hold salt tablets/lasix for now. Follow SNa. Fluid restrict.  See full note. (Patient seen earlier in day.) (Admission note also reviewed/confirmed.)
feels well  mild left upper arm pain   no dizziness/ha/cp/dyspnea/dysuria/cough    p/  na down trending this afternoon ---will restart lasix 40 mg po bid and cont 2g nacl BID  trend Na levels  will need ortho follow up for shoulder /arm pain --possible bicep tear  cont metop/losartan   f/u nephrology recs  bowel regimen
s/  feels well  anxious to get home  no SI/HI  no dizziness  ambulated with PT today    p/  na 126 this AM  back on  lasix and salt tabs  check Debora/Uosm  repeat na   ssri held  on 1:1 CO - can dc as per psych  check left shoulder xrays
s/  feels well  anxious to get home  no SI/HI    p/  na 130 this afternoon  dc lasix and salt tabs  check Debora/Uosm  repeat na level this evening  ssri held  on 1:1 CO  f/u psych recs

## 2019-12-10 NOTE — PROGRESS NOTE ADULT - ASSESSMENT
This is a 86 year old male with pmhx of CAD s/p PCI/CABG on plavix, HTN, HLD, BPH, Prostate ca s/p TURP, recent ocular surgery, penile implants presents with abdominal pain and constipation. Admitted for hyponatremia.

## 2019-12-10 NOTE — DISCHARGE NOTE PROVIDER - NSDCFUADDAPPT_GEN_ALL_CORE_FT
Primary care phyisican: Please follow up with Dr. Story on Friday 12/13/19 at 2:00pm. Primary care phyisican: Please follow up with Dr. Story on Friday 12/13/19 at 2:00pm.    Orthopedic physician: Please follow up with Dr. Plata.     Outpatient mental health clinic: [phone] Please call this number to schedule an appointment.

## 2019-12-10 NOTE — DISCHARGE NOTE PROVIDER - HOSPITAL COURSE
Patient is 87 yo M with past medical history of CAD s/p PCI/CABG on plavix, HTN, HLD, BPH, Prostate ca s/p TURP, recent ocular surgery, penile implants presents with abdominal pain and constipation. CT abd/pelvis                         Presented with _____, found to have _____    Problem List/Main Diagnoses (system-based):     Inpatient treatment course:     New medications:     Labs to be followed outpatient:     Exam to be followed outpatient: Patient is 87 yo M with past medical history of CAD s/p PCI/CABG on plavix, HTN, HLD, BPH, Prostate ca s/p TURP, recent ocular surgery, penile implants presents with abdominal pain and constipation. CT abd/pelvis with low-grade SBO. Surgery consulted, however pt had a large BM in interval and became asymptomatic. Found to have Na+ 122, admitted for hyponatremia.         Problem List/Main Diagnoses (system-based):     #Hyponatremia     -Patient presented from home with Na of 122    Was recently discharged from [place] with hyponatremia, presented at 123 and dc at 128, with the thought being 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 and was difficult to correct as patient was noncompliant with fluid restriction     Here, patient is euvolemic on exam     Unlikely polydipsia- he does not endorse increased water intake     Unlikely beer potomania as he has a wide range in his diet and it is heavy in soy sauce     Unclear if malignancy present however per collateral obtained from [place], CT head, face was negative for any pathology     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 as he is on escitalopram and lasix    -Continue to fluid restrict as he is euvolemic    -->130->130->126->123,continue to monitor     - Sosm 256, Debora 76- euvolemic hypotonic hyponatremia (SIADH)    - Continue to monitor BMP    - Hold escitalopram    - AM cortisol 8.3     - Nephro consulted, f/u recs, Restart salt tabs 2g BID, Lasix 20mg, Lasix 40mg x1                            Inpatient treatment course:     New medications:     Labs to be followed outpatient:     Exam to be followed outpatient: Patient is 87 yo M with past medical history of CAD s/p PCI/CABG on plavix, HTN, HLD, BPH, Prostate ca s/p TURP, recent ocular surgery, penile implants presents with abdominal pain and constipation. CT abd/pelvis with low-grade SBO. Surgery consulted, however pt had a large BM in interval and became asymptomatic. Found to have Na+ 122, admitted for hyponatremia.         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 Sosm 256, Debora 76- euvolemic hypotonic hyponatremia (SIADH)    -Per Dr. Story's office he had a recent f/u for a CT chest that showed a ground glass nodule 3.1cm in right apex, in setting of SIADH, malignancy will need to be ruled out, will f/u outpt for this    -Fluid restrict 1L     -BMP monitored throughout day with frequent fluctuations in Na+, asymptomatic but will need to be monitored closely outpt     - Nephro consulted, started on salt tabs 2g BID, and given additional doses of Lasix as needed         Depression     Depressive symptoms with thoughts of hurting self. Placed on 1:1 observations. Now denies SI or depressive thoughts    -Pysch consulted, SSRIs should not be restarted or uptitrated in setting of persistent hypernatremia. All anxiolytics can cause some degree of hyponatremia so will not start any for now. Should f/u outpt at [facility] for psychotherapy ([phone]).        Constipation    -Pt presented with constipation and CT with SBO however had large BM in ED and no longer symptomatic in nature     -Benign abdomen on exam    -Miralax         #Essential hypertension     -Losartan and Toprol         #Malignant neoplasm of prostate    -Severe BPH, not surgical candidate per outpatient notes     -C/w home medications to prevent urinary retention.         #Hyperlipidemia    -continue with home medications        #L Shoulder Pain     Patient reports recent injury when reaching for something at home. Tenderness to palpation at the biceps tendon. Possible "daniela sign" seen on exam which is indicative of a biceps tendon rupture.     -L shoulder xray without fracture. Should f/u outpt with PCP for this. Appointment made with Dr. Story for Friday 12/13 @ 2:00pm.        #CAD (coronary artery disease)    -hx of PCI and s/p CABG    -CW plavix and statin at this time.        #Ocular injury    -Pt with ocular surgery, no active bleeding or changes present, nothing to do, outpatient follow up.         New medications:         Labs to be followed outpatient: BMP         Exam to be followed outpatient: Follow up with Dr. Story and psychotherapy at [facility]. Patient is 87 yo M with past medical history of CAD s/p PCI/CABG on plavix, HTN, HLD, BPH, Prostate ca s/p TURP, recent ocular surgery, penile implants presents with abdominal pain and constipation. CT abd/pelvis with low-grade SBO. Surgery consulted, however pt had a large BM in interval and became asymptomatic. Found to have Na+ 122, admitted for hyponatremia.         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 Sosm 256, Debora 76- euvolemic hypotonic hyponatremia (SIADH)    -Per Dr. Story's office he had a recent f/u for a CT chest that showed a ground glass nodule 3.1cm in right apex, in setting of SIADH, malignancy will need to be ruled out, will f/u outpt for this    -Fluid restrict 1L     -BMP monitored throughout day with frequent fluctuations in Na+, asymptomatic but will need to be monitored closely outpt     - Nephro consulted, started on salt tabs 2g BID, and given additional doses of Lasix as needed         Depression     Depressive symptoms with thoughts of hurting self. Placed on 1:1 observations. Now denies SI or depressive thoughts    -Pysch consulted, SSRIs should not be restarted or uptitrated in setting of persistent hypernatremia. All anxiolytics can cause some degree of hyponatremia so will not start any for now. Should f/u outpt at [facility] for psychotherapy ([phone]).        Constipation    -Pt presented with constipation and CT with SBO however had large BM in ED and no longer symptomatic in nature     -Benign abdomen on exam    -Miralax         #Essential hypertension     -Losartan and Toprol         #Malignant neoplasm of prostate    -Severe BPH, not surgical candidate per outpatient notes     -C/w home medications to prevent urinary retention.         #Hyperlipidemia    -continue with home medications        #L Shoulder Pain     Patient reports recent injury when reaching for something at home. Tenderness to palpation at the biceps tendon. Possible "daniela sign" seen on exam which is indicative of a biceps tendon rupture.     -L shoulder xray without fracture. Should f/u outpt with PCP for this. Appointment made with Dr. Story for Friday 12/13 @ 2:00pm. Can follow up with Dr. Plata orthopedist.         #CAD (coronary artery disease)    -hx of PCI and s/p CABG    -CW plavix and statin at this time.        #Ocular injury    -Pt with ocular surgery, no active bleeding or changes present, nothing to do, outpatient follow up.         New medications: Lasix 40mg BID, Sodium chloride 2g BID         Labs to be followed outpatient: BMP         Exam to be followed outpatient: Follow up with Dr. Story, psychotherapy at [facility] and Dr. Plata

## 2019-12-10 NOTE — PROGRESS NOTE ADULT - PROBLEM SELECTOR PLAN 2
Pt presented with constipation and CT with SBO however had large BM in ED and no longer symptomatic in nature   Benign abdomen on exam  CW bowel regimen Pt presented with constipation and CT with SBO however had large BM in ED and no longer symptomatic in nature   Benign abdomen on exam  CW bowel regimen, Miralax increased to BID

## 2019-12-10 NOTE — PROGRESS NOTE ADULT - PROBLEM SELECTOR PLAN 5
Pt with hx HLD, WIll continue with home medications    #L Shoulder Pain   Patient reports recent injury when reaching for something at home. Tenderness to palpation at the biceps tendon.   -F/u L shoulder xray. Should f/u outpt with PCP for this Pt with hx HLD, WIll continue with home medications    #L Shoulder Pain   Patient reports recent injury when reaching for something at home. Tenderness to palpation at the biceps tendon. Possible "daniela sign" seen on exam which is indicative of a biceps tendon rupture.   -L shoulder xray without fracture. Should f/u outpt with PCP for this. Appointment made with Dr. Story for Friday 12/13 @ 2:00pm.

## 2019-12-10 NOTE — DISCHARGE NOTE PROVIDER - NSDCMRMEDTOKEN_GEN_ALL_CORE_FT
atorvastatin 40 mg oral tablet: 1 tab(s) orally once a day  brimonidine 0.2% ophthalmic solution: 1 drop(s) to each affected eye 3 times a day  clopidogrel 75 mg oral tablet: 1 tab(s) orally once a day  desloratadine 5 mg oral tablet: 1 tab(s) orally once a day  dorzolamide-timolol 2%-0.5% preservative-free ophthalmic solution: 1 drop(s) to each affected eye 2 times a day  escitalopram 10 mg oral tablet: 1 tab(s) orally once a day  finasteride 5 mg oral tablet: 1 tab(s) orally once a day  furosemide 20 mg oral tablet: 1 tab(s) orally once a day  latanoprost 0.005% ophthalmic solution: 1 drop(s) to each affected eye once a day (in the evening)  losartan 25 mg oral tablet: 1 tab(s) orally once a day  metoprolol succinate 25 mg oral tablet, extended release: 1 tab(s) orally once a day  oxybutynin 5 mg oral tablet: 1 tab(s) orally 3 times a day  tamsulosin 0.4 mg oral capsule: 1 cap(s) orally once a day atorvastatin 40 mg oral tablet: 1 tab(s) orally once a day  brimonidine 0.2% ophthalmic solution: 1 drop(s) to each affected eye 3 times a day  clopidogrel 75 mg oral tablet: 1 tab(s) orally once a day  desloratadine 5 mg oral tablet: 1 tab(s) orally once a day  dorzolamide-timolol 2%-0.5% preservative-free ophthalmic solution: 1 drop(s) to each affected eye 2 times a day  finasteride 5 mg oral tablet: 1 tab(s) orally once a day  furosemide 20 mg oral tablet: 1 tab(s) orally once a day  Lasix 40 mg oral tablet: 40 milligram(s) orally 2 times a day   latanoprost 0.005% ophthalmic solution: 1 drop(s) to each affected eye once a day (in the evening)  losartan 25 mg oral tablet: 1 tab(s) orally once a day  metoprolol succinate 25 mg oral tablet, extended release: 1 tab(s) orally once a day  oxybutynin 5 mg oral tablet: 1 tab(s) orally 3 times a day  polyethylene glycol 3350 oral powder for reconstitution: 17 gram(s) orally every 12 hours  sodium chloride 1 g oral tablet: 2 tab(s) orally every 12 hours  tamsulosin 0.4 mg oral capsule: 1 cap(s) orally once a day atorvastatin 40 mg oral tablet: 1 tab(s) orally once a day  brimonidine 0.2% ophthalmic solution: 1 drop(s) to each affected eye 3 times a day  clopidogrel 75 mg oral tablet: 1 tab(s) orally once a day  desloratadine 5 mg oral tablet: 1 tab(s) orally once a day  dorzolamide-timolol 2%-0.5% preservative-free ophthalmic solution: 1 drop(s) to each affected eye 2 times a day  finasteride 5 mg oral tablet: 1 tab(s) orally once a day  Lasix 40 mg oral tablet: 40 milligram(s) orally 2 times a day   latanoprost 0.005% ophthalmic solution: 1 drop(s) to each affected eye once a day (in the evening)  losartan 25 mg oral tablet: 1 tab(s) orally once a day  metoprolol succinate 25 mg oral tablet, extended release: 1 tab(s) orally once a day  oxybutynin 5 mg oral tablet: 1 tab(s) orally 3 times a day  polyethylene glycol 3350 oral powder for reconstitution: 17 gram(s) orally every 12 hours  sodium chloride 1 g oral tablet: 2 tab(s) orally every 12 hours  tamsulosin 0.4 mg oral capsule: 1 cap(s) orally once a day

## 2019-12-10 NOTE — PROGRESS NOTE ADULT - ASSESSMENT
A/p  87 y/o M with PMHx of CAD s/p CABG, HTN, BPH and prostate Ca s/p TURP currently admitted for abdominal pain and constipation, nephrology consulted for Hyponatremia

## 2019-12-10 NOTE — DISCHARGE NOTE PROVIDER - NSDCCPCAREPLAN_GEN_ALL_CORE_FT
PRINCIPAL DISCHARGE DIAGNOSIS  Diagnosis: Hyponatremia  Assessment and Plan of Treatment: You were found to have a low sodium. You were given salt tablets, Lasix and we restricted your fluid intake. Your sodium levels were monitored during your hospital stay. We think you have low sodium as a side effect of the Lexarpo (Escitalopram) which you take for depression and anxiety. We have stopped this medication. Please follow up with Dr. Story on Friday 12/13/19 at 2pm. An appointment has been made for you. His office informed us that a recent CT of your chest showed a 3.1cm nodule in your right lung. This should be followed up as this nodule may be causing  your low sodium level.      SECONDARY DISCHARGE DIAGNOSES  Diagnosis: CAD (coronary artery disease)  Assessment and Plan of Treatment: Continue your Plavix and Statin    Diagnosis: Left shoulder pain  Assessment and Plan of Treatment: You reported a recent injury to your left shoulder afte reaching for something at home. It is likely your strained biceps tendon. An xray of your shoulder was negative for a fracture. You were seen by the physical therapists who recommended you continue phyiscal therapy outside the hospital. Please follow upwith Dr. Story for this.    Diagnosis: Benign prostate hyperplasia  Assessment and Plan of Treatment: Continue your home medications.    Diagnosis: BPH (benign prostatic hyperplasia)  Assessment and Plan of Treatment:     Diagnosis: Hypertension  Assessment and Plan of Treatment: Continue Toprol and Losartan.    Diagnosis: Depression, major  Assessment and Plan of Treatment: You have a history of depression and you were having thoughts of hurting yourself. This appears to hvae improved. You were seen by the psychiatrist who recommended holding off on all treatment for now as all medications used for anxiety/depression can cause some decrease in your sodium and we are concerend in that setting. Please follow up at the outpatient center for mental health for psychotherapy. This was discussed with you.    Diagnosis: Constipation, unspecified constipation type  Assessment and Plan of Treatment: You presented for constipation. A CT abdomen/pelvis showed a mild small bowel obstruction which resolved after you had a large bowel movement. We have been giving you Miralax. You may continue taking Miralax at home. PRINCIPAL DISCHARGE DIAGNOSIS  Diagnosis: Hyponatremia  Assessment and Plan of Treatment: You were found to have a low sodium. You were given salt tablets, Lasix and we restricted your fluid intake. Your sodium levels were monitored during your hospital stay. We think you have low sodium as a side effect of the Lexarpo (Escitalopram) which you take for depression and anxiety. We have stopped this medication. Please follow up with Dr. Story on Friday 12/13/19 at 2pm. An appointment has been made for you. His office informed us that a recent CT of your chest showed a 3.1cm nodule in your right lung. This should be followed up as this nodule may be causing  your low sodium level. To help increase your sodium we will be discharging you on Lasix and salt tablets. Please continue Lasix 40mg 2 times per day. This medication should be decreased when you see Dr. Story so please discuss this with him. Please continue to take 2 salt tablets 2 times per day. These medications have been sent to your pharmacy.      SECONDARY DISCHARGE DIAGNOSES  Diagnosis: CAD (coronary artery disease)  Assessment and Plan of Treatment: Continue your Plavix and Statin    Diagnosis: Left shoulder pain  Assessment and Plan of Treatment: You reported a recent injury to your left shoulder afte reaching for something at home. It is likely your strained biceps tendon. An xray of your shoulder was negative for a fracture. You were seen by the physical therapists who recommended you continue phyiscal therapy outside the hospital. Please follow upwith Dr. Story for this. You may also follow up with orthopedic physican Dr. Plata who we contacted for you regarding your left shoulder.    Diagnosis: Benign prostate hyperplasia  Assessment and Plan of Treatment: Continue your home medications.    Diagnosis: Hypertension  Assessment and Plan of Treatment: Continue Toprol and Losartan.    Diagnosis: Depression, major  Assessment and Plan of Treatment: You have a history of depression and you were having thoughts of hurting yourself. This appears to hvae improved. You were seen by the psychiatrist who recommended holding off on all treatment for now as all medications used for anxiety/depression can cause some decrease in your sodium and we are concerend in that setting. Please follow up at the outpatient center for mental health for psychotherapy. This was discussed with you.    Diagnosis: Constipation, unspecified constipation type  Assessment and Plan of Treatment: You presented for constipation. A CT abdomen/pelvis showed a mild small bowel obstruction which resolved after you had a large bowel movement. We have been giving you Miralax. You may continue taking Miralax at home.

## 2019-12-10 NOTE — DISCHARGE NOTE PROVIDER - NSDCFUSCHEDAPPT_GEN_ALL_CORE_FT
СВЕТЛАНА PARRA ; 01/29/2020 ; [facility] [address] [place] [address] СВЕТЛАНА PARRA ; 01/29/2020 ; [facility] [address] [place] [address] СВЕТЛАНА PARRA ; 01/29/2020 ; [facility] [address] [place] [address] СВЕТЛАНА PARRA ; 01/29/2020 ; [facility] [address] [place] [address] СВЕТЛАНА PARRA ; 01/29/2020 ; [facility] [address] [place] [address]

## 2019-12-10 NOTE — PROGRESS NOTE ADULT - ASSESSMENT
per Internal Medicine    This is a 86 year old male with pmhx of CAD s/p PCI/CABG on plavix, HTN, HLD, BPH, Prostate ca s/p TURP, recent ocular surgery, penile implants presents with abdominal pain and constipation. Admitted for hyponatremia.    Problem/Plan - 1:  ·  Problem: Hyponatremia.  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 Placed on 1:1 observations. Denies SI, can DC 1:1.   -Pysch consulted, SSRIs should not be restarted or uptitrated in setting of persistent hypernatremia. Should f/u outpt at [facility] for psychotherapy ([phone]).     Problem/Plan - 2:  ·  Problem: Constipation, unspecified constipation type.  Plan: Pt presented with constipation and CT with SBO however had large BM in ED and no longer symptomatic in nature   Benign abdomen on exam  CW bowel regimen.     Problem/Plan - 3:  ·  Problem: Essential hypertension.  Plan: Pt with hx of HTN, presented normotensive then slightly hypertensive prior to BM   CW losartan and Toprol at this time.     Problem/Plan - 4:  ·  Problem: Malignant neoplasm of prostate.  Plan: Pt with severe BPH, not surgical candidate per outpatient notes   AT this time will continue with home medications to prevent urinary retention.     Problem/Plan - 5:  ·  Problem: Hyperlipidemia.  Plan: Pt with hx HLD, WIll continue with home medications    #L Shoulder Pain   Patient reports recent injury when reaching for something at home. Tenderness to palpation at the biceps tendon.   -F/u L shoulder xray. Should f/u outpt with PCP for this.     Problem/Plan - 6:  Problem: CAD (coronary artery disease). Plan: Pt with hx of PCI and s/p CABG  NO active CP at this time   CW plavix and statin at this time.    Problem/Plan - 7:  ·  Problem: Ocular injury.  Plan: Pt with ocular surgery  No active bleeding or changes present   Nothing to do, outpatient follow up.     Problem/Plan - 8:  ·  Problem: Nutrition, metabolism, and development symptoms.  Plan: F: No IVF, Fluid restriction   E: Replete PRN  N: Regular Diet/ Fluid Restriction.     Problem/Plan - 9:  ·  Problem: Prophylactic measure.  Plan: DVT: Lovenox QD    FULL CODE.

## 2019-12-10 NOTE — DISCHARGE NOTE PROVIDER - PROVIDER TOKENS
PROVIDER:[TOKEN:[[id number]]] PROVIDER:[TOKEN:[[id number]]],PROVIDER:[TOKEN:[[id number]]]

## 2019-12-10 NOTE — DISCHARGE NOTE PROVIDER - CARE PROVIDER_API CALL
Meeta Story)  Internal Medicine  [address] [place] [address]  Phone: [phone]  Fax: [phone]  Follow Up Time: Meeta Story)  Internal Medicine  [address] [address]  Phone: [phone]  Fax: [phone]  Follow Up Time:     Giorgi Plata)  Orthopaedic Surgery Surgery  [address] [address]  Phone: [phone]  Fax: [phone]  Follow Up Time:

## 2019-12-10 NOTE — PROGRESS NOTE ADULT - SUBJECTIVE AND OBJECTIVE BOX
O/N Events: PAUL  Subjective:  sNa fluctuating, concern for non compliance with fluid restriction, as Na improves overnight when he is sleeping     VITALS  Vital Signs Last 24 Hrs  T(C): 36.3 (10 Dec 2019 12:26), Max: 36.9 (10 Dec 2019 06:05)  T(F): 97.4 (10 Dec 2019 12:26), Max: 98.4 (10 Dec 2019 06:05)  HR: 70 (10 Dec 2019 12:26) (69 - 84)  BP: 126/70 (10 Dec 2019 12:26) (126/70 - 163/80)  RR: 16 (10 Dec 2019 12:26) (15 - 16)  SpO2: 98% (10 Dec 2019 12:26) (95% - 98%)    PHYSICAL EXAM  General: A&Ox 3; NAD  Neck:no JVD  Respiratory: CTA B/L  Cardiovascular: Regular rhythm/rate; S1/S2, no MGR  Gastrointestinal: Soft; mildly distended, non tender   Extremities: WWP; no edema   Neurological: non focal    MEDICATIONS  (STANDING):  atorvastatin 40 milliGRAM(s) Oral at bedtime  brimonidine 0.2% Ophthalmic Solution 1 Drop(s) Both EYES three times a day  clopidogrel Tablet 75 milliGRAM(s) Oral daily  dorzolamide 2%/timolol 0.5% Ophthalmic Solution 1 Drop(s) Both EYES two times a day  enoxaparin Injectable 40 milliGRAM(s) SubCutaneous every 24 hours  finasteride 5 milliGRAM(s) Oral daily  losartan 25 milliGRAM(s) Oral daily  metoprolol succinate ER 25 milliGRAM(s) Oral daily  oxybutynin 5 milliGRAM(s) Oral three times a day  polyethylene glycol 3350 17 Gram(s) Oral daily  senna 2 Tablet(s) Oral at bedtime  sodium chloride 2 Gram(s) Oral every 12 hours  tamsulosin 0.4 milliGRAM(s) Oral at bedtime    MEDICATIONS  (PRN):  acetaminophen   Tablet .. 650 milliGRAM(s) Oral every 6 hours PRN Moderate Pain (4 - 6)      LABS                        11.7   4.51  )-----------( 284      ( 10 Dec 2019 06:25 )             34.3     12-10    123<L>  |  94<L>  |  17  ----------------------------<  122<H>  see note   |  19<L>  |  0.80    Ca    8.6      10 Dec 2019 15:10  Mg     1.7     12-10

## 2019-12-10 NOTE — PROGRESS NOTE ADULT - SUBJECTIVE AND OBJECTIVE BOX
Physical Medicine and Rehabilitation Progress Note:    Patient is a 86y old  Male who presents with a chief complaint of Hyponatremia (10 Dec 2019 14:50)      HPI:  This is a 86 year old male with pmhx of CAD s/p PCI/CABG on plavix, HTN, HLD, BPH, Prostate ca s/p TURP, recent ocular surgery, penile implants presents with abdominal pain and constipation. A few weeks ago, the patient underwent a lip lid for ingrown eyelashes. He was s/p b/l blepharoplasty (Dr. Toussaint) complicated by L periorbital hematoma and lateral canthotomy and transferred to [facility]  for L orbital washout on 11/08 and discharged on 11/11. He then went home, and one day was walking around when he fell onto his face. He states the fall was mechanical he believed but he went to [facility]. There CT scans were performed of his face and orbit which were negative for acute pathology. Upon admission at [facility] his Na was 123. Renal was following and recommended fluid restriction, and after 4 days Na was 128 and he was discharged with outpt renal follow up on 10/22. The thought was possible 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s. He then went home and was in his general state of health when he noticed that over the last 3 days he has had abdominal pain which was cramping in nature diffusely but worse in lower quadrant, nausea, bloating, constipation. He tried an enema which didnt work. He denies any vomiting, melena, CP, fever, chills, SOB, cough, rhinorrhea, LE edema, urinary changes. He endorses passing gas. He presented to the hospital in the setting of constipation.     At [facility] ED: T 97.6, HR 95, /70, RR 16, SPO2 97%. Labs sig for 12.6 Hgb, Na 122, Cl 89, CO2 21. CT with There is a small bowel obstruction. The obstruction appears low-grade. The cause of obstruction could be adhesions. Severe atherosclerotic disease, including heavy coronary artery calcification. Large prostate. Surgery consulted however had large BM in interval and was then asx. Pt admitted for hyponatremia. (06 Dec 2019 23:19)                            11.7   4.51  )-----------( 284      ( 10 Dec 2019 06:25 )             34.3       12-10    123<L>  |  94<L>  |  17  ----------------------------<  122<H>  see note   |  19<L>  |  0.80    Ca    8.6      10 Dec 2019 15:10  Mg     1.7     12-10      Vital Signs Last 24 Hrs  T(C): 36.3 (10 Dec 2019 12:26), Max: 36.9 (10 Dec 2019 06:05)  T(F): 97.4 (10 Dec 2019 12:26), Max: 98.4 (10 Dec 2019 06:05)  HR: 70 (10 Dec 2019 12:26) (69 - 84)  BP: 126/70 (10 Dec 2019 12:26) (126/70 - 163/80)  BP(mean): --  RR: 16 (10 Dec 2019 12:26) (15 - 16)  SpO2: 98% (10 Dec 2019 12:26) (95% - 99%)    MEDICATIONS  (STANDING):  atorvastatin 40 milliGRAM(s) Oral at bedtime  brimonidine 0.2% Ophthalmic Solution 1 Drop(s) Both EYES three times a day  clopidogrel Tablet 75 milliGRAM(s) Oral daily  dorzolamide 2%/timolol 0.5% Ophthalmic Solution 1 Drop(s) Both EYES two times a day  enoxaparin Injectable 40 milliGRAM(s) SubCutaneous every 24 hours  finasteride 5 milliGRAM(s) Oral daily  furosemide    Tablet 40 milliGRAM(s) Oral once  losartan 25 milliGRAM(s) Oral daily  metoprolol succinate ER 25 milliGRAM(s) Oral daily  oxybutynin 5 milliGRAM(s) Oral three times a day  polyethylene glycol 3350 17 Gram(s) Oral daily  senna 2 Tablet(s) Oral at bedtime  sodium chloride 2 Gram(s) Oral every 12 hours  tamsulosin 0.4 milliGRAM(s) Oral at bedtime    MEDICATIONS  (PRN):  acetaminophen   Tablet .. 650 milliGRAM(s) Oral every 6 hours PRN Moderate Pain (4 - 6)    Currently Undergoing Physical Therapy at bedside.    Functional Status Assessment: 12/9/2019    Previous Level of Function:     · Ambulation Skills	independent; needs device	  · Transfer Skills	independent; needs device	  · ADL Skills	needed assist	  · Additional Comments	Pt reports living alone in a 4th floor walk up apartment with HRs. Pt reports being completely independent with all forms of mobility using a SAC and ADL's, however pt's son performs grocery shopping. Pt reports recent difficulty with L shoulder movement, claiming he was reaching for something overhead and injured it, however never had it examined.	    Cognitive Status Examination:   · Orientation	oriented to person, place, time and situation	  · Level of Consciousness	alert	  · Follows Commands and Answers Questions	100% of the time; able to follow single-step instructions	  · Personal Safety and Judgment	at risk behaviors demonstrated; pt can be impulsive at times	    Range of Motion Exam:   · Active Range of Motion Examination	bilateral upper extremity Active ROM was WFL (within functional limits); bilateral  lower extremity Active ROM was WFL (within functional limits); except L shoulder flexion/abduction ~90 degrees + shoulder shrug	    Manual Muscle Testing:   · Manual Muscle Testing Results	no strength deficits were identified  except L shoulder grossly ~2+/5	    Bed Mobility: Scooting/Bridging:     · Level of [place]	independent	    Bed Mobility: Sit to Supine:     · Level of [place]	Not assessed, pt left seated OOB in chair	    Bed Mobility: Supine to Sit:     · Level of [place]	independent	    Transfer: Sit to Stand:     · Level of [place]	independent	  · Weight-Bearing Restrictions	weight-bearing as tolerated; L UE in sling	  · Assistive Device	straight cane	    Transfer: Stand to Sit:     · Level of [place]	independent	  · Weight-Bearing Restrictions	weight-bearing as tolerated; L UE in sling	  · Assistive Device	straight cane	    Gait Skills:     · Level of [place]	independent	  · Weight-Bearing Restrictions	weight-bearing as tolerated	  · Assistive Device	straight cane	  · Gait Distance	150 feet	  · Brace/Orthotics	L UE in sling	    Gait Analysis:     · Gait Pattern Used	2-point gait	    Stair Negotiation:     · Level of [place]	Modified Independent	  · Weight-Bearing Restrictions	L UE in sling; weight-bearing as tolerated	  · Assistive Device	right rail up; right rail down	  · Stair Pattern	step to step	  · Number of Stairs	48	    Balance Skills Assessment:     · Sitting Balance: Static	normal balance	  · Sitting Balance: Dynamic	normal balance	  · Sit-to-Stand Balance	normal balance	  · Standing Balance: Static	normal balance	  · Standing Balance: Dynamic	normal balance	    Treatment Location:   · Comments	+ muscular knot noted by L bicipital groove	        PM&R Impression: as above    Current Disposition Plan Recommendations: d/c home, outpatient physical therapy

## 2019-12-10 NOTE — CHART NOTE - NSCHARTNOTEFT_GEN_A_CORE
Patient's 10pm bmp was 121 (decreased from 123 at 4pm.) Patient remained asymptomatic and continued to look euvolemic on exam. Called renal fellow overnight who recommended continuing to monitor bmp, next one with am labs, without further interventions at this moment.

## 2019-12-11 ENCOUNTER — TRANSCRIPTION ENCOUNTER (OUTPATIENT)
Age: 84
End: 2019-12-11

## 2019-12-11 VITALS — WEIGHT: 163.58 LBS

## 2019-12-11 DIAGNOSIS — I71.9 AORTIC ANEURYSM OF UNSPECIFIED SITE, W/OUT RUPTURE: ICD-10-CM

## 2019-12-11 LAB
ANION GAP SERPL CALC-SCNC: 11 MMOL/L — SIGNIFICANT CHANGE UP (ref 5–17)
BUN SERPL-MCNC: 18 MG/DL — SIGNIFICANT CHANGE UP (ref 7–23)
CALCIUM SERPL-MCNC: 9.2 MG/DL — SIGNIFICANT CHANGE UP (ref 8.4–10.5)
CHLORIDE SERPL-SCNC: 95 MMOL/L — LOW (ref 96–108)
CO2 SERPL-SCNC: 18 MMOL/L — LOW (ref 22–31)
CREAT SERPL-MCNC: 0.95 MG/DL — SIGNIFICANT CHANGE UP (ref 0.5–1.3)
GLUCOSE SERPL-MCNC: 110 MG/DL — HIGH (ref 70–99)
HCT VFR BLD CALC: 35.5 % — LOW (ref 39–50)
HGB BLD-MCNC: 12.2 G/DL — LOW (ref 13–17)
MAGNESIUM SERPL-MCNC: 1.7 MG/DL — SIGNIFICANT CHANGE UP (ref 1.6–2.6)
MCHC RBC-ENTMCNC: 28.4 PG — SIGNIFICANT CHANGE UP (ref 27–34)
MCHC RBC-ENTMCNC: 34.4 GM/DL — SIGNIFICANT CHANGE UP (ref 32–36)
MCV RBC AUTO: 82.8 FL — SIGNIFICANT CHANGE UP (ref 80–100)
NRBC # BLD: 0 /100 WBCS — SIGNIFICANT CHANGE UP (ref 0–0)
PLATELET # BLD AUTO: 295 K/UL — SIGNIFICANT CHANGE UP (ref 150–400)
POTASSIUM SERPL-MCNC: 4 MMOL/L — SIGNIFICANT CHANGE UP (ref 3.5–5.3)
POTASSIUM SERPL-SCNC: 4 MMOL/L — SIGNIFICANT CHANGE UP (ref 3.5–5.3)
RBC # BLD: 4.29 M/UL — SIGNIFICANT CHANGE UP (ref 4.2–5.8)
RBC # FLD: 12.2 % — SIGNIFICANT CHANGE UP (ref 10.3–14.5)
SODIUM SERPL-SCNC: 124 MMOL/L — LOW (ref 135–145)
WBC # BLD: 5.96 K/UL — SIGNIFICANT CHANGE UP (ref 3.8–10.5)
WBC # FLD AUTO: 5.96 K/UL — SIGNIFICANT CHANGE UP (ref 3.8–10.5)

## 2019-12-11 PROCEDURE — 82570 ASSAY OF URINE CREATININE: CPT

## 2019-12-11 PROCEDURE — 82533 TOTAL CORTISOL: CPT

## 2019-12-11 PROCEDURE — 97161 PT EVAL LOW COMPLEX 20 MIN: CPT

## 2019-12-11 PROCEDURE — 83690 ASSAY OF LIPASE: CPT

## 2019-12-11 PROCEDURE — 99231 SBSQ HOSP IP/OBS SF/LOW 25: CPT

## 2019-12-11 PROCEDURE — 81001 URINALYSIS AUTO W/SCOPE: CPT

## 2019-12-11 PROCEDURE — 80048 BASIC METABOLIC PNL TOTAL CA: CPT

## 2019-12-11 PROCEDURE — 99239 HOSP IP/OBS DSCHRG MGMT >30: CPT | Mod: GC

## 2019-12-11 PROCEDURE — 83735 ASSAY OF MAGNESIUM: CPT

## 2019-12-11 PROCEDURE — 80053 COMPREHEN METABOLIC PANEL: CPT

## 2019-12-11 PROCEDURE — 73030 X-RAY EXAM OF SHOULDER: CPT

## 2019-12-11 PROCEDURE — 83930 ASSAY OF BLOOD OSMOLALITY: CPT

## 2019-12-11 PROCEDURE — 36415 COLL VENOUS BLD VENIPUNCTURE: CPT

## 2019-12-11 PROCEDURE — 85025 COMPLETE CBC W/AUTO DIFF WBC: CPT

## 2019-12-11 PROCEDURE — 82962 GLUCOSE BLOOD TEST: CPT

## 2019-12-11 PROCEDURE — 74176 CT ABD & PELVIS W/O CONTRAST: CPT

## 2019-12-11 PROCEDURE — 85027 COMPLETE CBC AUTOMATED: CPT

## 2019-12-11 PROCEDURE — 99285 EMERGENCY DEPT VISIT HI MDM: CPT

## 2019-12-11 PROCEDURE — 84550 ASSAY OF BLOOD/URIC ACID: CPT

## 2019-12-11 PROCEDURE — 84443 ASSAY THYROID STIM HORMONE: CPT

## 2019-12-11 PROCEDURE — 83935 ASSAY OF URINE OSMOLALITY: CPT

## 2019-12-11 PROCEDURE — 83036 HEMOGLOBIN GLYCOSYLATED A1C: CPT

## 2019-12-11 PROCEDURE — 84300 ASSAY OF URINE SODIUM: CPT

## 2019-12-11 RX ORDER — MAGNESIUM SULFATE 500 MG/ML
2 VIAL (ML) INJECTION ONCE
Refills: 0 | Status: COMPLETED | OUTPATIENT
Start: 2019-12-11 | End: 2019-12-11

## 2019-12-11 RX ORDER — FUROSEMIDE 40 MG
1 TABLET ORAL
Qty: 0 | Refills: 0 | DISCHARGE

## 2019-12-11 RX ORDER — ESCITALOPRAM OXALATE 10 MG/1
1 TABLET, FILM COATED ORAL
Qty: 0 | Refills: 0 | DISCHARGE

## 2019-12-11 RX ORDER — SODIUM CHLORIDE 9 MG/ML
2 INJECTION INTRAMUSCULAR; INTRAVENOUS; SUBCUTANEOUS
Qty: 120 | Refills: 0
Start: 2019-12-11 | End: 2020-01-09

## 2019-12-11 RX ORDER — POLYETHYLENE GLYCOL 3350 17 G/17G
17 POWDER, FOR SOLUTION ORAL
Qty: 0 | Refills: 0 | DISCHARGE
Start: 2019-12-11

## 2019-12-11 RX ORDER — FUROSEMIDE 40 MG
40 TABLET ORAL EVERY 12 HOURS
Refills: 0 | Status: DISCONTINUED | OUTPATIENT
Start: 2019-12-11 | End: 2019-12-11

## 2019-12-11 RX ORDER — FUROSEMIDE 40 MG
40 TABLET ORAL
Qty: 20 | Refills: 0
Start: 2019-12-11 | End: 2019-12-20

## 2019-12-11 RX ADMIN — FINASTERIDE 5 MILLIGRAM(S): 5 TABLET, FILM COATED ORAL at 11:23

## 2019-12-11 RX ADMIN — CLOPIDOGREL BISULFATE 75 MILLIGRAM(S): 75 TABLET, FILM COATED ORAL at 11:30

## 2019-12-11 RX ADMIN — BRIMONIDINE TARTRATE 1 DROP(S): 2 SOLUTION/ DROPS OPHTHALMIC at 06:51

## 2019-12-11 RX ADMIN — Medication 5 MILLIGRAM(S): at 06:51

## 2019-12-11 RX ADMIN — Medication 650 MILLIGRAM(S): at 04:22

## 2019-12-11 RX ADMIN — DORZOLAMIDE HYDROCHLORIDE TIMOLOL MALEATE 1 DROP(S): 20; 5 SOLUTION/ DROPS OPHTHALMIC at 06:51

## 2019-12-11 RX ADMIN — LOSARTAN POTASSIUM 25 MILLIGRAM(S): 100 TABLET, FILM COATED ORAL at 06:50

## 2019-12-11 RX ADMIN — Medication 650 MILLIGRAM(S): at 04:49

## 2019-12-11 RX ADMIN — SODIUM CHLORIDE 2 GRAM(S): 9 INJECTION INTRAMUSCULAR; INTRAVENOUS; SUBCUTANEOUS at 06:50

## 2019-12-11 RX ADMIN — POLYETHYLENE GLYCOL 3350 17 GRAM(S): 17 POWDER, FOR SOLUTION ORAL at 06:51

## 2019-12-11 RX ADMIN — ENOXAPARIN SODIUM 40 MILLIGRAM(S): 100 INJECTION SUBCUTANEOUS at 06:51

## 2019-12-11 RX ADMIN — Medication 25 MILLIGRAM(S): at 06:50

## 2019-12-11 RX ADMIN — Medication 50 GRAM(S): at 11:22

## 2019-12-11 NOTE — PROGRESS NOTE ADULT - PROBLEM SELECTOR PLAN 1
cw SIADH  ? Hx of SIADH with recent admission at [facility]  unclear cause, could be from SSRI, currently on hold vs pain from recent shoulder injury/ TSH WNL  - cw fluid restriction 1 L per day/lasix 40 mg PO BID and 2 G of sodium chloride BID   ok to discharge on above regimen, would need close follow early next week for Na check as SIADH would wear off if related to pain or SSRI.

## 2019-12-11 NOTE — PROGRESS NOTE BEHAVIORAL HEALTH - SUMMARY
Patient is an 86 year old male with no reported past psych history, home meds of Lexapro 10 mg q24, extensive medical conditions, admitted to the hospital for hyponatremia and seen by Psychiatry CL for evaluation of suicidality and depression. Patient currently presents with significantly improved mood. He denies any SI/HI. He is in agreement with the plan to hold the SSRI until sodium level is stable and to f/u at [facility].     Plan:  -referral provided for outpatient follow up  -patient is not currently a candidate for an antidepressant due to the two recent episodes of hyponatremia. he is not a candidate for benzodiazepines due to the risk of falls and cognitive impairment

## 2019-12-11 NOTE — DIETITIAN INITIAL EVALUATION ADULT. - PROBLEM SELECTOR PLAN 6
Pt with hx of PCI and s/p CABG  NO active CP at this time   FU EKG   CW plavix and statin at this time

## 2019-12-11 NOTE — PROGRESS NOTE BEHAVIORAL HEALTH - NSBHATTESTSEENBY_PSY_A_CORE
Trainee with telephonic supervision from Attending Psychiatrist
attending Psychiatrist without NP/Trainee
Attending Psychiatrist supervising NP/Trainee, meeting pt...

## 2019-12-11 NOTE — PROGRESS NOTE BEHAVIORAL HEALTH - NSBHFUPINTERVALHXFT_PSY_A_CORE
Patient seen at bedside. Patient's son was present in the room. Patient reports that he feels better as he is close to being discharged. He denies any depressive symptoms or anxiety. He denies any SI/HI.   Patient and son and in agreement with the plan to hold the antidepressant until sodium level stabilizes.

## 2019-12-11 NOTE — DIETITIAN INITIAL EVALUATION ADULT. - ENERGY NEEDS
Ht: 5'6", Wt: 74.2kg, IBW: 64.5kg, 115.0%IBW.   Needs calculated based on [facility] standards of care. Needs adjusted for age.  Defer fluids to team 2/2 hyponatremia.

## 2019-12-11 NOTE — PROGRESS NOTE ADULT - SUBJECTIVE AND OBJECTIVE BOX
O/N Events: PAUL  Subjective:  sNa remained almost stable, Urine Osm responding to lasix     VITALS  Vital Signs Last 24 Hrs  T(C): 36.3 (11 Dec 2019 11:30), Max: 36.6 (10 Dec 2019 20:47)  T(F): 97.4 (11 Dec 2019 11:30), Max: 97.9 (10 Dec 2019 20:47)  HR: 83 (11 Dec 2019 11:30) (60 - 83)  BP: 108/63 (11 Dec 2019 11:30) (108/63 - 149/71)  RR: 16 (11 Dec 2019 11:30) (16 - 17)  SpO2: 97% (11 Dec 2019 11:30) (95% - 97%)    PHYSICAL EXAM  General: A&Ox 3; NAD  Respiratory: CTA B/L  Cardiovascular: Regular rhythm/rate; S1/S2, no MGR  Gastrointestinal: Soft; mildly distended, non tender   Extremities: WWP; no edema   Neurological: non focal    MEDICATIONS  (STANDING):  atorvastatin 40 milliGRAM(s) Oral at bedtime  brimonidine 0.2% Ophthalmic Solution 1 Drop(s) Both EYES three times a day  clopidogrel Tablet 75 milliGRAM(s) Oral daily  dorzolamide 2%/timolol 0.5% Ophthalmic Solution 1 Drop(s) Both EYES two times a day  enoxaparin Injectable 40 milliGRAM(s) SubCutaneous every 24 hours  finasteride 5 milliGRAM(s) Oral daily  furosemide    Tablet 40 milliGRAM(s) Oral every 12 hours  losartan 25 milliGRAM(s) Oral daily  metoprolol succinate ER 25 milliGRAM(s) Oral daily  oxybutynin 5 milliGRAM(s) Oral three times a day  polyethylene glycol 3350 17 Gram(s) Oral every 12 hours  senna 2 Tablet(s) Oral at bedtime  sodium chloride 2 Gram(s) Oral every 12 hours  tamsulosin 0.4 milliGRAM(s) Oral at bedtime    MEDICATIONS  (PRN):  acetaminophen   Tablet .. 650 milliGRAM(s) Oral every 6 hours PRN Moderate Pain (4 - 6)      LABS                        12.2   5.96  )-----------( 295      ( 11 Dec 2019 07:03 )             35.5     12-11    124<L>  |  95<L>  |  18  ----------------------------<  110<H>  4.0   |  18<L>  |  0.95    Ca    9.2      11 Dec 2019 07:03  Mg     1.7     12-11

## 2019-12-11 NOTE — PROGRESS NOTE BEHAVIORAL HEALTH - RISK ASSESSMENT
Patient denies any SI/HI, no hx of SA/IPP admissions, is future oriented.
Patient denies any SI/HI, no hx of SA/IPP admissions, is future oriented.
Patient denies any SI/HI, no hx of SA/IPP admissions, is future oriented.    Static: multiple ongoing medical issues, loss of girlfriend, lives alone  Modifiable: depressed mood  Protective: stable housing, involved and supportive friends and family  Risk: Low

## 2019-12-11 NOTE — DIETITIAN INITIAL EVALUATION ADULT. - PROBLEM SELECTOR PLAN 2
Trial hydroxyzine 25mg 1 po q hs  #30 0rf   Pt presented with Constipation and CT with SBO however had large BM in ED and no longer symptomatic in nature   Benign abdomen on exam  CW bowel regimen

## 2019-12-11 NOTE — DISCHARGE NOTE NURSING/CASE MANAGEMENT/SOCIAL WORK - PATIENT PORTAL LINK FT
You can access the FollowMyHealth Patient Portal offered by [facility] by registering at the following website: [email or web address]. By joining [employer]’s FollowMyHealth portal, you will also be able to view your health information using other applications (apps) compatible with our system.

## 2019-12-11 NOTE — PROGRESS NOTE BEHAVIORAL HEALTH - AXIS III
CAD s/p PCI/CABG on plavix, HTN, HLD, BPH, Prostate ca s/p TURP, recent ocular surgery, recent fall

## 2019-12-11 NOTE — DISCHARGE NOTE NURSING/CASE MANAGEMENT/SOCIAL WORK - NSDCFUADDAPPT_GEN_ALL_CORE_FT
Primary care phyisican: Please follow up with Dr. Story on Friday 12/13/19 at 2:00pm.    Orthopedic physician: Please follow up with Dr. Plata.     Outpatient mental health clinic: [phone] Please call this number to schedule an appointment.

## 2019-12-11 NOTE — DIETITIAN INITIAL EVALUATION ADULT. - OTHER INFO
Pt seen for initial assessment. 86 year old male with pmhx of CAD s/p PCI/CABG on plavix, HTN, HLD, BPH, Prostate ca s/p TURP, recent ocular surgery, penile implants presents with abdominal pain and constipation. Admitted for hyponatremia. Being followed by behavioral health due to anxiety, also noted to have depressive symptoms with thoughts of hurting self. Placed on 1:1 observations. Skin: no edema, no pressure injury. Pt seen resting in bed, awake, alert, son at bedside assisting w/ assessment. Pt is on DASH diet w/ 1000ml FR w/ good PO intake, consuming >75% of meal trays. Reports good appetite PTA, no dietary restrictions, no ONS (pt lives alone in 4th floor walk up apt, recently began receiving meals on wheels). NKFA. Denies GI distress, last BM 12/10, denies difficulty chewing/swallowing. Not reporting pain at this time. RD to follow up per protocol.

## 2019-12-11 NOTE — DIETITIAN INITIAL EVALUATION ADULT. - PROBLEM SELECTOR PLAN 1
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

## 2019-12-11 NOTE — PROGRESS NOTE BEHAVIORAL HEALTH - NSBHADMITCOUNSEL_PSY_A_CORE
importance of adherence to chosen treatment/diagnostic results/impressions and/or recommended studies
diagnostic results/impressions and/or recommended studies

## 2019-12-12 ENCOUNTER — FORM ENCOUNTER (OUTPATIENT)
Age: 84
End: 2019-12-12

## 2019-12-12 PROBLEM — I71.9 AORTIC ANEURYSM: Status: ACTIVE | Noted: 2019-12-12

## 2019-12-13 ENCOUNTER — OUTPATIENT (OUTPATIENT)
Dept: OUTPATIENT SERVICES | Facility: HOSPITAL | Age: 84
LOS: 1 days | End: 2019-12-13
Payer: MEDICARE

## 2019-12-13 DIAGNOSIS — Z98.89 OTHER SPECIFIED POSTPROCEDURAL STATES: Chronic | ICD-10-CM

## 2019-12-13 DIAGNOSIS — H26.9 UNSPECIFIED CATARACT: Chronic | ICD-10-CM

## 2019-12-13 DIAGNOSIS — Z98.890 OTHER SPECIFIED POSTPROCEDURAL STATES: Chronic | ICD-10-CM

## 2019-12-13 DIAGNOSIS — I71.9 AORTIC ANEURYSM OF UNSPECIFIED SITE, WITHOUT RUPTURE: ICD-10-CM

## 2019-12-13 PROCEDURE — 93306 TTE W/DOPPLER COMPLETE: CPT | Mod: 26

## 2019-12-13 PROCEDURE — 93306 TTE W/DOPPLER COMPLETE: CPT

## 2019-12-16 DIAGNOSIS — Z95.1 PRESENCE OF AORTOCORONARY BYPASS GRAFT: ICD-10-CM

## 2019-12-16 DIAGNOSIS — Z79.02 LONG TERM (CURRENT) USE OF ANTITHROMBOTICS/ANTIPLATELETS: ICD-10-CM

## 2019-12-16 DIAGNOSIS — E87.1 HYPO-OSMOLALITY AND HYPONATREMIA: ICD-10-CM

## 2019-12-16 DIAGNOSIS — F41.9 ANXIETY DISORDER, UNSPECIFIED: ICD-10-CM

## 2019-12-16 DIAGNOSIS — I10 ESSENTIAL (PRIMARY) HYPERTENSION: ICD-10-CM

## 2019-12-16 DIAGNOSIS — K59.00 CONSTIPATION, UNSPECIFIED: ICD-10-CM

## 2019-12-16 DIAGNOSIS — N40.0 BENIGN PROSTATIC HYPERPLASIA WITHOUT LOWER URINARY TRACT SYMPTOMS: ICD-10-CM

## 2019-12-16 DIAGNOSIS — M25.512 PAIN IN LEFT SHOULDER: ICD-10-CM

## 2019-12-16 DIAGNOSIS — E22.2 SYNDROME OF INAPPROPRIATE SECRETION OF ANTIDIURETIC HORMONE: ICD-10-CM

## 2019-12-16 DIAGNOSIS — E78.5 HYPERLIPIDEMIA, UNSPECIFIED: ICD-10-CM

## 2019-12-16 DIAGNOSIS — F32.9 MAJOR DEPRESSIVE DISORDER, SINGLE EPISODE, UNSPECIFIED: ICD-10-CM

## 2019-12-16 DIAGNOSIS — R91.1 SOLITARY PULMONARY NODULE: ICD-10-CM

## 2019-12-16 DIAGNOSIS — F10.11 ALCOHOL ABUSE, IN REMISSION: ICD-10-CM

## 2019-12-16 DIAGNOSIS — E11.9 TYPE 2 DIABETES MELLITUS WITHOUT COMPLICATIONS: ICD-10-CM

## 2019-12-16 DIAGNOSIS — C61 MALIGNANT NEOPLASM OF PROSTATE: ICD-10-CM

## 2019-12-16 DIAGNOSIS — Z95.5 PRESENCE OF CORONARY ANGIOPLASTY IMPLANT AND GRAFT: ICD-10-CM

## 2019-12-16 DIAGNOSIS — I25.10 ATHEROSCLEROTIC HEART DISEASE OF NATIVE CORONARY ARTERY WITHOUT ANGINA PECTORIS: ICD-10-CM

## 2019-12-16 DIAGNOSIS — F43.21 ADJUSTMENT DISORDER WITH DEPRESSED MOOD: ICD-10-CM

## 2019-12-18 ENCOUNTER — APPOINTMENT (OUTPATIENT)
Dept: CARDIOTHORACIC SURGERY | Facility: CLINIC | Age: 84
End: 2019-12-18
Payer: MEDICARE

## 2019-12-18 VITALS
TEMPERATURE: 97.3 F | HEIGHT: 65 IN | HEART RATE: 106 BPM | SYSTOLIC BLOOD PRESSURE: 99 MMHG | DIASTOLIC BLOOD PRESSURE: 58 MMHG | WEIGHT: 175 LBS | OXYGEN SATURATION: 98 % | RESPIRATION RATE: 18 BRPM | BODY MASS INDEX: 29.16 KG/M2

## 2019-12-18 PROCEDURE — 99205 OFFICE O/P NEW HI 60 MIN: CPT

## 2019-12-26 ENCOUNTER — APPOINTMENT (OUTPATIENT)
Dept: PULMONOLOGY | Facility: CLINIC | Age: 84
End: 2019-12-26
Payer: MEDICARE

## 2019-12-26 VITALS
TEMPERATURE: 99.2 F | HEART RATE: 99 BPM | SYSTOLIC BLOOD PRESSURE: 120 MMHG | BODY MASS INDEX: 29.16 KG/M2 | HEIGHT: 65 IN | DIASTOLIC BLOOD PRESSURE: 70 MMHG | OXYGEN SATURATION: 98 % | WEIGHT: 175 LBS

## 2019-12-26 DIAGNOSIS — R91.8 OTHER NONSPECIFIC ABNORMAL FINDING OF LUNG FIELD: ICD-10-CM

## 2019-12-26 DIAGNOSIS — R91.1 SOLITARY PULMONARY NODULE: ICD-10-CM

## 2019-12-26 PROCEDURE — 99204 OFFICE O/P NEW MOD 45 MIN: CPT | Mod: 25

## 2019-12-26 PROCEDURE — 94010 BREATHING CAPACITY TEST: CPT

## 2019-12-26 NOTE — ASSESSMENT
[FreeTextEntry1] : I reviewed the CT from 12/2/19 at [facility]. There is a ground glass opacity at the right lung apex measuring about 3 cm and has thickened interstitial strands traversing thru it, which appears as a scar. Pt does have a cough and is likely chronic bronchitis but given location of the opacity and recent siadh, as well as pt personal history, will send for afb stain and quantiferon. If negative, would consider JESSENIA and recommend f/u imaging in 6 months as per Fleischner guidelines. if we rule out infection, then siadh is unlikely related to the opacity as it would be very rarely associated with lung primary other than small cell and small cell does not present as ground glass. Will f/iu after labs and sputum are back.

## 2019-12-26 NOTE — REVIEW OF SYSTEMS
[Sputum] : sputum  [Cough] : cough [Dyspnea] : dyspnea [Negative] : Hematologic [Chest Tightness] : no chest tightness [Pleuritic Pain] : no pleuritic pain [Wheezing] : no wheezing

## 2019-12-26 NOTE — CONSULT LETTER
[Dear  ___] : Dear  [unfilled], [Consult Letter:] : I had the pleasure of evaluating your patient, [unfilled]. [Please see my note below.] : Please see my note below. [Consult Closing:] : Thank you very much for allowing me to participate in the care of this patient.  If you have any questions, please do not hesitate to contact me. [Sincerely,] : Sincerely, [FreeTextEntry3] : Madhavi Patel MD, [facility]

## 2019-12-26 NOTE — HISTORY OF PRESENT ILLNESS
[FreeTextEntry1] : 86M here for evaluation of abnormal imaging, referred by Dr Story. Pt has been twice hospitalized in the last month after falling and found to be hyponatremic with SIADH. ON imaging he was noted to have a RUL opacity and is referred to assess whether there is a link and how this opacity should be managed. Pt is a very remote former smoker. He Reports a phlegm productive cough chronically but worse in the last few months. He has no fevers, chills, night sweats or weight loss. He gets fatigued when walking but reports no breathing trouble, no wheezing. His father had TB when pt was young.

## 2019-12-26 NOTE — PHYSICAL EXAM
[General Appearance - Well Developed] : well developed [Normal Conjunctiva] : the conjunctiva exhibited no abnormalities [Normal Oropharynx] : normal oropharynx [General Appearance - Well Nourished] : well nourished [Neck Appearance] : the appearance of the neck was normal [Heart Rate And Rhythm] : heart rate and rhythm were normal [Heart Sounds] : normal S1 and S2 [Arterial Pulses Normal] : the arterial pulses were normal [Murmurs] : no murmurs present [Auscultation Breath Sounds / Voice Sounds] : lungs were clear to auscultation bilaterally [Respiration, Rhythm And Depth] : normal respiratory rhythm and effort [Abdomen Tenderness] : non-tender [Bowel Sounds] : normal bowel sounds [Abdomen Soft] : soft [Skin Color & Pigmentation] : normal skin color and pigmentation [Cyanosis, Localized] : no localized cyanosis [Nail Clubbing] : no clubbing of the fingernails [Skin Turgor] : normal skin turgor [] : no rash [Oriented To Time, Place, And Person] : oriented to person, place, and time [Affect] : the affect was normal [Impaired Insight] : insight and judgment were intact

## 2019-12-27 ENCOUNTER — MOBILE ON CALL (OUTPATIENT)
Age: 84
End: 2019-12-27

## 2019-12-27 LAB
ALBUMIN SERPL ELPH-MCNC: 4.2 G/DL
ALP BLD-CCNC: 97 U/L
ALT SERPL-CCNC: 33 U/L
ANION GAP SERPL CALC-SCNC: 12 MMOL/L
APTT BLD: 34 SEC
AST SERPL-CCNC: 27 U/L
BASOPHILS # BLD AUTO: 0.02 K/UL
BASOPHILS NFR BLD AUTO: 0.4 %
BILIRUB SERPL-MCNC: 0.6 MG/DL
BUN SERPL-MCNC: 10 MG/DL
CALCIUM SERPL-MCNC: 9.3 MG/DL
CHLORIDE SERPL-SCNC: 94 MMOL/L
CO2 SERPL-SCNC: 20 MMOL/L
CREAT SERPL-MCNC: 0.9 MG/DL
EOSINOPHIL # BLD AUTO: 0.08 K/UL
EOSINOPHIL NFR BLD AUTO: 1.6 %
GLUCOSE SERPL-MCNC: 112 MG/DL
HCT VFR BLD CALC: 35.9 %
HGB BLD-MCNC: 12.2 G/DL
IMM GRANULOCYTES NFR BLD AUTO: 0.4 %
INR PPP: 1.02 RATIO
LYMPHOCYTES # BLD AUTO: 0.74 K/UL
LYMPHOCYTES NFR BLD AUTO: 14.7 %
MAN DIFF?: NORMAL
MCHC RBC-ENTMCNC: 28.4 PG
MCHC RBC-ENTMCNC: 34 GM/DL
MCV RBC AUTO: 83.5 FL
MONOCYTES # BLD AUTO: 0.66 K/UL
MONOCYTES NFR BLD AUTO: 13.1 %
NEUTROPHILS # BLD AUTO: 3.5 K/UL
NEUTROPHILS NFR BLD AUTO: 69.8 %
NT-PROBNP SERPL-MCNC: 1013 PG/ML
PLATELET # BLD AUTO: 343 K/UL
POTASSIUM SERPL-SCNC: 5.3 MMOL/L
PROT SERPL-MCNC: 7.1 G/DL
PT BLD: 11.5 SEC
RBC # BLD: 4.3 M/UL
RBC # FLD: 13.2 %
SODIUM SERPL-SCNC: 126 MMOL/L
WBC # FLD AUTO: 5.02 K/UL

## 2019-12-28 ENCOUNTER — EMERGENCY (EMERGENCY)
Facility: HOSPITAL | Age: 84
LOS: 1 days | Discharge: HOME | End: 2019-12-28
Attending: EMERGENCY MEDICINE | Admitting: EMERGENCY MEDICINE
Payer: MEDICARE

## 2019-12-28 VITALS
OXYGEN SATURATION: 98 % | HEART RATE: 90 BPM | RESPIRATION RATE: 16 BRPM | SYSTOLIC BLOOD PRESSURE: 154 MMHG | DIASTOLIC BLOOD PRESSURE: 78 MMHG

## 2019-12-28 VITALS
OXYGEN SATURATION: 98 % | SYSTOLIC BLOOD PRESSURE: 143 MMHG | TEMPERATURE: 98 F | HEART RATE: 90 BPM | RESPIRATION RATE: 19 BRPM | DIASTOLIC BLOOD PRESSURE: 75 MMHG | WEIGHT: 160.5 LBS | HEIGHT: 66 IN

## 2019-12-28 DIAGNOSIS — Z98.890 OTHER SPECIFIED POSTPROCEDURAL STATES: Chronic | ICD-10-CM

## 2019-12-28 DIAGNOSIS — H26.9 UNSPECIFIED CATARACT: Chronic | ICD-10-CM

## 2019-12-28 DIAGNOSIS — Z98.89 OTHER SPECIFIED POSTPROCEDURAL STATES: Chronic | ICD-10-CM

## 2019-12-28 LAB
ALBUMIN SERPL ELPH-MCNC: 3.7 G/DL — SIGNIFICANT CHANGE UP (ref 3.3–5)
ALP SERPL-CCNC: 101 U/L — SIGNIFICANT CHANGE UP (ref 40–120)
ALT FLD-CCNC: 33 U/L — SIGNIFICANT CHANGE UP (ref 10–45)
ANION GAP SERPL CALC-SCNC: 13 MMOL/L — SIGNIFICANT CHANGE UP (ref 5–17)
APPEARANCE UR: ABNORMAL
APTT BLD: 27.6 SEC — SIGNIFICANT CHANGE UP (ref 27.5–36.3)
AST SERPL-CCNC: 25 U/L — SIGNIFICANT CHANGE UP (ref 10–40)
BASOPHILS # BLD AUTO: 0.01 K/UL — SIGNIFICANT CHANGE UP (ref 0–0.2)
BASOPHILS NFR BLD AUTO: 0.2 % — SIGNIFICANT CHANGE UP (ref 0–2)
BILIRUB SERPL-MCNC: 0.6 MG/DL — SIGNIFICANT CHANGE UP (ref 0.2–1.2)
BILIRUB UR-MCNC: NEGATIVE — SIGNIFICANT CHANGE UP
BUN SERPL-MCNC: 9 MG/DL — SIGNIFICANT CHANGE UP (ref 7–23)
CALCIUM SERPL-MCNC: 9.1 MG/DL — SIGNIFICANT CHANGE UP (ref 8.4–10.5)
CHLORIDE SERPL-SCNC: 96 MMOL/L — SIGNIFICANT CHANGE UP (ref 96–108)
CO2 SERPL-SCNC: 18 MMOL/L — LOW (ref 22–31)
COLOR SPEC: YELLOW — SIGNIFICANT CHANGE UP
CREAT SERPL-MCNC: 0.82 MG/DL — SIGNIFICANT CHANGE UP (ref 0.5–1.3)
DIFF PNL FLD: ABNORMAL
EOSINOPHIL # BLD AUTO: 0.12 K/UL — SIGNIFICANT CHANGE UP (ref 0–0.5)
EOSINOPHIL NFR BLD AUTO: 2.6 % — SIGNIFICANT CHANGE UP (ref 0–6)
GLUCOSE SERPL-MCNC: 117 MG/DL — HIGH (ref 70–99)
GLUCOSE UR QL: NEGATIVE — SIGNIFICANT CHANGE UP
HCT VFR BLD CALC: 33.9 % — LOW (ref 39–50)
HGB BLD-MCNC: 12 G/DL — LOW (ref 13–17)
IMM GRANULOCYTES NFR BLD AUTO: 0.4 % — SIGNIFICANT CHANGE UP (ref 0–1.5)
INR BLD: 1.13 — SIGNIFICANT CHANGE UP (ref 0.88–1.16)
KETONES UR-MCNC: NEGATIVE — SIGNIFICANT CHANGE UP
LEUKOCYTE ESTERASE UR-ACNC: NEGATIVE — SIGNIFICANT CHANGE UP
LYMPHOCYTES # BLD AUTO: 0.67 K/UL — LOW (ref 1–3.3)
LYMPHOCYTES # BLD AUTO: 14.3 % — SIGNIFICANT CHANGE UP (ref 13–44)
MCHC RBC-ENTMCNC: 28.6 PG — SIGNIFICANT CHANGE UP (ref 27–34)
MCHC RBC-ENTMCNC: 35.4 GM/DL — SIGNIFICANT CHANGE UP (ref 32–36)
MCV RBC AUTO: 80.7 FL — SIGNIFICANT CHANGE UP (ref 80–100)
MONOCYTES # BLD AUTO: 0.61 K/UL — SIGNIFICANT CHANGE UP (ref 0–0.9)
MONOCYTES NFR BLD AUTO: 13 % — SIGNIFICANT CHANGE UP (ref 2–14)
NEUTROPHILS # BLD AUTO: 3.25 K/UL — SIGNIFICANT CHANGE UP (ref 1.8–7.4)
NEUTROPHILS NFR BLD AUTO: 69.5 % — SIGNIFICANT CHANGE UP (ref 43–77)
NITRITE UR-MCNC: NEGATIVE — SIGNIFICANT CHANGE UP
NRBC # BLD: 0 /100 WBCS — SIGNIFICANT CHANGE UP (ref 0–0)
PH UR: 7 — SIGNIFICANT CHANGE UP (ref 5–8)
PLATELET # BLD AUTO: 369 K/UL — SIGNIFICANT CHANGE UP (ref 150–400)
POTASSIUM SERPL-MCNC: 4.5 MMOL/L — SIGNIFICANT CHANGE UP (ref 3.5–5.3)
POTASSIUM SERPL-SCNC: 4.5 MMOL/L — SIGNIFICANT CHANGE UP (ref 3.5–5.3)
PROT SERPL-MCNC: 7.1 G/DL — SIGNIFICANT CHANGE UP (ref 6–8.3)
PROT UR-MCNC: 30 MG/DL
PROTHROM AB SERPL-ACNC: 12.8 SEC — SIGNIFICANT CHANGE UP (ref 10–12.9)
RBC # BLD: 4.2 M/UL — SIGNIFICANT CHANGE UP (ref 4.2–5.8)
RBC # FLD: 12.8 % — SIGNIFICANT CHANGE UP (ref 10.3–14.5)
SODIUM SERPL-SCNC: 127 MMOL/L — LOW (ref 135–145)
SP GR SPEC: 1.02 — SIGNIFICANT CHANGE UP (ref 1–1.03)
UROBILINOGEN FLD QL: 0.2 E.U./DL — SIGNIFICANT CHANGE UP
WBC # BLD: 4.68 K/UL — SIGNIFICANT CHANGE UP (ref 3.8–10.5)
WBC # FLD AUTO: 4.68 K/UL — SIGNIFICANT CHANGE UP (ref 3.8–10.5)

## 2019-12-28 PROCEDURE — 85025 COMPLETE CBC W/AUTO DIFF WBC: CPT

## 2019-12-28 PROCEDURE — 81001 URINALYSIS AUTO W/SCOPE: CPT

## 2019-12-28 PROCEDURE — 80053 COMPREHEN METABOLIC PANEL: CPT

## 2019-12-28 PROCEDURE — 93010 ELECTROCARDIOGRAM REPORT: CPT

## 2019-12-28 PROCEDURE — 85610 PROTHROMBIN TIME: CPT

## 2019-12-28 PROCEDURE — 36415 COLL VENOUS BLD VENIPUNCTURE: CPT

## 2019-12-28 PROCEDURE — 85730 THROMBOPLASTIN TIME PARTIAL: CPT

## 2019-12-28 PROCEDURE — 93005 ELECTROCARDIOGRAM TRACING: CPT

## 2019-12-28 PROCEDURE — 99283 EMERGENCY DEPT VISIT LOW MDM: CPT | Mod: 25

## 2019-12-28 PROCEDURE — 99284 EMERGENCY DEPT VISIT MOD MDM: CPT

## 2019-12-28 RX ORDER — SODIUM CHLORIDE 9 MG/ML
1000 INJECTION INTRAMUSCULAR; INTRAVENOUS; SUBCUTANEOUS
Refills: 0 | Status: DISCONTINUED | OUTPATIENT
Start: 2019-12-28 | End: 2020-01-03

## 2019-12-28 RX ORDER — SODIUM CHLORIDE 9 MG/ML
500 INJECTION INTRAMUSCULAR; INTRAVENOUS; SUBCUTANEOUS ONCE
Refills: 0 | Status: COMPLETED | OUTPATIENT
Start: 2019-12-28 | End: 2019-12-28

## 2019-12-28 RX ORDER — FUROSEMIDE 40 MG
1 TABLET ORAL
Qty: 60 | Refills: 0
Start: 2019-12-28 | End: 2020-01-26

## 2019-12-28 RX ORDER — FUROSEMIDE 40 MG
40 TABLET ORAL ONCE
Refills: 0 | Status: COMPLETED | OUTPATIENT
Start: 2019-12-28 | End: 2019-12-28

## 2019-12-28 RX ADMIN — SODIUM CHLORIDE 1000 MILLILITER(S): 9 INJECTION INTRAMUSCULAR; INTRAVENOUS; SUBCUTANEOUS at 16:53

## 2019-12-28 RX ADMIN — SODIUM CHLORIDE 1000 MILLILITER(S): 9 INJECTION INTRAMUSCULAR; INTRAVENOUS; SUBCUTANEOUS at 16:30

## 2019-12-28 RX ADMIN — SODIUM CHLORIDE 100 MILLILITER(S): 9 INJECTION INTRAMUSCULAR; INTRAVENOUS; SUBCUTANEOUS at 16:54

## 2019-12-28 RX ADMIN — Medication 40 MILLIGRAM(S): at 19:07

## 2019-12-28 NOTE — ED PROVIDER NOTE - PROGRESS NOTE DETAILS
D/w hospitalist [place] and renal on call Golmai - restart lasix, no admission required D/w hospitalist Dr Reeves and renal on call Dr Reeves in the setting of possible readmission for hyponatremia (122-130, 124 on recent discharge, now improved)- NA improved, chronic w/ SIADH. Restart lasix, no re-admission required. D/w family and pt - pt wants to go home. Continue salt tabs, water restriction, Rx for Lasix, f/u PCP / renal. Demonstrates understanding of plan

## 2019-12-28 NOTE — ED PROVIDER NOTE - NSFOLLOWUPINSTRUCTIONS_ED_ALL_ED_FT
Please restart your Lasix (Furosemide). You were given the first dose in the ED and a prescription has been sent to your pharmacy. Continue your salt tabs. Follow up with your regular medical doctor and nephrology (kidney doctor). You may also call our referrals coordinator at [phone] Monday - Friday 11 am - 7 pm for assistance in making an appointment.    Hyponatremia  Hyponatremia is when the amount of salt (sodium) in your blood is too low. When sodium levels are low, your cells absorb extra water and they swell. The swelling happens throughout the body, but it mostly affects the brain.  What are the causes?  This condition may be caused by:  Heart, kidney, or liver problems.Thyroid problems.Adrenal gland problems.Metabolic conditions, such as syndrome of inappropriate antidiuretic hormone (SIADH).Severe vomiting and diarrhea.Certain medicines or illegal drugs.Dehydration.Drinking too much water.Eating a diet that is low in sodium.Large burns on your body.Sweating.What increases the risk?  This condition is more likely to develop in people who:  Have long-term (chronic) kidney disease.Have heart failure.Have a medical condition that causes frequent or excessive diarrhea.Have metabolic conditions, such as [place] disease or SIADH.Take certain medicines that affect the sodium and fluid balance in the blood. Some of these medicine types include:  Diuretics.NSAIDs.Some opioid pain medicines.Some antidepressants.Some seizure prevention medicines.What are the signs or symptoms?  Symptoms of this condition include:  Nausea and vomiting.Confusion.Lethargy.Agitation.Headache.Seizures.Unconsciousness.Appetite loss.Muscle weakness and cramping.Feeling weak or light-headed.Having a rapid heart rate.Fainting, in severe cases.How is this diagnosed?  This condition is diagnosed with a medical history and physical exam. You will also have other tests, including:  Blood tests.Urine tests.How is this treated?  Treatment for this condition depends on the cause. Treatment may include:  Fluids given through an IV tube that is inserted into one of your veins.Medicines to correct the sodium imbalance. If medicines are causing the condition, the medicines will need to be adjusted.Limiting water or fluid intake to get the correct sodium balance.Follow these instructions at home:  Take medicines only as directed by your health care provider. Many medicines can make this condition worse. Talk with your health care provider about any medicines that you are currently taking.Carefully follow a recommended diet as directed by your health care provider.Carefully follow instructions from your health care provider about fluid restrictions.Keep all follow-up visits as directed by your health care provider. This is important.Do not drink alcohol.Contact a health care provider if:  You develop worsening nausea, fatigue, headache, confusion, or weakness.Your symptoms go away and then return.You have problems following the recommended diet.Get help right away if:  You have a seizure.You faint.You have ongoing diarrhea or vomiting.This information is not intended to replace advice given to you by your health care provider. Make sure you discuss any questions you have with your health care provider.    Syndrome of Inappropriate Antidiuretic Hormone Secretion    WHAT YOU NEED TO KNOW:    The syndrome of inappropriate antidiuretic hormone secretion (SIADH) is a condition that causes your body to make too much antidiuretic hormone (ADH). ADH is a chemical that helps keep the right balance of fluids in your body. Increased ADH may cause too much water to remain inside your body. Chemicals in your blood, such as salt, may decrease. This may prevent your organs from working properly.    DISCHARGE INSTRUCTIONS:    Medicines:     Medicines will decrease the amount of fluid in your body. You will urinate more often when you take these medicines.      Take your medicine as directed. Contact your healthcare provider if you think your medicine is not helping or if you have side effects. Tell him of her if you are allergic to any medicine. Keep a list of the medicines, vitamins, and herbs you take. Include the amounts, and when and why you take them. Bring the list or the pill bottles to follow-up visits. Carry your medicine list with you in case of an emergency.    Eat a variety of healthy foods: You may need to increase the amount of salt you eat. You may also need to increase the amount of protein you eat. Some foods that are high in protein are beans, nuts, eggs, poultry (such as chicken and turkey), and fish. Ask if you need to be on a special diet.    Drink liquids as directed: Ask your healthcare provider how much liquid to drink each day and which liquids are best for you. You may need to limit the amount of liquid you drink to balance the fluid and chemicals in your body.    Follow up with your healthcare provider as directed: Write down your questions so you remember to ask them during your visits.     Contact your healthcare provider if:     You feel weak or have muscle cramps most of the time.      You feel like vomiting when you eat.      Your urine is darker than usual.      You urinate less than usual.      You have trouble staying awake.      You have questions or concerns about your condition or care.    Return to the emergency department if:     You have a sudden, severe headache.      You see or hear things that are not there.      You cannot think clearly.      You have swelling in your arms or legs.      You have a seizure.

## 2019-12-28 NOTE — ED PROVIDER NOTE - PATIENT PORTAL LINK FT
You can access the FollowMyHealth Patient Portal offered by [facility] by registering at the following website: [email or web address]. By joining [facility]’s FollowMyHealth portal, you will also be able to view your health information using other applications (apps) compatible with our system.

## 2019-12-28 NOTE — ED PROVIDER NOTE - PHYSICAL EXAMINATION
Constitutional: Well appearing, well nourished, awake, alert, oriented to person, place, time/situation and in no apparent distress.  ENMT: Airway patent. Normal MM  Eyes: Clear bilaterally, old b/l infraorbital bruising improving compared to prior visit  Cardiac: Normal rate, regular rhythm.  Heart sounds S1, S2.  No murmurs, rubs or gallops.  Respiratory: Breaths sounds equal and clear b/l. No increased WOB, tachypnea, hypoxia, or accessory mm use. Pt speaks in full sentences.   Gastrointestinal: Abd soft, NT, ND, NABS. No guarding, rebound, or rigidity. No pulsatile abdominal masses. No organomegaly appreciated. No CVAT   Musculoskeletal: Range of motion is not limited  Neuro: Alert & Oriented x 3. CN II-XII intact. No facial droop. Clear speech. TOMPKINS w/ 5/5 strength x 4 ext. Normal sensation. No pronator drift. No dysdidokinesia nor dysmetria. steady gait  Skin: Skin normal color for race, warm, dry and intact. No evidence of rash.  Psych: Alert and oriented to person, place, time/situation. normal mood and affect. no apparent risk to self or others.

## 2019-12-28 NOTE — ED PROVIDER NOTE - CLINICAL SUMMARY MEDICAL DECISION MAKING FREE TEXT BOX
Hyponatremia, chronic, hx SIADH. Ran out of Lasix 2 days ago. Chronic complaints. No acute sx hyponatremia. Start IVF, check labs, EKG. D/w renal whom pt is known to. Dispo pending w/u and clinical status

## 2019-12-28 NOTE — ED PROVIDER NOTE - OBJECTIVE STATEMENT
Pt w/ PMHx CAD s/p PCI/CABG on plavix, HTN, HLD, BPH, Prostate ca s/p TURP, recent admission for Na+ 122 (SIADH, seen by nephrology, started on salt tabs 2g BID, and increased dose of Lasix to 40 mg BID, recent CT chest that showed a ground glass nodule 3.1cm in right apex, and in the setting of SIADH, referred to pulm to r/o malignancy), f/u this week, was advised no concern for malignancy and had repeat labs drawn 4 days ago. Pt was called today, advised Na 126, and told to come to the hospital. Pt ran out of Lasix approx 48 hours ago and has been unable to get the Rx refilled. Pt has not yet f/u w/ renal as outpt. Pt is eating. No abd pain, vomiting, diarrhea. + constipation, last BM yesterday. No falls. No confusion. No HA. No seizures. Pt reports he feels off balance when he walks, but states it is secondary to pain w/ walking 2/2 R lower chronic back pain, for which he's scheduled an appointment w/ pain management. No vertigo.

## 2019-12-28 NOTE — ED ADULT NURSE NOTE - CHPI ED NUR SYMPTOMS NEG
no vomiting/no fever/no loss of consciousness/no change in level of consciousness/no numbness/no blurred vision/no confusion

## 2019-12-28 NOTE — ED ADULT NURSE REASSESSMENT NOTE - NS ED NURSE REASSESS COMMENT FT1
client states he is feeling better and would like to go home -- walked with cane and steady gait; dressed self

## 2019-12-28 NOTE — ED ADULT NURSE NOTE - NSIMPLEMENTINTERV_GEN_ALL_ED
Implemented All Fall Risk Interventions:  [place] to call system. Call bell, personal items and telephone within reach. Instruct patient to call for assistance. Room bathroom lighting operational. Non-slip footwear when patient is off stretcher. Physically safe environment: no spills, clutter or unnecessary equipment. Stretcher in lowest position, wheels locked, appropriate side rails in place. Provide visual cue, wrist band, yellow gown, etc. Monitor gait and stability. Monitor for mental status changes and reorient to person, place, and time. Review medications for side effects contributing to fall risk. Reinforce activity limits and safety measures with patient and family. Implemented All Fall with Harm Risk Interventions:  [place] to call system. Call bell, personal items and telephone within reach. Instruct patient to call for assistance. Room bathroom lighting operational. Non-slip footwear when patient is off stretcher. Physically safe environment: no spills, clutter or unnecessary equipment. Stretcher in lowest position, wheels locked, appropriate side rails in place. Provide visual cue, wrist band, yellow gown, etc. Monitor gait and stability. Monitor for mental status changes and reorient to person, place, and time. Review medications for side effects contributing to fall risk. Reinforce activity limits and safety measures with patient and family. Provide visual clues: red socks.

## 2019-12-29 LAB
M TB IFN-G BLD-IMP: NEGATIVE
QUANTIFERON TB PLUS MITOGEN MINUS NIL: 0.88 IU/ML
QUANTIFERON TB PLUS NIL: 0.03 IU/ML
QUANTIFERON TB PLUS TB1 MINUS NIL: 0.01 IU/ML
QUANTIFERON TB PLUS TB2 MINUS NIL: 0.01 IU/ML

## 2019-12-30 ENCOUNTER — OTHER (OUTPATIENT)
Age: 84
End: 2019-12-30

## 2020-01-01 DIAGNOSIS — E78.5 HYPERLIPIDEMIA, UNSPECIFIED: ICD-10-CM

## 2020-01-01 DIAGNOSIS — R42 DIZZINESS AND GIDDINESS: ICD-10-CM

## 2020-01-01 DIAGNOSIS — I10 ESSENTIAL (PRIMARY) HYPERTENSION: ICD-10-CM

## 2020-01-01 DIAGNOSIS — E22.2 SYNDROME OF INAPPROPRIATE SECRETION OF ANTIDIURETIC HORMONE: ICD-10-CM

## 2020-01-01 DIAGNOSIS — Z79.899 OTHER LONG TERM (CURRENT) DRUG THERAPY: ICD-10-CM

## 2020-01-01 DIAGNOSIS — E87.1 HYPO-OSMOLALITY AND HYPONATREMIA: ICD-10-CM

## 2020-01-03 NOTE — PHYSICAL EXAM
[General Appearance - In No Acute Distress] : in no acute distress [General Appearance - Alert] : alert [Sclera] : the sclera and conjunctiva were normal [PERRL With Normal Accommodation] : pupils were equal in size, round, and reactive to light [Extraocular Movements] : extraocular movements were intact [Outer Ear] : the ears and nose were normal in appearance [Neck Appearance] : the appearance of the neck was normal [Oropharynx] : the oropharynx was normal [Jugular Venous Distention Increased] : there was no jugular-venous distention [Neck Cervical Mass (___cm)] : no neck mass was observed [Thyroid Diffuse Enlargement] : the thyroid was not enlarged [Thyroid Nodule] : there were no palpable thyroid nodules [Auscultation Breath Sounds / Voice Sounds] : lungs were clear to auscultation bilaterally [Heart Rate And Rhythm] : heart rate was normal and rhythm regular [Heart Sounds Gallop] : no gallops [Murmurs] : no murmurs [Heart Sounds] : normal S1 and S2 [Examination Of The Chest] : the chest was normal in appearance [Heart Sounds Pericardial Friction Rub] : no pericardial rub [Diminished Respiratory Excursion] : normal chest expansion [Chest Visual Inspection Thoracic Asymmetry] : no chest asymmetry [2+] : left 2+ [No Abnormalities] : the abdominal aorta was not enlarged and no bruit was heard [Abdomen Soft] : soft [Bowel Sounds] : normal bowel sounds [Abdomen Tenderness] : non-tender [Abdomen Mass (___ Cm)] : no abdominal mass palpated [Cervical Lymph Nodes Enlarged Posterior Bilaterally] : posterior cervical [No CVA Tenderness] : no ~M costovertebral angle tenderness [No Spinal Tenderness] : no spinal tenderness [Abnormal Walk] : normal gait [Nail Clubbing] : no clubbing  or cyanosis of the fingernails [Musculoskeletal - Swelling] : no joint swelling seen [Motor Tone] : muscle strength and tone were normal [Skin Color & Pigmentation] : normal skin color and pigmentation [Sensation] : the sensory exam was normal to light touch and pinprick [Skin Turgor] : normal skin turgor [Deep Tendon Reflexes (DTR)] : deep tendon reflexes were 2+ and symmetric [] : no rash [Oriented To Time, Place, And Person] : oriented to person, place, and time [No Focal Deficits] : no focal deficits [Impaired Insight] : insight and judgment were intact [Affect] : the affect was normal [FreeTextEntry1] : deferred

## 2020-01-03 NOTE — PROCEDURE
[FreeTextEntry1] : Dr. Palacio reviewed the indications for surgery, and used our webpage [email or web address] <[email or web address]> to illustrate the aorta and anatomy of the heart. Those indications are the following: size greater than 5.0 cm, symptomatic aneurysms, family history of aortic dissection or aneurysm death with a size greater than 4.5 cm, other necessary cardiac procedures such as coronary artery bypass grafting or valvular disorders with an aneurysm greater than 4.5 cm, or connective tissue disorders with an aneurysm size greater than 4.5 cm. The patient does not meet size criteria for surgical intervention at the time.\par \par Dr. Palacio discussed activity restrictions with the patient, and would advise exercise at a moderate amount with no heavy lifting over one third of body weight, and avoiding heart rates that exceed 140 beats per minute. In addition, every patient should abstain from tobacco abuse and to avoid all illicit drug use, especially stimulants such as cocaine or methamphetamine. Dr. Palacio also counseled regarding maintaining a healthy heart diet, and losing any excessive weight as this also put undue stress on both the aorta and entire cardiovascular system. First degree family members should be screened for bicuspid valve disease, and ascending aortic aneurysms. \par \par

## 2020-01-03 NOTE — HISTORY OF PRESENT ILLNESS
[FreeTextEntry1] : 86 year old male with a past medical history of prostate cancer s/p TURP, CAD (s/p PCI and MIDCAB 6/5/2014), HTN, HLD, DM, presents today for an initial evaluation of an ascending aortic aneurysm and aortic valve regurgitation. He was referred by Meeta Story MD. \par \par Echocardiogram 10/22/18: EF60%, mild AS, severe AR.\par \par Echocardiogram 12/13/2019:\par 1. Normal left and right ventricular size and function. \par  2. Grade I left ventricular diastolic dysfunction.\par  3. Mild aortic regurgitation. \par  4. Mild aortic stenosis. \par  5. The aortic root is 3.9 cm. The ascending aorta is moderately dilated and measures 4.5 cm. \par  6. The aortic root is 3.9 cm. The ascending aorta is moderately dilated and measures 4.5 cm. \par  7. No pericardial effusion.\par \par CT chest non contrast 12/2/19:4.5 cm ascending aortic aneurysm, 3.1cm ground glass nodule in the right apex, recommending followup in 6-12 months, severe coronary artery calcifications. (Patient has an appointment with Dr. Madhavi Patel for evaluation of pulmonary nodule). \par \par Patient was recently admitted to the hospital 12/6/19 for abdominal pain with constipation. CT abd/pelvis with low-grade SBO. Surgery consulted, however pt had a large BM in interval and became asymptomatic. Found to have Na+ 122, admitted for hyponatremia. Patient is doing well otherwise. He  denies fever, chills, fatigue, headache, blurred vision, dizziness, syncope, chest pain, palpitations, shortness of breath, orthopnea, paroxysmal nocturnal dyspnea, nausea, vomiting, abdominal pain, back pain, BRBPR or swelling to legs.

## 2020-01-03 NOTE — END OF VISIT
[FreeTextEntry3] : Scribe Attestation:\par I personally scribed for BERTO TYSON on Dec 18 2019  1:15PM . \par \par \par \par \par Physician Attestation:\par Documented by DANIEL JEFF acting as a scribe for BERTO TYSON 12/18/2019 . \par                 All medical record entries made by the Scribe were at my, BERTO TYSON , direction and personally dictated by me on 12/18/2019 . I have reviewed the chart and agree that the record accurately reflects my personal performance of the history, physical exam, assessment and plan. \par \par

## 2020-01-03 NOTE — CONSULT LETTER
[Dear  ___] : Dear  [unfilled], [FreeTextEntry2] : Genevieve Story MD\par [address], \[place] [address]\par  [FreeTextEntry1] : I had the pleasure of seeing your patient, СВЕТЛАНА PARRA , in my office today. We take a multidisciplinary team approach to patient care and consider you, the referring physician, an extension of our team. We will maintain an open line of communication with you throughout your patient's treatment course.  \par \par Mr. PARRA presents for an initial evaluation and management of thoracic aortic aneurysm. I have reviewed all of his  medical records and diagnostic images at the time of his office consultation. I have enclosed a copy for your records. \par \par I have also reviewed the indications for surgery, and used our webpage <<[email or web address]>> to illustrate the aorta and anatomy of the heart. The patient does not meet size criteria for surgical intervention at the time. \par \par Therefore, I have recommended that the patient will require a follow up appointment in one year with CTA chest, abdomen and pelvis to monitor his aortic pathology. My office will assist the patient with his upcoming appointment and I will update you on his progress at that time.\par \par I have discussed with the patient that we will continue to monitor his  aortic pathology closely at the [facility] at [facility] that encompasses the entire health care system and is one of the largest in the nation at this point.\par \par It is our commitment to provide your patient with the highest quality of advanced therapeutic options. On behalf of the Cardiothoracic Surgery Team, thank you for sending your patient to the [facility] based at [facility].  If there are any questions or concerns, please call me directly at [phone].  \par \par Please see my note below. \par \par Sincerely, \par \par \par \par \par John Palacio M.D.\par Professor of Cardiovascular and Thoracic Surgery\par Minimally Invasive Valve Surgeon\par Director of Aortic Surgery, [facility]\par Cell: [phone]\par Email: [email or web address] \par \par [facility]:\par [address], [address] Floor, [place] [address]\par Office: [phone]\par Fax: [phone]\par \par [facility]:\par Department of Cardiovascular and Thoracic Surgery\par [address] [place] [address], [address]\par Office: [phone]\par Fax: [phone]\par \par Practice Manager: Ms. Suha Katz\[facility] Email: [email or web address] Phone: [phone]

## 2020-01-03 NOTE — ASSESSMENT
[FreeTextEntry1] : 86 year old male with a past medical history of prostate cancer s/p TURP, CAD (s/p PCI and MIDCAB 6/5/2014), HTN, HLD, DM, presents today for an initial evaluation of an ascending aortic aneurysm and aortic valve regurgitation.\par \par Echocardiogram 10/22/18: EF60%, mild AS, severe AR.\par \par Echocardiogram 12/13/2019:\par 1. Normal left and right ventricular size and function. \par  2. Grade I left ventricular diastolic dysfunction.\par  3. Mild aortic regurgitation. \par  4. Mild aortic stenosis. \par  5. The aortic root is 3.9 cm. The ascending aorta is moderately dilated and measures 4.5 cm. \par  6. The aortic root is 3.9 cm. The ascending aorta is moderately dilated and measures 4.5 cm. \par  7. No pericardial effusion.\par \par CT chest non contrast 12/2/19:4.5 cm ascending aortic aneurysm, 3.1cm ground glass nodule in the right apex, recommending followup in 6-12 months, severe coronary artery calcifications. (Patient has an appointment with Dr. Madhavi Patel for evaluation of pulmonary nodule). \par \par \par The patient's medical records and diagnostic images were reviewed at the time of this office visit, and the following recommendation was made. Patient does not meet criteria for surgical intervention at the time and will be entered into the aortic registry surveillance program.\par Plan\par \par 1. Follow up in [facility] in one year with 1 year CTA chest, abdomen and pelvis. \par 2. Continue medication regimen.\par 3. Follow up with cardiologist and PCP.

## 2020-01-11 ENCOUNTER — INPATIENT (INPATIENT)
Facility: HOSPITAL | Age: 85
LOS: 5 days | Discharge: HOME HEALTH | DRG: 552 | End: 2020-01-17
Attending: STUDENT IN AN ORGANIZED HEALTH CARE EDUCATION/TRAINING PROGRAM | Admitting: STUDENT IN AN ORGANIZED HEALTH CARE EDUCATION/TRAINING PROGRAM
Payer: MEDICARE

## 2020-01-11 VITALS
SYSTOLIC BLOOD PRESSURE: 95 MMHG | TEMPERATURE: 97 F | DIASTOLIC BLOOD PRESSURE: 61 MMHG | HEART RATE: 111 BPM | WEIGHT: 169.98 LBS | OXYGEN SATURATION: 98 % | HEIGHT: 67 IN | RESPIRATION RATE: 18 BRPM

## 2020-01-11 DIAGNOSIS — Z98.890 OTHER SPECIFIED POSTPROCEDURAL STATES: Chronic | ICD-10-CM

## 2020-01-11 DIAGNOSIS — Z98.89 OTHER SPECIFIED POSTPROCEDURAL STATES: Chronic | ICD-10-CM

## 2020-01-11 DIAGNOSIS — M54.9 DORSALGIA, UNSPECIFIED: ICD-10-CM

## 2020-01-11 DIAGNOSIS — M46.46 DISCITIS, UNSPECIFIED, LUMBAR REGION: ICD-10-CM

## 2020-01-11 DIAGNOSIS — I10 ESSENTIAL (PRIMARY) HYPERTENSION: ICD-10-CM

## 2020-01-11 DIAGNOSIS — N17.9 ACUTE KIDNEY FAILURE, UNSPECIFIED: ICD-10-CM

## 2020-01-11 DIAGNOSIS — C61 MALIGNANT NEOPLASM OF PROSTATE: ICD-10-CM

## 2020-01-11 DIAGNOSIS — R63.8 OTHER SYMPTOMS AND SIGNS CONCERNING FOOD AND FLUID INTAKE: ICD-10-CM

## 2020-01-11 DIAGNOSIS — I25.10 ATHEROSCLEROTIC HEART DISEASE OF NATIVE CORONARY ARTERY WITHOUT ANGINA PECTORIS: ICD-10-CM

## 2020-01-11 DIAGNOSIS — E78.5 HYPERLIPIDEMIA, UNSPECIFIED: ICD-10-CM

## 2020-01-11 DIAGNOSIS — Z91.89 OTHER SPECIFIED PERSONAL RISK FACTORS, NOT ELSEWHERE CLASSIFIED: ICD-10-CM

## 2020-01-11 DIAGNOSIS — E11.9 TYPE 2 DIABETES MELLITUS WITHOUT COMPLICATIONS: ICD-10-CM

## 2020-01-11 DIAGNOSIS — H26.9 UNSPECIFIED CATARACT: Chronic | ICD-10-CM

## 2020-01-11 LAB
ALBUMIN SERPL ELPH-MCNC: 3.6 G/DL — SIGNIFICANT CHANGE UP (ref 3.3–5)
ALP SERPL-CCNC: 101 U/L — SIGNIFICANT CHANGE UP (ref 40–120)
ALT FLD-CCNC: 37 U/L — SIGNIFICANT CHANGE UP (ref 10–45)
ANION GAP SERPL CALC-SCNC: 14 MMOL/L — SIGNIFICANT CHANGE UP (ref 5–17)
APPEARANCE UR: CLEAR — SIGNIFICANT CHANGE UP
AST SERPL-CCNC: 17 U/L — SIGNIFICANT CHANGE UP (ref 10–40)
BILIRUB SERPL-MCNC: 0.7 MG/DL — SIGNIFICANT CHANGE UP (ref 0.2–1.2)
BILIRUB UR-MCNC: NEGATIVE — SIGNIFICANT CHANGE UP
BUN SERPL-MCNC: 33 MG/DL — HIGH (ref 7–23)
CALCIUM SERPL-MCNC: 9.1 MG/DL — SIGNIFICANT CHANGE UP (ref 8.4–10.5)
CHLORIDE SERPL-SCNC: 104 MMOL/L — SIGNIFICANT CHANGE UP (ref 96–108)
CO2 SERPL-SCNC: 19 MMOL/L — LOW (ref 22–31)
COLOR SPEC: YELLOW — SIGNIFICANT CHANGE UP
CREAT SERPL-MCNC: 1.02 MG/DL — SIGNIFICANT CHANGE UP (ref 0.5–1.3)
DIFF PNL FLD: ABNORMAL
GLUCOSE BLDC GLUCOMTR-MCNC: 168 MG/DL — HIGH (ref 70–99)
GLUCOSE SERPL-MCNC: 166 MG/DL — HIGH (ref 70–99)
GLUCOSE UR QL: NEGATIVE — SIGNIFICANT CHANGE UP
HCT VFR BLD CALC: 39.6 % — SIGNIFICANT CHANGE UP (ref 39–50)
HGB BLD-MCNC: 13.5 G/DL — SIGNIFICANT CHANGE UP (ref 13–17)
KETONES UR-MCNC: NEGATIVE — SIGNIFICANT CHANGE UP
LEUKOCYTE ESTERASE UR-ACNC: NEGATIVE — SIGNIFICANT CHANGE UP
LIDOCAIN IGE QN: 64 U/L — HIGH (ref 7–60)
MCHC RBC-ENTMCNC: 28.4 PG — SIGNIFICANT CHANGE UP (ref 27–34)
MCHC RBC-ENTMCNC: 34.1 GM/DL — SIGNIFICANT CHANGE UP (ref 32–36)
MCV RBC AUTO: 83.4 FL — SIGNIFICANT CHANGE UP (ref 80–100)
NITRITE UR-MCNC: NEGATIVE — SIGNIFICANT CHANGE UP
NRBC # BLD: 0 /100 WBCS — SIGNIFICANT CHANGE UP (ref 0–0)
PH UR: 6 — SIGNIFICANT CHANGE UP (ref 5–8)
PLATELET # BLD AUTO: 262 K/UL — SIGNIFICANT CHANGE UP (ref 150–400)
POTASSIUM SERPL-MCNC: 4.8 MMOL/L — SIGNIFICANT CHANGE UP (ref 3.5–5.3)
POTASSIUM SERPL-SCNC: 4.8 MMOL/L — SIGNIFICANT CHANGE UP (ref 3.5–5.3)
PROT SERPL-MCNC: 6.8 G/DL — SIGNIFICANT CHANGE UP (ref 6–8.3)
PROT UR-MCNC: NEGATIVE MG/DL — SIGNIFICANT CHANGE UP
RBC # BLD: 4.75 M/UL — SIGNIFICANT CHANGE UP (ref 4.2–5.8)
RBC # FLD: 13.9 % — SIGNIFICANT CHANGE UP (ref 10.3–14.5)
SODIUM SERPL-SCNC: 137 MMOL/L — SIGNIFICANT CHANGE UP (ref 135–145)
SP GR SPEC: 1.01 — SIGNIFICANT CHANGE UP (ref 1–1.03)
UROBILINOGEN FLD QL: 0.2 E.U./DL — SIGNIFICANT CHANGE UP
WBC # BLD: 8.88 K/UL — SIGNIFICANT CHANGE UP (ref 3.8–10.5)
WBC # FLD AUTO: 8.88 K/UL — SIGNIFICANT CHANGE UP (ref 3.8–10.5)

## 2020-01-11 PROCEDURE — 99223 1ST HOSP IP/OBS HIGH 75: CPT | Mod: GC

## 2020-01-11 PROCEDURE — 74177 CT ABD & PELVIS W/CONTRAST: CPT | Mod: 26

## 2020-01-11 PROCEDURE — 99285 EMERGENCY DEPT VISIT HI MDM: CPT

## 2020-01-11 RX ORDER — SENNA PLUS 8.6 MG/1
2 TABLET ORAL AT BEDTIME
Refills: 0 | Status: DISCONTINUED | OUTPATIENT
Start: 2020-01-11 | End: 2020-01-17

## 2020-01-11 RX ORDER — METOCLOPRAMIDE HCL 10 MG
10 TABLET ORAL ONCE
Refills: 0 | Status: COMPLETED | OUTPATIENT
Start: 2020-01-11 | End: 2020-01-11

## 2020-01-11 RX ORDER — FUROSEMIDE 40 MG
40 TABLET ORAL EVERY 12 HOURS
Refills: 0 | Status: DISCONTINUED | OUTPATIENT
Start: 2020-01-11 | End: 2020-01-11

## 2020-01-11 RX ORDER — TAMSULOSIN HYDROCHLORIDE 0.4 MG/1
0.4 CAPSULE ORAL AT BEDTIME
Refills: 0 | Status: DISCONTINUED | OUTPATIENT
Start: 2020-01-11 | End: 2020-01-17

## 2020-01-11 RX ORDER — OXYBUTYNIN CHLORIDE 5 MG
5 TABLET ORAL THREE TIMES A DAY
Refills: 0 | Status: DISCONTINUED | OUTPATIENT
Start: 2020-01-11 | End: 2020-01-17

## 2020-01-11 RX ORDER — DEXTROSE 50 % IN WATER 50 %
25 SYRINGE (ML) INTRAVENOUS ONCE
Refills: 0 | Status: DISCONTINUED | OUTPATIENT
Start: 2020-01-11 | End: 2020-01-17

## 2020-01-11 RX ORDER — DORZOLAMIDE HYDROCHLORIDE TIMOLOL MALEATE 20; 5 MG/ML; MG/ML
1 SOLUTION/ DROPS OPHTHALMIC
Refills: 0 | Status: DISCONTINUED | OUTPATIENT
Start: 2020-01-11 | End: 2020-01-17

## 2020-01-11 RX ORDER — OXYCODONE AND ACETAMINOPHEN 5; 325 MG/1; MG/1
1 TABLET ORAL EVERY 6 HOURS
Refills: 0 | Status: DISCONTINUED | OUTPATIENT
Start: 2020-01-11 | End: 2020-01-17

## 2020-01-11 RX ORDER — GLUCAGON INJECTION, SOLUTION 0.5 MG/.1ML
1 INJECTION, SOLUTION SUBCUTANEOUS ONCE
Refills: 0 | Status: DISCONTINUED | OUTPATIENT
Start: 2020-01-11 | End: 2020-01-17

## 2020-01-11 RX ORDER — DEXTROSE 50 % IN WATER 50 %
15 SYRINGE (ML) INTRAVENOUS ONCE
Refills: 0 | Status: DISCONTINUED | OUTPATIENT
Start: 2020-01-11 | End: 2020-01-17

## 2020-01-11 RX ORDER — DEXTROSE 50 % IN WATER 50 %
12.5 SYRINGE (ML) INTRAVENOUS ONCE
Refills: 0 | Status: DISCONTINUED | OUTPATIENT
Start: 2020-01-11 | End: 2020-01-17

## 2020-01-11 RX ORDER — IOHEXOL 300 MG/ML
30 INJECTION, SOLUTION INTRAVENOUS ONCE
Refills: 0 | Status: COMPLETED | OUTPATIENT
Start: 2020-01-11 | End: 2020-01-11

## 2020-01-11 RX ORDER — MORPHINE SULFATE 50 MG/1
2 CAPSULE, EXTENDED RELEASE ORAL ONCE
Refills: 0 | Status: DISCONTINUED | OUTPATIENT
Start: 2020-01-11 | End: 2020-01-11

## 2020-01-11 RX ORDER — POLYETHYLENE GLYCOL 3350 17 G/17G
17 POWDER, FOR SOLUTION ORAL DAILY
Refills: 0 | Status: DISCONTINUED | OUTPATIENT
Start: 2020-01-11 | End: 2020-01-17

## 2020-01-11 RX ORDER — SODIUM CHLORIDE 9 MG/ML
1 INJECTION INTRAMUSCULAR; INTRAVENOUS; SUBCUTANEOUS EVERY 12 HOURS
Refills: 0 | Status: DISCONTINUED | OUTPATIENT
Start: 2020-01-11 | End: 2020-01-17

## 2020-01-11 RX ORDER — METOPROLOL TARTRATE 50 MG
25 TABLET ORAL DAILY
Refills: 0 | Status: DISCONTINUED | OUTPATIENT
Start: 2020-01-11 | End: 2020-01-17

## 2020-01-11 RX ORDER — INSULIN LISPRO 100/ML
VIAL (ML) SUBCUTANEOUS
Refills: 0 | Status: DISCONTINUED | OUTPATIENT
Start: 2020-01-11 | End: 2020-01-17

## 2020-01-11 RX ORDER — SODIUM CHLORIDE 9 MG/ML
500 INJECTION INTRAMUSCULAR; INTRAVENOUS; SUBCUTANEOUS ONCE
Refills: 0 | Status: COMPLETED | OUTPATIENT
Start: 2020-01-11 | End: 2020-01-11

## 2020-01-11 RX ORDER — LATANOPROST 0.05 MG/ML
1 SOLUTION/ DROPS OPHTHALMIC; TOPICAL AT BEDTIME
Refills: 0 | Status: DISCONTINUED | OUTPATIENT
Start: 2020-01-11 | End: 2020-01-17

## 2020-01-11 RX ORDER — LOSARTAN POTASSIUM 100 MG/1
25 TABLET, FILM COATED ORAL DAILY
Refills: 0 | Status: DISCONTINUED | OUTPATIENT
Start: 2020-01-11 | End: 2020-01-17

## 2020-01-11 RX ORDER — FINASTERIDE 5 MG/1
5 TABLET, FILM COATED ORAL DAILY
Refills: 0 | Status: DISCONTINUED | OUTPATIENT
Start: 2020-01-11 | End: 2020-01-17

## 2020-01-11 RX ORDER — LORATADINE 10 MG/1
10 TABLET ORAL DAILY
Refills: 0 | Status: DISCONTINUED | OUTPATIENT
Start: 2020-01-11 | End: 2020-01-17

## 2020-01-11 RX ORDER — KETOROLAC TROMETHAMINE 30 MG/ML
15 SYRINGE (ML) INJECTION ONCE
Refills: 0 | Status: DISCONTINUED | OUTPATIENT
Start: 2020-01-11 | End: 2020-01-11

## 2020-01-11 RX ORDER — BRIMONIDINE TARTRATE 2 MG/MG
1 SOLUTION/ DROPS OPHTHALMIC THREE TIMES A DAY
Refills: 0 | Status: DISCONTINUED | OUTPATIENT
Start: 2020-01-11 | End: 2020-01-17

## 2020-01-11 RX ORDER — ATORVASTATIN CALCIUM 80 MG/1
40 TABLET, FILM COATED ORAL AT BEDTIME
Refills: 0 | Status: DISCONTINUED | OUTPATIENT
Start: 2020-01-11 | End: 2020-01-17

## 2020-01-11 RX ORDER — CLOPIDOGREL BISULFATE 75 MG/1
75 TABLET, FILM COATED ORAL DAILY
Refills: 0 | Status: DISCONTINUED | OUTPATIENT
Start: 2020-01-11 | End: 2020-01-11

## 2020-01-11 RX ORDER — SODIUM CHLORIDE 9 MG/ML
1000 INJECTION, SOLUTION INTRAVENOUS
Refills: 0 | Status: DISCONTINUED | OUTPATIENT
Start: 2020-01-11 | End: 2020-01-17

## 2020-01-11 RX ADMIN — Medication 15 MILLIGRAM(S): at 13:27

## 2020-01-11 RX ADMIN — Medication 10 MILLIGRAM(S): at 21:15

## 2020-01-11 RX ADMIN — OXYCODONE AND ACETAMINOPHEN 1 TABLET(S): 5; 325 TABLET ORAL at 21:57

## 2020-01-11 RX ADMIN — Medication 15 MILLIGRAM(S): at 13:42

## 2020-01-11 RX ADMIN — LATANOPROST 1 DROP(S): 0.05 SOLUTION/ DROPS OPHTHALMIC; TOPICAL at 21:16

## 2020-01-11 RX ADMIN — ATORVASTATIN CALCIUM 40 MILLIGRAM(S): 80 TABLET, FILM COATED ORAL at 21:57

## 2020-01-11 RX ADMIN — SODIUM CHLORIDE 500 MILLILITER(S): 9 INJECTION INTRAMUSCULAR; INTRAVENOUS; SUBCUTANEOUS at 14:30

## 2020-01-11 RX ADMIN — SENNA PLUS 2 TABLET(S): 8.6 TABLET ORAL at 21:15

## 2020-01-11 RX ADMIN — OXYCODONE AND ACETAMINOPHEN 1 TABLET(S): 5; 325 TABLET ORAL at 22:50

## 2020-01-11 RX ADMIN — TAMSULOSIN HYDROCHLORIDE 0.4 MILLIGRAM(S): 0.4 CAPSULE ORAL at 21:57

## 2020-01-11 RX ADMIN — IOHEXOL 30 MILLILITER(S): 300 INJECTION, SOLUTION INTRAVENOUS at 13:23

## 2020-01-11 RX ADMIN — FINASTERIDE 5 MILLIGRAM(S): 5 TABLET, FILM COATED ORAL at 22:55

## 2020-01-11 RX ADMIN — DORZOLAMIDE HYDROCHLORIDE TIMOLOL MALEATE 1 DROP(S): 20; 5 SOLUTION/ DROPS OPHTHALMIC at 21:15

## 2020-01-11 RX ADMIN — SODIUM CHLORIDE 500 MILLILITER(S): 9 INJECTION INTRAMUSCULAR; INTRAVENOUS; SUBCUTANEOUS at 13:16

## 2020-01-11 RX ADMIN — Medication 2: at 22:50

## 2020-01-11 RX ADMIN — BRIMONIDINE TARTRATE 1 DROP(S): 2 SOLUTION/ DROPS OPHTHALMIC at 21:16

## 2020-01-11 RX ADMIN — Medication 5 MILLIGRAM(S): at 21:16

## 2020-01-11 NOTE — H&P ADULT - PROBLEM SELECTOR PLAN 2
Hx of low back pain and R hip after a fall in early December, which persists at severe intensity despite treatment by pain management with hydrocodone PRN and steroid injections in the epidural lumbar spine and R hip 2 weeks ago. Pain exacerbated by ambulation and movement in general, relieved when laying down. Etiology likely 2/2 ...  - pain regimen with ...  - collateral from Dr. Deleon Hx of low back pain and R hip after a fall in early December, which persists at severe intensity despite treatment by pain management with hydrocodone PRN and steroid injections in the epidural lumbar spine and R hip 2 weeks ago. Pain exacerbated by ambulation and movement in general, relieved when laying down.  - pain regimen with percocet 5/325 1 tab q4-6 hrs PRN  - collateral from Dr. Deleon  - PT assessment  - fall precautions

## 2020-01-11 NOTE — ED PROVIDER NOTE - CLINICAL SUMMARY MEDICAL DECISION MAKING FREE TEXT BOX
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 Seen by pain specialist, had epidural and hip injection for pain few weeks. No weakness, no numbness, no loss bowel/bladder control, no fevers or chills, no numbness. Taking all medications as directed. Tolerating PO, no n/v. Pt endorses increased frustration with pain, but pain w/o sig change. Has noted some abd discomfort in past few days and last BM 3d prior. No black/bloody stools. Last ambulated today 2 blocks w/ cane, which is increased from his usual activity. However, pain returned after opiates wore off. Exam noted for tachycardia at arrival, min RLQ ttp. Concern chronic back pain, sbo, no recent trauma to suggest fx, doubt cauda equina or epidural abscess as pt w/ increased movement and no diminished sensation or leg weakness and pain has not increased but is persistent for past month. 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 Seen by pain specialist, had epidural and hip injection for pain few weeks. No weakness, no numbness, no loss bowel/bladder control, no fevers or chills, no numbness. Taking all medications as directed. Tolerating PO, no n/v. Pt endorses increased frustration with pain, but pain w/o sig change. Has noted some abd discomfort in past few days and last BM 3d prior. No black/bloody stools. Last ambulated today 2 blocks w/ cane, which is increased from his usual activity. However, pain returned after opiates wore off. Exam noted for tachycardia at arrival, min RLQ ttp. Concern chronic back pain, sbo, no recent trauma to suggest fx, doubt cauda equina or epidural abscess as pt w/ increased movement and no diminished sensation or leg weakness and pain has not increased but is persistent for past month. CT notes no sbo, but concern osteomyelitis Lumbar spine. NSG recs mri w/w/o, blood culture, admit medicine, ?IR biopsy, no abx at this time.

## 2020-01-11 NOTE — ED ADULT NURSE NOTE - OBJECTIVE STATEMENT
85 y/o male presents to ED accompanied by son with c/o lower back pain worsening x3 days. Per son, pt had a fall approx 1 month ago and has had pain since. Received an epidural injection 2 weeks ago and 3 days ago. Has been taking hydrocodone for pain, states it temporarily relieves pain. Denies fever/chills, nausea/vomiting, urinary sx. Abdomen soft and nontender. Also endorses constipation, no BM x3 days but +passing gas. Pt states he has been taking bulk laxatives for a week.

## 2020-01-11 NOTE — ED PROVIDER NOTE - PHYSICAL EXAMINATION
VITAL SIGNS: I have reviewed nursing notes and confirm.  CONSTITUTIONAL: Well-developed; well-nourished; mildly agitated but otherwise no acute distress.  SKIN: Skin is warm and dry, no acute rash.  HEAD: Normocephalic; atraumatic.  EYES:  EOM intact; conjunctiva and sclera clear.  ENT: No nasal discharge; airway clear.  NECK: Supple; Voluntary FROM  CARD: No rubs appreciated, tachycardic rate and rhythm.  RESP: No wheezes, no rales. No respiratory distress  ABD: Soft; nondistended, min RLQ tender; no rebound or guarding  Back: no C/T/L spine ttp. Bandaid noted on L SI joint region, no erythema/swelling/discharge.   EXT: Normal ROM. No cyanosis or edema.  NEURO: Alert, oriented. Grossly unremarkable.  PSYCH: Cooperative, appropriate.

## 2020-01-11 NOTE — ED PROVIDER NOTE - PROGRESS NOTE DETAILS
Pt and son deny any hx fo allergies to medications. Endorses hx of remote GI bleed sx "long long ago" after aspirin. NSG recs admit medicine for MRI w/w/o, blood cultures, schedule for IR biopsy

## 2020-01-11 NOTE — H&P ADULT - PROBLEM SELECTOR PLAN 1
Presenting with back pain with w/u (CT abd/pelvis) showing progressive erosive change and sclerosis of the L2/3 interspace with persistent degenerative change of the lower lumbar spine. Exam n/f R lumbar paraspinal muscle tenderness on R and R hip tenderness, No spinous process tenderness, or LE numbness, decreased sensation, 5/5 LE strength, negative straight leg raise b/l. Neurosurgery consulted and now following.  - MRI Lumbar spine w/wo contrast  - plan for IR bx of L2/3 to rule out infection  - NPO after MN on Sunday, hold Plavix on Saturday 1/11/20, Sunday 1/12/20  - f/u blood cultures  - f/u neurosurgery recommendations

## 2020-01-11 NOTE — H&P ADULT - ASSESSMENT
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 Found to have findings concerning for L2/3 osteomyelitis/discitis, admitted for further w/u including MRI and possible IR guided bx.

## 2020-01-11 NOTE — ED ADULT NURSE NOTE - DRUG PRE-SCREENING (DAST -1)
[facility]  @FLOW([id number])@      Community Health Worker Note      Patient: Meg Diallo  Date: April 6, 2018  Preferred Name: Meg    Previous PHQ-9:   PHQ-9 SCORE 11/9/2017 12/11/2017 12/21/2017   Total Score 24 22 26     Previous RUFINA-7:   RUFINA-7 SCORE 11/9/2017 12/11/2017 12/21/2017   Total Score 21 20 19     BRIANNA LEVEL:  BRIANNA Score (Last Two) 9/20/2017   BRIANNA Raw Score 28   Activation Score 50   BRIANNA Level 2       Preferred Contact: @[facility]([phone])@  Type of Contact Today: Phone call (not reached/unavailable)      Data: (subjective / Objective):  Patient Goals Areas: @FLOW([id number])@  Patient Stated Goals: @FLOW([id number])@  Recent ED/IP Admission or Discharge?   None  Attempted to reach patient, but was unsuccessful.  Plan to attempt again.  Meg Ye        
Statement Selected

## 2020-01-11 NOTE — H&P ADULT - NSHPPHYSICALEXAM_GEN_ALL_CORE
.  VITAL SIGNS:  T(F): 98.6 (01-11-20 @ 18:45), Max: 98.6 (01-11-20 @ 18:45)  HR: 84 (01-11-20 @ 18:45) (82 - 111)  BP: 130/72 (01-11-20 @ 18:45) (95/61 - 130/72)  RR: 16 (01-11-20 @ 18:45) (16 - 18)  SpO2: 96% (01-11-20 @ 18:45) (96% - 98%)    PHYSICAL EXAM:  Constitutional: Elderly male, laying on back comfortable, NAD  HEENT: NC/AT, PERRL, EOMI, anicteric sclera, no nasal discharge; uvula midline, no oropharyngeal erythema or exudates; MMM  Neck: supple; no JVD or thyromegaly  Respiratory: CTA B/L; no W/R/R, no retractions  Cardiac: +S1/S2; RRR; no M/R/G; PMI non-displaced  Gastrointestinal: soft, NT/ND; no rebound or guarding; +BSx4  Back: spine midline, no bony tenderness or step-offs; no CVAT B/L  Extremities: WWP, no clubbing or cyanosis; no peripheral edema  Musculoskeletal: NROM x4; no joint swelling, tenderness or erythema  Vascular: 2+ radial, femoral, DP/PT pulses B/L  Dermatologic: skin warm, dry and intact; no rashes, wounds, or scars  Lymphatic: no submandibular or cervical LAD  Neurologic: AAOx3; CNII-XII grossly intact; no focal deficits  Psychiatric: affect and characteristics of appearance, verbalizations, behaviors are appropriate, denies SI/HI/AH/VH .  VITAL SIGNS:  T(F): 98.6 (01-11-20 @ 18:45), Max: 98.6 (01-11-20 @ 18:45)  HR: 84 (01-11-20 @ 18:45) (82 - 111)  BP: 130/72 (01-11-20 @ 18:45) (95/61 - 130/72)  RR: 16 (01-11-20 @ 18:45) (16 - 18)  SpO2: 96% (01-11-20 @ 18:45) (96% - 98%)    PHYSICAL EXAM:  Constitutional: Elderly male, laying on back comfortable, NAD  HEENT: NC/AT, PERRL, arcus senilis b/l, EOMI, dry MM  Neck: supple; no JVD or thyromegaly  Respiratory: CTA B/L; no W/R/R, no retractions  Cardiac: +S1/S2; 2/6 systolic murmur LSB w/o radiation  Gastrointestinal: soft, NT/ND; no rebound or guarding; +BSx4  Back: spine midline, no bony tenderness or step-offs, paraspinal muscle tenderness in lumbar spine on R, full active and passive ROM of lumbar spine  Extremities: WWP, no clubbing or cyanosis; no peripheral edema  Musculoskeletal: NROM passive and active of LE, figure 4 without pain at hips or back, negative straight leg raise b/l, no joint tenderness, erythema or edema  Vascular: 2+ radial, DP/PT pulses B/L  Dermatologic: skin warm, dry and intact; no rashes, wounds, or scars  Lymphatic: no submandibular or cervical LAD  Neurologic: AAOx3; CNII-XII grossly intact; no sensory deficits, 5/5 strength b/l hip, knee and ankle without pain, downgoing toes b/l

## 2020-01-11 NOTE — ED PROVIDER NOTE - OBJECTIVE STATEMENT
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 Last BM 3d prior. 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 Seen by pain specialist, had epidural and hip injection for pain few weeks. No weakness, no numbness, no loss bowel/bladder control, no fevers or chills, no numbness. Taking all medications as directed. Tolerating PO, no n/v. Pt endorses increased frustration with pain, but pain w/o sig change. Has noted some abd discomfort in past few days and last BM 3d prior. No black/bloody stools. Last ambulated today 2 blocks w/ cane, which is increased from his usual activity. However, pain returned after opiates wore off.   PMD advised pt to use walker, but Pt uses cane because is on 3rd story walk up. Advised utility of home walker and consider leaving one at apt doorway.   Has not called his pain specialist or PMD in past 2 weeks to discuss symptoms.

## 2020-01-11 NOTE — ED ADULT NURSE NOTE - NSIMPLEMENTINTERV_GEN_ALL_ED
Implemented All Fall with Harm Risk Interventions:  Vincent to call system. Call bell, personal items and telephone within reach. Instruct patient to call for assistance. Room bathroom lighting operational. Non-slip footwear when patient is off stretcher. Physically safe environment: no spills, clutter or unnecessary equipment. Stretcher in lowest position, wheels locked, appropriate side rails in place. Provide visual cue, wrist band, yellow gown, etc. Monitor gait and stability. Monitor for mental status changes and reorient to person, place, and time. Review medications for side effects contributing to fall risk. Reinforce activity limits and safety measures with patient and family. Provide visual clues: red socks.

## 2020-01-11 NOTE — H&P ADULT - ATTENDING COMMENTS
patient seen and examined a/f   reviewed pertinent data, h&p   PE  findings    a/p:   1. back pain    rest of plan as above patient seen and examined a/f back pain , pending MRI for possible discitis   reviewed pertinent data, h&p     PE  findings as above     a/p:   1. back pain r/o dicitis; followup MRI, IR bx , neurosurgery recs, pain control prn  2. lasix held in setting of slight MARIYA, monitor renal function, restart lasix based on renal function, may need lower dose (dcd on 40mg bid from last hospitalization).     rest of plan as above

## 2020-01-11 NOTE — H&P ADULT - NSHPLABSRESULTS_GEN_ALL_CORE
.  LABS:                         13.5   8.88  )-----------( 262      ( 2020 12:44 )             39.6         137  |  104  |  33<H>  ----------------------------<  166<H>  4.8   |  19<L>  |  1.02    Ca    9.1      2020 12:44    TPro  6.8  /  Alb  3.6  /  TBili  0.7  /  DBili  x   /  AST  17  /  ALT  37  /  AlkPhos  101        Urinalysis Basic - ( 2020 14:41 )  Color: Yellow / Appearance: Clear / S.015 / pH: x  Gluc: x / Ketone: NEGATIVE  / Bili: Negative / Urobili: 0.2 E.U./dL   Blood: x / Protein: NEGATIVE mg/dL / Nitrite: NEGATIVE   Leuk Esterase: NEGATIVE / RBC: < 5 /HPF / WBC < 5 /HPF   Sq Epi: x / Non Sq Epi: 0-5 /HPF / Bacteria: Present /HPF          RADIOLOGY, EKG & ADDITIONAL TESTS: Reviewed.    CT Abdomen and Pelvis w/ Oral Cont and w/ IV Cont (20 @ 15:23) >    FINDINGS: CT of the abdomen and pelvis was performed with the administration of intravenous contrast. Reconstructions were performed in the sagittal and coronal planes. Comparison is made to prior study dated 2019.    Evaluation of the lower chest demonstrates normal heart size. Severe calcification of aortic valve, which correlates with degree of aortic stenosis. Severe coronary arterial calcification. No pleural and no pericardial effusion. Lower lung parenchyma is unremarkable. Evaluation of the abdomen demonstrates mild focal biliary ductal dilatation localized to the lateral segment of the left lobe of liver of unknown etiology. There is hepatic steatosis. The pancreas, spleen, bilateral adrenal glands and bilateral kidneys are normal in appearance aside from several right renal cysts, pancreatic calcifications and a few scattered hepatic hypodensities too small to characterize. Evaluation of the gastrointestinal tract demonstrates fecal loading of stool within the rectum and sigmoid colon no bowel obstruction. No bowel thickening. Normal appearance of the appendix. Evaluation of the pelvis demonstrates an avidly enhancing nodule within the peripheral zone of the left lobe of the prostate gland measuring 11 mm and an additional avidly enhancing nodule centrally measuring 3 cm extending to the posterior wall of the bladder, consistent with known prostate carcinoma. No free fluid. No adenopathy. 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 Opacified aspects of the portal, hepatic, splenic, superior mesenteric vein and bilateral renal veins demonstrates no salbador intraluminal thrombus.  Penile implant. Evaluation of the osseous structures demonstrates progressive erosive change and sclerosis of the L2/3 interspace with persistent degenerative change of the lower lumbar spine.      IMPRESSION:  Findings worrisome for interval development of osteomyelitis/discitis at L2/3.  Findingsof prostate carcinoma.

## 2020-01-11 NOTE — H&P ADULT - PROBLEM SELECTOR PLAN 9
F: Fluid restriction given hx of SIADH  E: replete K<4, Mg<2  N: Dash/TLC    VTE Prophylaxis: HSQ 5000 q8.  C: Full Code  D: LLOYD

## 2020-01-11 NOTE — H&P ADULT - PROBLEM SELECTOR PLAN 4
Hx of CAD s/p PCI/CABG and hx of PAD. No chest pain, SOB, palpitations.  - continue aspirin 81mg daily, plavix 75mg daily, atorvastatin 40mg qhs  - continue home Toprol 25mg XL daily Hx of CAD s/p PCI/CABG and hx of PAD. No chest pain, SOB, palpitations.  - continue atorvastatin 40mg qhs  - continue home Toprol 25mg XL daily

## 2020-01-11 NOTE — H&P ADULT - NSHPSOCIALHISTORY_GEN_ALL_CORE
Tobacco use: former smoker, quit 40 years ago  ETOH use: Denies  Illicit drug use: Denies    Social Hx: Lives in 4th floor walk-up apartment building alone. Rx walker, however, patient mostly ambulates with cane.

## 2020-01-11 NOTE — H&P ADULT - PROBLEM SELECTOR PLAN 3
Elevated BUN/Scr (33/1.02) when compared to last admission Elevated BUN/Scr (33/1.02) when compared to last admission. Likely 2/2 small degree of dehydration, given that patient on fluid restriction and on Lasix for SIADH.  - hold off on home Lasix 40mg BID for now, resume tomorrow if Scr improves to baseline  - encourage PO intake  - no IVF as patient appears euvolemic on exam  - avoid nephrotoxic agents Elevated BUN/Scr (33/1.02) when compared to last admission. Likely 2/2 small degree of dehydration, given that patient on fluid restriction and on Lasix for SIADH.  - hold off on home Lasix 40mg BID for now, resume tomorrow if Scr improves to baseline  - consider resuming at reduced dose and monitoring response   - encourage PO intake  - no IVF as patient appears euvolemic on exam  - avoid nephrotoxic agents Elevated BUN/Scr (33/1.02) when compared to last admission. Likely 2/2 small degree of dehydration, given that patient on fluid restriction and on Lasix for SIADH.  - hold off on home Lasix 40mg BID for now, resume tomorrow if Scr improves to baseline  - consider resuming at reduced dose and monitoring response   - encourage PO intake  - no IVF as patient appears euvolemic on exam  - avoid nephrotoxic agents    #Hyponatremia 2/2 SIADH- Na now normal on Lasix and salt tabs  - monitor Scr  - continue salt tabs BID  - holding Lasix for now, resume if Scr returns to baseline

## 2020-01-11 NOTE — H&P ADULT - PROBLEM SELECTOR PLAN 8
DM2 not on any medications. A1c 6 in 12/19.  - ISS    #Constipation- reports contipation, last BM 3 days. CT with fecal loading of stool in rectum without obstruction  - given senna 2 tabs and dulcolax 1 suppository on admission  - continue daily senna and Miralax PRN  - continue bowel regimen while on opioid medications

## 2020-01-11 NOTE — ED ADULT TRIAGE NOTE - CHIEF COMPLAINT QUOTE
patient c/o abdominal pain and lower back pain for 2 weeks , also constipation for 3 days . Denies any nausea , vomiting , chest pain nor dizziness .

## 2020-01-11 NOTE — H&P ADULT - HISTORY OF PRESENT ILLNESS
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 Seen by pain specialist, had epidural and hip injection for pain few weeks. No weakness, no numbness, no loss bowel/bladder control, no fevers or chills, no numbness. Taking all medications as directed. Tolerating PO, no n/v. Pt endorses increased frustration with pain, but pain w/o sig change. Has noted some abd discomfort in past few days and last BM 3d prior. No black/bloody stools. Last ambulated today 2 blocks w/ cane, which is increased from his usual activity. However, pain returned after opiates wore off. PMD advised pt to use walker, but Pt uses cane because is on 3rd story walk up. Advised utility of home walker and consider leaving one at apt doorway. Has not called his pain specialist or PMD in past 2 weeks to discuss symptoms.    In the ED, vitals were T 97.2F,  (), BP /70s, RR 18, SpO2 98% on room air. Labs s/f CBC unremarkable, elevation in BUN/Cr (compared to 12/19) to 33/1.02, lipase 64. UA with trace blood, bacteria present and few hyaline casts. CT abd/pelvis with findings worrisome for development of osteomyelitis/discitis at L2/3 and findings of prostate malignancy (known). Neurosurgery was consulted who recommended IR guided of L2/3, MRI w/wo contrast, blood cultures. Patient given Toradol 15mg IV, and 500cc NS. 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 Pain difficult to describe and just described as severe. States that pain is somewhat relieved by hydrocodone but then returns once hydrocodone wears off. Seen by pain specialist (Dr. Deleon) and had lumbar epidural and L hip injection 2 weeks ago without relief. No weakness, numbness, paresthesias, bowel/bladder incontinence, fevers or chills, n/v/d/c. Endorses constipation, last BM 3 days ago. No black/bloody stools. Last ambulated today 2 blocks w/ cane, which is increased from his usual activity. Per notes, patient has been advised to use walker by PMD, however patient has been using cane since he lives on a 4th story walk-up. Has not called his pain specialist or PMD in past 2 weeks to discuss symptoms.    In the ED, vitals were T 97.2F,  (), BP /70s, RR 18, SpO2 98% on room air. Labs s/f CBC unremarkable, elevation in BUN/Cr (compared to 12/19) to 33/1.02, lipase 64. UA with trace blood, bacteria present and few hyaline casts. CT abd/pelvis with findings worrisome for development of osteomyelitis/discitis at L2/3 and findings of prostate malignancy (known). Neurosurgery was consulted who recommended IR guided of L2/3, MRI w/wo contrast, blood cultures. Patient given Toradol 15mg IV, and 500cc NS.

## 2020-01-11 NOTE — H&P ADULT - PROBLEM SELECTOR PLAN 7
Hx of prostate cancer, s/p TURP, no CTx or RTx. CT abd/pelvis showing avidly enhancing nodule within the peripheral zone of the left lobe of the prostate gland measuring 11 mm and an additional avidly enhancing nodule centrally measuring 3 cm extending to the posterior wall of the bladder, consistent with known prostate carcinoma, no free fluid, no adenopathy.  - continue home Finasteride 5mg daily  - continue home Tamsulosin 0.4mg qhs  - continue home oxybutynin 5mg TID

## 2020-01-11 NOTE — H&P ADULT - PROBLEM SELECTOR PLAN 6
Hx of HLD.  - continue home atorvastatin 40mg qhs    #Hyponatremia 2/2 SIADH- Na now normal on Lasix and salt tabs  - monitor Scr  - continue salt tabs BID  - holding Lasix for now, resume if Scr returns to baseline Hx of HLD.  - continue home atorvastatin 40mg qhs

## 2020-01-12 DIAGNOSIS — F10.10 ALCOHOL ABUSE, UNCOMPLICATED: Chronic | ICD-10-CM

## 2020-01-12 LAB
ANION GAP SERPL CALC-SCNC: 10 MMOL/L — SIGNIFICANT CHANGE UP (ref 5–17)
BASOPHILS # BLD AUTO: 0.01 K/UL — SIGNIFICANT CHANGE UP (ref 0–0.2)
BASOPHILS NFR BLD AUTO: 0.1 % — SIGNIFICANT CHANGE UP (ref 0–2)
BUN SERPL-MCNC: 34 MG/DL — HIGH (ref 7–23)
CALCIUM SERPL-MCNC: 8.6 MG/DL — SIGNIFICANT CHANGE UP (ref 8.4–10.5)
CHLORIDE SERPL-SCNC: 103 MMOL/L — SIGNIFICANT CHANGE UP (ref 96–108)
CO2 SERPL-SCNC: 18 MMOL/L — LOW (ref 22–31)
CREAT ?TM UR-MCNC: 74 MG/DL — SIGNIFICANT CHANGE UP
CREAT SERPL-MCNC: 0.91 MG/DL — SIGNIFICANT CHANGE UP (ref 0.5–1.3)
EOSINOPHIL # BLD AUTO: 0.01 K/UL — SIGNIFICANT CHANGE UP (ref 0–0.5)
EOSINOPHIL NFR BLD AUTO: 0.1 % — SIGNIFICANT CHANGE UP (ref 0–6)
GLUCOSE BLDC GLUCOMTR-MCNC: 124 MG/DL — HIGH (ref 70–99)
GLUCOSE BLDC GLUCOMTR-MCNC: 129 MG/DL — HIGH (ref 70–99)
GLUCOSE BLDC GLUCOMTR-MCNC: 149 MG/DL — HIGH (ref 70–99)
GLUCOSE BLDC GLUCOMTR-MCNC: 188 MG/DL — HIGH (ref 70–99)
GLUCOSE SERPL-MCNC: 173 MG/DL — HIGH (ref 70–99)
HCT VFR BLD CALC: 36.3 % — LOW (ref 39–50)
HGB BLD-MCNC: 11.9 G/DL — LOW (ref 13–17)
IMM GRANULOCYTES NFR BLD AUTO: 1.3 % — SIGNIFICANT CHANGE UP (ref 0–1.5)
LYMPHOCYTES # BLD AUTO: 0.83 K/UL — LOW (ref 1–3.3)
LYMPHOCYTES # BLD AUTO: 10.5 % — LOW (ref 13–44)
MAGNESIUM SERPL-MCNC: 2 MG/DL — SIGNIFICANT CHANGE UP (ref 1.6–2.6)
MCHC RBC-ENTMCNC: 28.1 PG — SIGNIFICANT CHANGE UP (ref 27–34)
MCHC RBC-ENTMCNC: 32.8 GM/DL — SIGNIFICANT CHANGE UP (ref 32–36)
MCV RBC AUTO: 85.8 FL — SIGNIFICANT CHANGE UP (ref 80–100)
MONOCYTES # BLD AUTO: 0.6 K/UL — SIGNIFICANT CHANGE UP (ref 0–0.9)
MONOCYTES NFR BLD AUTO: 7.6 % — SIGNIFICANT CHANGE UP (ref 2–14)
NEUTROPHILS # BLD AUTO: 6.35 K/UL — SIGNIFICANT CHANGE UP (ref 1.8–7.4)
NEUTROPHILS NFR BLD AUTO: 80.4 % — HIGH (ref 43–77)
NRBC # BLD: 0 /100 WBCS — SIGNIFICANT CHANGE UP (ref 0–0)
OSMOLALITY UR: 692 MOSMOL/KG — HIGH (ref 100–650)
PLATELET # BLD AUTO: 195 K/UL — SIGNIFICANT CHANGE UP (ref 150–400)
POTASSIUM SERPL-MCNC: 4.6 MMOL/L — SIGNIFICANT CHANGE UP (ref 3.5–5.3)
POTASSIUM SERPL-SCNC: 4.6 MMOL/L — SIGNIFICANT CHANGE UP (ref 3.5–5.3)
RBC # BLD: 4.23 M/UL — SIGNIFICANT CHANGE UP (ref 4.2–5.8)
RBC # FLD: 13.9 % — SIGNIFICANT CHANGE UP (ref 10.3–14.5)
SODIUM SERPL-SCNC: 131 MMOL/L — LOW (ref 135–145)
SODIUM UR-SCNC: 78 MMOL/L — SIGNIFICANT CHANGE UP
WBC # BLD: 7.9 K/UL — SIGNIFICANT CHANGE UP (ref 3.8–10.5)
WBC # FLD AUTO: 7.9 K/UL — SIGNIFICANT CHANGE UP (ref 3.8–10.5)

## 2020-01-12 PROCEDURE — 99233 SBSQ HOSP IP/OBS HIGH 50: CPT | Mod: GC

## 2020-01-12 RX ORDER — ENOXAPARIN SODIUM 100 MG/ML
40 INJECTION SUBCUTANEOUS EVERY 24 HOURS
Refills: 0 | Status: DISCONTINUED | OUTPATIENT
Start: 2020-01-12 | End: 2020-01-12

## 2020-01-12 RX ADMIN — BRIMONIDINE TARTRATE 1 DROP(S): 2 SOLUTION/ DROPS OPHTHALMIC at 21:27

## 2020-01-12 RX ADMIN — Medication 10 MILLIGRAM(S): at 12:59

## 2020-01-12 RX ADMIN — LOSARTAN POTASSIUM 25 MILLIGRAM(S): 100 TABLET, FILM COATED ORAL at 06:37

## 2020-01-12 RX ADMIN — OXYCODONE AND ACETAMINOPHEN 1 TABLET(S): 5; 325 TABLET ORAL at 23:02

## 2020-01-12 RX ADMIN — ATORVASTATIN CALCIUM 40 MILLIGRAM(S): 80 TABLET, FILM COATED ORAL at 21:28

## 2020-01-12 RX ADMIN — OXYCODONE AND ACETAMINOPHEN 1 TABLET(S): 5; 325 TABLET ORAL at 17:00

## 2020-01-12 RX ADMIN — OXYCODONE AND ACETAMINOPHEN 1 TABLET(S): 5; 325 TABLET ORAL at 09:20

## 2020-01-12 RX ADMIN — SODIUM CHLORIDE 1 GRAM(S): 9 INJECTION INTRAMUSCULAR; INTRAVENOUS; SUBCUTANEOUS at 17:18

## 2020-01-12 RX ADMIN — BRIMONIDINE TARTRATE 1 DROP(S): 2 SOLUTION/ DROPS OPHTHALMIC at 06:37

## 2020-01-12 RX ADMIN — OXYCODONE AND ACETAMINOPHEN 1 TABLET(S): 5; 325 TABLET ORAL at 16:24

## 2020-01-12 RX ADMIN — SENNA PLUS 2 TABLET(S): 8.6 TABLET ORAL at 21:28

## 2020-01-12 RX ADMIN — LATANOPROST 1 DROP(S): 0.05 SOLUTION/ DROPS OPHTHALMIC; TOPICAL at 21:28

## 2020-01-12 RX ADMIN — OXYCODONE AND ACETAMINOPHEN 1 TABLET(S): 5; 325 TABLET ORAL at 07:24

## 2020-01-12 RX ADMIN — Medication 5 MILLIGRAM(S): at 21:29

## 2020-01-12 RX ADMIN — TAMSULOSIN HYDROCHLORIDE 0.4 MILLIGRAM(S): 0.4 CAPSULE ORAL at 21:28

## 2020-01-12 RX ADMIN — SODIUM CHLORIDE 1 GRAM(S): 9 INJECTION INTRAMUSCULAR; INTRAVENOUS; SUBCUTANEOUS at 08:00

## 2020-01-12 RX ADMIN — ENOXAPARIN SODIUM 40 MILLIGRAM(S): 100 INJECTION SUBCUTANEOUS at 17:18

## 2020-01-12 RX ADMIN — FINASTERIDE 5 MILLIGRAM(S): 5 TABLET, FILM COATED ORAL at 09:07

## 2020-01-12 RX ADMIN — Medication 5 MILLIGRAM(S): at 06:37

## 2020-01-12 RX ADMIN — LORATADINE 10 MILLIGRAM(S): 10 TABLET ORAL at 12:04

## 2020-01-12 RX ADMIN — DORZOLAMIDE HYDROCHLORIDE TIMOLOL MALEATE 1 DROP(S): 20; 5 SOLUTION/ DROPS OPHTHALMIC at 09:07

## 2020-01-12 RX ADMIN — Medication 5 MILLIGRAM(S): at 12:57

## 2020-01-12 RX ADMIN — Medication 25 MILLIGRAM(S): at 06:37

## 2020-01-12 RX ADMIN — DORZOLAMIDE HYDROCHLORIDE TIMOLOL MALEATE 1 DROP(S): 20; 5 SOLUTION/ DROPS OPHTHALMIC at 21:27

## 2020-01-12 RX ADMIN — Medication 2: at 12:57

## 2020-01-12 RX ADMIN — BRIMONIDINE TARTRATE 1 DROP(S): 2 SOLUTION/ DROPS OPHTHALMIC at 12:56

## 2020-01-12 NOTE — PROGRESS NOTE ADULT - PROBLEM SELECTOR PLAN 10
1) PCP Contacted on Admission: (Y/N) --> Name & Phone #:  2) Date of Contact with PCP:  3) PCP Contacted at Discharge: (Y/N)  4) Summary of Handoff Given to PCP:   5) Post-Discharge Appointment Date and Location: Bilateral Helical Rim Advancement Flap Text: The defect edges were debeveled with a #15 blade scalpel.  Given the location of the defect and the proximity to free margins (helical rim) a bilateral helical rim advancement flap was deemed most appropriate.  Using a sterile surgical marker, the appropriate advancement flaps were drawn incorporating the defect and placing the expected incisions between the helical rim and antihelix where possible.  The area thus outlined was incised through and through with a #15 scalpel blade.  With a skin hook and iris scissors, the flaps were gently and sharply undermined and freed up.

## 2020-01-12 NOTE — PROGRESS NOTE ADULT - PROBLEM SELECTOR PLAN 3
Elevated BUN/Scr (33/1.02) when compared to last admission. Likely 2/2 small degree of dehydration, given that patient on fluid restriction and on Lasix for SIADH.  - hold off on home Lasix 40mg BID for now, resume tomorrow if Scr improves to baseline  - consider resuming at reduced dose and monitoring response   - encourage PO intake  - no IVF as patient appears euvolemic on exam  - avoid nephrotoxic agents Elevated BUN/Scr (33/1.02) when compared to last admission. Likely 2/2 small degree of dehydration, given that patient on fluid restriction and on Lasix for SIADH.  - hold off on home Lasix 40mg BID for now, resume tomorrow if Scr improves to baseline  - consider resuming at reduced dose and monitoring response   - encourage PO intake  - no IVF as patient appears euvolemic on exam  - avoid nephrotoxic agents  -Cr improved from yesterday, today 0.91

## 2020-01-12 NOTE — PROGRESS NOTE ADULT - PROBLEM SELECTOR PLAN 4
Hx of CAD s/p PCI/CABG and hx of PAD. No chest pain, SOB, palpitations.  - continue atorvastatin 40mg qhs  - continue home Toprol 25mg XL daily

## 2020-01-12 NOTE — PROGRESS NOTE ADULT - PROBLEM SELECTOR PLAN 2
Hx of low back pain and R hip after a fall in early December, which persists at severe intensity despite treatment by pain management with hydrocodone PRN and steroid injections in the epidural lumbar spine and R hip 2 weeks ago. Pain exacerbated by ambulation and movement in general, relieved when laying down.  - pain regimen with percocet 5/325 1 tab q4-6 hrs PRN  - collateral from Dr. Deleon  - PT assessment  - fall precautions

## 2020-01-12 NOTE — PROGRESS NOTE ADULT - SUBJECTIVE AND OBJECTIVE BOX
*** INCOMPLETE ***    OVERNIGHT / INTERVAL EVENTS: As per overnight staff, there were no acute events overnight    SUBJECTIVE HPI: Patient seen and examined at bedside. Patient resting comfortably, no complaints at this time. Patient denies: fever, chills, weakness, dizziness, headaches, changes in vision, chest pain, palpitations, shortness of breath, cough, N/V, diarrhea or constipation, dysuria, LE edema. ROS otherwise negative.      VITAL SIGNS:  Vital Signs Last 24 Hrs  T(C): 36.6 (2020 09:04), Max: 37.2 (2020 20:08)  T(F): 97.9 (2020 09:04), Max: 98.9 (2020 20:08)  HR: 90 (2020 09:04) (82 - 111)  BP: 100/59 (2020 09:04) (95/61 - 166/82)  BP(mean): --  RR: 16 (2020 09:04) (16 - 18)  SpO2: 95% (2020 09:04) (95% - 99%)        PHYSICAL EXAM:  General: WDWN, comfortable in bed, NAD  Neurological: AAOx3, no focal deficits  HEENT: NC/AT; EOMI, PERRL, anicteric sclera, no discharge or exudate from eyes or nose; MMM  Neck: supple, no cervical or post-auricular lymphadenopathy  Cardiovascular: +S1/S2, no murmurs/rubs/gallops, RRR  Respiratory: CTA B/L, no diminished breath sounds, no wheezes/rales/rhonchi, no increased work of breathing or accessory muscle use  Gastrointestinal: soft, NT/ND; active BSx4 quadrants  Genitourinary: no suprapubic tenderness, no CVA tenderness  Extremities: WWP; no edema, clubbing or cyanosis  Vascular: 2+ radial, DP/PT pulses B/L  Skin: no rashes  Lines/Drains:       MEDICATIONS:  MEDICATIONS  (STANDING):  atorvastatin 40 milliGRAM(s) Oral at bedtime  brimonidine 0.2% Ophthalmic Solution 1 Drop(s) Both EYES three times a day  dextrose 5%. 1000 milliLiter(s) (50 mL/Hr) IV Continuous <Continuous>  dextrose 50% Injectable 12.5 Gram(s) IV Push once  dextrose 50% Injectable 25 Gram(s) IV Push once  dextrose 50% Injectable 25 Gram(s) IV Push once  dorzolamide 2%/timolol 0.5% Ophthalmic Solution 1 Drop(s) Both EYES two times a day  finasteride 5 milliGRAM(s) Oral daily  insulin lispro (HumaLOG) corrective regimen sliding scale   SubCutaneous Before meals and at bedtime  latanoprost 0.005% Ophthalmic Solution 1 Drop(s) Both EYES at bedtime  loratadine 10 milliGRAM(s) Oral daily  losartan 25 milliGRAM(s) Oral daily  metoprolol succinate ER 25 milliGRAM(s) Oral daily  oxybutynin 5 milliGRAM(s) Oral three times a day  senna 2 Tablet(s) Oral at bedtime  sodium chloride 1 Gram(s) Oral every 12 hours  tamsulosin 0.4 milliGRAM(s) Oral at bedtime    MEDICATIONS  (PRN):  dextrose 40% Gel 15 Gram(s) Oral once PRN Blood Glucose LESS THAN 70 milliGRAM(s)/deciliter  glucagon  Injectable 1 milliGRAM(s) IntraMuscular once PRN Glucose LESS THAN 70 milligrams/deciliter  oxycodone    5 mG/acetaminophen 325 mG 1 Tablet(s) Oral every 6 hours PRN Severe Pain (7 - 10)  polyethylene glycol 3350 17 Gram(s) Oral daily PRN Constipation      ALLERGIES/INTOLERANCES:  Allergies    No Known Drug Allergies  Originally Entered as [Rash-General] reaction to [Other][[place]] (Rash)  Originally Entered as [Rash-General] reaction to [Other][Ragweed] (Rash)  ragweed and birch (Unknown)    Intolerances        LABS:                        11.9   7.90  )-----------( 195      ( 2020 07:03 )             36.3     01-12    131<L>  |  103  |  34<H>  ----------------------------<  173<H>  4.6   |  18<L>  |  0.91    Ca    8.6      2020 07:03  Mg     2.0     -12    TPro  6.8  /  Alb  3.6  /  TBili  0.7  /  DBili  x   /  AST  17  /  ALT  37  /  AlkPhos  101  -      CAPILLARY BLOOD GLUCOSE      POCT Blood Glucose.: 124 mg/dL (2020 08:20)            Urinalysis Basic - ( 2020 14:41 )    Color: Yellow / Appearance: Clear / S.015 / pH: x  Gluc: x / Ketone: NEGATIVE  / Bili: Negative / Urobili: 0.2 E.U./dL   Blood: x / Protein: NEGATIVE mg/dL / Nitrite: NEGATIVE   Leuk Esterase: NEGATIVE / RBC: < 5 /HPF / WBC < 5 /HPF   Sq Epi: x / Non Sq Epi: 0-5 /HPF / Bacteria: Present /HPF            Microbiology:      RADIOLOGY, EKG AND ADDITIONAL TESTS: Reviewed. OVERNIGHT / INTERVAL EVENTS: As per overnight staff, there were no acute events overnight.    SUBJECTIVE HPI: Patient seen and examined at bedside. Patient resting comfortably. He endorses bilateral hip pain. ROS otherwise negative.      VITAL SIGNS:  Vital Signs Last 24 Hrs  T(C): 36.6 (2020 09:04), Max: 37.2 (2020 20:08)  T(F): 97.9 (2020 09:04), Max: 98.9 (2020 20:08)  HR: 90 (2020 09:04) (82 - 111)  BP: 100/59 (2020 09:04) (95/61 - 166/82)  BP(mean): --  RR: 16 (2020 09:04) (16 - 18)  SpO2: 95% (2020 09:04) (95% - 99%)      PHYSICAL EXAM:  General: comfortable in bed, NAD  Neurological: AAOx3  HEENT: NC/AT; MMM  Neck: supple, no cervical or post-auricular lymphadenopathy  Cardiovascular: +S1/S2, holosystolic murmur best heard in RSB  Respiratory: CTA B/L, no diminished breath sounds, no increased work of breathing or accessory muscle use  Gastrointestinal: soft, NT/ND; active BSx4 quadrants  Genitourinary: no suprapubic tenderness  Back: no bony tenderness, no pain on palpation of iliacs  Extremities: WWP; no edema, clubbing or cyanosis  Vascular: 2+ radial, DP/PT pulses B/L  Skin: no rashes      MEDICATIONS:  MEDICATIONS  (STANDING):  atorvastatin 40 milliGRAM(s) Oral at bedtime  brimonidine 0.2% Ophthalmic Solution 1 Drop(s) Both EYES three times a day  dextrose 5%. 1000 milliLiter(s) (50 mL/Hr) IV Continuous <Continuous>  dextrose 50% Injectable 12.5 Gram(s) IV Push once  dextrose 50% Injectable 25 Gram(s) IV Push once  dextrose 50% Injectable 25 Gram(s) IV Push once  dorzolamide 2%/timolol 0.5% Ophthalmic Solution 1 Drop(s) Both EYES two times a day  finasteride 5 milliGRAM(s) Oral daily  insulin lispro (HumaLOG) corrective regimen sliding scale   SubCutaneous Before meals and at bedtime  latanoprost 0.005% Ophthalmic Solution 1 Drop(s) Both EYES at bedtime  loratadine 10 milliGRAM(s) Oral daily  losartan 25 milliGRAM(s) Oral daily  metoprolol succinate ER 25 milliGRAM(s) Oral daily  oxybutynin 5 milliGRAM(s) Oral three times a day  senna 2 Tablet(s) Oral at bedtime  sodium chloride 1 Gram(s) Oral every 12 hours  tamsulosin 0.4 milliGRAM(s) Oral at bedtime    MEDICATIONS  (PRN):  dextrose 40% Gel 15 Gram(s) Oral once PRN Blood Glucose LESS THAN 70 milliGRAM(s)/deciliter  glucagon  Injectable 1 milliGRAM(s) IntraMuscular once PRN Glucose LESS THAN 70 milligrams/deciliter  oxycodone    5 mG/acetaminophen 325 mG 1 Tablet(s) Oral every 6 hours PRN Severe Pain (7 - 10)  polyethylene glycol 3350 17 Gram(s) Oral daily PRN Constipation      ALLERGIES/INTOLERANCES:  Allergies    No Known Drug Allergies  Originally Entered as [Rash-General] reaction to [Other][[place]] (Rash)  Originally Entered as [Rash-General] reaction to [Other][Ragweed] (Rash)  ragweed and birch (Unknown)    Intolerances        LABS:                        11.9   7.90  )-----------( 195      ( 2020 07:03 )             36.3     01-12    131<L>  |  103  |  34<H>  ----------------------------<  173<H>  4.6   |  18<L>  |  0.91    Ca    8.6      2020 07:03  Mg     2.0     -12    TPro  6.8  /  Alb  3.6  /  TBili  0.7  /  DBili  x   /  AST  17  /  ALT  37  /  AlkPhos  101  01-11      CAPILLARY BLOOD GLUCOSE  POCT Blood Glucose.: 124 mg/dL (2020 08:20)      Urinalysis Basic - ( 2020 14:41 )  Color: Yellow / Appearance: Clear / S.015 / pH: x  Gluc: x / Ketone: NEGATIVE  / Bili: Negative / Urobili: 0.2 E.U./dL   Blood: x / Protein: NEGATIVE mg/dL / Nitrite: NEGATIVE   Leuk Esterase: NEGATIVE / RBC: < 5 /HPF / WBC < 5 /HPF   Sq Epi: x / Non Sq Epi: 0-5 /HPF / Bacteria: Present /HPF      RADIOLOGY, EKG AND ADDITIONAL TESTS: Reviewed.

## 2020-01-13 LAB
-  COAGULASE NEGATIVE STAPHYLOCOCCUS: SIGNIFICANT CHANGE UP
ANION GAP SERPL CALC-SCNC: 11 MMOL/L — SIGNIFICANT CHANGE UP (ref 5–17)
BUN SERPL-MCNC: 28 MG/DL — HIGH (ref 7–23)
CALCIUM SERPL-MCNC: 8.7 MG/DL — SIGNIFICANT CHANGE UP (ref 8.4–10.5)
CHLORIDE SERPL-SCNC: 102 MMOL/L — SIGNIFICANT CHANGE UP (ref 96–108)
CO2 SERPL-SCNC: 20 MMOL/L — LOW (ref 22–31)
CREAT SERPL-MCNC: 0.87 MG/DL — SIGNIFICANT CHANGE UP (ref 0.5–1.3)
CRP SERPL-MCNC: 10.68 MG/DL — HIGH (ref 0–0.4)
ERYTHROCYTE [SEDIMENTATION RATE] IN BLOOD: 61 MM/HR — HIGH
GLUCOSE BLDC GLUCOMTR-MCNC: 114 MG/DL — HIGH (ref 70–99)
GLUCOSE BLDC GLUCOMTR-MCNC: 114 MG/DL — HIGH (ref 70–99)
GLUCOSE BLDC GLUCOMTR-MCNC: 93 MG/DL — SIGNIFICANT CHANGE UP (ref 70–99)
GLUCOSE BLDC GLUCOMTR-MCNC: 94 MG/DL — SIGNIFICANT CHANGE UP (ref 70–99)
GLUCOSE SERPL-MCNC: 118 MG/DL — HIGH (ref 70–99)
GRAM STN FLD: SIGNIFICANT CHANGE UP
GRAM STN FLD: SIGNIFICANT CHANGE UP
HCT VFR BLD CALC: 34.5 % — LOW (ref 39–50)
HGB BLD-MCNC: 11.4 G/DL — LOW (ref 13–17)
MAGNESIUM SERPL-MCNC: 2.1 MG/DL — SIGNIFICANT CHANGE UP (ref 1.6–2.6)
MCHC RBC-ENTMCNC: 28.1 PG — SIGNIFICANT CHANGE UP (ref 27–34)
MCHC RBC-ENTMCNC: 33 GM/DL — SIGNIFICANT CHANGE UP (ref 32–36)
MCV RBC AUTO: 85.2 FL — SIGNIFICANT CHANGE UP (ref 80–100)
METHOD TYPE: SIGNIFICANT CHANGE UP
NRBC # BLD: 0 /100 WBCS — SIGNIFICANT CHANGE UP (ref 0–0)
PLATELET # BLD AUTO: 209 K/UL — SIGNIFICANT CHANGE UP (ref 150–400)
POTASSIUM SERPL-MCNC: 5.2 MMOL/L — SIGNIFICANT CHANGE UP (ref 3.5–5.3)
POTASSIUM SERPL-SCNC: 5.2 MMOL/L — SIGNIFICANT CHANGE UP (ref 3.5–5.3)
RBC # BLD: 4.05 M/UL — LOW (ref 4.2–5.8)
RBC # FLD: 13.8 % — SIGNIFICANT CHANGE UP (ref 10.3–14.5)
SODIUM SERPL-SCNC: 133 MMOL/L — LOW (ref 135–145)
WBC # BLD: 8.5 K/UL — SIGNIFICANT CHANGE UP (ref 3.8–10.5)
WBC # FLD AUTO: 8.5 K/UL — SIGNIFICANT CHANGE UP (ref 3.8–10.5)

## 2020-01-13 PROCEDURE — 72158 MRI LUMBAR SPINE W/O & W/DYE: CPT | Mod: 26

## 2020-01-13 PROCEDURE — 99233 SBSQ HOSP IP/OBS HIGH 50: CPT | Mod: GC

## 2020-01-13 PROCEDURE — 20225 BONE BIOPSY TROCAR/NDL DEEP: CPT

## 2020-01-13 PROCEDURE — 99232 SBSQ HOSP IP/OBS MODERATE 35: CPT

## 2020-01-13 PROCEDURE — 77012 CT SCAN FOR NEEDLE BIOPSY: CPT | Mod: 26

## 2020-01-13 RX ORDER — FUROSEMIDE 40 MG
40 TABLET ORAL
Refills: 0 | Status: DISCONTINUED | OUTPATIENT
Start: 2020-01-13 | End: 2020-01-17

## 2020-01-13 RX ORDER — ENOXAPARIN SODIUM 100 MG/ML
40 INJECTION SUBCUTANEOUS EVERY 24 HOURS
Refills: 0 | Status: DISCONTINUED | OUTPATIENT
Start: 2020-01-13 | End: 2020-01-16

## 2020-01-13 RX ADMIN — BRIMONIDINE TARTRATE 1 DROP(S): 2 SOLUTION/ DROPS OPHTHALMIC at 06:28

## 2020-01-13 RX ADMIN — Medication 5 MILLIGRAM(S): at 22:52

## 2020-01-13 RX ADMIN — LATANOPROST 1 DROP(S): 0.05 SOLUTION/ DROPS OPHTHALMIC; TOPICAL at 22:54

## 2020-01-13 RX ADMIN — Medication 5 MILLIGRAM(S): at 06:28

## 2020-01-13 RX ADMIN — Medication 40 MILLIGRAM(S): at 17:47

## 2020-01-13 RX ADMIN — Medication 5 MILLIGRAM(S): at 13:17

## 2020-01-13 RX ADMIN — LORATADINE 10 MILLIGRAM(S): 10 TABLET ORAL at 12:26

## 2020-01-13 RX ADMIN — OXYCODONE AND ACETAMINOPHEN 1 TABLET(S): 5; 325 TABLET ORAL at 00:02

## 2020-01-13 RX ADMIN — ATORVASTATIN CALCIUM 40 MILLIGRAM(S): 80 TABLET, FILM COATED ORAL at 22:52

## 2020-01-13 RX ADMIN — BRIMONIDINE TARTRATE 1 DROP(S): 2 SOLUTION/ DROPS OPHTHALMIC at 22:53

## 2020-01-13 RX ADMIN — OXYCODONE AND ACETAMINOPHEN 1 TABLET(S): 5; 325 TABLET ORAL at 18:41

## 2020-01-13 RX ADMIN — SENNA PLUS 2 TABLET(S): 8.6 TABLET ORAL at 22:52

## 2020-01-13 RX ADMIN — FINASTERIDE 5 MILLIGRAM(S): 5 TABLET, FILM COATED ORAL at 12:26

## 2020-01-13 RX ADMIN — ENOXAPARIN SODIUM 40 MILLIGRAM(S): 100 INJECTION SUBCUTANEOUS at 22:51

## 2020-01-13 RX ADMIN — BRIMONIDINE TARTRATE 1 DROP(S): 2 SOLUTION/ DROPS OPHTHALMIC at 13:20

## 2020-01-13 RX ADMIN — Medication 25 MILLIGRAM(S): at 06:27

## 2020-01-13 RX ADMIN — OXYCODONE AND ACETAMINOPHEN 1 TABLET(S): 5; 325 TABLET ORAL at 07:27

## 2020-01-13 RX ADMIN — LOSARTAN POTASSIUM 25 MILLIGRAM(S): 100 TABLET, FILM COATED ORAL at 06:27

## 2020-01-13 RX ADMIN — SODIUM CHLORIDE 1 GRAM(S): 9 INJECTION INTRAMUSCULAR; INTRAVENOUS; SUBCUTANEOUS at 17:47

## 2020-01-13 RX ADMIN — OXYCODONE AND ACETAMINOPHEN 1 TABLET(S): 5; 325 TABLET ORAL at 19:40

## 2020-01-13 RX ADMIN — DORZOLAMIDE HYDROCHLORIDE TIMOLOL MALEATE 1 DROP(S): 20; 5 SOLUTION/ DROPS OPHTHALMIC at 13:18

## 2020-01-13 RX ADMIN — TAMSULOSIN HYDROCHLORIDE 0.4 MILLIGRAM(S): 0.4 CAPSULE ORAL at 22:52

## 2020-01-13 RX ADMIN — SODIUM CHLORIDE 1 GRAM(S): 9 INJECTION INTRAMUSCULAR; INTRAVENOUS; SUBCUTANEOUS at 06:28

## 2020-01-13 NOTE — PROGRESS NOTE ADULT - PROBLEM SELECTOR PLAN 8
DM2 not on any medications. A1c 6 in 12/19.  - ISS    #Constipation- reports contipation, last BM 3 days. CT with fecal loading of stool in rectum without obstruction  - given senna 2 tabs and dulcolax 1 suppository on admission  - continue daily senna and Miralax PRN  - continue bowel regimen while on opioid medications DM2 not on any medications. A1c 6 in 12/19.  - ISS    #Constipation- reports constipation.  - continue senna and Miralax PRN daily  - continue bowel regimen while on opioid medications DM2 not on any medications. A1c 6 in 12/19.  - ISS    #Constipation- reports constipation.  - continue senna and Miralax PRN, c/w dulcolax suppository PRN daily  - continue bowel regimen while on opioid medications

## 2020-01-13 NOTE — PROGRESS NOTE ADULT - SUBJECTIVE AND OBJECTIVE BOX
******* INCOMPLETE ***************************************    OVERNIGHT EVENTS: No acute events overnight.    SUBJECTIVE HPI: Patient seen and examined at bedside. Patient resting comfortably. He endorses lower back and right hip pain 4-5/10 in severity, unchanged since yesterday. Denies changes in urination, dysuria, or incontinence. Reports having bowel movement yesterday. Ambulating with cane in his room, but is limited by pain. Denies chest pain, palpitations, SOB, HA, N/V, fever, LE pain. ROS otherwise negative.    VITAL SIGNS:  Vital Signs Last 24 Hrs  T(C): 37 (2020 08:25), Max: 37 (2020 08:25)  T(F): 98.6 (2020 08:25), Max: 98.6 (2020 08:25)  HR: 83 (2020 08:25) (83 - 97)  BP: 145/76 (2020 08:25) (121/67 - 145/76)  BP(mean): --  RR: 18 (2020 08:25) (16 - 18)  SpO2: 98% (2020 08:25) (95% - 98%)    PHYSICAL EXAM:    General: NAD, lying comfortably in bed  HEENT: NC/AT; mucous membranes dry  Cardiovascular: +S1/S2, RRR, grade 2 holosystolic murmur loudest over RSB  Respiratory: CTAB; no wheezes/rales/rhonchi  Gastrointestinal: soft, NT/ND; +BSx4  Extremities: WWP; no edema, clubbing or cyanosis; no calf tenderness  Vascular: 2+ radial, DP/PT pulses B/L  Neurological: AAOx3; lower extremities strength 5/5 b/l    MEDICATIONS:  MEDICATIONS  (STANDING):  atorvastatin 40 milliGRAM(s) Oral at bedtime  brimonidine 0.2% Ophthalmic Solution 1 Drop(s) Both EYES three times a day  dextrose 5%. 1000 milliLiter(s) (50 mL/Hr) IV Continuous <Continuous>  dextrose 50% Injectable 12.5 Gram(s) IV Push once  dextrose 50% Injectable 25 Gram(s) IV Push once  dextrose 50% Injectable 25 Gram(s) IV Push once  dorzolamide 2%/timolol 0.5% Ophthalmic Solution 1 Drop(s) Both EYES two times a day  finasteride 5 milliGRAM(s) Oral daily  insulin lispro (HumaLOG) corrective regimen sliding scale   SubCutaneous Before meals and at bedtime  latanoprost 0.005% Ophthalmic Solution 1 Drop(s) Both EYES at bedtime  loratadine 10 milliGRAM(s) Oral daily  losartan 25 milliGRAM(s) Oral daily  metoprolol succinate ER 25 milliGRAM(s) Oral daily  oxybutynin 5 milliGRAM(s) Oral three times a day  senna 2 Tablet(s) Oral at bedtime  sodium chloride 1 Gram(s) Oral every 12 hours  tamsulosin 0.4 milliGRAM(s) Oral at bedtime    MEDICATIONS  (PRN):  dextrose 40% Gel 15 Gram(s) Oral once PRN Blood Glucose LESS THAN 70 milliGRAM(s)/deciliter  glucagon  Injectable 1 milliGRAM(s) IntraMuscular once PRN Glucose LESS THAN 70 milligrams/deciliter  oxycodone    5 mG/acetaminophen 325 mG 1 Tablet(s) Oral every 6 hours PRN Severe Pain (7 - 10)  polyethylene glycol 3350 17 Gram(s) Oral daily PRN Constipation      ALLERGIES:  Allergies    No Known Drug Allergies  Originally Entered as [Rash-General] reaction to [Other][[place]] (Rash)  Originally Entered as [Rash-General] reaction to [Other][Ragweed] (Rash)  ragweed and birch (Unknown)    Intolerances        LABS:                        11.4   8.50  )-----------( 209      ( 2020 05:48 )             34.5     -    133<L>  |  102  |  28<H>  ----------------------------<  118<H>  5.2   |  20<L>  |  0.87    Ca    8.7      2020 05:48  Mg     2.1         TPro  6.8  /  Alb  3.6  /  TBili  0.7  /  DBili  x   /  AST  17  /  ALT  37  /  AlkPhos  101  01-      Urinalysis Basic - ( 2020 14:41 )    Color: Yellow / Appearance: Clear / S.015 / pH: x  Gluc: x / Ketone: NEGATIVE  / Bili: Negative / Urobili: 0.2 E.U./dL   Blood: x / Protein: NEGATIVE mg/dL / Nitrite: NEGATIVE   Leuk Esterase: NEGATIVE / RBC: < 5 /HPF / WBC < 5 /HPF   Sq Epi: x / Non Sq Epi: 0-5 /HPF / Bacteria: Present /HPF      CAPILLARY BLOOD GLUCOSE      POCT Blood Glucose.: 114 mg/dL (2020 08:39)      RADIOLOGY & ADDITIONAL TESTS: Reviewed.    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 Epidural enhancement without evidence of epidural abscess. Severe degenerative changes at L2-3 resulting in severe spinal stenosis and thecal sac compression and bilateral neural foraminal narrowing with impingement on the exiting L2 nerve roots.    Additional moderate to severe multilevel degenerative changes at L3-4 through L5-S1 as described above.    Abnormal signal within the L4-L5 disc space with preservation of the endplates, likely representing degenerative changes of the early discitis cannot be excluded. ******* INCOMPLETE ***************************************    OVERNIGHT EVENTS: No acute events overnight.    SUBJECTIVE HPI: Patient seen and examined at bedside. Patient resting comfortably. He endorses lower back and right hip pain 4-5/10 in severity, unchanged since yesterday. Denies changes in urination, dysuria, or incontinence. Reports having bowel movement yesterday. Ambulating with cane in his room, but is limited by pain. Denies chest pain, palpitations, SOB, HA, N/V, fever, LE pain. ROS otherwise negative.    VITAL SIGNS:  Vital Signs Last 24 Hrs  T(C): 37 (2020 08:25), Max: 37 (2020 08:25)  T(F): 98.6 (2020 08:25), Max: 98.6 (2020 08:25)  HR: 83 (2020 08:25) (83 - 97)  BP: 145/76 (2020 08:25) (121/67 - 145/76)  BP(mean): --  RR: 18 (2020 08:25) (16 - 18)  SpO2: 98% (2020 08:25) (95% - 98%)    PHYSICAL EXAM:    General: NAD, lying comfortably in bed  HEENT: NC/AT; mucous membranes dry  Cardiovascular: +S1/S2, RRR, grade 2 holosystolic murmur loudest over RSB  Respiratory: CTAB; no wheezes/rales/rhonchi  Gastrointestinal: soft, NT/ND; +BSx4  Extremities: WWP; no edema, clubbing or cyanosis; no calf tenderness  Vascular: 2+ radial, DP/PT pulses B/L  Neurological: AAOx3; lower extremities strength 5/5 b/l    MEDICATIONS:  MEDICATIONS  (STANDING):  atorvastatin 40 milliGRAM(s) Oral at bedtime  brimonidine 0.2% Ophthalmic Solution 1 Drop(s) Both EYES three times a day  dextrose 5%. 1000 milliLiter(s) (50 mL/Hr) IV Continuous <Continuous>  dextrose 50% Injectable 12.5 Gram(s) IV Push once  dextrose 50% Injectable 25 Gram(s) IV Push once  dextrose 50% Injectable 25 Gram(s) IV Push once  dorzolamide 2%/timolol 0.5% Ophthalmic Solution 1 Drop(s) Both EYES two times a day  finasteride 5 milliGRAM(s) Oral daily  insulin lispro (HumaLOG) corrective regimen sliding scale   SubCutaneous Before meals and at bedtime  latanoprost 0.005% Ophthalmic Solution 1 Drop(s) Both EYES at bedtime  loratadine 10 milliGRAM(s) Oral daily  losartan 25 milliGRAM(s) Oral daily  metoprolol succinate ER 25 milliGRAM(s) Oral daily  oxybutynin 5 milliGRAM(s) Oral three times a day  senna 2 Tablet(s) Oral at bedtime  sodium chloride 1 Gram(s) Oral every 12 hours  tamsulosin 0.4 milliGRAM(s) Oral at bedtime    MEDICATIONS  (PRN):  dextrose 40% Gel 15 Gram(s) Oral once PRN Blood Glucose LESS THAN 70 milliGRAM(s)/deciliter  glucagon  Injectable 1 milliGRAM(s) IntraMuscular once PRN Glucose LESS THAN 70 milligrams/deciliter  oxycodone    5 mG/acetaminophen 325 mG 1 Tablet(s) Oral every 6 hours PRN Severe Pain (7 - 10)  polyethylene glycol 3350 17 Gram(s) Oral daily PRN Constipation      ALLERGIES:  Allergies    No Known Drug Allergies  Originally Entered as [Rash-General] reaction to [Other][[place]] (Rash)  Originally Entered as [Rash-General] reaction to [Other][Ragweed] (Rash)  ragweed and birch (Unknown)    Intolerances        LABS:                        11.4   8.50  )-----------( 209      ( 2020 05:48 )             34.5     -    133<L>  |  102  |  28<H>  ----------------------------<  118<H>  5.2   |  20<L>  |  0.87    Ca    8.7      2020 05:48  Mg     2.1         TPro  6.8  /  Alb  3.6  /  TBili  0.7  /  DBili  x   /  AST  17  /  ALT  37  /  AlkPhos  101  01-      Urinalysis Basic - ( 2020 14:41 )    Color: Yellow / Appearance: Clear / S.015 / pH: x  Gluc: x / Ketone: NEGATIVE  / Bili: Negative / Urobili: 0.2 E.U./dL   Blood: x / Protein: NEGATIVE mg/dL / Nitrite: NEGATIVE   Leuk Esterase: NEGATIVE / RBC: < 5 /HPF / WBC < 5 /HPF   Sq Epi: x / Non Sq Epi: 0-5 /HPF / Bacteria: Present /HPF      CAPILLARY BLOOD GLUCOSE    POCT Blood Glucose.: 114 mg/dL (2020 08:39)    Culture - Blood (20 @ 22:39)  Coagulase negative Staphylococcus: Detec  Gram Stain: Anaerobic Bottle: Gram Positive Cocci in Clusters    RADIOLOGY & ADDITIONAL TESTS: Reviewed.    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 Epidural enhancement without evidence of epidural abscess. Severe degenerative changes at L2-3 resulting in severe spinal stenosis and thecal sac compression and bilateral neural foraminal narrowing with impingement on the exiting L2 nerve roots.    Additional moderate to severe multilevel degenerative changes at L3-4 through L5-S1 as described above.    Abnormal signal within the L4-L5 disc space with preservation of the endplates, likely representing degenerative changes of the early discitis cannot be excluded. OVERNIGHT EVENTS: No acute events overnight.    SUBJECTIVE HPI: Patient seen and examined at bedside. Patient resting comfortably. He endorses lower back and right hip pain 4-5/10 in severity, unchanged since yesterday. Denies changes in urination, dysuria, or incontinence. Reports having bowel movement yesterday. Ambulating with cane in his room, but is limited by pain. Denies chest pain, palpitations, SOB, HA, N/V, fever, LE pain. ROS otherwise negative.    VITAL SIGNS:  Vital Signs Last 24 Hrs  T(C): 37 (2020 08:25), Max: 37 (2020 08:25)  T(F): 98.6 (2020 08:25), Max: 98.6 (2020 08:25)  HR: 83 (2020 08:25) (83 - 97)  BP: 145/76 (2020 08:25) (121/67 - 145/76)  BP(mean): --  RR: 18 (2020 08:25) (16 - 18)  SpO2: 98% (2020 08:25) (95% - 98%)    PHYSICAL EXAM:  General: NAD, lying comfortably in bed  HEENT: NC/AT; mucous membranes dry  Cardiovascular: +S1/S2, RRR, grade 2 holosystolic murmur loudest over RSB  Respiratory: CTAB; no wheezes/rales/rhonchi  Gastrointestinal: soft, NT/ND; +BSx4  Extremities: WWP; no edema, clubbing or cyanosis; no calf tenderness  Vascular: 2+ radial, DP/PT pulses B/L  Neurological: AAOx3; lower extremities strength 5/5 b/l      MEDICATIONS:  MEDICATIONS  (STANDING):  atorvastatin 40 milliGRAM(s) Oral at bedtime  brimonidine 0.2% Ophthalmic Solution 1 Drop(s) Both EYES three times a day  dextrose 5%. 1000 milliLiter(s) (50 mL/Hr) IV Continuous <Continuous>  dextrose 50% Injectable 12.5 Gram(s) IV Push once  dextrose 50% Injectable 25 Gram(s) IV Push once  dextrose 50% Injectable 25 Gram(s) IV Push once  dorzolamide 2%/timolol 0.5% Ophthalmic Solution 1 Drop(s) Both EYES two times a day  finasteride 5 milliGRAM(s) Oral daily  insulin lispro (HumaLOG) corrective regimen sliding scale   SubCutaneous Before meals and at bedtime  latanoprost 0.005% Ophthalmic Solution 1 Drop(s) Both EYES at bedtime  loratadine 10 milliGRAM(s) Oral daily  losartan 25 milliGRAM(s) Oral daily  metoprolol succinate ER 25 milliGRAM(s) Oral daily  oxybutynin 5 milliGRAM(s) Oral three times a day  senna 2 Tablet(s) Oral at bedtime  sodium chloride 1 Gram(s) Oral every 12 hours  tamsulosin 0.4 milliGRAM(s) Oral at bedtime    MEDICATIONS  (PRN):  dextrose 40% Gel 15 Gram(s) Oral once PRN Blood Glucose LESS THAN 70 milliGRAM(s)/deciliter  glucagon  Injectable 1 milliGRAM(s) IntraMuscular once PRN Glucose LESS THAN 70 milligrams/deciliter  oxycodone    5 mG/acetaminophen 325 mG 1 Tablet(s) Oral every 6 hours PRN Severe Pain (7 - 10)  polyethylene glycol 3350 17 Gram(s) Oral daily PRN Constipation      ALLERGIES:  Allergies    No Known Drug Allergies  Originally Entered as [Rash-General] reaction to [Other][[place]] (Rash)  Originally Entered as [Rash-General] reaction to [Other][Ragweed] (Rash)  ragweed and birch (Unknown)      LABS:                        11.4   8.50  )-----------( 209      ( 2020 05:48 )             34.5     01-    133<L>  |  102  |  28<H>  ----------------------------<  118<H>  5.2   |  20<L>  |  0.87    Ca    8.7      2020 05:48  Mg     2.1     -    TPro  6.8  /  Alb  3.6  /  TBili  0.7  /  DBili  x   /  AST  17  /  ALT  37  /  AlkPhos  101        Urinalysis Basic - ( 2020 14:41 )  Color: Yellow / Appearance: Clear / S.015 / pH: x  Gluc: x / Ketone: NEGATIVE  / Bili: Negative / Urobili: 0.2 E.U./dL   Blood: x / Protein: NEGATIVE mg/dL / Nitrite: NEGATIVE   Leuk Esterase: NEGATIVE / RBC: < 5 /HPF / WBC < 5 /HPF   Sq Epi: x / Non Sq Epi: 0-5 /HPF / Bacteria: Present /HPF      CAPILLARY BLOOD GLUCOSE  POCT Blood Glucose.: 114 mg/dL (2020 08:39)    Culture - Blood (20 @ 22:39)  Coagulase negative Staphylococcus: Detec  Gram Stain: Anaerobic Bottle: Gram Positive Cocci in Clusters    RADIOLOGY & ADDITIONAL TESTS: Reviewed.    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 Epidural enhancement without evidence of epidural abscess. Severe degenerative changes at L2-3 resulting in severe spinal stenosis and thecal sac compression and bilateral neural foraminal narrowing with impingement on the exiting L2 nerve roots.    Additional moderate to severe multilevel degenerative changes at L3-4 through L5-S1 as described above.    Abnormal signal within the L4-L5 disc space with preservation of the endplates, likely representing degenerative changes of the early discitis cannot be excluded.

## 2020-01-13 NOTE — PROGRESS NOTE ADULT - SUBJECTIVE AND OBJECTIVE BOX
Pt was seen at the bedside.  MRI L-spine was reviewed with Dr. Morrison.        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 Epidural enhancement without evidence of epidural abscess. Severe degenerative changes at L2-3 resulting in severe spinal stenosis and thecal sac compression and bilateral neural foraminal narrowing with impingement on the exiting L2 nerve roots.    Additional moderate to severe multilevel degenerative changes at L3-4 through L5-S1 as described above.    Abnormal signal within the L4-L5 disc space with preservation of the endplates, likely representing degenerative changes of the early discitis cannot be excluded.    < end of copied text >      NEURO:   AOx3. OE spont, FC, face symmetric, speech clear. CNII-XII intact. PERRL, EOMI. No pronator drift.   MAEx4. 5/5 strength throughout. mild TTP over R hip and lumbar spine.  SILT      Assessment: Pt is 87 y/o M PMH CAD (s/p stents 2016 on plavix), HTN, HLD, BPH, prostate CA s/p TURP, SBO, hx hyponatremia presenting w/ LBP found to have possible L3-L3 osteomyelitis vs discitis.    Recommendations:  - No Acute Neurosurgical Intervention is indicated at this time  - MRI L-spine was reviewed with Dr. Morrison  - Continue Management as per ID/Medicine  - Signing off, reconsult if Neurological changes are noted  - Call with any questions/concerns  - D/w Dr. Morrison

## 2020-01-13 NOTE — PROGRESS NOTE ADULT - PROBLEM SELECTOR PLAN 1
Pt presenting with persistent back pain not relieved by outpatient tx w/ hydrocodone and steroid injections.   - CT abd/pelvis shows progressive erosive change and sclerosis of the L2/3 interspace with persistent degenerative change of the lower lumbar spine.   - MRI lumbar spine shows discitis osteomyelitis at L2-3 with severe degenerative changes at L2-3  - Plan for IR bx of L2/3 today to r/o infection  - NPO and continue to hold Plavix until after IR bx  - Restart Plavix and Lasix, d/c IVF after IR bx  - baseline ESR, CRP  - f/u blood cultures (1/11): coagulase negative Staphylococcus x1  - Neurosurgery following Pt presenting with persistent back pain not relieved by outpatient tx w/ hydrocodone and lumbar epidural 2 weeks ago.   - CT abd/pelvis shows progressive erosive change and sclerosis of the L2/3 interspace with persistent degenerative change of the lower lumbar spine.   - MRI lumbar spine shows discitis osteomyelitis at L2-3 w/ severe degenerative changes; no evidence of epidural abscess.  - Plan for IR bx of L2/3 today to r/o infection  - NPO and continue to hold Plavix until after IR bx  - After IR bx - restart Plavix and Lasix, d/c IVF  - f/u baseline ESR, CRP  - f/u blood cultures (1/11): coagulase negative Staphylococcus x1  - Neurosurgery following Pt presenting with persistent back pain not relieved by outpatient tx w/ hydrocodone and lumbar epidural 2 weeks ago.   - CT abd/pelvis shows progressive erosive change and sclerosis of the L2/3 interspace with persistent degenerative change of the lower lumbar spine.   - MRI lumbar spine shows discitis osteomyelitis at L2-3 w/ severe degenerative changes; no evidence of epidural abscess.  - Plan for IR bx of L2/3 today to r/o infection  - NPO and continue to hold Plavix until after IR bx. Restart Plavix after biopsy.  - f/u baseline ESR, CRP  - f/u blood cultures (1/11): coagulase negative Staphylococcus x1  - Neurosurgery following regarding paraspinal fluid collections on MRI, will appreciate recs Pt presenting with persistent back pain not relieved by outpatient tx w/ hydrocodone and lumbar epidural 2 weeks ago.   - CT abd/pelvis shows progressive erosive change and sclerosis of the L2/3 interspace with persistent degenerative change of the lower lumbar spine.   - MRI lumbar spine shows discitis osteomyelitis at L2-3 w/ severe degenerative changes; no evidence of epidural abscess.  - Plan for IR bx of L2/3 today to r/o infection  - NPO and continue to hold Plavix until after IR bx. Restart Plavix after biopsy.  - f/u baseline ESR, CRP  - Neurosurgery following regarding paraspinal fluid collections on MRI, will appreciate recs  - f/u blood cultures (1/11): coagulase negative Staphylococcus x1. Etiology of discitis likely 2/2 contiguous spread (hx of lumbar epidural 2wks ago) rather than hematologic spread from bacteremia as pt did not meet SIRS crit on admission and denied fevers or ssx of infection. Will hold antibiotics at this time pending biopsy results    #r/o bacterermia  On admission, 1/4 blood cultures positive for coagulase negative Staphylococcus. Pt afebrile on admission, w/o leukocytosis or other signs of systemic infection. Positive culture likely contamination, will follow up with surveillance cultures.

## 2020-01-13 NOTE — PROGRESS NOTE ADULT - PROBLEM SELECTOR PLAN 9
F: Fluid restriction given hx of SIADH  E: replete K<4, Mg<2  N: Dash/TLC    VTE Prophylaxis: HSQ 5000 q8.  C: Full Code  D: [place] F: Fluid restriction given hx of SIADH  E: replete K<4, Mg<2  N: Dash/TLC    VTE Prophylaxis: lovenox 40mg QD  C: Full Code  D: F

## 2020-01-13 NOTE — PROGRESS NOTE ADULT - PROBLEM SELECTOR PLAN 2
Hx of low back pain and R hip after a fall in early December, which persists at severe intensity despite treatment by pain management with hydrocodone PRN and steroid injections in the epidural lumbar spine and R hip 2 weeks ago. Pain exacerbated by ambulation and movement in general, relieved when laying down.  - pain regimen with percocet 5/325 1 tab q4-6 hrs PRN  - collateral from Dr. Deleon  - PT assessment  - fall precautions Hx of lower back and R hip pain after a fall in early December, which persists at severe intensity despite outpatient tx w/ hydrocodone and steroid injections in the epidural lumbar spine and R hip 2 weeks ago.   - pain management with Percocet 5/325 1 tab q4-6h PRN  - collateral from Dr. Deleon  - PT eval  - fall precautions Hx of lower back and R hip pain after a fall in early December, which persists at severe intensity despite outpatient tx w/ hydrocodone and steroid injections in the epidural lumbar spine and R hip 2 weeks ago.   - pain management with Percocet 5/325 1 tab q4-6h PRN  - PT eval  - fall precautions

## 2020-01-13 NOTE — PROGRESS NOTE ADULT - PROBLEM SELECTOR PLAN 6
Hx of HLD.  - continue home atorvastatin 40mg qhs    #Hyponatremia 2/2 SIADH- Na now normal on Lasix and salt tabs  - monitor Scr  - continue salt tabs BID  - holding Lasix for now, resume if Scr returns to baseline Hx of HLD.  - continue home atorvastatin 40mg qhs    #Hyponatremia 2/2 SIADH- Na now normal on Lasix and salt tabs  - monitor sCr  - continue salt tabs BID  - resume Lasix considering sCr has returned to baseline Hx of HLD.  - continue home atorvastatin 40mg qhs

## 2020-01-13 NOTE — PROGRESS NOTE ADULT - PROBLEM SELECTOR PLAN 4
Hx of CAD s/p PCI/CABG and hx of PAD. No chest pain, SOB, palpitations.  - continue atorvastatin 40mg qhs  - continue home Toprol 25mg XL daily Hx of CAD s/p PCI/CABG and hx of PAD. No chest pain, SOB, palpitations.  - continue home atorvastatin 40mg qhs  - continue home Toprol 25mg XL daily

## 2020-01-13 NOTE — PROVIDER CONTACT NOTE (OTHER) - ASSESSMENT
results collected on 1/11 are gram positive in the the anaerobic bottle, gram + cocci in clusters read back by [email or web address]  MICHI Cota

## 2020-01-13 NOTE — PROGRESS NOTE ADULT - PROBLEM SELECTOR PLAN 3
Elevated BUN/Scr (33/1.02) when compared to last admission. Likely 2/2 small degree of dehydration, given that patient on fluid restriction and on Lasix for SIADH.  - hold off on home Lasix 40mg BID for now, resume tomorrow if Scr improves to baseline  - consider resuming at reduced dose and monitoring response   - encourage PO intake  - no IVF as patient appears euvolemic on exam  - avoid nephrotoxic agents  -Cr improved from yesterday, today 0.91 Elevated BUN/Cr 33/1.02 on admission, suggestive of prerenal MARIYA likely 2/2 dehydration given that pt on fluid restriction and Lasix for SIADH.  - BUN/Cr 28/0.87 improved from yesterday  - Resume home Lasix 40mg BID  - encourage PO intake w/ limited fluid intake given hx of SIADH  - avoid nephrotoxic agents Elevated BUN/Cr 33/1.02 on admission, suggestive of prerenal MARIYA likely 2/2 dehydration given that pt on fluid restriction and Lasix for SIADH.  - BUN/Cr 28/0.87 improved from yesterday  - Resume home Lasix 40mg BID  - encourage PO intake w/ limited fluid intake given hx of SIADH  - avoid nephrotoxic agents    #Hyponatremia 2/2 SIADH- Na now normal on Lasix and salt tabs  - monitor Cr  - continue salt tabs BID  - resume Lasix considering Cr has returned to baseline

## 2020-01-13 NOTE — PROGRESS NOTE ADULT - PROBLEM SELECTOR PLAN 7
Hx of prostate cancer, s/p TURP, no CTx or RTx. CT abd/pelvis showing avidly enhancing nodule within the peripheral zone of the left lobe of the prostate gland measuring 11 mm and an additional avidly enhancing nodule centrally measuring 3 cm extending to the posterior wall of the bladder, consistent with known prostate carcinoma, no free fluid, no adenopathy.  - continue home Finasteride 5mg daily  - continue home Tamsulosin 0.4mg qhs  - continue home oxybutynin 5mg TID Hx of prostate cancer, s/p TURP, no CTx or RTx. CT abd/pelvis showing avidly enhancing nodule within the peripheral zone of the left lobe of the prostate gland measuring 11 mm and an additional avidly enhancing nodule centrally measuring 3 cm extending to the posterior wall of the bladder, consistent with known prostate carcinoma, no free fluid, no adenopathy. No acute complaints at this time.  - continue home Finasteride 5mg daily  - continue home Tamsulosin 0.4mg qhs  - continue home oxybutynin 5mg TID Hx of prostate cancer, s/p TURP, no CTx or RTx. CT abd/pelvis showing avidly enhancing nodule within the peripheral zone of the left lobe of the prostate gland measuring 11 mm and an additional avidly enhancing nodule centrally measuring 3 cm extending to the posterior wall of the bladder, consistent with known prostate carcinoma, no free fluid, no adenopathy. No acute complaints at this time.  - continue home finasteride 5mg daily  - continue home Tamsulosin 0.4mg qhs  - continue home oxybutynin 5mg TID

## 2020-01-13 NOTE — PROGRESS NOTE ADULT - PROBLEM SELECTOR PLAN 5
Hx of HTN.  - continue home Losartan 25mg daily  - continue home Toprol 25mg daily Hx of HTN.  - continue home losartan 25mg daily  - continue home Toprol 25mg daily

## 2020-01-13 NOTE — PROGRESS NOTE ADULT - ASSESSMENT
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 Patient was admitted for further w/u for possible discitis/osteomyelitis. 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 Patient was admitted for further w/u for possible discitis/osteomyelitis. 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 Patient was admitted for further w/u for possible discitis/osteomyelitis. S/p MRI showing L2-L3 discitis osteomyelitis w/ small fluid collections within L paraspinal muscles measuring ~1.7cm. Pt to undergo IR-guided biopsy today.

## 2020-01-14 DIAGNOSIS — M46.47 DISCITIS, UNSPECIFIED, LUMBOSACRAL REGION: ICD-10-CM

## 2020-01-14 LAB
ANION GAP SERPL CALC-SCNC: 11 MMOL/L — SIGNIFICANT CHANGE UP (ref 5–17)
BUN SERPL-MCNC: 26 MG/DL — HIGH (ref 7–23)
CALCIUM SERPL-MCNC: 8.6 MG/DL — SIGNIFICANT CHANGE UP (ref 8.4–10.5)
CHLORIDE SERPL-SCNC: 100 MMOL/L — SIGNIFICANT CHANGE UP (ref 96–108)
CO2 SERPL-SCNC: 18 MMOL/L — LOW (ref 22–31)
CREAT SERPL-MCNC: 0.87 MG/DL — SIGNIFICANT CHANGE UP (ref 0.5–1.3)
GLUCOSE BLDC GLUCOMTR-MCNC: 112 MG/DL — HIGH (ref 70–99)
GLUCOSE BLDC GLUCOMTR-MCNC: 117 MG/DL — HIGH (ref 70–99)
GLUCOSE BLDC GLUCOMTR-MCNC: 136 MG/DL — HIGH (ref 70–99)
GLUCOSE BLDC GLUCOMTR-MCNC: 148 MG/DL — HIGH (ref 70–99)
GLUCOSE SERPL-MCNC: 120 MG/DL — HIGH (ref 70–99)
GRAM STN FLD: SIGNIFICANT CHANGE UP
HCT VFR BLD CALC: 34.2 % — LOW (ref 39–50)
HGB BLD-MCNC: 11.4 G/DL — LOW (ref 13–17)
MAGNESIUM SERPL-MCNC: 1.9 MG/DL — SIGNIFICANT CHANGE UP (ref 1.6–2.6)
MCHC RBC-ENTMCNC: 28.2 PG — SIGNIFICANT CHANGE UP (ref 27–34)
MCHC RBC-ENTMCNC: 33.3 GM/DL — SIGNIFICANT CHANGE UP (ref 32–36)
MCV RBC AUTO: 84.7 FL — SIGNIFICANT CHANGE UP (ref 80–100)
NRBC # BLD: 0 /100 WBCS — SIGNIFICANT CHANGE UP (ref 0–0)
PLATELET # BLD AUTO: 211 K/UL — SIGNIFICANT CHANGE UP (ref 150–400)
POTASSIUM SERPL-MCNC: 4.5 MMOL/L — SIGNIFICANT CHANGE UP (ref 3.5–5.3)
POTASSIUM SERPL-SCNC: 4.5 MMOL/L — SIGNIFICANT CHANGE UP (ref 3.5–5.3)
RBC # BLD: 4.04 M/UL — LOW (ref 4.2–5.8)
RBC # FLD: 14 % — SIGNIFICANT CHANGE UP (ref 10.3–14.5)
SODIUM SERPL-SCNC: 129 MMOL/L — LOW (ref 135–145)
WBC # BLD: 7.39 K/UL — SIGNIFICANT CHANGE UP (ref 3.8–10.5)
WBC # FLD AUTO: 7.39 K/UL — SIGNIFICANT CHANGE UP (ref 3.8–10.5)

## 2020-01-14 PROCEDURE — 93010 ELECTROCARDIOGRAM REPORT: CPT

## 2020-01-14 PROCEDURE — 99233 SBSQ HOSP IP/OBS HIGH 50: CPT | Mod: GC

## 2020-01-14 RX ORDER — CLOPIDOGREL BISULFATE 75 MG/1
75 TABLET, FILM COATED ORAL DAILY
Refills: 0 | Status: DISCONTINUED | OUTPATIENT
Start: 2020-01-14 | End: 2020-01-17

## 2020-01-14 RX ORDER — VANCOMYCIN HCL 1 G
1000 VIAL (EA) INTRAVENOUS EVERY 12 HOURS
Refills: 0 | Status: DISCONTINUED | OUTPATIENT
Start: 2020-01-14 | End: 2020-01-15

## 2020-01-14 RX ADMIN — SODIUM CHLORIDE 1 GRAM(S): 9 INJECTION INTRAMUSCULAR; INTRAVENOUS; SUBCUTANEOUS at 17:28

## 2020-01-14 RX ADMIN — LORATADINE 10 MILLIGRAM(S): 10 TABLET ORAL at 11:09

## 2020-01-14 RX ADMIN — Medication 5 MILLIGRAM(S): at 14:13

## 2020-01-14 RX ADMIN — OXYCODONE AND ACETAMINOPHEN 1 TABLET(S): 5; 325 TABLET ORAL at 03:37

## 2020-01-14 RX ADMIN — CLOPIDOGREL BISULFATE 75 MILLIGRAM(S): 75 TABLET, FILM COATED ORAL at 11:12

## 2020-01-14 RX ADMIN — Medication 5 MILLIGRAM(S): at 23:19

## 2020-01-14 RX ADMIN — LOSARTAN POTASSIUM 25 MILLIGRAM(S): 100 TABLET, FILM COATED ORAL at 06:36

## 2020-01-14 RX ADMIN — BRIMONIDINE TARTRATE 1 DROP(S): 2 SOLUTION/ DROPS OPHTHALMIC at 06:37

## 2020-01-14 RX ADMIN — Medication 40 MILLIGRAM(S): at 06:36

## 2020-01-14 RX ADMIN — BRIMONIDINE TARTRATE 1 DROP(S): 2 SOLUTION/ DROPS OPHTHALMIC at 23:20

## 2020-01-14 RX ADMIN — SODIUM CHLORIDE 1 GRAM(S): 9 INJECTION INTRAMUSCULAR; INTRAVENOUS; SUBCUTANEOUS at 06:35

## 2020-01-14 RX ADMIN — OXYCODONE AND ACETAMINOPHEN 1 TABLET(S): 5; 325 TABLET ORAL at 02:37

## 2020-01-14 RX ADMIN — Medication 25 MILLIGRAM(S): at 07:23

## 2020-01-14 RX ADMIN — FINASTERIDE 5 MILLIGRAM(S): 5 TABLET, FILM COATED ORAL at 11:10

## 2020-01-14 RX ADMIN — DORZOLAMIDE HYDROCHLORIDE TIMOLOL MALEATE 1 DROP(S): 20; 5 SOLUTION/ DROPS OPHTHALMIC at 11:11

## 2020-01-14 RX ADMIN — DORZOLAMIDE HYDROCHLORIDE TIMOLOL MALEATE 1 DROP(S): 20; 5 SOLUTION/ DROPS OPHTHALMIC at 00:12

## 2020-01-14 RX ADMIN — DORZOLAMIDE HYDROCHLORIDE TIMOLOL MALEATE 1 DROP(S): 20; 5 SOLUTION/ DROPS OPHTHALMIC at 23:20

## 2020-01-14 RX ADMIN — Medication 250 MILLIGRAM(S): at 18:04

## 2020-01-14 RX ADMIN — Medication 5 MILLIGRAM(S): at 06:36

## 2020-01-14 RX ADMIN — LATANOPROST 1 DROP(S): 0.05 SOLUTION/ DROPS OPHTHALMIC; TOPICAL at 23:20

## 2020-01-14 RX ADMIN — OXYCODONE AND ACETAMINOPHEN 1 TABLET(S): 5; 325 TABLET ORAL at 12:00

## 2020-01-14 RX ADMIN — TAMSULOSIN HYDROCHLORIDE 0.4 MILLIGRAM(S): 0.4 CAPSULE ORAL at 23:19

## 2020-01-14 RX ADMIN — Medication 40 MILLIGRAM(S): at 17:28

## 2020-01-14 RX ADMIN — BRIMONIDINE TARTRATE 1 DROP(S): 2 SOLUTION/ DROPS OPHTHALMIC at 14:13

## 2020-01-14 RX ADMIN — SENNA PLUS 2 TABLET(S): 8.6 TABLET ORAL at 23:19

## 2020-01-14 RX ADMIN — ENOXAPARIN SODIUM 40 MILLIGRAM(S): 100 INJECTION SUBCUTANEOUS at 23:19

## 2020-01-14 RX ADMIN — POLYETHYLENE GLYCOL 3350 17 GRAM(S): 17 POWDER, FOR SOLUTION ORAL at 18:04

## 2020-01-14 RX ADMIN — ATORVASTATIN CALCIUM 40 MILLIGRAM(S): 80 TABLET, FILM COATED ORAL at 23:19

## 2020-01-14 RX ADMIN — OXYCODONE AND ACETAMINOPHEN 1 TABLET(S): 5; 325 TABLET ORAL at 11:10

## 2020-01-14 NOTE — PROGRESS NOTE ADULT - PROBLEM SELECTOR PLAN 4
Hx of CAD s/p PCI/CABG and hx of PAD. No chest pain, SOB, palpitations.  - continue home atorvastatin 40mg qhs  - continue home Toprol 25mg XL daily Hx of CAD s/p PCI/CABG and hx of PAD. No chest pain, SOB, palpitations.  - continue home atorvastatin 40mg qhs  - continue home Toprol 25mg XL daily    #grade 2 holosystolic murmur loudest over RSB  - pt denies hx of murmur, and murmur was not noted during prior admissions  - obtain collateral from cardiologist (Dr. Dinh Moya [phone]) Hx of CAD s/p PCI/CABG and hx of PAD. No chest pain, SOB, palpitations.  - continue home atorvastatin 40mg qhs  - continue home Toprol 25mg XL daily    #grade 2 holosystolic murmur loudest over RSB  - pt denies hx of murmur, and murmur was not noted during prior admissions or in  previous Cardiology clinic note (8/2018)  - obtain collateral from cardiologist (Dr. Dinh Moya [phone]) Hx of CAD s/p PCI/CABG and hx of PAD. No chest pain, SOB, palpitations.  - continue home atorvastatin 40mg qhs  - continue home Toprol 25mg XL daily    #grade 2 holosystolic murmur loudest over RSB  - pt denies hx of murmur, and murmur was not noted during prior admissions or in  previous Cardiology clinic note (8/2018)  - obtain collateral from cardiologist (Dr. Dinh Moya [phone])  - TTE Hx of CAD s/p PCI/CABG and hx of PAD. No chest pain, SOB, palpitations.  - continue home atorvastatin 40mg qhs  - continue home Toprol 25mg XL daily    #grade 2 holosystolic murmur loudest over RSB  - pt denies hx of murmur, and murmur was not noted during prior admissions or in  previous Cardiology clinic note (8/2018)  - Pt denying lightheadedness, dizziness  - obtain collateral from cardiologist (Dr. Dinh Moya [phone])  - TTE

## 2020-01-14 NOTE — PROGRESS NOTE ADULT - SUBJECTIVE AND OBJECTIVE BOX
OVERNIGHT EVENTS: Patient was found to be tachycardic to  (one-time measurement). EKG showed normal sinus rhythm.    SUBJECTIVE / INTERVAL HPI: Patient seen and examined at bedside. He reports that his lower back and right hip pain are slightly improved compared to yesterday. He continues to endorse constipation; he reports having a bowel movement 2 days ago, but he is unable to recall if he had a bowel movement yesterday. Denies changes in urination, dysuria, incontinence, chest pain, palpitations, SOB, HA, N/V, fever LE pain, or numbness/tingling. Denies any other complaints.     VITAL SIGNS:  Vital Signs Last 24 Hrs  T(C): 36.7 (14 Jan 2020 08:40), Max: 36.7 (13 Jan 2020 17:35)  T(F): 98 (14 Jan 2020 08:40), Max: 98.1 (13 Jan 2020 17:35)  HR: 82 (14 Jan 2020 09:50) (82 - 105)  BP: 123/65 (14 Jan 2020 09:50) (105/61 - 144/77)  BP(mean): --  RR: 16 (14 Jan 2020 08:40) (16 - 18)  SpO2: 97% (14 Jan 2020 08:40) (94% - 98%)    PHYSICAL EXAM:    General: NAD, lying comfortably in bed  HEENT: NC/AT; moist mucous membrances  Cardiovascular: +S1/S2; RRR; grade 2 holosystolic murmur loudest over 5th ICS midclavicular line  Respiratory: CTAB; no wheezes/rales/rhonchi  Gastrointestinal: soft, NT/ND; +BSx4  Extremities: WWP; no edema, clubbing or cyanosis; no calf tenderness  Vascular: 2+ radial, DP/PT pulses B/L  Neurological: AAOx3; lower extremities strength 5/5 b/l; lower extremities sensation to light touch intact b/l    MEDICATIONS:  MEDICATIONS  (STANDING):  atorvastatin 40 milliGRAM(s) Oral at bedtime  brimonidine 0.2% Ophthalmic Solution 1 Drop(s) Both EYES three times a day  clopidogrel Tablet 75 milliGRAM(s) Oral daily  dextrose 5%. 1000 milliLiter(s) (50 mL/Hr) IV Continuous <Continuous>  dextrose 50% Injectable 12.5 Gram(s) IV Push once  dextrose 50% Injectable 25 Gram(s) IV Push once  dextrose 50% Injectable 25 Gram(s) IV Push once  dorzolamide 2%/timolol 0.5% Ophthalmic Solution 1 Drop(s) Both EYES two times a day  enoxaparin Injectable 40 milliGRAM(s) SubCutaneous every 24 hours  finasteride 5 milliGRAM(s) Oral daily  furosemide    Tablet 40 milliGRAM(s) Oral two times a day  insulin lispro (HumaLOG) corrective regimen sliding scale   SubCutaneous Before meals and at bedtime  latanoprost 0.005% Ophthalmic Solution 1 Drop(s) Both EYES at bedtime  loratadine 10 milliGRAM(s) Oral daily  losartan 25 milliGRAM(s) Oral daily  metoprolol succinate ER 25 milliGRAM(s) Oral daily  oxybutynin 5 milliGRAM(s) Oral three times a day  senna 2 Tablet(s) Oral at bedtime  sodium chloride 1 Gram(s) Oral every 12 hours  tamsulosin 0.4 milliGRAM(s) Oral at bedtime    MEDICATIONS  (PRN):  bisacodyl Suppository 10 milliGRAM(s) Rectal daily PRN Constipation  dextrose 40% Gel 15 Gram(s) Oral once PRN Blood Glucose LESS THAN 70 milliGRAM(s)/deciliter  glucagon  Injectable 1 milliGRAM(s) IntraMuscular once PRN Glucose LESS THAN 70 milligrams/deciliter  oxycodone    5 mG/acetaminophen 325 mG 1 Tablet(s) Oral every 6 hours PRN Severe Pain (7 - 10)  polyethylene glycol 3350 17 Gram(s) Oral daily PRN Constipation      ALLERGIES:  Allergies    No Known Drug Allergies  Originally Entered as [Rash-General] reaction to [Other][[place]] (Rash)  Originally Entered as [Rash-General] reaction to [Other][Ragweed] (Rash)  ragweed and birch (Unknown)    Intolerances        LABS:                        11.4   7.39  )-----------( 211      ( 14 Jan 2020 05:42 )             34.2     01-14    129<L>  |  100  |  26<H>  ----------------------------<  120<H>  4.5   |  18<L>  |  0.87    Ca    8.6      14 Jan 2020 05:42  Mg     1.9     01-14      CAPILLARY BLOOD GLUCOSE  POCT Blood Glucose.: 112 mg/dL (14 Jan 2020 12:40)    C-Reactive Protein, Serum (01.13.20 @ 20:20)    C-Reactive Protein, Serum: 10.68 mg/dL    Sedimentation Rate, Erythrocyte (01.13.20 @ 20:20)    Sedimentation Rate, Erythrocyte: 61 mm/Hr    Culture - Blood (01.11.20 @ 22:39)  Gram stain:   - Anaerobic Bottle: Gram Positive Cocci in Clusters  - Aerobic Bottle: Gram Positive Cocci in Clusters  Blood Culture PCR:  - Coagulase negative Staphylococcus: Detec  Culture Results:   - Growth in anaerobic bottle: Staphylococcus epidermidis    Culture - Blood (01.11.20 @ 22:39)    Specimen Source: .Blood Blood    Culture Results: No growth at 2 days.    Culture - Body Fluid with Gram Stain (01.13.20 @ 16:46)    Gram Stain:   No organisms seen  Few White blood cells    Specimen Source: .Body Fluid L2-L3 disc    Culture Results:   Insufficient amount of specimen sent which may cause false negative or  inaccurate results.  No growth to date.    Culture - Blood (01.13.20 @ 19:34)    Specimen Source: .Blood Blood-Venous    Culture Results:   No growth at 12 hours      RADIOLOGY & ADDITIONAL TESTS: Reviewed. OVERNIGHT EVENTS: Patient was found to be tachycardic to  (one-time measurement). EKG showed normal sinus rhythm.    SUBJECTIVE / INTERVAL HPI: Patient seen and examined at bedside. He reports that his lower back and right hip pain are slightly improved compared to yesterday. He continues to endorse constipation; he reports having a bowel movement 2 days ago, but he is unable to recall if he had a bowel movement yesterday. Denies changes in urination, dysuria, incontinence, chest pain, palpitations, SOB, HA, N/V, fever LE pain, or numbness/tingling. Denies any other complaints.     VITAL SIGNS:  Vital Signs Last 24 Hrs  T(C): 36.7 (14 Jan 2020 08:40), Max: 36.7 (13 Jan 2020 17:35)  T(F): 98 (14 Jan 2020 08:40), Max: 98.1 (13 Jan 2020 17:35)  HR: 82 (14 Jan 2020 09:50) (82 - 105)  BP: 123/65 (14 Jan 2020 09:50) (105/61 - 144/77)  BP(mean): --  RR: 16 (14 Jan 2020 08:40) (16 - 18)  SpO2: 97% (14 Jan 2020 08:40) (94% - 98%)    PHYSICAL EXAM:    General: NAD, lying comfortably in bed  HEENT: NC/AT; moist mucous membrances  Cardiovascular: +S1/S2; RRR; grade 2 holosystolic murmur loudest over RSB  Respiratory: CTAB; no wheezes/rales/rhonchi  Gastrointestinal: soft, NT/ND; +BSx4  Extremities: WWP; no edema, clubbing or cyanosis; no calf tenderness  Vascular: 2+ radial, DP/PT pulses B/L  Neurological: AAOx3; lower extremities strength 5/5 b/l; lower extremities sensation to light touch intact b/l    MEDICATIONS:  MEDICATIONS  (STANDING):  atorvastatin 40 milliGRAM(s) Oral at bedtime  brimonidine 0.2% Ophthalmic Solution 1 Drop(s) Both EYES three times a day  clopidogrel Tablet 75 milliGRAM(s) Oral daily  dextrose 5%. 1000 milliLiter(s) (50 mL/Hr) IV Continuous <Continuous>  dextrose 50% Injectable 12.5 Gram(s) IV Push once  dextrose 50% Injectable 25 Gram(s) IV Push once  dextrose 50% Injectable 25 Gram(s) IV Push once  dorzolamide 2%/timolol 0.5% Ophthalmic Solution 1 Drop(s) Both EYES two times a day  enoxaparin Injectable 40 milliGRAM(s) SubCutaneous every 24 hours  finasteride 5 milliGRAM(s) Oral daily  furosemide    Tablet 40 milliGRAM(s) Oral two times a day  insulin lispro (HumaLOG) corrective regimen sliding scale   SubCutaneous Before meals and at bedtime  latanoprost 0.005% Ophthalmic Solution 1 Drop(s) Both EYES at bedtime  loratadine 10 milliGRAM(s) Oral daily  losartan 25 milliGRAM(s) Oral daily  metoprolol succinate ER 25 milliGRAM(s) Oral daily  oxybutynin 5 milliGRAM(s) Oral three times a day  senna 2 Tablet(s) Oral at bedtime  sodium chloride 1 Gram(s) Oral every 12 hours  tamsulosin 0.4 milliGRAM(s) Oral at bedtime    MEDICATIONS  (PRN):  bisacodyl Suppository 10 milliGRAM(s) Rectal daily PRN Constipation  dextrose 40% Gel 15 Gram(s) Oral once PRN Blood Glucose LESS THAN 70 milliGRAM(s)/deciliter  glucagon  Injectable 1 milliGRAM(s) IntraMuscular once PRN Glucose LESS THAN 70 milligrams/deciliter  oxycodone    5 mG/acetaminophen 325 mG 1 Tablet(s) Oral every 6 hours PRN Severe Pain (7 - 10)  polyethylene glycol 3350 17 Gram(s) Oral daily PRN Constipation      ALLERGIES:  Allergies    No Known Drug Allergies  Originally Entered as [Rash-General] reaction to [Other][[place]] (Rash)  Originally Entered as [Rash-General] reaction to [Other][Ragweed] (Rash)  ragweed and birch (Unknown)    Intolerances        LABS:                        11.4   7.39  )-----------( 211      ( 14 Jan 2020 05:42 )             34.2     01-14    129<L>  |  100  |  26<H>  ----------------------------<  120<H>  4.5   |  18<L>  |  0.87    Ca    8.6      14 Jan 2020 05:42  Mg     1.9     01-14      CAPILLARY BLOOD GLUCOSE  POCT Blood Glucose.: 112 mg/dL (14 Jan 2020 12:40)    C-Reactive Protein, Serum (01.13.20 @ 20:20)    C-Reactive Protein, Serum: 10.68 mg/dL    Sedimentation Rate, Erythrocyte (01.13.20 @ 20:20)    Sedimentation Rate, Erythrocyte: 61 mm/Hr    Culture - Blood (01.11.20 @ 22:39)  Gram stain:   - Anaerobic Bottle: Gram Positive Cocci in Clusters  - Aerobic Bottle: Gram Positive Cocci in Clusters  Blood Culture PCR:  - Coagulase negative Staphylococcus: Detec  Culture Results:   - Growth in anaerobic bottle: Staphylococcus epidermidis    Culture - Blood (01.11.20 @ 22:39)    Specimen Source: .Blood Blood    Culture Results: No growth at 2 days.    Culture - Body Fluid with Gram Stain (01.13.20 @ 16:46)    Gram Stain:   No organisms seen  Few White blood cells    Specimen Source: .Body Fluid L2-L3 disc    Culture Results:   Insufficient amount of specimen sent which may cause false negative or  inaccurate results.  No growth to date.    Culture - Blood (01.13.20 @ 19:34)    Specimen Source: .Blood Blood-Venous    Culture Results:   No growth at 12 hours      RADIOLOGY & ADDITIONAL TESTS: Reviewed. OVERNIGHT EVENTS: Patient was found to be tachycardic to  (one-time measurement). EKG showed normal sinus rhythm.    SUBJECTIVE / INTERVAL HPI: Patient seen and examined at bedside. He reports that his lower back and right hip pain are slightly improved compared to yesterday. He continues to endorse constipation; he reports having a bowel movement 2 days ago, but he is unable to recall if he had a bowel movement yesterday. Denies changes in urination, dysuria, incontinence, chest pain, palpitations, SOB, HA, N/V, fever LE pain, or numbness/tingling. Denies any other complaints.     VITAL SIGNS:  Vital Signs Last 24 Hrs  T(C): 36.7 (14 Jan 2020 08:40), Max: 36.7 (13 Jan 2020 17:35)  T(F): 98 (14 Jan 2020 08:40), Max: 98.1 (13 Jan 2020 17:35)  HR: 82 (14 Jan 2020 09:50) (82 - 105)  BP: 123/65 (14 Jan 2020 09:50) (105/61 - 144/77)  BP(mean): --  RR: 16 (14 Jan 2020 08:40) (16 - 18)  SpO2: 97% (14 Jan 2020 08:40) (94% - 98%)    PHYSICAL EXAM:  General: NAD, lying comfortably in bed  HEENT: NC/AT; moist mucous membrances  Cardiovascular: +S1/S2; RRR; grade 2 holosystolic murmur loudest over RSB  Respiratory: CTAB; no wheezes/rales/rhonchi  Gastrointestinal: soft, NT/ND; +BSx4  Extremities: WWP; no edema, clubbing or cyanosis; no calf tenderness  Vascular: 2+ radial, DP/PT pulses B/L  Neurological: AAOx3; lower extremities strength 5/5 b/l; lower extremities sensation to light touch intact b/l    MEDICATIONS:  MEDICATIONS  (STANDING):  atorvastatin 40 milliGRAM(s) Oral at bedtime  brimonidine 0.2% Ophthalmic Solution 1 Drop(s) Both EYES three times a day  clopidogrel Tablet 75 milliGRAM(s) Oral daily  dextrose 5%. 1000 milliLiter(s) (50 mL/Hr) IV Continuous <Continuous>  dextrose 50% Injectable 12.5 Gram(s) IV Push once  dextrose 50% Injectable 25 Gram(s) IV Push once  dextrose 50% Injectable 25 Gram(s) IV Push once  dorzolamide 2%/timolol 0.5% Ophthalmic Solution 1 Drop(s) Both EYES two times a day  enoxaparin Injectable 40 milliGRAM(s) SubCutaneous every 24 hours  finasteride 5 milliGRAM(s) Oral daily  furosemide    Tablet 40 milliGRAM(s) Oral two times a day  insulin lispro (HumaLOG) corrective regimen sliding scale   SubCutaneous Before meals and at bedtime  latanoprost 0.005% Ophthalmic Solution 1 Drop(s) Both EYES at bedtime  loratadine 10 milliGRAM(s) Oral daily  losartan 25 milliGRAM(s) Oral daily  metoprolol succinate ER 25 milliGRAM(s) Oral daily  oxybutynin 5 milliGRAM(s) Oral three times a day  senna 2 Tablet(s) Oral at bedtime  sodium chloride 1 Gram(s) Oral every 12 hours  tamsulosin 0.4 milliGRAM(s) Oral at bedtime    MEDICATIONS  (PRN):  bisacodyl Suppository 10 milliGRAM(s) Rectal daily PRN Constipation  dextrose 40% Gel 15 Gram(s) Oral once PRN Blood Glucose LESS THAN 70 milliGRAM(s)/deciliter  glucagon  Injectable 1 milliGRAM(s) IntraMuscular once PRN Glucose LESS THAN 70 milligrams/deciliter  oxycodone    5 mG/acetaminophen 325 mG 1 Tablet(s) Oral every 6 hours PRN Severe Pain (7 - 10)  polyethylene glycol 3350 17 Gram(s) Oral daily PRN Constipation      ALLERGIES:  Allergies  No Known Drug Allergies  Originally Entered as [Rash-General] reaction to [Other][[place]] (Rash)  Originally Entered as [Rash-General] reaction to [Other][[place]] (Rash)  ragweed and birch (Unknown)      LABS:                        11.4   7.39  )-----------( 211      ( 14 Jan 2020 05:42 )             34.2     01-14    129<L>  |  100  |  26<H>  ----------------------------<  120<H>  4.5   |  18<L>  |  0.87    Ca    8.6      14 Jan 2020 05:42  Mg     1.9     01-14      CAPILLARY BLOOD GLUCOSE  POCT Blood Glucose.: 112 mg/dL (14 Jan 2020 12:40)    C-Reactive Protein, Serum (01.13.20 @ 20:20)    C-Reactive Protein, Serum: 10.68 mg/dL    Sedimentation Rate, Erythrocyte (01.13.20 @ 20:20)    Sedimentation Rate, Erythrocyte: 61 mm/Hr    Culture - Blood (01.11.20 @ 22:39)  Gram stain:   - Anaerobic Bottle: Gram Positive Cocci in Clusters  - Aerobic Bottle: Gram Positive Cocci in Clusters  Blood Culture PCR:  - Coagulase negative Staphylococcus: Detec  Culture Results:   - Growth in anaerobic bottle: Staphylococcus epidermidis    Culture - Blood (01.11.20 @ 22:39)    Specimen Source: .Blood Blood    Culture Results: No growth at 2 days.    Culture - Body Fluid with Gram Stain (01.13.20 @ 16:46)    Gram Stain:   No organisms seen  Few White blood cells    Specimen Source: .Body Fluid L2-L3 disc    Culture Results:   Insufficient amount of specimen sent which may cause false negative or  inaccurate results.  No growth to date.    Culture - Blood (01.13.20 @ 19:34)    Specimen Source: .Blood Blood-Venous    Culture Results:   No growth at 12 hours      RADIOLOGY & ADDITIONAL TESTS: Reviewed.

## 2020-01-14 NOTE — PROGRESS NOTE ADULT - PROBLEM SELECTOR PLAN 9
F: Fluid restriction given hx of SIADH  E: replete K<4, Mg<2  N: Dash/TLC    VTE Prophylaxis: lovenox 40mg QD  C: Full Code  D: F F: Fluid restriction given hx of SIADH  E: replete K>4, Mg>2  N: Dash/TLC    VTE Prophylaxis: lovenox 40mg QD  C: Full Code  D: [facility]

## 2020-01-14 NOTE — PROGRESS NOTE ADULT - PROBLEM SELECTOR PLAN 2
Hx of lower back and R hip pain after a fall in early December, which persists at severe intensity despite outpatient tx w/ hydrocodone and steroid injections in the epidural lumbar spine and R hip 2 weeks ago.   - pain management with Percocet 5/325 1 tab q4-6h PRN  - PT eval  - fall precautions

## 2020-01-14 NOTE — PROGRESS NOTE ADULT - PROBLEM SELECTOR PLAN 8
DM2 not on any medications. A1c 6 in 12/19.  - ISS    #Constipation- reports constipation.  - continue senna and Miralax PRN, c/w dulcolax suppository PRN daily  - continue bowel regimen while on opioid medications

## 2020-01-14 NOTE — PROGRESS NOTE ADULT - PROBLEM SELECTOR PLAN 1
Pt presenting with persistent lower back pain not relieved by outpatient tx w/ hydrocodone and lumbar epidural 2 weeks ago. MRI lumbar spine shows discitis/osteomyelitis at L2-3 w/ severe degenerative changes; no evidence of epidural abscess. Etiology likely 2/2 contiguous spread (hx of recent lumbar epidural) rather than hematologic spread from bacteremia as pt did not meet SIRS crit on admission and denied fevers or other signs of infection. Patient has remained afebrile with WBC WNL.   - Neurosurgery consult: No acute intervention indicated at this time.  - Plavix was resumed today  - trend weekly ESR, CRP (baseline: ESR 61, CRP 10.6)  - BCx (1/11): Staph epidermidis in 1 of 4 bottles - likely due to contamination given other bottles show NGTD and patient shows no clinical signs of systemic infection and no leukocytosis.   - f/u surveillance BCx (1/13)  - f/u results from IR bx on 1/13  - Will hold antibiotics at this time pending biopsy and surveillance BCx results  - f/u ID consult      #r/o bacteremia  On admission, 1/4 blood cultures grew Staph epidermidis. Pt afebrile on admission, w/o leukocytosis or other signs of systemic infection. Positive culture likely contamination, will follow up with surveillance cultures. Pt presenting with persistent lower back pain not relieved by outpatient tx w/ hydrocodone and lumbar epidural 2 weeks ago. MRI lumbar spine shows discitis/osteomyelitis at L2-3 w/ severe degenerative changes; no evidence of epidural abscess. Etiology likely 2/2 contiguous spread (hx of recent lumbar epidural) rather than hematologic spread from bacteremia as pt did not meet SIRS crit on admission and denied fevers or other signs of infection. Patient has remained afebrile with WBC WNL.   - Neurosurgery consult: No acute intervention indicated at this time.  - Plavix was resumed today  - trend weekly ESR, CRP (baseline: ESR 61, CRP 10.6)  - BCx (1/11): Staph epidermidis in 1 of 4 bottles - likely due to contamination given other bottles show NGTD and patient shows no clinical signs of systemic infection and no leukocytosis.   - f/u surveillance BCx (1/13)  - f/u results from IR bx on 1/13  - Will hold antibiotics at this time pending biopsy and surveillance BCx results  - f/u ID consult    #r/o bacteremia  On admission, 1/4 blood cultures grew Staph epidermidis. Pt afebrile on admission, w/o leukocytosis or other signs of systemic infection. Positive culture likely contamination, will follow up with surveillance cultures. Pt presenting with persistent lower back pain not relieved by outpatient tx w/ hydrocodone and lumbar epidural 2 weeks ago. MRI lumbar spine shows discitis/osteomyelitis at L2-3 w/ severe degenerative changes; no evidence of epidural abscess. Etiology likely 2/2 contiguous spread (hx of recent lumbar epidural) rather than hematologic spread from bacteremia as pt did not meet SIRS crit on admission and denied fevers or other signs of infection. Patient has remained afebrile with WBC WNL.   - Neurosurgery consult: No acute intervention indicated at this time.  - Plavix was resumed today  - trend weekly ESR, CRP (baseline: ESR 61, CRP 10.6)  - BCx (1/11): Staph epidermidis in 1 of 4 bottles - likely due to contamination given other bottles show NGTD and patient shows no clinical signs of systemic infection and no leukocytosis.   - f/u surveillance BCx (1/13)  - f/u results from IR bx on 1/13  - ID consulted, recommending vancomycin. Vanc 1g q12hrs started 1/14PM    #r/o bacteremia  On admission, 1/4 blood cultures grew Staph epidermidis. Pt afebrile on admission, w/o leukocytosis or other signs of systemic infection. Positive culture likely contamination, will follow up with surveillance cultures.  -surveillance cultures (1/13) NGTD

## 2020-01-14 NOTE — PROGRESS NOTE ADULT - PROBLEM SELECTOR PLAN 7
Hx of prostate cancer, s/p TURP, no CTx or RTx. CT abd/pelvis showing avidly enhancing nodule within the peripheral zone of the left lobe of the prostate gland measuring 11 mm and an additional avidly enhancing nodule centrally measuring 3 cm extending to the posterior wall of the bladder, consistent with known prostate carcinoma, no free fluid, no adenopathy. No acute complaints at this time.  - continue home finasteride 5mg daily  - continue home Tamsulosin 0.4mg qhs  - continue home oxybutynin 5mg TID

## 2020-01-14 NOTE — PROGRESS NOTE ADULT - PROBLEM SELECTOR PLAN 3
Elevated BUN/Cr 33/1.02 on admission, suggestive of prerenal MARIYA likely 2/2 dehydration given that pt on fluid restriction and Lasix for SIADH.  - BUN/Cr 26/0.87 improved from yesterday  - continue home Lasix 40mg BID  - encourage PO intake w/ limited fluid intake given hx of SIADH  - avoid nephrotoxic agents    #Hyponatremia 2/2 SIADH- Na now normal on Lasix and salt tabs  - monitor Cr  - continue salt tabs BID  - continue Lasix considering Cr has returned to baseline Elevated BUN/Cr 33/1.02 on admission, suggestive of prerenal MARIYA likely 2/2 dehydration given that pt on fluid restriction and Lasix for SIADH.  - BUN/Cr 26/0.87 improved from yesterday  - continue home Lasix 40mg BID  - encourage PO intake w/ limited fluid intake given hx of SIADH  - avoid nephrotoxic agents    #Hyponatremia 2/2 SIADH- Na now at baseline on Lasix and salt tabs (pt was previously discharged with Na stable in 120s), and pt denies symptoms (HA, fatigue, N/V).   - monitor Cr  - continue salt tabs BID  - continue Lasix considering Cr has returned to baseline

## 2020-01-14 NOTE — PROGRESS NOTE ADULT - ASSESSMENT
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 Patient was admitted for further w/u for possible discitis/osteomyelitis. S/p MRI showing L2-L3 discitis osteomyelitis w/ small fluid collections within L paraspinal muscles measuring ~1.7cm. S/p IR-guided biopsy.

## 2020-01-15 LAB
-  CEFAZOLIN: SIGNIFICANT CHANGE UP
-  CLINDAMYCIN: SIGNIFICANT CHANGE UP
-  ERYTHROMYCIN: SIGNIFICANT CHANGE UP
-  LINEZOLID: SIGNIFICANT CHANGE UP
-  OXACILLIN: SIGNIFICANT CHANGE UP
-  PENICILLIN: SIGNIFICANT CHANGE UP
-  RIFAMPIN: SIGNIFICANT CHANGE UP
-  TRIMETHOPRIM/SULFAMETHOXAZOLE: SIGNIFICANT CHANGE UP
-  VANCOMYCIN: SIGNIFICANT CHANGE UP
ANION GAP SERPL CALC-SCNC: 13 MMOL/L — SIGNIFICANT CHANGE UP (ref 5–17)
BUN SERPL-MCNC: 29 MG/DL — HIGH (ref 7–23)
CALCIUM SERPL-MCNC: 8.5 MG/DL — SIGNIFICANT CHANGE UP (ref 8.4–10.5)
CHLORIDE SERPL-SCNC: 96 MMOL/L — SIGNIFICANT CHANGE UP (ref 96–108)
CO2 SERPL-SCNC: 19 MMOL/L — LOW (ref 22–31)
CREAT SERPL-MCNC: 0.92 MG/DL — SIGNIFICANT CHANGE UP (ref 0.5–1.3)
CULTURE RESULTS: SIGNIFICANT CHANGE UP
GLUCOSE BLDC GLUCOMTR-MCNC: 125 MG/DL — HIGH (ref 70–99)
GLUCOSE BLDC GLUCOMTR-MCNC: 126 MG/DL — HIGH (ref 70–99)
GLUCOSE BLDC GLUCOMTR-MCNC: 134 MG/DL — HIGH (ref 70–99)
GLUCOSE BLDC GLUCOMTR-MCNC: 139 MG/DL — HIGH (ref 70–99)
GLUCOSE SERPL-MCNC: 138 MG/DL — HIGH (ref 70–99)
HCT VFR BLD CALC: 34.2 % — LOW (ref 39–50)
HGB BLD-MCNC: 11.6 G/DL — LOW (ref 13–17)
MAGNESIUM SERPL-MCNC: 1.7 MG/DL — SIGNIFICANT CHANGE UP (ref 1.6–2.6)
MCHC RBC-ENTMCNC: 28.1 PG — SIGNIFICANT CHANGE UP (ref 27–34)
MCHC RBC-ENTMCNC: 33.9 GM/DL — SIGNIFICANT CHANGE UP (ref 32–36)
MCV RBC AUTO: 82.8 FL — SIGNIFICANT CHANGE UP (ref 80–100)
METHOD TYPE: SIGNIFICANT CHANGE UP
NRBC # BLD: 0 /100 WBCS — SIGNIFICANT CHANGE UP (ref 0–0)
ORGANISM # SPEC MICROSCOPIC CNT: SIGNIFICANT CHANGE UP
PLATELET # BLD AUTO: 226 K/UL — SIGNIFICANT CHANGE UP (ref 150–400)
POTASSIUM SERPL-MCNC: 3.8 MMOL/L — SIGNIFICANT CHANGE UP (ref 3.5–5.3)
POTASSIUM SERPL-SCNC: 3.8 MMOL/L — SIGNIFICANT CHANGE UP (ref 3.5–5.3)
RBC # BLD: 4.13 M/UL — LOW (ref 4.2–5.8)
RBC # FLD: 13.8 % — SIGNIFICANT CHANGE UP (ref 10.3–14.5)
SODIUM SERPL-SCNC: 128 MMOL/L — LOW (ref 135–145)
SPECIMEN SOURCE: SIGNIFICANT CHANGE UP
WBC # BLD: 7.19 K/UL — SIGNIFICANT CHANGE UP (ref 3.8–10.5)
WBC # FLD AUTO: 7.19 K/UL — SIGNIFICANT CHANGE UP (ref 3.8–10.5)

## 2020-01-15 PROCEDURE — 99233 SBSQ HOSP IP/OBS HIGH 50: CPT | Mod: GC

## 2020-01-15 PROCEDURE — 93306 TTE W/DOPPLER COMPLETE: CPT | Mod: 26

## 2020-01-15 RX ORDER — POTASSIUM CHLORIDE 20 MEQ
40 PACKET (EA) ORAL ONCE
Refills: 0 | Status: COMPLETED | OUTPATIENT
Start: 2020-01-15 | End: 2020-01-15

## 2020-01-15 RX ORDER — MAGNESIUM SULFATE 500 MG/ML
1 VIAL (ML) INJECTION ONCE
Refills: 0 | Status: DISCONTINUED | OUTPATIENT
Start: 2020-01-15 | End: 2020-01-16

## 2020-01-15 RX ADMIN — CLOPIDOGREL BISULFATE 75 MILLIGRAM(S): 75 TABLET, FILM COATED ORAL at 11:34

## 2020-01-15 RX ADMIN — Medication 25 MILLIGRAM(S): at 06:37

## 2020-01-15 RX ADMIN — SODIUM CHLORIDE 1 GRAM(S): 9 INJECTION INTRAMUSCULAR; INTRAVENOUS; SUBCUTANEOUS at 07:02

## 2020-01-15 RX ADMIN — LORATADINE 10 MILLIGRAM(S): 10 TABLET ORAL at 11:34

## 2020-01-15 RX ADMIN — Medication 40 MILLIEQUIVALENT(S): at 18:07

## 2020-01-15 RX ADMIN — TAMSULOSIN HYDROCHLORIDE 0.4 MILLIGRAM(S): 0.4 CAPSULE ORAL at 21:34

## 2020-01-15 RX ADMIN — DORZOLAMIDE HYDROCHLORIDE TIMOLOL MALEATE 1 DROP(S): 20; 5 SOLUTION/ DROPS OPHTHALMIC at 11:35

## 2020-01-15 RX ADMIN — SENNA PLUS 2 TABLET(S): 8.6 TABLET ORAL at 21:34

## 2020-01-15 RX ADMIN — OXYCODONE AND ACETAMINOPHEN 1 TABLET(S): 5; 325 TABLET ORAL at 18:55

## 2020-01-15 RX ADMIN — Medication 5 MILLIGRAM(S): at 06:36

## 2020-01-15 RX ADMIN — SODIUM CHLORIDE 1 GRAM(S): 9 INJECTION INTRAMUSCULAR; INTRAVENOUS; SUBCUTANEOUS at 18:06

## 2020-01-15 RX ADMIN — Medication 5 MILLIGRAM(S): at 21:34

## 2020-01-15 RX ADMIN — OXYCODONE AND ACETAMINOPHEN 1 TABLET(S): 5; 325 TABLET ORAL at 12:51

## 2020-01-15 RX ADMIN — BRIMONIDINE TARTRATE 1 DROP(S): 2 SOLUTION/ DROPS OPHTHALMIC at 06:37

## 2020-01-15 RX ADMIN — Medication 40 MILLIGRAM(S): at 18:06

## 2020-01-15 RX ADMIN — Medication 250 MILLIGRAM(S): at 06:35

## 2020-01-15 RX ADMIN — FINASTERIDE 5 MILLIGRAM(S): 5 TABLET, FILM COATED ORAL at 11:34

## 2020-01-15 RX ADMIN — OXYCODONE AND ACETAMINOPHEN 1 TABLET(S): 5; 325 TABLET ORAL at 18:07

## 2020-01-15 RX ADMIN — Medication 40 MILLIGRAM(S): at 06:37

## 2020-01-15 RX ADMIN — LATANOPROST 1 DROP(S): 0.05 SOLUTION/ DROPS OPHTHALMIC; TOPICAL at 21:33

## 2020-01-15 RX ADMIN — Medication 250 MILLIGRAM(S): at 18:06

## 2020-01-15 RX ADMIN — BRIMONIDINE TARTRATE 1 DROP(S): 2 SOLUTION/ DROPS OPHTHALMIC at 21:33

## 2020-01-15 RX ADMIN — LOSARTAN POTASSIUM 25 MILLIGRAM(S): 100 TABLET, FILM COATED ORAL at 06:37

## 2020-01-15 RX ADMIN — ATORVASTATIN CALCIUM 40 MILLIGRAM(S): 80 TABLET, FILM COATED ORAL at 21:34

## 2020-01-15 RX ADMIN — ENOXAPARIN SODIUM 40 MILLIGRAM(S): 100 INJECTION SUBCUTANEOUS at 21:34

## 2020-01-15 RX ADMIN — OXYCODONE AND ACETAMINOPHEN 1 TABLET(S): 5; 325 TABLET ORAL at 11:35

## 2020-01-15 RX ADMIN — Medication 5 MILLIGRAM(S): at 13:53

## 2020-01-15 RX ADMIN — BRIMONIDINE TARTRATE 1 DROP(S): 2 SOLUTION/ DROPS OPHTHALMIC at 13:53

## 2020-01-15 NOTE — PHYSICAL THERAPY INITIAL EVALUATION ADULT - PASSIVE RANGE OF MOTION EXAMINATION, REHAB EVAL
except L UE shoulder flexion/abduction ~90 degrees due to pain/bilateral lower extremity Passive ROM was WFL (within functional limits)/bilateral upper extremity Passive ROM was WFL (within functional limits)

## 2020-01-15 NOTE — PHYSICAL THERAPY INITIAL EVALUATION ADULT - ADDITIONAL COMMENTS
Pt reports living alone in a 4 floor walk up apartment with b/l HRs. Pt reports being independent with mobility using a quad cane and refuses to use RW secondary to difficulty managing it. Pt reports his son assists with grocery shopping and occasionally dressing secondary to chronic L UE weakness/pain, which pt noted on previous admission to [facility]. Pt reports being independent with all other ADL's.

## 2020-01-15 NOTE — PROGRESS NOTE ADULT - SUBJECTIVE AND OBJECTIVE BOX
OVERNIGHT EVENTS: Patient was found to be tachycardic to  this morning. Repeat HR at bedside was 90. Patient denies any chest pain, palpitations, SOB, lightheadedness/dizziness.     SUBJECTIVE / INTERVAL HPI: Patient seen and examined at bedside. He reports that his lower back and right hip pain are unchanged since yesterday (4-5/10 in severity). He continues to endorse constipation and reports having 1 small bowel movement this morning with hard stools. Denies changes in urination, dysuria, incontinence, HA, N/V, fever, LE pain, or numbness/tingling. Denies any other complaints.     VITAL SIGNS:  Vital Signs Last 24 Hrs  T(C): 36.6 (15 Anatoliy 2020 09:45), Max: 36.8 (15 Anatoliy 2020 05:41)  T(F): 97.9 (15 Anatoliy 2020 09:45), Max: 98.2 (15 Anatoliy 2020 05:41)  HR: 108 (15 Anatoliy 2020 09:45) (85 - 108)  BP: 143/78 (15 Anatoliy 2020 09:45) (100/48 - 143/78)  BP(mean): --  RR: 18 (15 Anatoliy 2020 09:45) (16 - 18)  SpO2: 96% (15 Anatoliy 2020 09:45) (96% - 98%)    PHYSICAL EXAM:    General: NAD, lying comfortably in bed  HEENT: NC/AT; dry mucous membranes  Cardiovascular: +S1/S2; RRR; grade 2 holosystolic murmur loudest over RSB  Respiratory: CTAB; no wheezes/rales/rhonchi  Gastrointestinal: soft, generalized mild tenderness to palpation, mildly distended; +BSx4  Extremities: WWP; no edema, cyanosis; no calf tenderness  Vascular: 2+ radial, DP pulses B/L  Neurological: AAOx3; lower extremities strength 5/5 b/l; lower extremities sensation to light touch intact b/l    MEDICATIONS:  MEDICATIONS  (STANDING):  atorvastatin 40 milliGRAM(s) Oral at bedtime  brimonidine 0.2% Ophthalmic Solution 1 Drop(s) Both EYES three times a day  clopidogrel Tablet 75 milliGRAM(s) Oral daily  dextrose 5%. 1000 milliLiter(s) (50 mL/Hr) IV Continuous <Continuous>  dextrose 50% Injectable 12.5 Gram(s) IV Push once  dextrose 50% Injectable 25 Gram(s) IV Push once  dextrose 50% Injectable 25 Gram(s) IV Push once  dorzolamide 2%/timolol 0.5% Ophthalmic Solution 1 Drop(s) Both EYES two times a day  enoxaparin Injectable 40 milliGRAM(s) SubCutaneous every 24 hours  finasteride 5 milliGRAM(s) Oral daily  furosemide    Tablet 40 milliGRAM(s) Oral two times a day  insulin lispro (HumaLOG) corrective regimen sliding scale   SubCutaneous Before meals and at bedtime  latanoprost 0.005% Ophthalmic Solution 1 Drop(s) Both EYES at bedtime  loratadine 10 milliGRAM(s) Oral daily  losartan 25 milliGRAM(s) Oral daily  magnesium sulfate  IVPB 1 Gram(s) IV Intermittent once  metoprolol succinate ER 25 milliGRAM(s) Oral daily  oxybutynin 5 milliGRAM(s) Oral three times a day  potassium chloride    Tablet ER 40 milliEquivalent(s) Oral once  senna 2 Tablet(s) Oral at bedtime  sodium chloride 1 Gram(s) Oral every 12 hours  tamsulosin 0.4 milliGRAM(s) Oral at bedtime  vancomycin  IVPB 1000 milliGRAM(s) IV Intermittent every 12 hours    MEDICATIONS  (PRN):  bisacodyl Suppository 10 milliGRAM(s) Rectal daily PRN Constipation  dextrose 40% Gel 15 Gram(s) Oral once PRN Blood Glucose LESS THAN 70 milliGRAM(s)/deciliter  glucagon  Injectable 1 milliGRAM(s) IntraMuscular once PRN Glucose LESS THAN 70 milligrams/deciliter  oxycodone    5 mG/acetaminophen 325 mG 1 Tablet(s) Oral every 6 hours PRN Severe Pain (7 - 10)  polyethylene glycol 3350 17 Gram(s) Oral daily PRN Constipation      ALLERGIES:  Allergies    No Known Drug Allergies  Originally Entered as [Rash-General] reaction to [Other][[place]] (Rash)  Originally Entered as [Rash-General] reaction to [Other][Ragweed] (Rash)  ragweed and birch (Unknown)    Intolerances        LABS:                        11.6   7.19  )-----------( 226      ( 15 Anatoliy 2020 10:33 )             34.2     01-15    128<L>  |  96  |  29<H>  ----------------------------<  138<H>  3.8   |  19<L>  |  0.92    Ca    8.5      15 Anatoliy 2020 10:33  Mg     1.7     01-15          CAPILLARY BLOOD GLUCOSE  POCT Blood Glucose.: 126 mg/dL (15 Anatoliy 2020 13:14)      RADIOLOGY & ADDITIONAL TESTS: Reviewed.    Culture - Body Fluid with Gram Stain (01.13.20 @ 16:46)    Gram Stain: No organisms seen. Few White blood cells    Specimen Source: .Body Fluid L2-L3 disc    Culture Results: Insufficient amount of specimen sent which may cause false negative or inaccurate results. No growth to date.    Culture - Blood (01.13.20 @ 19:34)    Specimen Source: .Blood Blood-Venous    Culture Results: No growth at 1 day.    Culture - Blood (01.11.20 @ 22:39)    Specimen Source: .Blood Blood    Culture Results: No growth at 3 days.    Culture - Blood (01.11.20 @ 22:39)  Gram Stain: Anaerobic Bottle: Gram Positive Cocci in Clusters. Aerobic Bottle: Gram Positive Cocci in Clusters.    -  Coagulase negative Staphylococcus: Detec    -  Cefazolin: R <=4    -  Clindamycin: S <=0.25    -  Erythromycin: R >4    -  Linezolid: S 1    -  Oxacillin: R >2    -  Penicillin: R 2    -  RIF- Rifampin: S <=1 Should not be used as monotherapy    -  Trimethoprim/Sulfamethoxazole: R >2/38    -  Vancomycin: S 1    Organism: Staphylococcus epidermidis    Organism: Blood Culture PCR    Culture Results: Growth in aerobic and anaerobic bottles: Staphylococcus epidermidis    Organism Identification: Staphylococcus epidermidis      < from: Echocardiogram (01.15.20 @ 12:16) >  EXAM:  TRANSTHORACIC ECHOCARDIOGRAM REPORT  PROCEDURE DATE:  01/15/2020      CONCLUSIONS:     1. Normal left and right ventricular size and function.   2. Moderate symmetric left ventricular hypertrophy.   3. Grade I left ventricular diastolic dysfunction. LV calculated EF 59%.   4. Mild-to-moderate aortic regurgitation.   5. Aortic sclerosis without significant stenosis.   6. No evidence of pulmonary hypertension.   7. No pericardial effusion.   8. The aortic root is mildly dilated and measures 4.2 cm. The ascending aorta is moderately dilated and measures 4.6 cm. OVERNIGHT EVENTS: Patient was found to be tachycardic to  this morning. Repeat HR at bedside was 90. Patient denies any chest pain, palpitations, SOB, lightheadedness/dizziness.     SUBJECTIVE / INTERVAL HPI: Patient seen and examined at bedside. He reports that his lower back and right hip pain are unchanged since yesterday (4-5/10 in severity). He continues to endorse constipation and reports having 1 small bowel movement this morning with hard stools. Denies changes in urination, dysuria, incontinence, HA, N/V, fever, LE pain, or numbness/tingling. Denies any other complaints.     Primary care doctor reached today for collateral regarding SIADH history as well as whether he had a prior history of heart murmur on previous exams.  regarding finding of heart murmur on physical exam as    VITAL SIGNS:  Vital Signs Last 24 Hrs  T(C): 36.6 (15 Anatoliy 2020 09:45), Max: 36.8 (15 Anatoliy 2020 05:41)  T(F): 97.9 (15 Anatoliy 2020 09:45), Max: 98.2 (15 Anatoliy 2020 05:41)  HR: 108 (15 Anatoliy 2020 09:45) (85 - 108)  BP: 143/78 (15 Anatoliy 2020 09:45) (100/48 - 143/78)  BP(mean): --  RR: 18 (15 Anatoliy 2020 09:45) (16 - 18)  SpO2: 96% (15 Anatoliy 2020 09:45) (96% - 98%)    PHYSICAL EXAM:  General: NAD, lying comfortably in bed  HEENT: NC/AT; dry mucous membranes  Cardiovascular: +S1/S2; RRR; grade 2 holosystolic murmur loudest over RSB  Respiratory: CTAB; no wheezes/rales/rhonchi  Gastrointestinal: soft, generalized mild tenderness to palpation, mildly distended; +BSx4  Extremities: WWP; no edema, cyanosis; no calf tenderness  Vascular: 2+ radial, DP pulses B/L  Neurological: AAOx3; lower extremities strength 5/5 b/l; lower extremities sensation to light touch intact b/l    MEDICATIONS:  MEDICATIONS  (STANDING):  atorvastatin 40 milliGRAM(s) Oral at bedtime  brimonidine 0.2% Ophthalmic Solution 1 Drop(s) Both EYES three times a day  clopidogrel Tablet 75 milliGRAM(s) Oral daily  dextrose 5%. 1000 milliLiter(s) (50 mL/Hr) IV Continuous <Continuous>  dextrose 50% Injectable 12.5 Gram(s) IV Push once  dextrose 50% Injectable 25 Gram(s) IV Push once  dextrose 50% Injectable 25 Gram(s) IV Push once  dorzolamide 2%/timolol 0.5% Ophthalmic Solution 1 Drop(s) Both EYES two times a day  enoxaparin Injectable 40 milliGRAM(s) SubCutaneous every 24 hours  finasteride 5 milliGRAM(s) Oral daily  furosemide    Tablet 40 milliGRAM(s) Oral two times a day  insulin lispro (HumaLOG) corrective regimen sliding scale   SubCutaneous Before meals and at bedtime  latanoprost 0.005% Ophthalmic Solution 1 Drop(s) Both EYES at bedtime  loratadine 10 milliGRAM(s) Oral daily  losartan 25 milliGRAM(s) Oral daily  magnesium sulfate  IVPB 1 Gram(s) IV Intermittent once  metoprolol succinate ER 25 milliGRAM(s) Oral daily  oxybutynin 5 milliGRAM(s) Oral three times a day  potassium chloride    Tablet ER 40 milliEquivalent(s) Oral once  senna 2 Tablet(s) Oral at bedtime  sodium chloride 1 Gram(s) Oral every 12 hours  tamsulosin 0.4 milliGRAM(s) Oral at bedtime  vancomycin  IVPB 1000 milliGRAM(s) IV Intermittent every 12 hours    MEDICATIONS  (PRN):  bisacodyl Suppository 10 milliGRAM(s) Rectal daily PRN Constipation  dextrose 40% Gel 15 Gram(s) Oral once PRN Blood Glucose LESS THAN 70 milliGRAM(s)/deciliter  glucagon  Injectable 1 milliGRAM(s) IntraMuscular once PRN Glucose LESS THAN 70 milligrams/deciliter  oxycodone    5 mG/acetaminophen 325 mG 1 Tablet(s) Oral every 6 hours PRN Severe Pain (7 - 10)  polyethylene glycol 3350 17 Gram(s) Oral daily PRN Constipation      ALLERGIES:  Allergies  No Known Drug Allergies  Originally Entered as [Rash-General] reaction to [Other][[place]] (Rash)  Originally Entered as [Rash-General] reaction to [Other][Ragweed] (Rash)  ragweed and birch (Unknown)        LABS:                        11.6   7.19  )-----------( 226      ( 15 Anatoliy 2020 10:33 )             34.2     01-15    128<L>  |  96  |  29<H>  ----------------------------<  138<H>  3.8   |  19<L>  |  0.92    Ca    8.5      15 Anatoliy 2020 10:33  Mg     1.7     01-15      CAPILLARY BLOOD GLUCOSE  POCT Blood Glucose.: 126 mg/dL (15 Anatoliy 2020 13:14)      RADIOLOGY & ADDITIONAL TESTS: Reviewed.  Culture - Body Fluid with Gram Stain (01.13.20 @ 16:46)    Gram Stain: No organisms seen. Few White blood cells    Specimen Source: .Body Fluid L2-L3 disc    Culture Results: Insufficient amount of specimen sent which may cause false negative or inaccurate results. No growth to date.    Culture - Blood (01.13.20 @ 19:34)    Specimen Source: .Blood Blood-Venous    Culture Results: No growth at 1 day.    Culture - Blood (01.11.20 @ 22:39)    Specimen Source: .Blood Blood    Culture Results: No growth at 3 days.    Culture - Blood (01.11.20 @ 22:39)  Gram Stain: Anaerobic Bottle: Gram Positive Cocci in Clusters. Aerobic Bottle: Gram Positive Cocci in Clusters.    -  Coagulase negative Staphylococcus: Detec    -  Cefazolin: R <=4    -  Clindamycin: S <=0.25    -  Erythromycin: R >4    -  Linezolid: S 1    -  Oxacillin: R >2    -  Penicillin: R 2    -  RIF- Rifampin: S <=1 Should not be used as monotherapy    -  Trimethoprim/Sulfamethoxazole: R >2/38    -  Vancomycin: S 1    Organism: Staphylococcus epidermidis    Organism: Blood Culture PCR    Culture Results: Growth in aerobic and anaerobic bottles: Staphylococcus epidermidis    Organism Identification: Staphylococcus epidermidis      < from: Echocardiogram (01.15.20 @ 12:16) >  EXAM:  TRANSTHORACIC ECHOCARDIOGRAM REPORT  PROCEDURE DATE:  01/15/2020      CONCLUSIONS:     1. Normal left and right ventricular size and function.   2. Moderate symmetric left ventricular hypertrophy.   3. Grade I left ventricular diastolic dysfunction. LV calculated EF 59%.   4. Mild-to-moderate aortic regurgitation.   5. Aortic sclerosis without significant stenosis.   6. No evidence of pulmonary hypertension.   7. No pericardial effusion.   8. The aortic root is mildly dilated and measures 4.2 cm. The ascending aorta is moderately dilated and measures 4.6 cm. OVERNIGHT EVENTS: Patient was found to be tachycardic to  this morning. Repeat HR at bedside was 90. Patient denies any chest pain, palpitations, SOB, lightheadedness/dizziness.     SUBJECTIVE / INTERVAL HPI: Patient seen and examined at bedside. He reports that his lower back and right hip pain are unchanged since yesterday (4-5/10 in severity). He continues to endorse constipation and reports having 1 small bowel movement this morning with hard stools. Denies changes in urination, dysuria, incontinence, HA, N/V, fever, LE pain, or numbness/tingling. Denies any other complaints.     Primary care doctor reached today for collateral regarding SIADH history as well as whether he had a prior history of heart murmur on previous exams. Physician endorsed his baseline sodium to be 128-134. In regards to his heart murmur, the PMD endorses prior knowledge of this murmur, diagnosing him 1yr ago. He had an in-office cardiologist assess him with ECHO and found mild aortic regurgitation and mild aortic stenosis.    VITAL SIGNS:  Vital Signs Last 24 Hrs  T(C): 36.6 (15 Anatoliy 2020 09:45), Max: 36.8 (15 Anatoliy 2020 05:41)  T(F): 97.9 (15 Anatoliy 2020 09:45), Max: 98.2 (15 Anatoliy 2020 05:41)  HR: 108 (15 Anatoliy 2020 09:45) (85 - 108)  BP: 143/78 (15 Anatoliy 2020 09:45) (100/48 - 143/78)  BP(mean): --  RR: 18 (15 Anatoliy 2020 09:45) (16 - 18)  SpO2: 96% (15 Anatoliy 2020 09:45) (96% - 98%)    PHYSICAL EXAM:  General: NAD, lying comfortably in bed  HEENT: NC/AT; dry mucous membranes  Cardiovascular: +S1/S2; RRR; grade 2 holosystolic murmur loudest over RSB  Respiratory: CTAB; no wheezes/rales/rhonchi  Gastrointestinal: soft, generalized mild tenderness to palpation, mildly distended; +BSx4  Extremities: WWP; no edema, cyanosis; no calf tenderness  Vascular: 2+ radial, DP pulses B/L  Neurological: AAOx3; lower extremities strength 5/5 b/l; lower extremities sensation to light touch intact b/l    MEDICATIONS:  MEDICATIONS  (STANDING):  atorvastatin 40 milliGRAM(s) Oral at bedtime  brimonidine 0.2% Ophthalmic Solution 1 Drop(s) Both EYES three times a day  clopidogrel Tablet 75 milliGRAM(s) Oral daily  dextrose 5%. 1000 milliLiter(s) (50 mL/Hr) IV Continuous <Continuous>  dextrose 50% Injectable 12.5 Gram(s) IV Push once  dextrose 50% Injectable 25 Gram(s) IV Push once  dextrose 50% Injectable 25 Gram(s) IV Push once  dorzolamide 2%/timolol 0.5% Ophthalmic Solution 1 Drop(s) Both EYES two times a day  enoxaparin Injectable 40 milliGRAM(s) SubCutaneous every 24 hours  finasteride 5 milliGRAM(s) Oral daily  furosemide    Tablet 40 milliGRAM(s) Oral two times a day  insulin lispro (HumaLOG) corrective regimen sliding scale   SubCutaneous Before meals and at bedtime  latanoprost 0.005% Ophthalmic Solution 1 Drop(s) Both EYES at bedtime  loratadine 10 milliGRAM(s) Oral daily  losartan 25 milliGRAM(s) Oral daily  magnesium sulfate  IVPB 1 Gram(s) IV Intermittent once  metoprolol succinate ER 25 milliGRAM(s) Oral daily  oxybutynin 5 milliGRAM(s) Oral three times a day  potassium chloride    Tablet ER 40 milliEquivalent(s) Oral once  senna 2 Tablet(s) Oral at bedtime  sodium chloride 1 Gram(s) Oral every 12 hours  tamsulosin 0.4 milliGRAM(s) Oral at bedtime  vancomycin  IVPB 1000 milliGRAM(s) IV Intermittent every 12 hours    MEDICATIONS  (PRN):  bisacodyl Suppository 10 milliGRAM(s) Rectal daily PRN Constipation  dextrose 40% Gel 15 Gram(s) Oral once PRN Blood Glucose LESS THAN 70 milliGRAM(s)/deciliter  glucagon  Injectable 1 milliGRAM(s) IntraMuscular once PRN Glucose LESS THAN 70 milligrams/deciliter  oxycodone    5 mG/acetaminophen 325 mG 1 Tablet(s) Oral every 6 hours PRN Severe Pain (7 - 10)  polyethylene glycol 3350 17 Gram(s) Oral daily PRN Constipation      ALLERGIES:  Allergies  No Known Drug Allergies  Originally Entered as [Rash-General] reaction to [Other][[place]] (Rash)  Originally Entered as [Rash-General] reaction to [Other][[place]] (Rash)  ragweed and birch (Unknown)      LABS:                        11.6   7.19  )-----------( 226      ( 15 Anatoliy 2020 10:33 )             34.2     01-15    128<L>  |  96  |  29<H>  ----------------------------<  138<H>  3.8   |  19<L>  |  0.92    Ca    8.5      15 Anatoliy 2020 10:33  Mg     1.7     01-15      CAPILLARY BLOOD GLUCOSE  POCT Blood Glucose.: 126 mg/dL (15 Anatoliy 2020 13:14)      RADIOLOGY & ADDITIONAL TESTS: Reviewed.  Culture - Body Fluid with Gram Stain (01.13.20 @ 16:46)    Gram Stain: No organisms seen. Few White blood cells    Specimen Source: .Body Fluid L2-L3 disc    Culture Results: Insufficient amount of specimen sent which may cause false negative or inaccurate results. No growth to date.    Culture - Blood (01.13.20 @ 19:34)    Specimen Source: .Blood Blood-Venous    Culture Results: No growth at 1 day.    Culture - Blood (01.11.20 @ 22:39)    Specimen Source: .Blood Blood    Culture Results: No growth at 3 days.    Culture - Blood (01.11.20 @ 22:39)  Gram Stain: Anaerobic Bottle: Gram Positive Cocci in Clusters. Aerobic Bottle: Gram Positive Cocci in Clusters.    -  Coagulase negative Staphylococcus: Detec    -  Cefazolin: R <=4    -  Clindamycin: S <=0.25    -  Erythromycin: R >4    -  Linezolid: S 1    -  Oxacillin: R >2    -  Penicillin: R 2    -  RIF- Rifampin: S <=1 Should not be used as monotherapy    -  Trimethoprim/Sulfamethoxazole: R >2/38    -  Vancomycin: S 1    Organism: Staphylococcus epidermidis    Organism: Blood Culture PCR    Culture Results: Growth in aerobic and anaerobic bottles: Staphylococcus epidermidis    Organism Identification: Staphylococcus epidermidis      < from: Echocardiogram (01.15.20 @ 12:16) >  EXAM:  TRANSTHORACIC ECHOCARDIOGRAM REPORT  PROCEDURE DATE:  01/15/2020      CONCLUSIONS:     1. Normal left and right ventricular size and function.   2. Moderate symmetric left ventricular hypertrophy.   3. Grade I left ventricular diastolic dysfunction. LV calculated EF 59%.   4. Mild-to-moderate aortic regurgitation.   5. Aortic sclerosis without significant stenosis.   6. No evidence of pulmonary hypertension.   7. No pericardial effusion.   8. The aortic root is mildly dilated and measures 4.2 cm. The ascending aorta is moderately dilated and measures 4.6 cm.

## 2020-01-15 NOTE — DIETITIAN INITIAL EVALUATION ADULT. - PROBLEM SELECTOR PLAN 3
Elevated BUN/Scr (33/1.02) when compared to last admission. Likely 2/2 small degree of dehydration, given that patient on fluid restriction and on Lasix for SIADH.  - hold off on home Lasix 40mg BID for now, resume tomorrow if Scr improves to baseline  - consider resuming at reduced dose and monitoring response   - encourage PO intake  - no IVF as patient appears euvolemic on exam  - avoid nephrotoxic agents    #Hyponatremia 2/2 SIADH- Na now normal on Lasix and salt tabs  - monitor Scr  - continue salt tabs BID  - holding Lasix for now, resume if Scr returns to baseline

## 2020-01-15 NOTE — PROGRESS NOTE ADULT - PROBLEM SELECTOR PLAN 2
Hx of lower back and R hip pain after a fall in early December, which persists at severe intensity despite outpatient tx w/ hydrocodone and steroid injections in the epidural lumbar spine and R hip 2 weeks ago.   - pain management with Percocet 5/325 1 tab q4-6h PRN  - f/u PT eval  - fall precautions

## 2020-01-15 NOTE — PHYSICAL THERAPY INITIAL EVALUATION ADULT - PERTINENT HX OF CURRENT PROBLEM, REHAB EVAL
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 Patient was admitted for further w/u for possible discitis/osteomyelitis.

## 2020-01-15 NOTE — PHYSICAL THERAPY INITIAL EVALUATION ADULT - ACTIVE RANGE OF MOTION EXAMINATION, REHAB EVAL
bilateral  lower extremity Active ROM was WFL (within functional limits)/except unable to perform L shoulder flexion/abduction due to pain/bilateral upper extremity Active ROM was WFL (within functional limits)

## 2020-01-15 NOTE — PROGRESS NOTE ADULT - PROBLEM SELECTOR PLAN 10
1) PCP Contacted on Admission: (Y/N) --> Name & Phone #: Dr. Meeta Story, [phone]   2) Date of Contact with PCP: 1/15/2020  3) PCP Contacted at Discharge: (Y/N)  4) Summary of Handoff Given to PCP:   5) Post-Discharge Appointment Date and Location:

## 2020-01-15 NOTE — DIETITIAN INITIAL EVALUATION ADULT. - DIET TYPE
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

## 2020-01-15 NOTE — PROGRESS NOTE ADULT - ASSESSMENT
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 Patient was admitted for further w/u for possible discitis/osteomyelitis. S/p MRI showing L2-L3 discitis osteomyelitis w/ small fluid collections within L paraspinal muscles measuring ~1.7cm. S/p IR-guided biopsy. 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 Patient was admitted for further w/u for possible discitis/osteomyelitis. S/p MRI showing L2-L3 discitis osteomyelitis w/ small fluid collections within L paraspinal muscles measuring ~1.7cm. S/p IR-guided biopsy. Pt started on empiric therapy for osteomyelitis with vancomycin likely from contiguous spread in the setting of recent epidural.

## 2020-01-15 NOTE — PHYSICAL THERAPY INITIAL EVALUATION ADULT - CRITERIA FOR SKILLED THERAPEUTIC INTERVENTIONS
anticipated discharge recommendation/risk reduction/prevention/rehab potential/impairments found/functional limitations in following categories/therapy frequency

## 2020-01-15 NOTE — PROGRESS NOTE ADULT - SUBJECTIVE AND OBJECTIVE BOX
pt seen and examined by me at bedside this AM  c/o constipation;     VSS   comfortable   NAD  +murmur  +BS/mild TTP lower abd/ND  able to lift both LEs against gravity    labs reviewed   Echo-AR      a/p:  1. L2-L3 discitis  2. MARIYA  3. Chronic hyponatremia 2/2 SIADH  4. CAD  5. HTN    -f/u ID recs for long term abx, will need PICC   -Na baseline 128-129   -c/w current meds (reviewed)  Agree with rest of treatment plan as above.

## 2020-01-15 NOTE — DIETITIAN INITIAL EVALUATION ADULT. - ENERGY NEEDS
Ht: 5'7", Wt: 77.1kg, IBW: 67.3kg, 114.6%IBW.   Needs calculated based on [facility] standards of care. Needs adjusted for age, inflammation.   Defer fluids to primary care team 2/2 hyponatremia.

## 2020-01-15 NOTE — PROGRESS NOTE ADULT - SUBJECTIVE AND OBJECTIVE BOX
INTERVAL HPI/OVERNIGHT EVENTS:    OVERNIGHT: No overnight events.  SUBJECTIVE: Patient seen and examined at bedside. Pt still c/o back pain and wants pain medicine.     REVIEW OF SYSTEMS:    All other review of systems is negative unless indicated above.      Allergies    No Known Drug Allergies  Originally Entered as [Rash-General] reaction to [Other][[place]] (Rash)  Originally Entered as [Rash-General] reaction to [Other][Ragweed] (Rash)  ragweed and birch (Unknown)    Intolerances        ANTIBIOTICS/RELEVANT:  antimicrobials  vancomycin  IVPB 1000 milliGRAM(s) IV Intermittent every 12 hours    immunologic:    OTHER:  atorvastatin 40 milliGRAM(s) Oral at bedtime  bisacodyl Suppository 10 milliGRAM(s) Rectal daily PRN  brimonidine 0.2% Ophthalmic Solution 1 Drop(s) Both EYES three times a day  clopidogrel Tablet 75 milliGRAM(s) Oral daily  dextrose 40% Gel 15 Gram(s) Oral once PRN  dextrose 5%. 1000 milliLiter(s) IV Continuous <Continuous>  dextrose 50% Injectable 12.5 Gram(s) IV Push once  dextrose 50% Injectable 25 Gram(s) IV Push once  dextrose 50% Injectable 25 Gram(s) IV Push once  dorzolamide 2%/timolol 0.5% Ophthalmic Solution 1 Drop(s) Both EYES two times a day  enoxaparin Injectable 40 milliGRAM(s) SubCutaneous every 24 hours  finasteride 5 milliGRAM(s) Oral daily  furosemide    Tablet 40 milliGRAM(s) Oral two times a day  glucagon  Injectable 1 milliGRAM(s) IntraMuscular once PRN  insulin lispro (HumaLOG) corrective regimen sliding scale   SubCutaneous Before meals and at bedtime  latanoprost 0.005% Ophthalmic Solution 1 Drop(s) Both EYES at bedtime  loratadine 10 milliGRAM(s) Oral daily  losartan 25 milliGRAM(s) Oral daily  magnesium sulfate  IVPB 1 Gram(s) IV Intermittent once  metoprolol succinate ER 25 milliGRAM(s) Oral daily  oxybutynin 5 milliGRAM(s) Oral three times a day  oxycodone    5 mG/acetaminophen 325 mG 1 Tablet(s) Oral every 6 hours PRN  polyethylene glycol 3350 17 Gram(s) Oral daily PRN  potassium chloride    Tablet ER 40 milliEquivalent(s) Oral once  senna 2 Tablet(s) Oral at bedtime  sodium chloride 1 Gram(s) Oral every 12 hours  tamsulosin 0.4 milliGRAM(s) Oral at bedtime      Objective:  Vital Signs Last 24 Hrs  T(C): 36.6 (15 Antaoliy 2020 09:45), Max: 36.8 (15 Anatoliy 2020 05:41)  T(F): 97.9 (15 Anatoliy 2020 09:45), Max: 98.2 (15 Anatoliy 2020 05:41)  HR: 108 (15 Anatoliy 2020 09:45) (85 - 108)  BP: 143/78 (15 Anatoliy 2020 09:45) (100/48 - 143/78)  BP(mean): --  RR: 18 (15 Anatoliy 2020 09:45) (16 - 18)  SpO2: 96% (15 Anatoliy 2020 09:45) (96% - 98%)      PHYSICAL EXAM:  General: NAD, lying comfortably in bed  HEENT: NC/AT; moist mucous membrances  Cardiovascular: +S1/S2; RRR; grade 2 holosystolic murmur loudest over RSB  Respiratory: CTAB; no wheezes/rales/rhonchi  Gastrointestinal: soft, NT/ND; +BSx4  Extremities: WWP; no edema, clubbing or cyanosis; no calf tenderness  Vascular: 2+ radial, DP/PT pulses B/L  Neurological: AAOx3; lower extremities strength 5/5 b/l; lower extremities sensation to light touch intact b/l        LABS:                        11.6   7.19  )-----------( 226      ( 15 Anatoliy 2020 10:33 )             34.2     01-15    128<L>  |  96  |  29<H>  ----------------------------<  138<H>  3.8   |  19<L>  |  0.92    Ca    8.5      15 Anatoliy 2020 10:33  Mg     1.7     01-15            MICROBIOLOGY:            RECENT CULTURES:  01-13 @ 19:34  .Blood Blood-Venous  --  --  --    No growth at 1 day.  --  01-13 @ 16:46  .Body Fluid L2-L3 disc  --  --  --    Insufficient amount of specimen sent which may cause false negative or  inaccurate results.  No growth to date.  --  01-11 @ 22:39  .Blood Blood  Staphylococcus epidermidis  Blood Culture PCR  Staphylococcus epidermidis  PCR    Growth in aerobic and anaerobic bottles: Staphylococcus epidermidis  --      RADIOLOGY & ADDITIONAL STUDIES:

## 2020-01-15 NOTE — PHYSICAL THERAPY INITIAL EVALUATION ADULT - GENERAL OBSERVATIONS, REHAB EVAL
Pt found semi supine in bed in NAD, +hep lock, +bed alarm, +L UE rash/bruising RN made aware and pt agreeable to PT Eval.

## 2020-01-15 NOTE — PROGRESS NOTE ADULT - ASSESSMENT
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 Seen by pain specialist (Dr. Deleon) and had lumbar epidural and R hip injection 2 weeks ago without relief. No weakness, numbness, paresthesias, bowel/bladder incontinence, fevers or chills, n/v/d/c.  In the ED, vitals were T 97.2F, Labs s/f CBC unremarkable. CT abd/pelvis with findings worrisome for development of osteomyelitis/discitis at L2/3 and findings of prostate malignancy (known). Neurosurgery was consulted, IR  drained the fluid collection and sent for culture that is pending.     - Please c/w vancomycin based on trough  - f/u the result of biopsy and culture  - Please repeat the blood culture       Plan discussed with Dr. Rubio and primary team.

## 2020-01-15 NOTE — PROGRESS NOTE ADULT - PROBLEM SELECTOR PLAN 3
Elevated BUN/Cr 33/1.02 on admission, suggestive of prerenal MARIYA likely 2/2 dehydration given that pt on fluid restriction and Lasix for SIADH.  - BUN/Cr 29/0.92 stable  - continue home Lasix 40mg BID  - encourage PO intake w/ limited fluid intake given hx of SIADH  - avoid nephrotoxic agents    #Hyponatremia 2/2 SIADH- Baseline Na 128-134 per PCP's office. Na has remained at baseline on Lasix and salt tabs. Pt denies HA, fatigue, N/V.   - monitor Cr  - continue salt tabs BID  - continue Lasix considering Cr has returned to baseline

## 2020-01-15 NOTE — CONSULT NOTE ADULT - ASSESSMENT
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 Patient was admitted for further w/u for possible discitis/osteomyelitis. S/p MRI showing L2-L3 discitis osteomyelitis w/ small fluid collections within L paraspinal muscles measuring ~1.7cm. S/p IR-guided biopsy.    Problem/Plan - 1:  ·  Problem: Discitis of lumbar region.  Plan: Pt presenting with persistent lower back pain not relieved by outpatient tx w/ hydrocodone and lumbar epidural 2 weeks ago. MRI lumbar spine shows discitis/osteomyelitis at L2-3 w/ severe degenerative changes; no evidence of epidural abscess. Etiology likely 2/2 contiguous spread (hx of recent lumbar epidural) rather than hematologic spread from bacteremia as pt did not meet SIRS crit on admission and denied fevers or other signs of infection. Patient has remained afebrile with WBC WNL.   - Neurosurgery consult: No acute intervention indicated at this time.  - Plavix was resumed today  - trend weekly ESR, CRP (baseline: ESR 61, CRP 10.6)  - BCx (1/11): Staph epidermidis in 1 of 4 bottles - likely due to contamination given other bottles show NGTD and patient shows no clinical signs of systemic infection and no leukocytosis.   - f/u surveillance BCx (1/13)  - f/u results from IR bx on 1/13  - ID consulted, recommending vancomycin. Vanc 1g q12hrs started 1/14PM    #r/o bacteremia  On admission, 1/4 blood cultures grew Staph epidermidis. Pt afebrile on admission, w/o leukocytosis or other signs of systemic infection. Positive culture likely contamination, will follow up with surveillance cultures.  -surveillance cultures (1/13) NGTD.     Problem/Plan - 2:  ·  Problem: Back pain, chronic.  Plan: Hx of lower back and R hip pain after a fall in early December, which persists at severe intensity despite outpatient tx w/ hydrocodone and steroid injections in the epidural lumbar spine and R hip 2 weeks ago.   - pain management with Percocet 5/325 1 tab q4-6h PRN  - PT eval  - fall precautions.     Problem/Plan - 3:  ·  Problem: MARIYA (acute kidney injury).  Plan: Elevated BUN/Cr 33/1.02 on admission, suggestive of prerenal MARIYA likely 2/2 dehydration given that pt on fluid restriction and Lasix for SIADH.  - BUN/Cr 26/0.87 improved from yesterday  - continue home Lasix 40mg BID  - encourage PO intake w/ limited fluid intake given hx of SIADH  - avoid nephrotoxic agents    #Hyponatremia 2/2 SIADH- Na now at baseline on Lasix and salt tabs (pt was previously discharged with Na stable in 120s), and pt denies symptoms (HA, fatigue, N/V).   - monitor Cr  - continue salt tabs BID  - continue Lasix considering Cr has returned to baseline.     Problem/Plan - 4:  ·  Problem: CAD (coronary artery disease).  Plan: Hx of CAD s/p PCI/CABG and hx of PAD. No chest pain, SOB, palpitations.  - continue home atorvastatin 40mg qhs  - continue home Toprol 25mg XL daily    #grade 2 holosystolic murmur loudest over RSB  - pt denies hx of murmur, and murmur was not noted during prior admissions or in  previous Cardiology clinic note (8/2018)  - Pt denying lightheadedness, dizziness  - obtain collateral from cardiologist (Dr. Dinh Moya [phone])  - TTE.     Problem/Plan - 5:  ·  Problem: Essential hypertension.  Plan: Hx of HTN.  - continue home losartan 25mg daily  - continue home Toprol 25mg daily.     Problem/Plan - 6:  Problem: Hyperlipidemia. Plan: Hx of HLD.  - continue home atorvastatin 40mg qhs.    Problem/Plan - 7:  ·  Problem: Malignant neoplasm of prostate.  Plan: Hx of prostate cancer, s/p TURP, no CTx or RTx. CT abd/pelvis showing avidly enhancing nodule within the peripheral zone of the left lobe of the prostate gland measuring 11 mm and an additional avidly enhancing nodule centrally measuring 3 cm extending to the posterior wall of the bladder, consistent with known prostate carcinoma, no free fluid, no adenopathy. No acute complaints at this time.  - continue home finasteride 5mg daily  - continue home Tamsulosin 0.4mg qhs  - continue home oxybutynin 5mg TID.     Problem/Plan - 8:  ·  Problem: Type 2 diabetes mellitus.  Plan: DM2 not on any medications. A1c 6 in 12/19.  - ISS    #Constipation- reports constipation.  - continue senna and Miralax PRN, c/w dulcolax suppository PRN daily  - continue bowel regimen while on opioid medications.     Problem/Plan - 9:  ·  Problem: Nutrition, metabolism, and development symptoms.  Plan: F: Fluid restriction given hx of SIADH  E: replete K>4, Mg>2  N: Dash/TLC    VTE Prophylaxis: lovenox 40mg QD  C: Full Code  D: RMF.
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 Seen by pain specialist (Dr. Deleon) and had lumbar epidural and R hip injection 2 weeks ago without relief. No weakness, numbness, paresthesias, bowel/bladder incontinence, fevers or chills, n/v/d/c.  In the ED, vitals were T 97.2F, Labs s/f CBC unremarkable. CT abd/pelvis with findings worrisome for development of osteomyelitis/discitis at L2/3 and findings of prostate malignancy (known). Neurosurgery was consulted, IR  drained the fluid collection and sent for culture     - Please start the pt on vancomycin based on GFR  - get vanc trough when appropriate  - f/u the result of biopsy and culture     Plan discussed with Dr. Rubio and primary team.

## 2020-01-15 NOTE — DIETITIAN INITIAL EVALUATION ADULT. - OTHER INFO
Pt seen for initial assessment. 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 Patient was admitted for further w/u for possible discitis/osteomyelitis. S/p MRI showing L2-L3 discitis osteomyelitis w/ small fluid collections within L paraspinal muscles measuring ~1.7cm. S/p IR-guided biopsy. Skin: no edema, intact. Attempted to see pt several times today however unable to obtain subjective information, known to RD from previous assessment, last seen 12/11/19. At that time pt was eating well, and was with good PO intake PTA, also living alone in 4th floor walk up apt, recently began receiving meals on wheels. Pt denied wt loss at that time w/ wt of 74.2kg on 12/11/19, now w/ admit wt of 77.1kg (2.9kg wt gain). NKFA. No apparent GI distress, last BM 1/12 per EMR. RD to follow up per protocol.

## 2020-01-15 NOTE — PHYSICAL THERAPY INITIAL EVALUATION ADULT - IMPAIRMENTS FOUND, PT EVAL
muscle strength/poor safety awareness/posture/aerobic capacity/endurance/decreased midline orientation/gait, locomotion, and balance/ergonomics and body mechanics

## 2020-01-15 NOTE — PROGRESS NOTE ADULT - PROBLEM SELECTOR PLAN 1
Pt presenting with persistent lower back pain not relieved by outpatient tx w/ hydrocodone and lumbar epidural 2 weeks ago. MRI lumbar spine shows discitis/osteomyelitis at L2-3 w/ severe degenerative changes; no evidence of epidural abscess. Etiology likely 2/2 contiguous spread (hx of recent lumbar epidural) rather than hematologic spread from bacteremia as pt did not meet SIRS crit on admission and denied fevers or other signs of infection. Patient has remained afebrile with WBC WNL.   - Neurosurgery consult: No acute intervention indicated at this time.  - continue home Plavix (resumed 1/14 after IR biopsy)  - trend weekly ESR, CRP (baseline: ESR 61, CRP 10.6)  - BCx (1/11): Staph epidermidis in 1 of 4 bottles - likely due to contamination given other bottles show NGTD and patient shows no clinical signs of systemic infection and no leukocytosis.   - f/u surveillance BCx (1/13)  - f/u results from IR bx on 1/13  - ID consult: recommending vancomycin. Vanc 1g q12hrs started 1/14 6PM  - vanc trough 1/15 at 4AM before 4th dose  - plan for PICC line for abx administration    #r/o bacteremia  On admission, 1/4 blood cultures grew Staph epidermidis. Pt afebrile on admission, w/o leukocytosis or other signs of systemic infection. Positive culture likely contamination, will follow up with surveillance cultures.  -surveillance cultures (1/13): NGTD Pt presenting with persistent lower back pain not relieved by outpatient tx w/ hydrocodone and lumbar epidural 2 weeks ago. MRI lumbar spine shows discitis/osteomyelitis at L2-3 w/ severe degenerative changes; no evidence of epidural abscess. Etiology likely 2/2 contiguous spread (hx of recent lumbar epidural) rather than hematologic spread from bacteremia as pt did not meet SIRS crit on admission and denied fevers or other signs of infection. Patient has remained afebrile with WBC WNL.   - Neurosurgery consult: No acute intervention indicated at this time.  - continue home Plavix (resumed 1/14 after IR biopsy)  - trend weekly ESR, CRP (baseline: ESR 61, CRP 10.6)  - BCx (1/11): Staph epidermidis in 1 of 4 bottles - likely due to contamination given other bottles show NGTD and patient shows no clinical signs of systemic infection and no leukocytosis.   - f/u surveillance BCx (1/13)  - f/u results from IR bx on 1/13  - ID consult: recommending vancomycin. Vanc 1g q12hrs started 1/14 6PM for empiric coverage.   -Will vancomycin levels with troughs to ensure effective concentration  - plan for PICC line for abx administration upon discharge    #r/o bacteremia  On admission, 1/4 blood cultures grew Staph epidermidis. Pt afebrile on admission, w/o leukocytosis or other signs of systemic infection. Positive culture likely contamination, will follow up with surveillance cultures.  -surveillance cultures (1/13): NGTD

## 2020-01-15 NOTE — PROGRESS NOTE ADULT - PROBLEM SELECTOR PLAN 4
Hx of CAD s/p PCI/CABG and hx of PAD. No chest pain, SOB, palpitations.  - continue home atorvastatin 40mg qhs  - continue home Toprol 25mg XL daily    #grade 2 holosystolic murmur loudest over RSB  - Pt has hx of aortic valve insufficiency and stenosis, non-rheumatic, dx 1 year ago (per PCP's office)  - Pt denying lightheadedness, dizziness  - followed by cardiologist at [facility]  - TTE (1/15) shows EF 59%, mild-to-moderate aortic regurgitation, aortic sclerosis without significant stenosis.

## 2020-01-15 NOTE — DIETITIAN INITIAL EVALUATION ADULT. - PERTINENT LABORATORY DATA
1/15: hemoglobin 11.6, hematocrit 34.2, 1/14: Na 129, BUN 26, , 12/7: HgbA1c 6.0%, POCT: 1/15: 125, 1/14: 112-148, 1/13:

## 2020-01-15 NOTE — PHYSICAL THERAPY INITIAL EVALUATION ADULT - DIAGNOSIS, PT EVAL
5A: Primary Prevention/Risk Reduction for Loss of Balance and Falling 4F: Impaired Joint Mobility, Motor Function, Muscle Performance, and Range of Motion and Reflex Integrity Associated with Spinal Disorders

## 2020-01-16 ENCOUNTER — TRANSCRIPTION ENCOUNTER (OUTPATIENT)
Age: 85
End: 2020-01-16
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
## 2020-01-16 LAB
ANION GAP SERPL CALC-SCNC: 13 MMOL/L — SIGNIFICANT CHANGE UP (ref 5–17)
BUN SERPL-MCNC: 31 MG/DL — HIGH (ref 7–23)
CALCIUM SERPL-MCNC: 8.4 MG/DL — SIGNIFICANT CHANGE UP (ref 8.4–10.5)
CHLORIDE SERPL-SCNC: 101 MMOL/L — SIGNIFICANT CHANGE UP (ref 96–108)
CO2 SERPL-SCNC: 17 MMOL/L — LOW (ref 22–31)
CREAT SERPL-MCNC: 0.92 MG/DL — SIGNIFICANT CHANGE UP (ref 0.5–1.3)
CULTURE RESULTS: SIGNIFICANT CHANGE UP
GLUCOSE BLDC GLUCOMTR-MCNC: 152 MG/DL — HIGH (ref 70–99)
GLUCOSE BLDC GLUCOMTR-MCNC: 153 MG/DL — HIGH (ref 70–99)
GLUCOSE BLDC GLUCOMTR-MCNC: 168 MG/DL — HIGH (ref 70–99)
GLUCOSE SERPL-MCNC: 122 MG/DL — HIGH (ref 70–99)
HCT VFR BLD CALC: 32.2 % — LOW (ref 39–50)
HGB BLD-MCNC: 11 G/DL — LOW (ref 13–17)
MAGNESIUM SERPL-MCNC: 1.9 MG/DL — SIGNIFICANT CHANGE UP (ref 1.6–2.6)
MCHC RBC-ENTMCNC: 28.2 PG — SIGNIFICANT CHANGE UP (ref 27–34)
MCHC RBC-ENTMCNC: 34.2 GM/DL — SIGNIFICANT CHANGE UP (ref 32–36)
MCV RBC AUTO: 82.6 FL — SIGNIFICANT CHANGE UP (ref 80–100)
NRBC # BLD: 0 /100 WBCS — SIGNIFICANT CHANGE UP (ref 0–0)
PLATELET # BLD AUTO: 244 K/UL — SIGNIFICANT CHANGE UP (ref 150–400)
POTASSIUM SERPL-MCNC: 3.9 MMOL/L — SIGNIFICANT CHANGE UP (ref 3.5–5.3)
POTASSIUM SERPL-SCNC: 3.9 MMOL/L — SIGNIFICANT CHANGE UP (ref 3.5–5.3)
RBC # BLD: 3.9 M/UL — LOW (ref 4.2–5.8)
RBC # FLD: 14.1 % — SIGNIFICANT CHANGE UP (ref 10.3–14.5)
SODIUM SERPL-SCNC: 131 MMOL/L — LOW (ref 135–145)
SPECIMEN SOURCE: SIGNIFICANT CHANGE UP
VANCOMYCIN TROUGH SERPL-MCNC: 15.3 UG/ML — SIGNIFICANT CHANGE UP (ref 10–20)
WBC # BLD: 6.24 K/UL — SIGNIFICANT CHANGE UP (ref 3.8–10.5)
WBC # FLD AUTO: 6.24 K/UL — SIGNIFICANT CHANGE UP (ref 3.8–10.5)

## 2020-01-16 PROCEDURE — 70450 CT HEAD/BRAIN W/O DYE: CPT | Mod: 26

## 2020-01-16 PROCEDURE — 73070 X-RAY EXAM OF ELBOW: CPT | Mod: 26,LT

## 2020-01-16 PROCEDURE — 73030 X-RAY EXAM OF SHOULDER: CPT | Mod: 26,LT

## 2020-01-16 PROCEDURE — 76937 US GUIDE VASCULAR ACCESS: CPT | Mod: 26,59

## 2020-01-16 PROCEDURE — 99233 SBSQ HOSP IP/OBS HIGH 50: CPT | Mod: GC

## 2020-01-16 PROCEDURE — 36569 INSJ PICC 5 YR+ W/O IMAGING: CPT

## 2020-01-16 PROCEDURE — 71045 X-RAY EXAM CHEST 1 VIEW: CPT | Mod: 26

## 2020-01-16 RX ORDER — POTASSIUM CHLORIDE 20 MEQ
20 PACKET (EA) ORAL ONCE
Refills: 0 | Status: COMPLETED | OUTPATIENT
Start: 2020-01-16 | End: 2020-01-16

## 2020-01-16 RX ORDER — SODIUM CHLORIDE 9 MG/ML
10 INJECTION INTRAMUSCULAR; INTRAVENOUS; SUBCUTANEOUS
Refills: 0 | Status: DISCONTINUED | OUTPATIENT
Start: 2020-01-16 | End: 2020-01-17

## 2020-01-16 RX ORDER — VANCOMYCIN HCL 1 G
1000 VIAL (EA) INTRAVENOUS EVERY 12 HOURS
Refills: 0 | Status: DISCONTINUED | OUTPATIENT
Start: 2020-01-16 | End: 2020-01-17

## 2020-01-16 RX ORDER — CHLORHEXIDINE GLUCONATE 213 G/1000ML
1 SOLUTION TOPICAL
Refills: 0 | Status: DISCONTINUED | OUTPATIENT
Start: 2020-01-16 | End: 2020-01-17

## 2020-01-16 RX ORDER — MAGNESIUM SULFATE 500 MG/ML
1 VIAL (ML) INJECTION ONCE
Refills: 0 | Status: COMPLETED | OUTPATIENT
Start: 2020-01-16 | End: 2020-01-16

## 2020-01-16 RX ORDER — CEFTRIAXONE 500 MG/1
2 INJECTION, POWDER, FOR SOLUTION INTRAMUSCULAR; INTRAVENOUS EVERY 24 HOURS
Refills: 0 | Status: DISCONTINUED | OUTPATIENT
Start: 2020-01-16 | End: 2020-01-17

## 2020-01-16 RX ORDER — ENOXAPARIN SODIUM 100 MG/ML
40 INJECTION SUBCUTANEOUS EVERY 24 HOURS
Refills: 0 | Status: DISCONTINUED | OUTPATIENT
Start: 2020-01-16 | End: 2020-01-17

## 2020-01-16 RX ADMIN — ENOXAPARIN SODIUM 40 MILLIGRAM(S): 100 INJECTION SUBCUTANEOUS at 21:31

## 2020-01-16 RX ADMIN — Medication 5 MILLIGRAM(S): at 14:19

## 2020-01-16 RX ADMIN — Medication 10 MILLIGRAM(S): at 14:20

## 2020-01-16 RX ADMIN — Medication 2: at 18:44

## 2020-01-16 RX ADMIN — Medication 25 MILLIGRAM(S): at 06:59

## 2020-01-16 RX ADMIN — DORZOLAMIDE HYDROCHLORIDE TIMOLOL MALEATE 1 DROP(S): 20; 5 SOLUTION/ DROPS OPHTHALMIC at 02:00

## 2020-01-16 RX ADMIN — Medication 2: at 22:58

## 2020-01-16 RX ADMIN — CLOPIDOGREL BISULFATE 75 MILLIGRAM(S): 75 TABLET, FILM COATED ORAL at 11:38

## 2020-01-16 RX ADMIN — Medication 100 GRAM(S): at 14:19

## 2020-01-16 RX ADMIN — DORZOLAMIDE HYDROCHLORIDE TIMOLOL MALEATE 1 DROP(S): 20; 5 SOLUTION/ DROPS OPHTHALMIC at 23:53

## 2020-01-16 RX ADMIN — DORZOLAMIDE HYDROCHLORIDE TIMOLOL MALEATE 1 DROP(S): 20; 5 SOLUTION/ DROPS OPHTHALMIC at 11:37

## 2020-01-16 RX ADMIN — Medication 20 MILLIEQUIVALENT(S): at 14:21

## 2020-01-16 RX ADMIN — LATANOPROST 1 DROP(S): 0.05 SOLUTION/ DROPS OPHTHALMIC; TOPICAL at 21:45

## 2020-01-16 RX ADMIN — Medication 250 MILLIGRAM(S): at 07:05

## 2020-01-16 RX ADMIN — Medication 40 MILLIGRAM(S): at 06:59

## 2020-01-16 RX ADMIN — LOSARTAN POTASSIUM 25 MILLIGRAM(S): 100 TABLET, FILM COATED ORAL at 06:59

## 2020-01-16 RX ADMIN — BRIMONIDINE TARTRATE 1 DROP(S): 2 SOLUTION/ DROPS OPHTHALMIC at 07:00

## 2020-01-16 RX ADMIN — OXYCODONE AND ACETAMINOPHEN 1 TABLET(S): 5; 325 TABLET ORAL at 15:55

## 2020-01-16 RX ADMIN — FINASTERIDE 5 MILLIGRAM(S): 5 TABLET, FILM COATED ORAL at 11:38

## 2020-01-16 RX ADMIN — Medication 5 MILLIGRAM(S): at 21:30

## 2020-01-16 RX ADMIN — CEFTRIAXONE 100 MILLIGRAM(S): 500 INJECTION, POWDER, FOR SOLUTION INTRAMUSCULAR; INTRAVENOUS at 18:53

## 2020-01-16 RX ADMIN — Medication 250 MILLIGRAM(S): at 17:24

## 2020-01-16 RX ADMIN — Medication 2: at 11:38

## 2020-01-16 RX ADMIN — SENNA PLUS 2 TABLET(S): 8.6 TABLET ORAL at 21:31

## 2020-01-16 RX ADMIN — SODIUM CHLORIDE 1 GRAM(S): 9 INJECTION INTRAMUSCULAR; INTRAVENOUS; SUBCUTANEOUS at 06:59

## 2020-01-16 RX ADMIN — Medication 5 MILLIGRAM(S): at 06:59

## 2020-01-16 RX ADMIN — BRIMONIDINE TARTRATE 1 DROP(S): 2 SOLUTION/ DROPS OPHTHALMIC at 21:30

## 2020-01-16 RX ADMIN — SODIUM CHLORIDE 1 GRAM(S): 9 INJECTION INTRAMUSCULAR; INTRAVENOUS; SUBCUTANEOUS at 17:25

## 2020-01-16 RX ADMIN — OXYCODONE AND ACETAMINOPHEN 1 TABLET(S): 5; 325 TABLET ORAL at 07:18

## 2020-01-16 RX ADMIN — TAMSULOSIN HYDROCHLORIDE 0.4 MILLIGRAM(S): 0.4 CAPSULE ORAL at 21:31

## 2020-01-16 RX ADMIN — LORATADINE 10 MILLIGRAM(S): 10 TABLET ORAL at 11:38

## 2020-01-16 RX ADMIN — ATORVASTATIN CALCIUM 40 MILLIGRAM(S): 80 TABLET, FILM COATED ORAL at 21:31

## 2020-01-16 RX ADMIN — Medication 40 MILLIGRAM(S): at 17:25

## 2020-01-16 RX ADMIN — OXYCODONE AND ACETAMINOPHEN 1 TABLET(S): 5; 325 TABLET ORAL at 15:23

## 2020-01-16 RX ADMIN — BRIMONIDINE TARTRATE 1 DROP(S): 2 SOLUTION/ DROPS OPHTHALMIC at 14:23

## 2020-01-16 NOTE — PROGRESS NOTE ADULT - PROBLEM SELECTOR PLAN 4
Elevated BUN/Cr 33/1.02 on admission, suggestive of prerenal MARIYA likely 2/2 dehydration given that pt on fluid restriction and Lasix for SIADH.  - BUN/Cr 31/0.92 stable  - continue home Lasix 40mg BID  - encourage PO intake w/ limited fluid intake given hx of SIADH  - avoid nephrotoxic agents    #Hyponatremia 2/2 SIADH- Baseline Na 128-134 per PCP's office. Na has remained at baseline on Lasix and salt tabs. Pt denies HA, fatigue, N/V.   - monitor Cr  - continue salt tabs BID  - continue Lasix considering Cr has returned to baseline Hx of CAD s/p PCI/CABG and hx of PAD. No chest pain, SOB, palpitations.  - continue home atorvastatin 40mg qhs  - continue home Toprol 25mg XL daily    #grade 2 holosystolic murmur loudest over RSB  - Pt has hx of aortic valve insufficiency and stenosis, non-rheumatic, dx 1 year ago (per PCP's office)  - Pt denying lightheadedness, dizziness  - followed by cardiologist at [facility]  - TTE (1/15) shows EF 59%, mild-to-moderate aortic regurgitation, aortic sclerosis without significant stenosis.

## 2020-01-16 NOTE — OCCUPATIONAL THERAPY INITIAL EVALUATION ADULT - ADDITIONAL COMMENTS
Pt states that he lives alone in 4th floor walk up. Pt reports independence in ADLs and mobility prior to admission. Pt states that he uses a cane and RW at baseline. Meals on wheels 3x/wk and that his son is supportive of him.

## 2020-01-16 NOTE — PROGRESS NOTE ADULT - SUBJECTIVE AND OBJECTIVE BOX
OVERNIGHT EVENTS: Patient had a fall this morning at around 5:50AM. He said he lost his footing and fell in the bathroom. He reports hitting his left arm and back, and lightly bumping the back of his head. He denies any HA, blurry/double vision, N/V, LOC, lightheadedness/dizziness. He endorses pain from the left elbow to shoulder, and left side of his lower-to-mid back. CT head shows no acute intracranial hemorrhage or fracture. XR left shoulder/elbow pending.   Vancomycin trough this morning was in therapeutic range.     SUBJECTIVE / INTERVAL HPI: Patient seen and examined at bedside. Patient reports having a bowel movement yesterday afternoon, after which his abdominal discomfort from constipation improved. He denies any changes in urination, dysuria, incontinence, chest pain, palpitations, SOB, fever, LE pain, numbness//tingling. Denies any other complaints.    VITAL SIGNS:  Vital Signs Last 24 Hrs  T(C): 36.4 (16 Jan 2020 06:00), Max: 36.8 (16 Jan 2020 05:33)  T(F): 97.6 (16 Jan 2020 06:00), Max: 98.2 (16 Jan 2020 05:33)  HR: 102 (16 Jan 2020 06:00) (98 - 102)  BP: 145/79 (16 Jan 2020 06:00) (111/69 - 145/79)  BP(mean): --  RR: 17 (16 Jan 2020 06:00) (17 - 18)  SpO2: 95% (16 Jan 2020 06:00) (93% - 96%)    PHYSICAL EXAM:    General: NAD, lying comfortably in bed  HEENT: NC/AT; No ecchymosis or tenderness over scalp.  Cardiovascular: +S1/S2; RRR; grade 2 holosystolic murmur loudest over RSB  Respiratory: CTAB; no wheezes/rales/rhonchi  Gastrointestinal: soft, nondistended, mild tenderness to palpation over LLQ; +BSx4  Extremities: 7cm superficial abrasion over dorsal aspect of left forearm, ecchymosis over posterior aspect of left arm. Tenderness to palpation from left elbow to shoulder. WWP; no edema or cyanosis. No calf tenderness.  Vascular: 2+ radial, DP/PT pulses B/L  Neurological: AAOx3; PEERL, EOMI, facial sensation intact in V1-V3 distributions, face symmetric, hearing grossly intact to finger rub, palate elevation symmetric, uvula midline, head turn and shoulder shrug strength 5/5 b/l, tongue midline. Motor strength in LE 5/5 b/l, RUE 5/5; LUE limited ROM due to pain from elbow to shoulder.    MEDICATIONS:  MEDICATIONS  (STANDING):  atorvastatin 40 milliGRAM(s) Oral at bedtime  brimonidine 0.2% Ophthalmic Solution 1 Drop(s) Both EYES three times a day  clopidogrel Tablet 75 milliGRAM(s) Oral daily  dextrose 5%. 1000 milliLiter(s) (50 mL/Hr) IV Continuous <Continuous>  dextrose 50% Injectable 12.5 Gram(s) IV Push once  dextrose 50% Injectable 25 Gram(s) IV Push once  dextrose 50% Injectable 25 Gram(s) IV Push once  dorzolamide 2%/timolol 0.5% Ophthalmic Solution 1 Drop(s) Both EYES two times a day  finasteride 5 milliGRAM(s) Oral daily  furosemide    Tablet 40 milliGRAM(s) Oral two times a day  insulin lispro (HumaLOG) corrective regimen sliding scale   SubCutaneous Before meals and at bedtime  latanoprost 0.005% Ophthalmic Solution 1 Drop(s) Both EYES at bedtime  loratadine 10 milliGRAM(s) Oral daily  losartan 25 milliGRAM(s) Oral daily  magnesium sulfate  IVPB 1 Gram(s) IV Intermittent once  metoprolol succinate ER 25 milliGRAM(s) Oral daily  oxybutynin 5 milliGRAM(s) Oral three times a day  potassium chloride    Tablet ER 20 milliEquivalent(s) Oral once  senna 2 Tablet(s) Oral at bedtime  sodium chloride 1 Gram(s) Oral every 12 hours  tamsulosin 0.4 milliGRAM(s) Oral at bedtime  vancomycin  IVPB 1000 milliGRAM(s) IV Intermittent every 12 hours    MEDICATIONS  (PRN):  bisacodyl Suppository 10 milliGRAM(s) Rectal daily PRN Constipation  dextrose 40% Gel 15 Gram(s) Oral once PRN Blood Glucose LESS THAN 70 milliGRAM(s)/deciliter  glucagon  Injectable 1 milliGRAM(s) IntraMuscular once PRN Glucose LESS THAN 70 milligrams/deciliter  oxycodone    5 mG/acetaminophen 325 mG 1 Tablet(s) Oral every 6 hours PRN Severe Pain (7 - 10)  polyethylene glycol 3350 17 Gram(s) Oral daily PRN Constipation      ALLERGIES:  Allergies    No Known Drug Allergies  Originally Entered as [Rash-General] reaction to [Other][[place]] (Rash)  Originally Entered as [Rash-General] reaction to [Other][Ragweed] (Rash)  ragweed and birch (Unknown)    Intolerances        LABS:                        11.0   6.24  )-----------( 244      ( 16 Jan 2020 05:47 )             32.2     01-16    131<L>  |  101  |  31<H>  ----------------------------<  122<H>  3.9   |  17<L>  |  0.92    Ca    8.4      16 Jan 2020 05:47  Mg     1.9     01-16      CAPILLARY BLOOD GLUCOSE  POCT Blood Glucose.: 139 mg/dL (15 Anatoliy 2020 22:10)      RADIOLOGY & ADDITIONAL TESTS: Reviewed.    < from: CT Head No Cont (01.16.20 @ 09:58) >  Impression: No acute intracranial hemorrhage, mass effect or calvarial fracture. Chronic microvascular changes. OVERNIGHT EVENTS: Patient had a fall this morning at around 5:50AM. He said he lost his footing and fell in the bathroom. He reports hitting his left arm and back, and lightly bumping the back of his head. He denies any HA, blurry/double vision, N/V, LOC, lightheadedness/dizziness. He endorses pain from the left elbow to shoulder, and left side of his lower-to-mid back. CT head shows no acute intracranial hemorrhage or fracture. XR left shoulder/elbow pending.   Vancomycin trough this morning was in therapeutic range.     SUBJECTIVE / INTERVAL HPI: Patient seen and examined at bedside. Patient reports having a bowel movement yesterday afternoon, after which his abdominal discomfort from constipation improved. He denies any changes in urination, dysuria, incontinence, chest pain, palpitations, SOB, fever, LE pain, numbness/tingling. Denies any other complaints.    VITAL SIGNS:  Vital Signs Last 24 Hrs  T(C): 36.4 (16 Jan 2020 06:00), Max: 36.8 (16 Jan 2020 05:33)  T(F): 97.6 (16 Jan 2020 06:00), Max: 98.2 (16 Jan 2020 05:33)  HR: 102 (16 Jan 2020 06:00) (98 - 102)  BP: 145/79 (16 Jan 2020 06:00) (111/69 - 145/79)  BP(mean): --  RR: 17 (16 Jan 2020 06:00) (17 - 18)  SpO2: 95% (16 Jan 2020 06:00) (93% - 96%)    PHYSICAL EXAM:    General: NAD, lying comfortably in bed  HEENT: NC/AT; No ecchymosis or tenderness over scalp.  Cardiovascular: +S1/S2; RRR; grade 2 holosystolic murmur loudest over RSB  Respiratory: CTAB; no wheezes/rales/rhonchi  Gastrointestinal: soft, nondistended, mild tenderness to palpation over LLQ; +BSx4  Extremities: 7cm superficial abrasion over dorsal aspect of left forearm, ecchymosis over posterior aspect of left arm. Tenderness to palpation from left elbow to shoulder. WWP; no edema or cyanosis. No calf tenderness.  Vascular: 2+ radial, DP/PT pulses B/L  Neurological: AAOx3; PEERL, EOMI, facial sensation intact in V1-V3 distributions, face symmetric, hearing grossly intact to finger rub, palate elevation symmetric, uvula midline, head turn and shoulder shrug strength 5/5 b/l, tongue midline. Motor strength in LE 5/5 b/l, RUE 5/5; LUE limited ROM due to pain from elbow to shoulder.    MEDICATIONS:  MEDICATIONS  (STANDING):  atorvastatin 40 milliGRAM(s) Oral at bedtime  brimonidine 0.2% Ophthalmic Solution 1 Drop(s) Both EYES three times a day  clopidogrel Tablet 75 milliGRAM(s) Oral daily  dextrose 5%. 1000 milliLiter(s) (50 mL/Hr) IV Continuous <Continuous>  dextrose 50% Injectable 12.5 Gram(s) IV Push once  dextrose 50% Injectable 25 Gram(s) IV Push once  dextrose 50% Injectable 25 Gram(s) IV Push once  dorzolamide 2%/timolol 0.5% Ophthalmic Solution 1 Drop(s) Both EYES two times a day  finasteride 5 milliGRAM(s) Oral daily  furosemide    Tablet 40 milliGRAM(s) Oral two times a day  insulin lispro (HumaLOG) corrective regimen sliding scale   SubCutaneous Before meals and at bedtime  latanoprost 0.005% Ophthalmic Solution 1 Drop(s) Both EYES at bedtime  loratadine 10 milliGRAM(s) Oral daily  losartan 25 milliGRAM(s) Oral daily  magnesium sulfate  IVPB 1 Gram(s) IV Intermittent once  metoprolol succinate ER 25 milliGRAM(s) Oral daily  oxybutynin 5 milliGRAM(s) Oral three times a day  potassium chloride    Tablet ER 20 milliEquivalent(s) Oral once  senna 2 Tablet(s) Oral at bedtime  sodium chloride 1 Gram(s) Oral every 12 hours  tamsulosin 0.4 milliGRAM(s) Oral at bedtime  vancomycin  IVPB 1000 milliGRAM(s) IV Intermittent every 12 hours    MEDICATIONS  (PRN):  bisacodyl Suppository 10 milliGRAM(s) Rectal daily PRN Constipation  dextrose 40% Gel 15 Gram(s) Oral once PRN Blood Glucose LESS THAN 70 milliGRAM(s)/deciliter  glucagon  Injectable 1 milliGRAM(s) IntraMuscular once PRN Glucose LESS THAN 70 milligrams/deciliter  oxycodone    5 mG/acetaminophen 325 mG 1 Tablet(s) Oral every 6 hours PRN Severe Pain (7 - 10)  polyethylene glycol 3350 17 Gram(s) Oral daily PRN Constipation      ALLERGIES:  Allergies    No Known Drug Allergies  Originally Entered as [Rash-General] reaction to [Other][[place]] (Rash)  Originally Entered as [Rash-General] reaction to [Other][Ragweed] (Rash)  ragweed and birch (Unknown)    Intolerances        LABS:                        11.0   6.24  )-----------( 244      ( 16 Jan 2020 05:47 )             32.2     01-16    131<L>  |  101  |  31<H>  ----------------------------<  122<H>  3.9   |  17<L>  |  0.92    Ca    8.4      16 Jan 2020 05:47  Mg     1.9     01-16      CAPILLARY BLOOD GLUCOSE  POCT Blood Glucose.: 139 mg/dL (15 Anatoliy 2020 22:10)      RADIOLOGY & ADDITIONAL TESTS: Reviewed.    < from: CT Head No Cont (01.16.20 @ 09:58) >  Impression: No acute intracranial hemorrhage, mass effect or calvarial fracture. Chronic microvascular changes. OVERNIGHT EVENTS: Patient had a fall this morning at around 5:50AM. He said he lost his footing and fell in the bathroom. He reports hitting his left arm and back, and lightly bumping the back of his head. He denies any HA, blurry/double vision, N/V, LOC, lightheadedness/dizziness. He endorses pain from the left elbow to shoulder, and left side of his lower-to-mid back. CT head shows no acute intracranial hemorrhage or fracture. XR left shoulder/elbow pending.   Vancomycin trough this morning was in therapeutic range.     SUBJECTIVE / INTERVAL HPI: Patient seen and examined at bedside. Patient reports having a bowel movement yesterday afternoon, after which his abdominal discomfort from constipation improved. He denies any changes in urination, dysuria, incontinence, chest pain, palpitations, SOB, fever, LE pain, numbness/tingling. Denies any other complaints.    VITAL SIGNS:  Vital Signs Last 24 Hrs  T(C): 36.4 (16 Jan 2020 06:00), Max: 36.8 (16 Jan 2020 05:33)  T(F): 97.6 (16 Jan 2020 06:00), Max: 98.2 (16 Jan 2020 05:33)  HR: 102 (16 Jan 2020 06:00) (98 - 102)  BP: 145/79 (16 Jan 2020 06:00) (111/69 - 145/79)  BP(mean): --  RR: 17 (16 Jan 2020 06:00) (17 - 18)  SpO2: 95% (16 Jan 2020 06:00) (93% - 96%)    PHYSICAL EXAM:    General: NAD, lying comfortably in bed  HEENT: NC/AT; No ecchymosis or tenderness over scalp.  Cardiovascular: +S1/S2; RRR; grade 2 holosystolic murmur loudest over RSB  Respiratory: CTAB; no wheezes/rales/rhonchi  Gastrointestinal: soft, nondistended, mild tenderness to palpation over LLQ; +BSx4  Extremities: 7cm superficial abrasion over dorsal aspect of left forearm, ecchymosis over posterior aspect of left arm. Diffuse tenderness to palpation from left elbow to shoulder. WWP; no edema or cyanosis. No calf tenderness.  Vascular: 2+ radial, DP/PT pulses B/L  Neurological: AAOx3; PEERL, EOMI, facial sensation intact in V1-V3 distributions, face symmetric, hearing grossly intact to finger rub, palate elevation symmetric, uvula midline, head turn and shoulder shrug strength 5/5 b/l, tongue midline. Motor strength in LE 5/5 b/l, RUE 5/5; LUE limited ROM due to pain from elbow to shoulder.    MEDICATIONS:  MEDICATIONS  (STANDING):  atorvastatin 40 milliGRAM(s) Oral at bedtime  brimonidine 0.2% Ophthalmic Solution 1 Drop(s) Both EYES three times a day  clopidogrel Tablet 75 milliGRAM(s) Oral daily  dextrose 5%. 1000 milliLiter(s) (50 mL/Hr) IV Continuous <Continuous>  dextrose 50% Injectable 12.5 Gram(s) IV Push once  dextrose 50% Injectable 25 Gram(s) IV Push once  dextrose 50% Injectable 25 Gram(s) IV Push once  dorzolamide 2%/timolol 0.5% Ophthalmic Solution 1 Drop(s) Both EYES two times a day  finasteride 5 milliGRAM(s) Oral daily  furosemide    Tablet 40 milliGRAM(s) Oral two times a day  insulin lispro (HumaLOG) corrective regimen sliding scale   SubCutaneous Before meals and at bedtime  latanoprost 0.005% Ophthalmic Solution 1 Drop(s) Both EYES at bedtime  loratadine 10 milliGRAM(s) Oral daily  losartan 25 milliGRAM(s) Oral daily  magnesium sulfate  IVPB 1 Gram(s) IV Intermittent once  metoprolol succinate ER 25 milliGRAM(s) Oral daily  oxybutynin 5 milliGRAM(s) Oral three times a day  potassium chloride    Tablet ER 20 milliEquivalent(s) Oral once  senna 2 Tablet(s) Oral at bedtime  sodium chloride 1 Gram(s) Oral every 12 hours  tamsulosin 0.4 milliGRAM(s) Oral at bedtime  vancomycin  IVPB 1000 milliGRAM(s) IV Intermittent every 12 hours    MEDICATIONS  (PRN):  bisacodyl Suppository 10 milliGRAM(s) Rectal daily PRN Constipation  dextrose 40% Gel 15 Gram(s) Oral once PRN Blood Glucose LESS THAN 70 milliGRAM(s)/deciliter  glucagon  Injectable 1 milliGRAM(s) IntraMuscular once PRN Glucose LESS THAN 70 milligrams/deciliter  oxycodone    5 mG/acetaminophen 325 mG 1 Tablet(s) Oral every 6 hours PRN Severe Pain (7 - 10)  polyethylene glycol 3350 17 Gram(s) Oral daily PRN Constipation      ALLERGIES:  Allergies    No Known Drug Allergies  Originally Entered as [Rash-General] reaction to [Other][[place]] (Rash)  Originally Entered as [Rash-General] reaction to [Other][Ragweed] (Rash)  ragweed and birch (Unknown)    Intolerances        LABS:                        11.0   6.24  )-----------( 244      ( 16 Jan 2020 05:47 )             32.2     01-16    131<L>  |  101  |  31<H>  ----------------------------<  122<H>  3.9   |  17<L>  |  0.92    Ca    8.4      16 Jan 2020 05:47  Mg     1.9     01-16      CAPILLARY BLOOD GLUCOSE  POCT Blood Glucose.: 139 mg/dL (15 Anatoliy 2020 22:10)      RADIOLOGY & ADDITIONAL TESTS: Reviewed.    < from: CT Head No Cont (01.16.20 @ 09:58) >  Impression: No acute intracranial hemorrhage, mass effect or calvarial fracture. Chronic microvascular changes. OVERNIGHT EVENTS: Patient had a fall this morning at around 5:50AM. He said he lost his footing and fell in the bathroom. He reports hitting his left arm and back, and lightly bumping the back of his head. He denies any HA, blurry/double vision, N/V, LOC, lightheadedness/dizziness. He endorses pain from the left elbow to shoulder, and left side of his lower-to-mid back. CT head shows no acute intracranial hemorrhage or fracture. XR left shoulder/elbow pending.   Vancomycin trough this morning was in therapeutic range.     SUBJECTIVE / INTERVAL HPI: Patient seen and examined at bedside. Patient reports having a bowel movement yesterday afternoon, after which his abdominal discomfort from constipation improved. He denies any changes in urination, dysuria, incontinence, chest pain, palpitations, SOB, fever, LE pain, numbness/tingling. Denies any other complaints.    VITAL SIGNS:  Vital Signs Last 24 Hrs  T(C): 36.4 (16 Jan 2020 06:00), Max: 36.8 (16 Jan 2020 05:33)  T(F): 97.6 (16 Jan 2020 06:00), Max: 98.2 (16 Jan 2020 05:33)  HR: 102 (16 Jan 2020 06:00) (98 - 102)  BP: 145/79 (16 Jan 2020 06:00) (111/69 - 145/79)  BP(mean): --  RR: 17 (16 Jan 2020 06:00) (17 - 18)  SpO2: 95% (16 Jan 2020 06:00) (93% - 96%)    PHYSICAL EXAM:  General: NAD, lying comfortably in bed  HEENT: NC/AT; No ecchymosis or tenderness over scalp.  Cardiovascular: +S1/S2; RRR; grade 2 holosystolic murmur loudest over RSB  Respiratory: CTAB; no wheezes/rales/rhonchi  Gastrointestinal: soft, nondistended, mild tenderness to palpation over LLQ; +BSx4  Extremities: 7cm superficial abrasion over dorsal aspect of left forearm, ecchymosis over posterior aspect of left arm. Diffuse tenderness to palpation from left elbow to shoulder. WWP; no edema or cyanosis. No calf tenderness.  Vascular: 2+ radial, DP/PT pulses B/L  Neurological: AAOx3; PEERL, EOMI, facial sensation intact in V1-V3 distributions, face symmetric, hearing grossly intact to finger rub, palate elevation symmetric, uvula midline, head turn and shoulder shrug strength 5/5 b/l, tongue midline. Motor strength in LE 5/5 b/l, RUE 5/5; LUE limited ROM due to pain from elbow to shoulder.    MEDICATIONS:  MEDICATIONS  (STANDING):  atorvastatin 40 milliGRAM(s) Oral at bedtime  brimonidine 0.2% Ophthalmic Solution 1 Drop(s) Both EYES three times a day  clopidogrel Tablet 75 milliGRAM(s) Oral daily  dextrose 5%. 1000 milliLiter(s) (50 mL/Hr) IV Continuous <Continuous>  dextrose 50% Injectable 12.5 Gram(s) IV Push once  dextrose 50% Injectable 25 Gram(s) IV Push once  dextrose 50% Injectable 25 Gram(s) IV Push once  dorzolamide 2%/timolol 0.5% Ophthalmic Solution 1 Drop(s) Both EYES two times a day  finasteride 5 milliGRAM(s) Oral daily  furosemide    Tablet 40 milliGRAM(s) Oral two times a day  insulin lispro (HumaLOG) corrective regimen sliding scale   SubCutaneous Before meals and at bedtime  latanoprost 0.005% Ophthalmic Solution 1 Drop(s) Both EYES at bedtime  loratadine 10 milliGRAM(s) Oral daily  losartan 25 milliGRAM(s) Oral daily  magnesium sulfate  IVPB 1 Gram(s) IV Intermittent once  metoprolol succinate ER 25 milliGRAM(s) Oral daily  oxybutynin 5 milliGRAM(s) Oral three times a day  potassium chloride    Tablet ER 20 milliEquivalent(s) Oral once  senna 2 Tablet(s) Oral at bedtime  sodium chloride 1 Gram(s) Oral every 12 hours  tamsulosin 0.4 milliGRAM(s) Oral at bedtime  vancomycin  IVPB 1000 milliGRAM(s) IV Intermittent every 12 hours    MEDICATIONS  (PRN):  bisacodyl Suppository 10 milliGRAM(s) Rectal daily PRN Constipation  dextrose 40% Gel 15 Gram(s) Oral once PRN Blood Glucose LESS THAN 70 milliGRAM(s)/deciliter  glucagon  Injectable 1 milliGRAM(s) IntraMuscular once PRN Glucose LESS THAN 70 milligrams/deciliter  oxycodone    5 mG/acetaminophen 325 mG 1 Tablet(s) Oral every 6 hours PRN Severe Pain (7 - 10)  polyethylene glycol 3350 17 Gram(s) Oral daily PRN Constipation      ALLERGIES:  Allergies    No Known Drug Allergies  Originally Entered as [Rash-General] reaction to [Other][[place]] (Rash)  Originally Entered as [Rash-General] reaction to [Other][[place]] (Rash)  ragweed and birch (Unknown)      LABS:                        11.0   6.24  )-----------( 244      ( 16 Jan 2020 05:47 )             32.2     01-16    131<L>  |  101  |  31<H>  ----------------------------<  122<H>  3.9   |  17<L>  |  0.92    Ca    8.4      16 Jan 2020 05:47  Mg     1.9     01-16      CAPILLARY BLOOD GLUCOSE  POCT Blood Glucose.: 139 mg/dL (15 Anatoliy 2020 22:10)      RADIOLOGY & ADDITIONAL TESTS: Reviewed.    < from: CT Head No Cont (01.16.20 @ 09:58) >  Impression: No acute intracranial hemorrhage, mass effect or calvarial fracture. Chronic microvascular changes.

## 2020-01-16 NOTE — PROGRESS NOTE ADULT - ATTENDING COMMENTS
Pt seen and examined at beside, agree with above. Still with back pain, worse with movement and sitting up. Also c/o constipation. No evidence of cord compression on exam or imaging. Pending MRI    - Per nsx, will obtain MRI and plan for IR biopsy to r/o osteomyelitis/discitis of lumbar spine  - No abx for now as pt without obvious evidence of infection and will improve bone biopsy results  - Pain control  - Bowel regimen with dulcolax suppositories per pt preference  - Hyponatremia 2/2 SIADH worsened today, pt had not received salt tabs and lasix being held in setting of MARIYA. Salt tabs resumed and UA/Ulytes to be checked. Repeat BMP tomorrow.
Pt seen and examined by me at bedside earlier in AM. Agree with above with additions,   1/4 bottle +coagulase negative staph. surveillance cx NGTD  bone biopsy-insufficient amt, no organism  L2-L3 discitis-appreciate ID recs, start Vanco IV, check trough, will likely need PICC for long term abx; neuro sx recs appreciated.     rest of a/p as above.
Pt seen and examined by me at bedside earlier in AM. Agree with above with additions,   MRI findings noted.   VSS, exam as above   a/p:  1. L2-L3 Discitis/OM: scheduled for IR guided bone biopsy today, check ESR/CRP, ID consult. hold abx until biopsy result back  2. CAD-resume plavix after biopsy  3. Hyponatremia hx of chronic SIADH-resume bicarb/lasix  4. HTN-bp controlled, c/w home meds    Agree with rest of a/p as above.
Pt seen and examined by me at bedside earlier in AM. Agree with above with additions,   overnight events noted, pt states fell backward, c/o L shoulder pain, worse than baseline.   NCAT, limited active ROM of L shoulder, passive ROM of L shoulder approx 90 degree.   XR L shoulder negative for fracture. +soft tissue swelling. CT head reviewed.  L2-L3 Discitis-spoke to ID, Vanco IV and ceftriaxone 2g iv qdaily for total of 6weeks.

## 2020-01-16 NOTE — PROGRESS NOTE ADULT - PROBLEM SELECTOR PLAN 1
Pt lost his footing and fell this morning. Endorses pain over left arm, but denies LOC, HA, N/V. No focal neurological deficits on physical exam. CT head shows no acute hemorrhage or fracture.  - f/u XR left shoulder/elbow Pt presenting with persistent lower back pain not relieved by outpatient tx w/ hydrocodone and lumbar epidural 2 weeks prior to admission. MRI lumbar spine shows discitis/osteomyelitis at L2-3 w/ severe degenerative changes; no evidence of epidural abscess. Etiology likely 2/2 contiguous spread (hx of recent lumbar epidural) rather than hematologic spread from bacteremia as pt did not meet SIRS crit on admission and denied fevers or other signs of infection. Patient has remained afebrile with WBC WNL.   - Neurosurgery consult: No acute intervention indicated at this time.  - continue home Plavix (resumed 1/14 after IR biopsy)  - trend weekly ESR, CRP (baseline: ESR 61, CRP 10.6)  - BCx (1/11): Staph epidermidis in 1 of 4 bottles - likely due to contamination given other bottles show NGTD and patient shows no clinical signs of systemic infection and no leukocytosis.   - f/u surveillance BCx (1/13): NGTD  - f/u results from IR bx on 1/13  - ID consult: Recommending vancomycin. Vanc 1g q12hrs started 1/14 6PM for empiric coverage. F/u regarding duration of abx course  - Continue vancomycin 1g q12h given vanc trough was within therapeutic range  - Plan for PICC line for abx administration upon discharge  - f/u SW for DAMIAN placement    #r/o bacteremia  On admission, 1/4 blood cultures grew Staph epidermidis. Pt afebrile on admission, w/o leukocytosis or other signs of systemic infection. Positive culture likely contamination, will follow up with surveillance cultures.  -surveillance BCx (1/13): NGTD

## 2020-01-16 NOTE — DISCHARGE NOTE PROVIDER - PROVIDER TOKENS
FREE:[LAST:[Jez],FIRST:[Meeta],PHONE:[phone]],FAX:[(   )    -],FOLLOWUP:[2 weeks],ESTABLISHEDPATIENT:[T]]

## 2020-01-16 NOTE — DISCHARGE NOTE PROVIDER - NSDCFUSCHEDAPPT_GEN_ALL_CORE_FT
СВЕТЛАНА PARRA ; 01/29/2020 ; [facility] [address]  СВЕТЛАНА PARRA ; 03/10/2020 ; [facility] [address] СВЕТЛАНА PARRA ; 01/29/2020 ; [facility] [address]  СВЕТЛАНА PARRA ; 03/10/2020 ; [facility] [address] СВЕТЛАНА PARRA ; 01/29/2020 ; [facility] [address]  СВЕТЛАНА PARRA ; 03/10/2020 ; [facility] Ne[facility] [address] СВЕТЛАНА PARRA ; 01/29/2020 ; [facility] [address]  СВЕТЛАНА PARRA ; 03/10/2020 ; [facility] Nephro [address] [facility] ; 01/29/2020 ; [facility] U[facility] [address] ; 02/03/2020 ; NPP Med  [address] ; 03/10/2020 ; [facility] Nephro [address]

## 2020-01-16 NOTE — PROGRESS NOTE ADULT - PROBLEM SELECTOR PLAN 5
Hx of CAD s/p PCI/CABG and hx of PAD. No chest pain, SOB, palpitations.  - continue home atorvastatin 40mg qhs  - continue home Toprol 25mg XL daily    #grade 2 holosystolic murmur loudest over RSB  - Pt has hx of aortic valve insufficiency and stenosis, non-rheumatic, dx 1 year ago (per PCP's office)  - Pt denying lightheadedness, dizziness  - followed by cardiologist at [facility]  - TTE (1/15) shows EF 59%, mild-to-moderate aortic regurgitation, aortic sclerosis without significant stenosis. Hx of HTN.  - continue home losartan 25mg daily  - continue home Toprol 25mg daily    #Hyperlipidemia  - Hx of HLD  - continue home atorvastatin 40mg qhs

## 2020-01-16 NOTE — PROGRESS NOTE ADULT - PROBLEM SELECTOR PLAN 6
Hx of HTN.  - continue home losartan 25mg daily  - continue home Toprol 25mg daily    #Hyperlipidemia  - Hx of HLD  - continue home atorvastatin 40mg qhs Hx of prostate cancer, s/p TURP, no CTx or RTx. CT abd/pelvis showing avidly enhancing nodule within the peripheral zone of the left lobe of the prostate gland measuring 11 mm and an additional avidly enhancing nodule centrally measuring 3 cm extending to the posterior wall of the bladder, consistent with known prostate carcinoma, no free fluid, no adenopathy. No acute complaints at this time.  - continue home finasteride 5mg daily  - continue home Tamsulosin 0.4mg qhs  - continue home oxybutynin 5mg TID

## 2020-01-16 NOTE — OCCUPATIONAL THERAPY INITIAL EVALUATION ADULT - PLANNED THERAPY INTERVENTIONS, OT EVAL
ADL retraining/IADL retraining/balance training/neuromuscular re-education/motor coordination training/bed mobility training/orthotic fitting/training/ROM/strengthening/cognitive, visual perceptual/fine motor coordination training/transfer training

## 2020-01-16 NOTE — PROCEDURE NOTE - NSPROCDETAILS_GEN_ALL_CORE
ultrasound assessment/sterile dressing applied/sterile technique, catheter placed/ultrasound guidance/supine position/location identified, draped/prepped, sterile technique used

## 2020-01-16 NOTE — PROGRESS NOTE ADULT - ASSESSMENT
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 Patient was admitted for further w/u for possible discitis/osteomyelitis. S/p MRI showing L2-L3 discitis osteomyelitis w/ small fluid collections within L paraspinal muscles measuring ~1.7cm. S/p IR-guided biopsy. Pt started on empiric therapy for osteomyelitis with vancomycin likely from contiguous spread in the setting of recent epidural.    Problem/Plan - 1:  ·  Problem: Discitis of lumbar region.  Plan: Pt presenting with persistent lower back pain not relieved by outpatient tx w/ hydrocodone and lumbar epidural 2 weeks prior to admission. MRI lumbar spine shows discitis/osteomyelitis at L2-3 w/ severe degenerative changes; no evidence of epidural abscess. Etiology likely 2/2 contiguous spread (hx of recent lumbar epidural) rather than hematologic spread from bacteremia as pt did not meet SIRS crit on admission and denied fevers or other signs of infection. Patient has remained afebrile with WBC WNL.   - Neurosurgery consult: No acute intervention indicated at this time.  - continue home Plavix (resumed 1/14 after IR biopsy)  - trend weekly ESR, CRP (baseline: ESR 61, CRP 10.6)  - BCx (1/11): Staph epidermidis in 1 of 4 bottles - likely due to contamination given other bottles show NGTD and patient shows no clinical signs of systemic infection and no leukocytosis.   - f/u surveillance BCx (1/13): NGTD  - f/u results from IR bx on 1/13  - ID consult: Recommending vancomycin. Vanc 1g q12hrs started 1/14 6PM for empiric coverage. F/u regarding duration of abx course  - Continue vancomycin 1g q12h given vanc trough was within therapeutic range  - Plan for PICC line for abx administration upon discharge  - f/u SW for DAMIAN placement    #r/o bacteremia  On admission, 1/4 blood cultures grew Staph epidermidis. Pt afebrile on admission, w/o leukocytosis or other signs of systemic infection. Positive culture likely contamination, will follow up with surveillance cultures.  -surveillance BCx (1/13): NGTD.     Problem/Plan - 2:  ·  Problem: Fall with injury, initial encounter.  Plan: Pt lost his footing and fell this morning. Endorses pain over left arm, but denies LOC, HA, N/V. No focal neurological deficits on physical exam. CT head shows no acute hemorrhage or fracture.  - f/u XR left shoulder/elbow.     Problem/Plan - 3:  ·  Problem: Back pain, chronic.  Plan: Hx of lower back and R hip pain after a fall in early December, which persists at severe intensity despite outpatient tx w/ hydrocodone and steroid injections in the epidural lumbar spine and R hip 2 weeks ago.   - pain management with Percocet 5/325 1 tab q4-6h PRN  - PT eval: recommending OT  - f/u OT eval  - fall precautions.     Problem/Plan - 4:  ·  Problem: CAD (coronary artery disease).  Plan: Hx of CAD s/p PCI/CABG and hx of PAD. No chest pain, SOB, palpitations.  - continue home atorvastatin 40mg qhs  - continue home Toprol 25mg XL daily    #grade 2 holosystolic murmur loudest over RSB  - Pt has hx of aortic valve insufficiency and stenosis, non-rheumatic, dx 1 year ago (per PCP's office)  - Pt denying lightheadedness, dizziness  - followed by cardiologist at [facility]  - TTE (1/15) shows EF 59%, mild-to-moderate aortic regurgitation, aortic sclerosis without significant stenosis.     Problem/Plan - 5:  ·  Problem: Essential hypertension.  Plan: Hx of HTN.  - continue home losartan 25mg daily  - continue home Toprol 25mg daily    #Hyperlipidemia  - Hx of HLD  - continue home atorvastatin 40mg qhs.     Problem/Plan - 6:  Problem: Malignant neoplasm of prostate. Plan: Hx of prostate cancer, s/p TURP, no CTx or RTx. CT abd/pelvis showing avidly enhancing nodule within the peripheral zone of the left lobe of the prostate gland measuring 11 mm and an additional avidly enhancing nodule centrally measuring 3 cm extending to the posterior wall of the bladder, consistent with known prostate carcinoma, no free fluid, no adenopathy. No acute complaints at this time.  - continue home finasteride 5mg daily  - continue home Tamsulosin 0.4mg qhs  - continue home oxybutynin 5mg TID.    Problem/Plan - 7:  ·  Problem: Type 2 diabetes mellitus.  Plan: DM2 not on any medications. A1c 6 in 12/19.  - ISS    #Constipation- reports constipation.  - continue senna and Miralax PRN, c/w dulcolax suppository PRN daily  - continue bowel regimen while on opioid medications.     Problem/Plan - 8:  ·  Problem: MARIYA (acute kidney injury).  Plan: RESOLVED  Elevated BUN/Cr 33/1.02 on admission, suggestive of prerenal MARIYA likely 2/2 dehydration given that pt on fluid restriction and Lasix for SIADH.  - BUN/Cr 31/0.92 stable  - continue home Lasix 40mg BID  - encourage PO intake w/ limited fluid intake given hx of SIADH  - avoid nephrotoxic agents    #Hyponatremia 2/2 SIADH- Baseline Na 128-134 per PCP's office. Na has remained at baseline on Lasix and salt tabs. Pt denies HA, fatigue, N/V.   - monitor Cr  - continue salt tabs BID  - continue Lasix considering Cr has returned to baseline.     Problem/Plan - 9:  ·  Problem: Nutrition, metabolism, and development symptoms.  Plan: F: Fluid restriction given hx of SIADH  E: replete K>4, Mg>2  N: Dash/TLC    VTE Prophylaxis: lovenox 40mg QD  C: Full Code  D: RMF.

## 2020-01-16 NOTE — PROGRESS NOTE ADULT - ASSESSMENT
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 Patient was admitted for further w/u for possible discitis/osteomyelitis. S/p MRI showing L2-L3 discitis osteomyelitis w/ small fluid collections within L paraspinal muscles measuring ~1.7cm. S/p IR-guided biopsy. Pt started on empiric therapy for osteomyelitis with vancomycin likely from contiguous spread in the setting of recent epidural.

## 2020-01-16 NOTE — OCCUPATIONAL THERAPY INITIAL EVALUATION ADULT - RANGE OF MOTION EXAMINATION, UPPER EXTREMITY
RUE AROM, PROM  at WFL, WNL. L shoulder AROM/PROM not tested (pending results of x-ray) elbow, wrist and digits at WFL/WNL

## 2020-01-16 NOTE — PROGRESS NOTE ADULT - PROBLEM SELECTOR PLAN 7
Hx of prostate cancer, s/p TURP, no CTx or RTx. CT abd/pelvis showing avidly enhancing nodule within the peripheral zone of the left lobe of the prostate gland measuring 11 mm and an additional avidly enhancing nodule centrally measuring 3 cm extending to the posterior wall of the bladder, consistent with known prostate carcinoma, no free fluid, no adenopathy. No acute complaints at this time.  - continue home finasteride 5mg daily  - continue home Tamsulosin 0.4mg qhs  - continue home oxybutynin 5mg TID DM2 not on any medications. A1c 6 in 12/19.  - ISS    #Constipation- reports constipation.  - continue senna and Miralax PRN, c/w dulcolax suppository PRN daily  - continue bowel regimen while on opioid medications

## 2020-01-16 NOTE — PROGRESS NOTE ADULT - SUBJECTIVE AND OBJECTIVE BOX
INTERVAL HPI/OVERNIGHT EVENTS:    OVERNIGHT: No overnight events.  SUBJECTIVE: Patient seen and examined at bedside. Still have pain in the back.     REVIEW OF SYSTEMS:    All other review of systems is negative unless indicated above.      Allergies    No Known Drug Allergies  Originally Entered as [Rash-General] reaction to [Other][[place]] (Rash)  Originally Entered as [Rash-General] reaction to [Other][Ragweed] (Rash)  ragweed and birch (Unknown)    Intolerances        ANTIBIOTICS/RELEVANT:  antimicrobials  cefTRIAXone   IVPB 2 milliGRAM(s) IV Intermittent every 24 hours  vancomycin  IVPB 1000 milliGRAM(s) IV Intermittent every 12 hours    immunologic:    OTHER:  atorvastatin 40 milliGRAM(s) Oral at bedtime  bisacodyl Suppository 10 milliGRAM(s) Rectal daily  brimonidine 0.2% Ophthalmic Solution 1 Drop(s) Both EYES three times a day  clopidogrel Tablet 75 milliGRAM(s) Oral daily  dextrose 40% Gel 15 Gram(s) Oral once PRN  dextrose 5%. 1000 milliLiter(s) IV Continuous <Continuous>  dextrose 50% Injectable 12.5 Gram(s) IV Push once  dextrose 50% Injectable 25 Gram(s) IV Push once  dextrose 50% Injectable 25 Gram(s) IV Push once  dorzolamide 2%/timolol 0.5% Ophthalmic Solution 1 Drop(s) Both EYES two times a day  enoxaparin Injectable 40 milliGRAM(s) SubCutaneous every 24 hours  finasteride 5 milliGRAM(s) Oral daily  furosemide    Tablet 40 milliGRAM(s) Oral two times a day  glucagon  Injectable 1 milliGRAM(s) IntraMuscular once PRN  insulin lispro (HumaLOG) corrective regimen sliding scale   SubCutaneous Before meals and at bedtime  latanoprost 0.005% Ophthalmic Solution 1 Drop(s) Both EYES at bedtime  loratadine 10 milliGRAM(s) Oral daily  losartan 25 milliGRAM(s) Oral daily  metoprolol succinate ER 25 milliGRAM(s) Oral daily  oxybutynin 5 milliGRAM(s) Oral three times a day  oxycodone    5 mG/acetaminophen 325 mG 1 Tablet(s) Oral every 6 hours PRN  polyethylene glycol 3350 17 Gram(s) Oral daily PRN  senna 2 Tablet(s) Oral at bedtime  sodium chloride 1 Gram(s) Oral every 12 hours  tamsulosin 0.4 milliGRAM(s) Oral at bedtime      Objective:  Vital Signs Last 24 Hrs  T(C): 36.7 (16 Jan 2020 10:30), Max: 36.8 (16 Jan 2020 05:33)  T(F): 98 (16 Jan 2020 10:30), Max: 98.2 (16 Jan 2020 05:33)  HR: 105 (16 Jan 2020 12:10) (96 - 105)  BP: 115/82 (16 Jan 2020 12:10) (111/69 - 145/79)  BP(mean): --  RR: 18 (16 Jan 2020 10:30) (17 - 18)  SpO2: 97% (16 Jan 2020 10:30) (93% - 97%)      PHYSICAL EXAM:    General: NAD, lying comfortably in bed  HEENT: NC/AT; No ecchymosis or tenderness over scalp.  Cardiovascular: +S1/S2; RRR; grade 2 holosystolic murmur loudest over RSB  Respiratory: CTAB; no wheezes/rales/rhonchi  Gastrointestinal: soft, nondistended, mild tenderness to palpation over LLQ; +BSx4  Extremities: 7cm superficial abrasion over dorsal aspect of left forearm, ecchymosis over posterior aspect of left arm. Diffuse tenderness to palpation from left elbow to shoulder. WWP; no edema or cyanosis. No calf tenderness.  Vascular: 2+ radial, DP/PT pulses B/L  Neurological: AAOx3; PEERL, EOMI, facial sensation intact in V1-V3 distributions, face symmetric, hearing grossly intact to finger rub, palate elevation symmetric, uvula midline, head turn and shoulder shrug strength 5/5 b/l, tongue midline. Motor strength in LE 5/5 b/l, RUE 5/5; LUE limited ROM due to pain from elbow to shoulder        LABS:                        11.0   6.24  )-----------( 244      ( 16 Jan 2020 05:47 )             32.2     01-16    131<L>  |  101  |  31<H>  ----------------------------<  122<H>  3.9   |  17<L>  |  0.92    Ca    8.4      16 Jan 2020 05:47  Mg     1.9     01-16            MICROBIOLOGY:            RECENT CULTURES:  01-13 @ 19:34  .Blood Blood-Venous  --  --  --    No growth at 2 days.  --  01-13 @ 16:46  .Body Fluid L2-L3 disc  --  --  --    Insufficient amount of specimen sent which may cause false negative or  inaccurate results.  No growth to date.  --  01-11 @ 22:39  .Blood Blood  Staphylococcus epidermidis  Blood Culture PCR  Staphylococcus epidermidis  PCR    Growth in aerobic and anaerobic bottles: Staphylococcus epidermidis  --      RADIOLOGY & ADDITIONAL STUDIES:

## 2020-01-16 NOTE — OCCUPATIONAL THERAPY INITIAL EVALUATION ADULT - GENERAL OBSERVATIONS, REHAB EVAL
Pt is right hand dominant. Pt's RN aware of intent to tx; cleared Pt. Pt received in supine - +bed alarm, heplock. Pt agreeable to OT.

## 2020-01-16 NOTE — PROVIDER CONTACT NOTE (FALL NOTIFICATION) - ACTION/TREATMENT ORDERED:
CT head & Xray L arm ordered. Pt verbalized understanding of fall risk & calling for assistance. Enhanced rounding initiated.

## 2020-01-16 NOTE — PROGRESS NOTE ADULT - PROBLEM SELECTOR PLAN 3
Hx of lower back and R hip pain after a fall in early December, which persists at severe intensity despite outpatient tx w/ hydrocodone and steroid injections in the epidural lumbar spine and R hip 2 weeks ago.   - pain management with Percocet 5/325 1 tab q4-6h PRN  - PT eval: recommending OT  - f/u OT eval  - fall precautions

## 2020-01-16 NOTE — PROVIDER CONTACT NOTE (FALL NOTIFICATION) - ASSESSMENT
VSS. Pt denies having lost consciousness, and was able to pull himself up by the railing to a seated position. Pt c/o L arm pain.

## 2020-01-16 NOTE — PROVIDER CONTACT NOTE (FALL NOTIFICATION) - BACKGROUND
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

## 2020-01-16 NOTE — PROGRESS NOTE ADULT - PROBLEM SELECTOR PLAN 10
1) PCP Contacted on Admission: (Y/N) --> Name & Phone #: Dr. Meeta Story, [phone]   2) Date of Contact with PCP: 1/15/2020  3) PCP Contacted at Discharge: (Y/N)  4) Summary of Handoff Given to PCP:   5) Post-Discharge Appointment Date and Location:

## 2020-01-16 NOTE — DISCHARGE NOTE PROVIDER - NSDCMRMEDTOKEN_GEN_ALL_CORE_FT
atorvastatin 40 mg oral tablet: 1 tab(s) orally once a day  brimonidine 0.2% ophthalmic solution: 1 drop(s) to each affected eye 3 times a day  clopidogrel 75 mg oral tablet: 1 tab(s) orally once a day  desloratadine 5 mg oral tablet: 1 tab(s) orally once a day  dorzolamide-timolol 2%-0.5% preservative-free ophthalmic solution: 1 drop(s) to each affected eye 2 times a day  finasteride 5 mg oral tablet: 1 tab(s) orally once a day  Lasix 40 mg oral tablet: 40 milligram(s) orally 2 times a day   latanoprost 0.005% ophthalmic solution: 1 drop(s) to each affected eye once a day (in the evening)  losartan 25 mg oral tablet: 1 tab(s) orally once a day  metoprolol succinate 25 mg oral tablet, extended release: 1 tab(s) orally once a day  oxybutynin 5 mg oral tablet: 1 tab(s) orally 3 times a day  polyethylene glycol 3350 oral powder for reconstitution: 17 gram(s) orally every 12 hours  sodium chloride 1 g oral tablet: 2 tab(s) orally every 12 hours  tamsulosin 0.4 mg oral capsule: 1 cap(s) orally once a day atorvastatin 40 mg oral tablet: 1 tab(s) orally once a day  brimonidine 0.2% ophthalmic solution: 1 drop(s) to each affected eye 3 times a day  cefTRIAXone: 2 gram(s) intravenous once a day  clopidogrel 75 mg oral tablet: 1 tab(s) orally once a day  desloratadine 5 mg oral tablet: 1 tab(s) orally once a day  dorzolamide-timolol 2%-0.5% preservative-free ophthalmic solution: 1 drop(s) to each affected eye 2 times a day  finasteride 5 mg oral tablet: 1 tab(s) orally once a day  Lasix 40 mg oral tablet: 40 milligram(s) orally 2 times a day   latanoprost 0.005% ophthalmic solution: 1 drop(s) to each affected eye once a day (in the evening)  losartan 25 mg oral tablet: 1 tab(s) orally once a day  metoprolol succinate 25 mg oral tablet, extended release: 1 tab(s) orally once a day  oxybutynin 5 mg oral tablet: 1 tab(s) orally 3 times a day  polyethylene glycol 3350 oral powder for reconstitution: 17 gram(s) orally every 12 hours  sodium chloride 1 g oral tablet: 2 tab(s) orally every 12 hours  tamsulosin 0.4 mg oral capsule: 1 cap(s) orally once a day  vancomycin 1 g intravenous injection: 1 gram(s) intravenous every 12 hours

## 2020-01-16 NOTE — PROVIDER CONTACT NOTE (FALL NOTIFICATION) - SITUATION
Found pt in restroom alone. Pt states he fell backwards in restroom and got up by himself. Pt states he fell onto L arm and hit head. Dr. Zapata called at bedside to assess pt. EPIFANIO sewell.

## 2020-01-16 NOTE — DISCHARGE NOTE PROVIDER - HOSPITAL COURSE
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        Presented with persistent severe lower back pain and right hip pain for >1 month despite outpatient treatment with hydrocodone and steroid injections (at lumbar spine and right hip 2 weeks prior to admission), found to have L2-L3 discitis/osteomyelitis with small fluid collections within left paraspinal muscles measuring ~1.7cm.        Problem List/Main Diagnoses (system-based):    1. Discitis/osteomyelitis of lumbar region    Patient presented with persistent lower back pain not relieved by outpatient treatment w/ hydrocodone and lumbar epidural 2 weeks prior to admission. Patient remained clinically stable throughout hospital course and showed no signs of localized or systemic infection. CT abd/pelvis showed progressive erosive change and sclerosis of the L2/3 interspace with persistent degenerative change of the lower lumbar spine. MRI lumbar spine showed discitis/osteomyelitis at L2-3 w/ severe degenerative changes; no evidence of epidural abscess. CT-guided biopsy of the L2-3 disc space was performed; cultures of the specimen were negative, though there was an insufficient amount of specimen which may cause false negative or inaccurate results. 1 of 4 initial blood cultures was positive for Staphylococcus epidermidis, likely a result of contamination given that surveillance cultures and biopsy specimen were negative prior to initiation of antibiotics. Per Infectious Disease recommendations, patient was started on empiric treatment with vancomycin 1g q12h (1st dose on 1/14 at 6PM). Neurosurgery was consulted and they noted that no acute intervention is indicated at this time. PICC line was inserted on 1/16 for antibiotic administration.     - Patient is to continue on vancomycin 1g q12h and ceftriaxone 2g q24h for 6 weeks total duration of treatment. ESR and CRP should be trended weekly (baseline on 1/13: ESR 61, CRP 10.6) to monitor therapeutic response. Patient is to follow up with Infectious Disease.     - The etiology of patient's osteomyelitis is likely secondary to contiguous spread (given his history of recent lumbar epidural) rather than hematologic spread from bacteremia as patient did not meet SIRS criteria on admission and denied fevers or other signs of infection.         2. Back pain, acute on chronic    Patient has a history of lower back and right hip pain after a fall in early December, which persists at severe intensity despite outpatient treatment w/ hydrocodone and steroid injections in the epidural lumbar spine and R hip 2 weeks prior to admission. His chronic back pain may have been exacerbated by osteomylitis. Patient was given Percocet 5/325 1 tab q4-6h PRN for pain management.        3. MARIYA (acute kidney injury)    Patient was noted to have elevated BUN/Cr 33/1.02 on admission, suggestive of prerenal MARIYA likely secondary to dehydration given that patient is on fluid restriction and Lasix for SIADH. His Cr returned to baseline following IV fluid administration and holding his home Lasix. Lasix was later resumed and BUN/Cr remained stable.         4. Hyponatremia secondary to SIADH    Patient has a history of SIADH with a baseline Na 128-134 per PCP's office. Na remained at baseline on Lasix and salt tabs. Patient denied headache, fatigue, or nausea/vomiting.          5. Grade 2 holosystolic murmur loudest over RSB    Patient has a history of aortic valve insufficiency and stenosis, non-rheumatic, dx 1 year ago (per PCP's office). Patient denied any lightheadedness/dizziness, chest pain, palpitations, or SOB. TTE (1/15) showed EF 59%, mild-to-moderate aortic regurgitation, aortic sclerosis without significant stenosis. Patient is followed by a cardiologist at [facility].          6. Fall with injury    During patient's hospital course, he lost his footing while in the bathroom and fell. Patient endorsed diffuse pain over the left arm, but denied loss of consciousness, headache, or nausea/vomiting. Physical exam was notable for a superficial abrasion on the left posterior forearm, ecchymosis over the left posterior arm, and diffuse tenderness to palpation extending from the left elbow to shoulder; there were no focal neurological deficits. CT head showed no acute intracranial hemorrhage or fracture. XR left elbow and shoulder showed soft tissue swelling in the arm, but no fracture, dislocation, or effusion.             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        Presented with persistent severe lower back pain and right hip pain for >1 month despite outpatient treatment with hydrocodone and steroid injections (at lumbar spine and right hip 2 weeks prior to admission), found to have L2-L3 discitis/osteomyelitis with small fluid collections within left paraspinal muscles measuring ~1.7cm.        Problem List/Main Diagnoses (system-based):    1. Discitis/osteomyelitis of lumbar region    Patient presented with persistent lower back pain not relieved by outpatient treatment w/ hydrocodone and lumbar epidural 2 weeks prior to admission. Patient remained clinically stable throughout hospital course and showed no signs of localized or systemic infection. CT abd/pelvis showed progressive erosive change and sclerosis of the L2/3 interspace with persistent degenerative change of the lower lumbar spine. MRI lumbar spine showed discitis/osteomyelitis at L2-3 w/ severe degenerative changes; no evidence of epidural abscess. CT-guided biopsy of the L2-3 disc space was performed; cultures of the specimen were negative, though there was an insufficient amount of specimen which may cause false negative or inaccurate results; AFB preliminary result was negative. 1 of 4 initial blood cultures was positive for Staphylococcus epidermidis, likely a result of contamination given that surveillance cultures and biopsy specimen were negative prior to initiation of antibiotics. Per Infectious Disease recommendations, patient was started on empiric treatment with vancomycin 1g q12h (1st dose on 1/14 at 6PM), and ceftriaxone was added on 1/16 evening. Neurosurgery was consulted and they noted that no acute intervention is indicated at this time. PICC line was inserted on 1/16 for antibiotic administration.     - Patient is to continue on vancomycin 1g q12h and ceftriaxone 2g q24h for 6 weeks total duration of treatment. ESR and CRP should be trended weekly (baseline on 1/13: ESR 61, CRP 10.6) to monitor therapeutic response. Patient is to follow up with Infectious Disease.     - The etiology of patient's osteomyelitis is likely secondary to contiguous spread (given his history of recent lumbar epidural) rather than hematologic spread from bacteremia as patient did not meet SIRS criteria on admission and denied fevers or other signs of infection.         2. Back pain, acute on chronic    Patient has a history of lower back and right hip pain after a fall in early December, which persists at severe intensity despite outpatient treatment w/ hydrocodone and steroid injections in the epidural lumbar spine and R hip 2 weeks prior to admission. His chronic back pain may have been exacerbated by osteomylitis. Patient was given Percocet 5/325 1 tab q4-6h PRN for pain management.        3. MARIYA (acute kidney injury)    Patient was noted to have elevated BUN/Cr 33/1.02 on admission, suggestive of prerenal MARIYA likely secondary to dehydration given that patient is on fluid restriction and Lasix for SIADH. His Cr returned to baseline following IV fluid administration and holding his home Lasix. Lasix was later resumed and BUN/Cr remained stable.         4. Hyponatremia secondary to SIADH    Patient has a history of SIADH with a baseline Na 128-134 per PCP's office. Na remained at baseline on Lasix and salt tabs. Patient denied headache, fatigue, or nausea/vomiting.          5. Grade 2 holosystolic murmur loudest over RSB    Patient has a history of aortic valve insufficiency and stenosis, non-rheumatic, dx 1 year ago (per PCP's office). Patient denied any lightheadedness/dizziness, chest pain, palpitations, or SOB. TTE (1/15) showed EF 59%, mild-to-moderate aortic regurgitation, aortic sclerosis without significant stenosis. Patient is followed by a cardiologist at [facility].          6. Fall with injury    During patient's hospital course, he lost his footing while in the bathroom and fell. Patient endorsed diffuse pain over the left arm, but denied loss of consciousness, headache, or nausea/vomiting. Physical exam was notable for a superficial abrasion on the left posterior forearm, ecchymosis over the left posterior arm, and diffuse tenderness to palpation extending from the left elbow to shoulder; there were no focal neurological deficits. CT head showed no acute intracranial hemorrhage or fracture. XR left elbow and shoulder showed soft tissue swelling in the arm, but no fracture, dislocation, or effusion.             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        Presented with persistent severe lower back pain and right hip pain for >1 month despite outpatient treatment with hydrocodone and steroid injections (at lumbar spine and right hip 2 weeks prior to admission), found to have L2-L3 discitis/osteomyelitis with small fluid collections within left paraspinal muscles measuring ~1.7cm.        Problem List/Main Diagnoses (system-based):    1. Discitis/osteomyelitis of lumbar region    Patient presented with persistent lower back pain not relieved by outpatient treatment w/ hydrocodone and lumbar epidural 2 weeks prior to admission. Patient remained clinically stable throughout hospital course and showed no signs of localized or systemic infection. CT abd/pelvis showed progressive erosive change and sclerosis of the L2/3 interspace with persistent degenerative change of the lower lumbar spine. MRI lumbar spine showed discitis/osteomyelitis at L2-3 w/ severe degenerative changes; no evidence of epidural abscess. CT-guided biopsy of the L2-3 disc space was performed; cultures of the specimen were negative, though there was an insufficient amount of specimen which may cause false negative or inaccurate results; AFB preliminary result was negative. 1 of 4 initial blood cultures was positive for Staphylococcus epidermidis, likely a result of contamination given that surveillance cultures and biopsy specimen were negative prior to initiation of antibiotics. Per Infectious Disease recommendations, patient was started on empiric treatment with vancomycin 1g q12h (1st dose on 1/14 at 6PM), and ceftriaxone was added on 1/16 evening. Neurosurgery was consulted and they noted that no acute intervention is indicated at this time. PICC line was inserted on 1/16 for antibiotic administration.     - Patient is to continue on vancomycin 1g q12h and ceftriaxone 2g q24h for 6 weeks total duration of treatment. ESR and CRP should be trended weekly (baseline on 1/13: ESR 61, CRP 10.6) to monitor therapeutic response. Patient is to follow up with Infectious Disease.     - The etiology of patient's osteomyelitis is likely secondary to contiguous spread (given his history of recent lumbar epidural) rather than hematologic spread from bacteremia as patient did not meet SIRS criteria on admission and denied fevers or other signs of infection.         2. Back pain, acute on chronic    Patient has a history of lower back and right hip pain after a fall in early December, which persists at severe intensity despite outpatient treatment w/ hydrocodone and steroid injections in the epidural lumbar spine and R hip 2 weeks prior to admission. His chronic back pain may have been exacerbated by osteomylitis. Patient was given Percocet 5/325 1 tab q4-6h PRN for pain management.        3. MARIYA (acute kidney injury)    Patient was noted to have elevated BUN/Cr 33/1.02 on admission, suggestive of prerenal MARIYA likely secondary to dehydration given that patient is on fluid restriction and Lasix for SIADH. His Cr returned to baseline following IV fluid administration and holding his home Lasix. Lasix was later resumed and BUN/Cr remained stable.         4. Hyponatremia secondary to SIADH    Patient has a history of SIADH with a baseline Na 128-134 per PCP's office. Na remained at baseline on Lasix and salt tabs. Patient denied headache, fatigue, or nausea/vomiting.          5. Grade 2 holosystolic murmur loudest over RSB    Patient has a history of aortic valve insufficiency and stenosis, non-rheumatic, dx 1 year ago (per PCP's office). Patient denied any lightheadedness/dizziness, chest pain, palpitations, or SOB. TTE (1/15) showed EF 59%, mild-to-moderate aortic regurgitation, aortic sclerosis without significant stenosis. Patient is followed by a cardiologist at [facility].          6. Fall     During patient's hospital course, he lost his footing while in the bathroom and fell. Patient endorsed diffuse pain over the left arm, but denied loss of consciousness, headache, or nausea/vomiting. Physical exam was notable for a superficial abrasion on the left posterior forearm, ecchymosis over the left posterior arm, and diffuse tenderness to palpation extending from the left elbow to shoulder; there were no focal neurological deficits. CT head showed no acute intracranial hemorrhage or fracture. XR left elbow and shoulder showed soft tissue swelling in the arm, but no fracture, dislocation, or effusion.             Inpatient treatment course: (see above)        New medications: vancomycin 1g q12h for 6 weeks total duration, ceftriaxone 2g q24h for 6 weeks total duration, bowel regimen        Labs to be followed outpatient: CBC, weekly ESR & CRP        Exam to be followed outpatient: f/u with Infectious Disease

## 2020-01-16 NOTE — PROGRESS NOTE ADULT - ASSESSMENT
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 Seen by pain specialist (Dr. Deleon) and had lumbar epidural and R hip injection 2 weeks ago without relief. No weakness, numbness, paresthesias, bowel/bladder incontinence, fevers or chills, n/v/d/c.  In the ED, vitals were T 97.2F, Labs s/f CBC unremarkable. CT abd/pelvis with findings worrisome for development of osteomyelitis/discitis at L2/3 and findings of prostate malignancy (known). Neurosurgery was consulted, IR  drained the fluid collection and sent for culture that is pending.     - Please c/w vancomycin based on trough  - f/u the result of biopsy and culture  - Please repeat the blood culture       Plan discussed with Dr. Rubio and primary team. 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 Seen by pain specialist (Dr. Deleon) and had lumbar epidural and R hip injection 2 weeks ago without relief. No weakness, numbness, paresthesias, bowel/bladder incontinence, fevers or chills, n/v/d/c.  In the ED, vitals were T 97.2F, Labs s/f CBC unremarkable. CT abd/pelvis with findings worrisome for development of osteomyelitis/discitis at L2/3 and findings of prostate malignancy (known). Neurosurgery was consulted, IR  drained the fluid collection and sent for culture that is pending.     - Please c/w vancomycin 1gr q12 based on trough for 6 weeks   - start him on ceftriaxone 2gr q24h for 6 weeks  - Get a chest xray to evaluate for any cavitary lesion in lungs    - f/u the result of biopsy and culture  - f/u the repeat blood culture  - pt needs ID clinic follow up; we will arrange the appointment for him  - needs weekly CBC/diff, CMP, ESR and vanc level        Plan discussed with Dr. Rubio and primary team.   we will sign off on this pt. Please call with any questions

## 2020-01-16 NOTE — DISCHARGE NOTE PROVIDER - NSDCCPTREATMENT_GEN_ALL_CORE_FT
PRINCIPAL PROCEDURE  Procedure: Transthoracic echo  Findings and Treatment: CONCLUSIONS:   1. Normal left and right ventricular size and function.   2. Moderate symmetric left ventricular hypertrophy.   3. Grade I left ventricular diastolic dysfunction.   4. Mild-to-moderate aortic regurgitation.   5. Aortic sclerosis without significant stenosis.   6. No evidence of pulmonary hypertension.   7. No pericardial effusion.   8. The aortic root is mildly dilated and measures 4.2 cm. The ascending aorta is moderately dilated and measures 4.6 cm.

## 2020-01-16 NOTE — DISCHARGE NOTE PROVIDER - NSDCCPCAREPLAN_GEN_ALL_CORE_FT
PRINCIPAL DISCHARGE DIAGNOSIS  Diagnosis: Discitis of lumbosacral region  Assessment and Plan of Treatment: You presented to the hospital for persistent lower back pain that was not relieved by treatment with hydrocodone and a lumbar epidural 2 weeks prior to admission. You did not appear to have any signs of infection, such as fever, on physical exam. However, an MRI showed the presence of discitis/osteomyelitis (bone infection) of the lumbar spine. You were started on antibiotics for the treatment of osteomyelitis.   - Please continue taking vancomycin twice a day (one dose at 7AM and another at 7PM each day) for a total of 6 weeks. Your 1st dose was on 1/14 evening, so your last dose will be on 2/25.  - Please also continue taking ceftriaxone once a day (at 7PM each day) for 6 weeks.   - Please follow up at the Infectious Disease clinic to monitor your treatment.      SECONDARY DISCHARGE DIAGNOSES  Diagnosis: Back pain, chronic  Assessment and Plan of Treatment: You have a history of lower back and right hip pain, and presented for persistent back pain despite treatment with hydrocodone and steroid injections. Your back pain was likely worsened by the osteomyelitis. You were given Percocet for pain management. Please continue taking antibiotics (as decribed above) for treatment of your osteomyelitis.    Diagnosis: MARIYA (acute kidney injury)  Assessment and Plan of Treatment: Your initial lab work was suggestive of acute kidney injury most likely due to dehydration considering you are on a fluid-restricted diet and taking Lasix for your known history of low sodium levels. We temporarily held your Lasix and you were given IV fluids, and your kidney injury resolved.    Diagnosis: SIADH (syndrome of inappropriate ADH production)  Assessment and Plan of Treatment: You have a known history of chronically low sodium levels, for which you take Lasix and a salt tablet at home. During your admission, we temporarily held your Lasix and administered IV fluids because you were found to have acute kidney injury likely due to dehydration. Your kidney injury resolved, and you were restarted on your home medications. Please continue taking your home Lasix and salt tablet, and continue a fluid-restricted diet. Please follow up with your primary care doctor or endocrinologist.    Diagnosis: Heart murmur  Assessment and Plan of Treatment: You were incidentally found to have a heart murmur. We contacted your primary care doctor, who confirmed that you have a known history of aortic valve insufficiency and stenosis. You denied any chest pain, palpitations, or shortness of breath during your admission. Please follow up with your cardiologist.        Diagnosis: Fall with injury, initial encounter  Assessment and Plan of Treatment: During your admission, you fell while in the bathroom. You reported that you lost your footing and did not loose consciousness, faint, or feel lightheaded/dizzy. On physical exam you had a bruise and superficial abrasion on the left arm, but there were no signs of significant trauma. Imaging showed no signs of internal bleeding, fracture, or dislocation. PRINCIPAL DISCHARGE DIAGNOSIS  Diagnosis: Discitis of lumbosacral region  Assessment and Plan of Treatment: You presented to the hospital for persistent lower back pain that was not relieved by treatment with hydrocodone and a lumbar epidural 2 weeks prior to admission. You did not appear to have any signs of infection, such as fever, on physical exam. However, an MRI showed the presence of discitis/osteomyelitis (bone infection) of the lumbar spine. You were started on antibiotics for the treatment of osteomyelitis.   - Please continue taking vancomycin twice a day (one dose at 7AM and another at 7PM each day) for a total of 6 weeks. Your 1st dose was on 1/14 evening, so your last dose will be on 2/25.  - Please also continue taking ceftriaxone once a day (at 7PM each day) for 6 weeks.   - Please follow up at the Infectious Disease clinic to monitor your treatment.      SECONDARY DISCHARGE DIAGNOSES  Diagnosis: Back pain, chronic  Assessment and Plan of Treatment: You have a history of lower back and right hip pain, and presented for persistent back pain despite treatment with hydrocodone and steroid injections. Your back pain was likely worsened by the osteomyelitis. You were given Percocet for pain management. Please continue taking antibiotics (as decribed above) for treatment of your osteomyelitis.    Diagnosis: MARIYA (acute kidney injury)  Assessment and Plan of Treatment: Your initial lab work was suggestive of acute kidney injury most likely due to dehydration considering you are on a fluid-restricted diet and taking Lasix for your known history of low sodium levels. We temporarily held your Lasix and you were given IV fluids, and your kidney injury resolved.    Diagnosis: SIADH (syndrome of inappropriate ADH production)  Assessment and Plan of Treatment: You have a known history of chronically low sodium levels, for which you take Lasix and a salt tablet at home. During your admission, we temporarily held your Lasix and administered IV fluids because you were found to have acute kidney injury likely due to dehydration. Your kidney injury resolved, and you were restarted on your home medications. Please continue taking your home Lasix and salt tablet, and continue a fluid-restricted diet. Please follow up with your primary care doctor or endocrinologist.    Diagnosis: Heart murmur  Assessment and Plan of Treatment: You were incidentally found to have a heart murmur, which is an extra heart sound that is heard on exam. You denied any chest pain, palpitations, or shortness of breath during your hospitalization. We contacted your primary care doctor, who confirmed that you have a known history of aortic valve insufficiency and stenosis. Please follow up with your cardiologist.        Diagnosis: Fall with injury, initial encounter  Assessment and Plan of Treatment: During your admission, you fell while in the bathroom. You reported that you lost your footing and did not loose consciousness, faint, or feel lightheaded/dizzy. On physical exam you had a bruise and superficial abrasion on the left arm, but there were no signs of significant trauma. Imaging showed no signs of internal bleeding, fracture, or dislocation. PRINCIPAL DISCHARGE DIAGNOSIS  Diagnosis: Discitis of lumbosacral region  Assessment and Plan of Treatment: You presented to the hospital for persistent lower back pain that was not relieved by treatment with hydrocodone and a lumbar epidural 2 weeks prior to admission. An MRI of your back showed the presence of discitis/osteomyelitis (bone infection) of the lumbar spine. You were started on antibiotics for the treatment of osteomyelitis, which will be completed after discharge.   - Please continue taking vancomycin twice a day (12hrs apart) for a total of 6 weeks. Your 1st dose was on 1/14 evening, so your last dose will be on 2/25.  - Please also continue taking ceftriaxone once a day (at 7PM each day) for 6 weeks. (You will complete both antibiotics on the same day, for a full 6 week course)  - Please follow up at the Infectious Disease clinic to monitor your treatment.      SECONDARY DISCHARGE DIAGNOSES  Diagnosis: Heart murmur  Assessment and Plan of Treatment: You were incidentally found to have a heart murmur, which is an abnormal heart sound that is heard on exam. We contacted your primary care doctor, who confirmed that you have a known history of aortic valve insufficiency and stenosis. Please follow up with your cardiologist.    Diagnosis: Fall with injury, initial encounter  Assessment and Plan of Treatment: During your admission, you fell while in the bathroom. You reported that you lost your footing and did not loose consciousness, faint, or feel lightheaded/dizzy. On physical exam you had a bruise and superficial abrasion on the left arm, but there were no signs of significant trauma. Imaging showed no signs of internal bleeding, fracture, or dislocation.    Diagnosis: SIADH (syndrome of inappropriate ADH production)  Assessment and Plan of Treatment: You have a known history of chronically low sodium levels, for which you take Lasix and a salt tablet at home. During your admission, we temporarily held your Lasix and administered IV fluids because you were found to have acute kidney injury likely due to dehydration. Your kidney injury resolved, and you were restarted on your home medications. Please continue taking your home Lasix and salt tablet, and continue a fluid-restricted diet. Please follow up with your primary care doctor or endocrinologist.    Diagnosis: MARIYA (acute kidney injury)  Assessment and Plan of Treatment: Your initial lab work was suggestive of acute kidney injury most likely due to dehydration considering you are on a fluid-restricted diet and taking Lasix for your known history of low sodium levels. We temporarily held your Lasix and you were given IV fluids, and your kidney injury resolved.    Diagnosis: Back pain, chronic  Assessment and Plan of Treatment: You have a history of lower back and right hip pain, and presented for persistent back pain despite treatment with hydrocodone and steroid injections. Your back pain was likely worsened by the osteomyelitis. You were given Percocet for pain management. Please continue taking antibiotics (as decribed above) for treatment of your osteomyelitis.

## 2020-01-16 NOTE — OCCUPATIONAL THERAPY INITIAL EVALUATION ADULT - MD ORDER
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

## 2020-01-16 NOTE — PROGRESS NOTE ADULT - SUBJECTIVE AND OBJECTIVE BOX
Physical Medicine and Rehabilitation Progress Note:    Patient is a 86y old  Male who presents with a chief complaint of Back pain (16 Jan 2020 15:38)      HPI:  86M with PMH of CAD s/p PCI/CABG on Plavix, PAD s/p stent RLE, aortic aneurysm, HTN, HLD, BPH, Prostate ca s/p TURP (no CTX or RTx), SBO, chronic hyponatremia 2/2 SIADH (on salt tabs and Lasix), R apex ground-glass lung nodule (ruled out malignancy by pulm), with recent admission in December for dizziness 2/2 hyponatremia, at that time complaining of LBP after a fall earlier in the month, who presents with persistent severe low back pain and R hip pain despite outpatient treatment with hydrocodone and steroid injections. Pain difficult to describe and just described as severe. States that pain is somewhat relieved by hydrocodone but then returns once hydrocodone wears off. Seen by pain specialist (Dr. Deloen) and had lumbar epidural and L hip injection 2 weeks ago without relief. No weakness, numbness, paresthesias, bowel/bladder incontinence, fevers or chills, n/v/d/c. Endorses constipation, last BM 3 days ago. No black/bloody stools. Last ambulated today 2 blocks w/ cane, which is increased from his usual activity. Per notes, patient has been advised to use walker by PMD, however patient has been using cane since he lives on a 4th story walk-up. Has not called his pain specialist or PMD in past 2 weeks to discuss symptoms.    In the ED, vitals were T 97.2F,  (), BP /70s, RR 18, SpO2 98% on room air. Labs s/f CBC unremarkable, elevation in BUN/Cr (compared to 12/19) to 33/1.02, lipase 64. UA with trace blood, bacteria present and few hyaline casts. CT abd/pelvis with findings worrisome for development of osteomyelitis/discitis at L2/3 and findings of prostate malignancy (known). Neurosurgery was consulted who recommended IR guided of L2/3, MRI w/wo contrast, blood cultures. Patient given Toradol 15mg IV, and 500cc NS. (11 Jan 2020 18:15)                            11.0   6.24  )-----------( 244      ( 16 Jan 2020 05:47 )             32.2       01-16    131<L>  |  101  |  31<H>  ----------------------------<  122<H>  3.9   |  17<L>  |  0.92    Ca    8.4      16 Jan 2020 05:47  Mg     1.9     01-16      Vital Signs Last 24 Hrs  T(C): 36.7 (16 Jan 2020 10:30), Max: 36.8 (16 Jan 2020 05:33)  T(F): 98 (16 Jan 2020 10:30), Max: 98.2 (16 Jan 2020 05:33)  HR: 105 (16 Jan 2020 12:10) (96 - 105)  BP: 115/82 (16 Jan 2020 12:10) (111/69 - 145/79)  BP(mean): --  RR: 18 (16 Jan 2020 10:30) (17 - 18)  SpO2: 97% (16 Jan 2020 10:30) (93% - 97%)    MEDICATIONS  (STANDING):  atorvastatin 40 milliGRAM(s) Oral at bedtime  bisacodyl Suppository 10 milliGRAM(s) Rectal daily  brimonidine 0.2% Ophthalmic Solution 1 Drop(s) Both EYES three times a day  cefTRIAXone   IVPB 2 milliGRAM(s) IV Intermittent every 24 hours  chlorhexidine 2% Cloths 1 Application(s) Topical <User Schedule>  clopidogrel Tablet 75 milliGRAM(s) Oral daily  dextrose 5%. 1000 milliLiter(s) (50 mL/Hr) IV Continuous <Continuous>  dextrose 50% Injectable 12.5 Gram(s) IV Push once  dextrose 50% Injectable 25 Gram(s) IV Push once  dextrose 50% Injectable 25 Gram(s) IV Push once  dorzolamide 2%/timolol 0.5% Ophthalmic Solution 1 Drop(s) Both EYES two times a day  enoxaparin Injectable 40 milliGRAM(s) SubCutaneous every 24 hours  finasteride 5 milliGRAM(s) Oral daily  furosemide    Tablet 40 milliGRAM(s) Oral two times a day  insulin lispro (HumaLOG) corrective regimen sliding scale   SubCutaneous Before meals and at bedtime  latanoprost 0.005% Ophthalmic Solution 1 Drop(s) Both EYES at bedtime  loratadine 10 milliGRAM(s) Oral daily  losartan 25 milliGRAM(s) Oral daily  metoprolol succinate ER 25 milliGRAM(s) Oral daily  oxybutynin 5 milliGRAM(s) Oral three times a day  senna 2 Tablet(s) Oral at bedtime  sodium chloride 1 Gram(s) Oral every 12 hours  tamsulosin 0.4 milliGRAM(s) Oral at bedtime  vancomycin  IVPB 1000 milliGRAM(s) IV Intermittent every 12 hours    MEDICATIONS  (PRN):  dextrose 40% Gel 15 Gram(s) Oral once PRN Blood Glucose LESS THAN 70 milliGRAM(s)/deciliter  glucagon  Injectable 1 milliGRAM(s) IntraMuscular once PRN Glucose LESS THAN 70 milligrams/deciliter  oxycodone    5 mG/acetaminophen 325 mG 1 Tablet(s) Oral every 6 hours PRN Severe Pain (7 - 10)  polyethylene glycol 3350 17 Gram(s) Oral daily PRN Constipation  sodium chloride 0.9% lock flush 10 milliLiter(s) IV Push every 1 hour PRN Pre/post blood products, medications, blood draw, and to maintain line patency    Currently Undergoing Physical Therapy at bedside.    Functional Status Assessment:    Previous Level of Function:     · Ambulation Skills	independent; needs device	  · Transfer Skills	independent; needs device	  · ADL Skills	independent	  · Additional Comments	Pt reports living alone in a 4 floor walk up apartment with b/l HRs. Pt reports being independent with mobility using a quad cane and refuses to use RW secondary to difficulty managing it. Pt reports his son assists with grocery shopping and occasionally dressing secondary to chronic L UE weakness/pain, which pt noted on previous admission to [facility]. Pt reports being independent with all other ADL's.	    Cognitive Status Examination:   · Orientation	oriented to person, place, time and situation	  · Level of Consciousness	alert	  · Follows Commands and Answers Questions	100% of the time	  · Personal Safety and Judgment	intact	    Range of Motion Exam:   · Active Range of Motion Examination	bilateral upper extremity Active ROM was WFL (within functional limits); bilateral  lower extremity Active ROM was WFL (within functional limits); except unable to perform L shoulder flexion/abduction due to pain	  · Passive Range of Motion Examination	bilateral upper extremity Passive ROM was WFL (within functional limits); bilateral lower extremity Passive ROM was WFL (within functional limits); except L UE shoulder flexion/abduction ~90 degrees due to pain	    Manual Muscle Testing:   · Manual Muscle Testing Results	grossly >3/5 t/o b/l UE's/LE's except L shoulder grossly 2/5 in all planes	    Bed Mobility: Rolling/Turning:     · Level of [place]	independent	    Bed Mobility: Scooting/Bridging:     · Level of [place]	independent	    Bed Mobility: Sit to Supine:     · Level of [place]	independent	    Bed Mobility: Supine to Sit:     · Level of [place]	independent	    Transfer: Sit to Stand:     · Level of [place]	independent	  · Weight-Bearing Restrictions	weight-bearing as tolerated	  · Assistive Device	quad cane	    Transfer: Stand to Sit:     · Level of [place]	independent	  · Weight-Bearing Restrictions	weight-bearing as tolerated	  · Assistive Device	quad cane	    Gait Skills:     · Level of [place]	supervision	  · Physical Assist/Nonphysical Assist	nonverbal cues (demo/gestures); verbal cues; supervision	  · Weight-Bearing Restrictions	weight-bearing as tolerated	  · Assistive Device	quad cane	  · Gait Distance	150 feet	    Gait Analysis:     · Gait Pattern Used	2-point gait	  · Gait Deviations Noted	increased time in double stance; decreased step length	  · Impairments Contributing to Gait Deviations	impaired balance; impaired coordination; decreased strength	    Stair Negotiation:     · Level of [place]	supervision; contact guard	  · Physical Assist/Nonphysical Assist	1 person assist; nonverbal cues (demo/gestures); verbal cues	  · Weight-Bearing Restrictions	weight-bearing as tolerated	  · Assistive Device	right rail up; right rail down	  · Stair Pattern	step to step	  · Number of Stairs	3	    Balance Skills Assessment:     · Sitting Balance: Static	normal balance	  · Sitting Balance: Dynamic	normal balance	  · Sit-to-Stand Balance	normal balance	  · Standing Balance: Static	good balance	  · Standing Balance: Dynamic	good balance	  · Systems Impairment Contributing to Balance Disturbance	musculoskeletal	  · Identified Impairments Contributing to Balance Disturbance	decreased strength; pain	    Clinical Impressions:   · Criteria for Skilled Therapeutic Interventions	impairments found; functional limitations in following categories; risk reduction/prevention; rehab potential; therapy frequency; anticipated discharge recommendation	  · Impairments Found (describe specific impairments)	aerobic capacity/endurance; poor safety awareness; ergonomics and body mechanics; posture; gait, locomotion, and balance; muscle strength; decreased midline orientation	  · Functional Limitations in Following Categories (describe specific limitations)	self-care; home management	  · Risk Reduction/Prevention (Describe Specific Areas of risk reduction/prevention)	risk factors	  · Risk Areas	fall	  · Rehab Potential	good, to achieve stated therapy goals	  · Therapy Frequency	2-3x/week	        PM&R Impression: as above    Current Disposition Plan Recommendations: d/c home, outpatient P.T.

## 2020-01-16 NOTE — PROGRESS NOTE ADULT - PROBLEM SELECTOR PLAN 2
Pt presenting with persistent lower back pain not relieved by outpatient tx w/ hydrocodone and lumbar epidural 2 weeks prior to admission. MRI lumbar spine shows discitis/osteomyelitis at L2-3 w/ severe degenerative changes; no evidence of epidural abscess. Etiology likely 2/2 contiguous spread (hx of recent lumbar epidural) rather than hematologic spread from bacteremia as pt did not meet SIRS crit on admission and denied fevers or other signs of infection. Patient has remained afebrile with WBC WNL.   - Neurosurgery consult: No acute intervention indicated at this time.  - continue home Plavix (resumed 1/14 after IR biopsy)  - trend weekly ESR, CRP (baseline: ESR 61, CRP 10.6)  - BCx (1/11): Staph epidermidis in 1 of 4 bottles - likely due to contamination given other bottles show NGTD and patient shows no clinical signs of systemic infection and no leukocytosis.   - f/u surveillance BCx (1/13): NGTD  - f/u results from IR bx on 1/13  - ID consult: Recommending vancomycin. Vanc 1g q12hrs started 1/14 6PM for empiric coverage. F/u regarding duration of abx course  - Continue vancomycin 1g q12h given vanc trough was within therapeutic range  - Plan for PICC line for abx administration upon discharge  - f/u SW for DAMIAN placement    #r/o bacteremia  On admission, 1/4 blood cultures grew Staph epidermidis. Pt afebrile on admission, w/o leukocytosis or other signs of systemic infection. Positive culture likely contamination, will follow up with surveillance cultures.  -surveillance BCx (1/13): NGTD Pt lost his footing and fell this morning. Endorses pain over left arm, but denies LOC, HA, N/V. No focal neurological deficits on physical exam. CT head shows no acute hemorrhage or fracture.  - f/u XR left shoulder/elbow

## 2020-01-16 NOTE — OCCUPATIONAL THERAPY INITIAL EVALUATION ADULT - PERTINENT HX OF CURRENT PROBLEM, REHAB EVAL
persistent lower back pain not relieved by outpatient tx w/ hydrocodone and lumbar epidural 2 weeks prior to admission. MRI lumbar spine shows discitis/osteomyelitis at L2-3 w/ severe degenerative changes; no evidence of epidural abscess. +Discitis of lumbar region. S/P Fall inhouse (in toilet, overnight) 1/16/20 - CT head shows no acute intracranial hemorrhage or fracture. XR left shoulder/elbow pending.

## 2020-01-16 NOTE — PROGRESS NOTE ADULT - PROBLEM SELECTOR PLAN 8
DM2 not on any medications. A1c 6 in 12/19.  - ISS    #Constipation- reports constipation.  - continue senna and Miralax PRN, c/w dulcolax suppository PRN daily  - continue bowel regimen while on opioid medications RESOLVED  Elevated BUN/Cr 33/1.02 on admission, suggestive of prerenal MARIYA likely 2/2 dehydration given that pt on fluid restriction and Lasix for SIADH.  - BUN/Cr 31/0.92 stable  - continue home Lasix 40mg BID  - encourage PO intake w/ limited fluid intake given hx of SIADH  - avoid nephrotoxic agents    #Hyponatremia 2/2 SIADH- Baseline Na 128-134 per PCP's office. Na has remained at baseline on Lasix and salt tabs. Pt denies HA, fatigue, N/V.   - monitor Cr  - continue salt tabs BID  - continue Lasix considering Cr has returned to baseline

## 2020-01-16 NOTE — PROGRESS NOTE ADULT - PROBLEM SELECTOR PLAN 9
F: Fluid restriction given hx of SIADH  E: replete K>4, Mg>2  N: Dash/TLC    VTE Prophylaxis: lovenox 40mg QD  C: Full Code  D: [facility]

## 2020-01-16 NOTE — CHART NOTE - NSCHARTNOTEFT_GEN_A_CORE
Called to patient room at 559am after patient reportedly fell in the bathroom. Patient immediately seen and examined at bedside. Per patient, he rang the bell for assistance in ambulating to the restroom, but was unable to wait and was up and out of bed on his own. He recalls slipping backwards in the bathroom onto his L arm and hitting his head lightly on either the floor or the wall. He denies having lost consciousness, and was able to pull himself up by the railing to a seated position, where he was found by nursing staff. Vitals immediately after the event were as follows: T 97.6F I  I /79 I RR 17 I SpO2 95% on RA. On exam, the patient is reclined in bed and appears anxious. He has sustained a superficial abrasion to the dorsum of his L forearm and is reporting diffuse tenderness to palpation extending from the abrasion proximally to the L shoulder. ROM of the LUE is limited 2/2 pain, specifically abduction and raising overhead. HEENT is normocephalic and nontraumatic. No FNDs on exam; patient is AOx3 and responding appropriately. Denying LOC, palpitations, HA, changes to vision or hearing, preceding symptoms or aura, post-ictal state, CP, weakness, changes to sensation. Urgent NC Head CT & X-rays of the L shoulder and elbow pending.

## 2020-01-16 NOTE — CONSULT NOTE ADULT - SUBJECTIVE AND OBJECTIVE BOX
HISTORY OF PRESENT ILLNESS:   Hx obtained from son at bedside. Pt is 85 y/o M PMH CAD s/p CABG (2016, on plavix, last dose 20), HTN, HLD, BPH, prostate CA s/p TURP, SBO, hyponatremia w/ R apex lung nodule cleared from malignancy w/ pulm presents with worsened LBP x 1 mo. Pt follows with pain management doctor who performed epidural and hip injections 2 weeks ago. No pain relief after tx. Pt denies HA, numbness, paresthesias, weakness, urinary/bladder incontinence, recent fevers/chills.  Pt ambulates with cane and lives in 3rd story walk-up. Pt takes hydrocodone for pain which helps temporarily.    PAST MEDICAL & SURGICAL HISTORY:  Essential hypertension: Hypertension  Type 2 diabetes mellitus: Diabetes  Hyperlipidemia: Hyperlipidemia  Type 2 diabetes mellitus: DM (diabetes mellitus), type 2  Malignant neoplasm of prostate: Prostate cancer  Acute myocardial infarction: Patient denies MI  S/p bilateral blepharoplasty  H/O transurethral destruction of bladder lesion  Bilateral cataracts  Status post aorto-coronary artery bypass graft: S/P CABG (coronary artery bypass graft)  Other postprocedural status: History of penile implant    FAMILY HISTORY: None      SOCIAL HISTORY:  Tobacco Use: None  EtOH use:  None  Substance: None    Allergies    No Known Drug Allergies  Originally Entered as [Rash-General] reaction to [Other][[place]] (Rash)  Originally Entered as [Rash-General] reaction to [Other][Ragweed] (Rash)  ragweed and birch (Unknown)    Intolerances        REVIEW OF SYSTEMS      General:	no recent illnesses, no recent wt gain/loss    Skin/Breast:  intact  	  Ophthalmologic:  negative  	  ENMT:	negative    Respiratory and Thorax: no coughing, wheezing, recent URI  	  Cardiovascular: no chest pain, ALEXANDER    Gastrointestinal:	soft, non tender    Genitourinary: no frequency, dysuria    Musculoskeletal:	negative    Neurological:	see HPI    Psychiatric:	negative    Hematology/Lymphatics:	negative    Endocrine:  	negative    Allergic/Immunologic:  Negative      MEDICATIONS:  Antibiotics:    Neuro:     Anticoagulation: plavix 75mg daily, last dose 20    OTHER:    IVF:      Vital Signs Last 24 Hrs  T(C): 36.2 (2020 12:13), Max: 36.2 (2020 12:13)  T(F): 97.2 (2020 12:13), Max: 97.2 (2020 12:13)  HR: 82 (2020 14:30) (82 - 111)  BP: 114/76 (2020 12:42) (95/61 - 114/76)  BP(mean): --  RR: 17 (2020 12:42) (17 - 18)  SpO2: 97% (2020 12:42) (97% - 98%)    PHYSICAL EXAM:  GEN: pt laying in bed, appears well, NAD  NEURO: AOx3. OE spont, FC, face symmetric, speech clear. CNII-XII intact. PERRL, EOMI. No pronator drift. MAEx4. 5/5 strength throughout. mild TTP over R hip and lumbar spine. SILT  CV: RRR +S1/S2  PULM: CTAB  GI: Abd soft, nontender, nondistended  EXT: Ext warm, nontender, dry    LABS:                        13.5   8.88  )-----------( 262      ( 2020 12:44 )             39.6     -11    137  |  104  |  33<H>  ----------------------------<  166<H>  4.8   |  19<L>  |  1.02    Ca    9.1      2020 12:44    TPro  6.8  /  Alb  3.6  /  TBili  0.7  /  DBili  x   /  AST  17  /  ALT  37  /  AlkPhos  101  -11      Urinalysis Basic - ( 2020 14:41 )    Color: Yellow / Appearance: Clear / S.015 / pH: x  Gluc: x / Ketone: NEGATIVE  / Bili: Negative / Urobili: 0.2 E.U./dL   Blood: x / Protein: NEGATIVE mg/dL / Nitrite: NEGATIVE   Leuk Esterase: NEGATIVE / RBC: < 5 /HPF / WBC < 5 /HPF   Sq Epi: x / Non Sq Epi: 0-5 /HPF / Bacteria: Present /HPF      CULTURES:      RADIOLOGY & ADDITIONAL STUDIES:  CT Abd 20:  Evaluation of the lower chest demonstrates normal heart size. Severe calcification of aortic valve, which correlates with degree of aortic stenosis. Severe coronary arterial calcification. No pleural and no pericardial effusion. Lower lung parenchyma is unremarkable. Evaluation of the abdomen demonstrates mild focal biliary ductal dilatation localized to the lateral segment of the left lobe of liver of unknown etiology. There is hepatic steatosis. The pancreas, spleen, bilateral adrenal glands and bilateral kidneys are normal in appearance aside from several right renal cysts, pancreatic calcifications and a few scattered hepatic hypodensities too small to characterize. Evaluation of the gastrointestinal tract demonstrates fecal loading of stool within the rectum and sigmoid colon no bowel obstruction. No bowel thickening. Normal appearance of the appendix. Evaluation of the pelvis demonstrates an avidly enhancing nodule within the peripheral zone of the left lobe of the prostate gland measuring 11 mm and an additional avidly enhancing nodule centrally measuring 3 cm extending to the posterior wall of the bladder, consistent with known prostate carcinoma. No free fluid. No adenopathy. 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 Opacified aspects of the portal, hepatic, splenic, superior mesenteric vein and bilateral renal veins demonstrates no salbador intraluminal thrombus.Penile implant. Evaluation of the osseous structures demonstrates progressive erosive change and sclerosis of the L2/3 interspace with persistent degenerative change of the lower lumbar spine.      Assessment: Pt is 85 y/o M PMH CAD (s/p stents 2016 on plavix), HTN, HLD, BPH, prostate CA s/p TURP, SBO, hx hyponatremia presenting w/ LBP found to have possible L3-L3 osteomyelitis vs discitis.      Plan:  - admit to medicine service  - needs MRI with and without contrast  - send blood cultures  - pt should be scheduled for IR guided bx   - we will continue to follow pt. please page with any questions at [phone]    Plan above d/w Dr. Morrison
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 Pain difficult to describe and just described as severe. States that pain is somewhat relieved by hydrocodone but then returns once hydrocodone wears off. Seen by pain specialist (Dr. Deleon) and had lumbar epidural and L hip injection 2 weeks ago without relief. No weakness, numbness, paresthesias, bowel/bladder incontinence, fevers or chills, n/v/d/c. Endorses constipation, last BM 3 days ago. No black/bloody stools. Last ambulated today 2 blocks w/ cane, which is increased from his usual activity. Per notes, patient has been advised to use walker by PMD, however patient has been using cane since he lives on a 4th story walk-up. Has not called his pain specialist or PMD in past 2 weeks to discuss symptoms.    In the ED, vitals were T 97.2F,  (), BP /70s, RR 18, SpO2 98% on room air. Labs s/f CBC unremarkable, elevation in BUN/Cr (compared to 12/19) to 33/1.02, lipase 64. UA with trace blood, bacteria present and few hyaline casts. CT abd/pelvis with findings worrisome for development of osteomyelitis/discitis at L2/3 and findings of prostate malignancy (known). Neurosurgery was consulted who recommended IR guided of L2/3, MRI w/wo contrast, blood cultures. Patient given Toradol 15mg IV, and 500cc NS. (11 Jan 2020 18:15)      PAST MEDICAL & SURGICAL HISTORY:  Essential hypertension: Hypertension  Type 2 diabetes mellitus: Diabetes  Hyperlipidemia: Hyperlipidemia  Type 2 diabetes mellitus: DM (diabetes mellitus), type 2  Malignant neoplasm of prostate: Prostate cancer  Acute myocardial infarction: Patient denies MI  S/p bilateral blepharoplasty  H/O transurethral destruction of bladder lesion  Bilateral cataracts  Status post aorto-coronary artery bypass graft: S/P CABG (coronary artery bypass graft)  Other postprocedural status: History of penile implant      MEDICATIONS  (STANDING):  atorvastatin 40 milliGRAM(s) Oral at bedtime  brimonidine 0.2% Ophthalmic Solution 1 Drop(s) Both EYES three times a day  clopidogrel Tablet 75 milliGRAM(s) Oral daily  dextrose 5%. 1000 milliLiter(s) (50 mL/Hr) IV Continuous <Continuous>  dextrose 50% Injectable 12.5 Gram(s) IV Push once  dextrose 50% Injectable 25 Gram(s) IV Push once  dextrose 50% Injectable 25 Gram(s) IV Push once  dorzolamide 2%/timolol 0.5% Ophthalmic Solution 1 Drop(s) Both EYES two times a day  enoxaparin Injectable 40 milliGRAM(s) SubCutaneous every 24 hours  finasteride 5 milliGRAM(s) Oral daily  furosemide    Tablet 40 milliGRAM(s) Oral two times a day  insulin lispro (HumaLOG) corrective regimen sliding scale   SubCutaneous Before meals and at bedtime  latanoprost 0.005% Ophthalmic Solution 1 Drop(s) Both EYES at bedtime  loratadine 10 milliGRAM(s) Oral daily  losartan 25 milliGRAM(s) Oral daily  magnesium sulfate  IVPB 1 Gram(s) IV Intermittent once  metoprolol succinate ER 25 milliGRAM(s) Oral daily  oxybutynin 5 milliGRAM(s) Oral three times a day  potassium chloride    Tablet ER 40 milliEquivalent(s) Oral once  senna 2 Tablet(s) Oral at bedtime  sodium chloride 1 Gram(s) Oral every 12 hours  tamsulosin 0.4 milliGRAM(s) Oral at bedtime  vancomycin  IVPB 1000 milliGRAM(s) IV Intermittent every 12 hours    MEDICATIONS  (PRN):  bisacodyl Suppository 10 milliGRAM(s) Rectal daily PRN Constipation  dextrose 40% Gel 15 Gram(s) Oral once PRN Blood Glucose LESS THAN 70 milliGRAM(s)/deciliter  glucagon  Injectable 1 milliGRAM(s) IntraMuscular once PRN Glucose LESS THAN 70 milligrams/deciliter  oxycodone    5 mG/acetaminophen 325 mG 1 Tablet(s) Oral every 6 hours PRN Severe Pain (7 - 10)  polyethylene glycol 3350 17 Gram(s) Oral daily PRN Constipation      Social History:           -  Occupation: X           -  Home Living Status: lives alone in a 4th floor walkup apartment            -  Prior Home Care Services:  X    Functional Level Prior to Admission:           - ADL's:  independent           - ambulates with a quad cane/ walker     FAMILY HISTORY:  FH: alcohol abuse: mother; father&#x27;s hx is unknown      CBC Full  -  ( 15 Anatoliy 2020 10:33 )  WBC Count : 7.19 K/uL  RBC Count : 4.13 M/uL  Hemoglobin : 11.6 g/dL  Hematocrit : 34.2 %  Platelet Count - Automated : 226 K/uL  Mean Cell Volume : 82.8 fl  Mean Cell Hemoglobin : 28.1 pg  Mean Cell Hemoglobin Concentration : 33.9 gm/dL  Auto Neutrophil # : x  Auto Lymphocyte # : x  Auto Monocyte # : x  Auto Eosinophil # : x  Auto Basophil # : x  Auto Neutrophil % : x  Auto Lymphocyte % : x  Auto Monocyte % : x  Auto Eosinophil % : x  Auto Basophil % : x      01-15    128<L>  |  96  |  29<H>  ----------------------------<  138<H>  3.8   |  19<L>  |  0.92    Ca    8.5      15 Anatoliy 2020 10:33  Mg     1.7     01-15              Radiology:    < from: MR Lumbar Spine w/wo IV Cont (01.13.20 @ 10:14) >  EXAM:  MR SPINE LUMBAR WAW IC                          PROCEDURE DATE:  01/13/2020          INTERPRETATION:  PROCEDURE: MRI of the lumbar spine with and without contrast    INDICATION: Back pain x1 month, recent epidural steroid injection. PossibleL2-3 discitis on recent CT abdomen pelvis.    TECHNIQUE: Sagittal T1, T2, STIR, and axial T1 and T2 weighted images of the lumbar spine were obtained. Following administration of 7.5 mL IV Gadavist, axial and sagittal T1 postcontrast images were obtained.    COMPARISON: CT abdomen pelvis 1/11/2020 and 12/6/2019    FINDINGS:     There is straightening of the normal lumbar lordosis. There is grade 1 anterolisthesis L4 on L5. There is trace retrolisthesis L1 on L2.    There is hyperintensity involving the L2-3 disc space. There are sternal hyperintensity and enhancement involving the inferior aspect of L2 and the superior aspect of L3. These findings are consistent with discitis/osteomyelitis. There is abnormal enhancement involving the left greater than right paraspinal muscles at L2 and L3. There are several small peripherally enhancing, centrally T2 hyperintense regions within the left paraspinal muscle at the L2 and L3 levels which may represent small fluid collections. The largest measures 1.4 cm transverse x 1.3 cm AP x 1.7 cm craniocaudal at the L3 level. There is epidural enhancement at the L2-L3 level along the ventral and dorsal aspect without evidence of a epidural fluid collection. There is enhancement involving the bilateralneural foramina at L2-3.    There is additional STIR hyperintensity within the disc space and L4-5 without involvement of the endplates. The CT abdomen and pelvis from 1/11/2020 demonstrates air within the disc space. These findings may be related todegenerative changes with early discitis not entirely excluded. There is no paravertebral or epidural involvement.    The vertebral body heights are maintained. There is diffuse disc desiccation and disc space narrowing which is most prominent at L4-5 and L5-S1.    The conus medullaris ends at L1-L2.    At the L1/2 level, there is a disc bulge asymmetric to the left along with facet and ligamentum flavum hypertrophy resulting in mild spinal stenosis with moderate right and mild-to-moderate left neural foraminal narrowing    At the L2/3 level, there is epidural enhancement without epidural fluid collection. There is facet and ligamentum flavum hypertrophy as well as prominence of the dorsal epidural fat. This results in severe spinal canal stenosis with thecal sac compression with severe bilateral neural foraminal narrowing with impingement on the exiting L2 nerve roots.    At the L3/4 level, there is no a disc bulge with facet and ligamentum flavum hypertrophy along with prominence of thedorsal epidural fat resulting in moderate spinal canal stenosis and moderate bilateral neural foraminal narrowing with possible impingement on the exiting L3 nerve roots..    At the L4/5 level, there is uncovering the posterior disc margin with a superimposed right paracentral disc protrusion along with facet and ligamentum flavum hypertrophy resulting in moderate to severe spinal canal stenosis with moderate left and mild to moderate right neural foraminal narrowing with possible impingement on the exiting left L4 nerve root.    At the L5/S1 level, there is a disc bulge with facet hypertrophy resulting in severe bilateral neural foraminal narrowing with impingement on the exiting L5 nerve roots.    IMPRESSION:     Discitis osteomyelitis at L2-3 with left greater than right paraspinal enhancement and small fluid collections within the left paraspinal muscles measuring up to 1.7 cm. Epidural enhancement without evidence of epidural abscess. Severe degenerative changes at L2-3 resulting in severe spinal stenosis and thecal sac compression and bilateral neural foraminal narrowing with impingement on the exiting L2 nerve roots.    Additional moderate to severe multilevel degenerative changes at L3-4 through L5-S1 as described above.    Abnormal signal within the L4-L5 disc space with preservation of the endplates, likely representing degenerative changes of the early discitis cannot be excluded.            Vital Signs Last 24 Hrs  T(C): 36.6 (15 Anatoliy 2020 09:45), Max: 36.8 (15 Anatoliy 2020 05:41)  T(F): 97.9 (15 Anatoliy 2020 09:45), Max: 98.2 (15 Anatoliy 2020 05:41)  HR: 108 (15 Anatoliy 2020 09:45) (85 - 108)  BP: 143/78 (15 Anatoliy 2020 09:45) (100/48 - 143/78)  BP(mean): --  RR: 18 (15 Anatoliy 2020 09:45) (16 - 18)  SpO2: 96% (15 Anatoliy 2020 09:45) (96% - 98%)    REVIEW OF SYSTEMS:    CONSTITUTIONAL: No fever, weight loss, or fatigue  EYES: No eye pain, visual disturbances, or discharge  ENMT:  No difficulty hearing, tinnitus, vertigo; No sinus or throat pain  NECK: No pain or stiffness  BREASTS: No pain, masses, or nipple discharge  RESPIRATORY: No cough, wheezing, chills or hemoptysis; No shortness of breath  CARDIOVASCULAR: No chest pain, palpitations, dizziness, or leg swelling  GASTROINTESTINAL: No abdominal or epigastric pain. No nausea, vomiting, or hematemesis; No diarrhea or constipation. No melena or hematochezia.  GENITOURINARY: No dysuria, frequency, hematuria, or incontinence  NEUROLOGICAL: No headaches, memory loss, loss of strength, numbness, or tremors  SKIN: No itching, burning, rashes, or lesions   LYMPH NODES: No enlarged glands  ENDOCRINE: No heat or cold intolerance; No hair loss  MUSCULOSKELETAL: low back pain  PSYCHIATRIC: No depression, anxiety, mood swings, or difficulty sleeping  HEME/LYMPH: No easy bruising, or bleeding gums  ALLERGY AND IMMUNOLOGIC: No hives or eczema  VASCULAR: no swelling, erythema        Physical Exam: 87 yo  gentleman lying in semi Quinonez's position, c/o back pain    Head: normocephalic, atraumatic    Eyes: PERRLA, EOMI, no nystagmus, sclera anicteric    ENT: nasal discharge, uvula midline, no oropharyngeal erythema/exudate    Neck: supple, negative JVD, negative carotid bruits, no thyromegaly    Chest: CTA bilaterally, neg wheeze/ rhonchi/ rales/ crackles/ egophany    Cardiovascular: regular rate and rhythm, neg murmurs/rubs/gallops    Abdomen: soft, non distended, non tender, negative rebound/guarding, normal bowel sounds, neg hepatosplenomegaly    Extremities: WWP, neg cyanosis/clubbing/edema, negative calf tenderness to palpation, negative Tori's sign    Spine: randy L2-4 TTP, neg spasms, neg nerve tension signs    :     Neurologic Exam:    Motor Exam:    Upper Extremities:     RIght:  no focal weakness    Left :   no focal weakness      Lower Extremities:                 Right:   no focal weakness                 Left:      no focal weakness                   Sensory:    intact to LT/PP in all UE/LE dermatomes    DTR:            = biceps/     triceps/     brachioradialis                      = patella/   medial hamstring/ankle                      neg clonus                      neg Babinski                          Gait:  not tested        PM&R Impression:    1) lumbar myofascial pain  2) L 2/3 disciitis/osteomyelitis on MRI  3) no focal neuro deficits      Recommendations:    1) Physical therapy focusing on therapeutic exercises, bed mobility/transfer out of bed evaluation, progressive ambulation with assistive devices prn.    2) Disposition Plan/Recs:  pending functional progress
Vascular Access Service Consult Note    86[facility] ISSUES - PROBLEM Dx:  Fall with injury, initial encounter: Fall with injury, initial encounter  Discitis of lumbosacral region: Discitis of lumbosacral region  CAD (coronary artery disease): CAD (coronary artery disease)  Transition of care performed with sharing of clinical summary: Transition of care performed with sharing of clinical summary  Nutrition, metabolism, and development symptoms: Nutrition, metabolism, and development symptoms  Type 2 diabetes mellitus: Type 2 diabetes mellitus  Malignant neoplasm of prostate: Malignant neoplasm of prostate  Hyperlipidemia: Hyperlipidemia  Essential hypertension: Essential hypertension  MARIYA (acute kidney injury): MARIYA (acute kidney injury)  Back pain, chronic: Back pain, chronic  Discitis of lumbar region: Discitis of lumbar region             Diagnosis: discitis of lumbar     Indications for Vascular Access (Check all that apply)  [ X ]  Antibiotic Therapy       Antibiotic Prescribed:  ceftriaxone/vanc                                                                           Expected Duration of Therapy:  6 weeks              [  ]  IV Hydration  [  ]  Total Parenteral Nutrition  [  ]  Chemotherapy  [  ]  Difficult Venous Access  [  ]  CVP monitoring  [  ]  Medications with high potential for tissue necrosis on extravasation  [  ]  Other    Screening (Check all that apply)  Previous Radiation to chest  [  ] Yes      [ X ]  No  Breast Cancer                          [  ] Left     [  ]  Right    [X  ]  No  Lymph Node Dissection         [  ] Left     [  ]  Right    [X  ]  No  Pacemaker or ICD                   [  ] Left     [  ]  Right    [ X ]  No  Upper Extremity DVT             [  ] Left     [  ]  Right    [X  ]  No  Chronic Kidney Disease         [  ]  Yes     [ X ]  No  Hemodialysis                           [  ]  Yes     [  X]  No  AV Fistula/ Graft                     [  ]  Left    [  ]  Right    [X  ]  No  Temp>101F in past 24 H       [  ]  Yes     [ X ]  No  H/O PICC/Midline                   [  ]  Yes     [X  ]  No    Lab data:                        11.0   6.24  )-----------( 244      ( 16 Jan 2020 05:47 )             32.2     01-16    131<L>  |  101  |  31<H>  ----------------------------<  122<H>  3.9   |  17<L>  |  0.92    Ca    8.4      16 Jan 2020 05:47  Mg     1.9     01-16                I have reviewed the chart, interviewed and examined the patient and determined that this patient:  [ X ] Is a candidate for a PICC line  [  ] Is a candidate for a Midline  [  ] Is not a candidate for vascular access device (reason)    Lumens:    [  ] Single  [ X] Double
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 Seen by pain specialist (Dr. Deleon) and had lumbar epidural and R hip injection 2 weeks ago without relief. No weakness, numbness, paresthesias, bowel/bladder incontinence, fevers or chills, n/v/d/c.  In the ED, vitals were T 97.2F, Labs s/f CBC unremarkable. CT abd/pelvis with findings worrisome for development of osteomyelitis/discitis at L2/3 and findings of prostate malignancy (known). Neurosurgery was consulted, IR  drained the fluid collection and sent for culture         PAST MEDICAL/SURGICAL HISTORY  PAST MEDICAL & SURGICAL HISTORY:  Essential hypertension: Hypertension  Type 2 diabetes mellitus: Diabetes  Hyperlipidemia: Hyperlipidemia  Type 2 diabetes mellitus: DM (diabetes mellitus), type 2  Malignant neoplasm of prostate: Prostate cancer  Acute myocardial infarction: Patient denies MI  S/p bilateral blepharoplasty  H/O transurethral destruction of bladder lesion  Bilateral cataracts  Status post aorto-coronary artery bypass graft: S/P CABG (coronary artery bypass graft)  Other postprocedural status: History of penile implant        T(C): 36.5 (01-14-20 @ 16:41), Max: 36.7 (01-14-20 @ 02:20)  HR: 85 (01-14-20 @ 16:41) (82 - 105)  BP: 100/48 (01-14-20 @ 16:41) (100/48 - 133/75)  RR: 16 (01-14-20 @ 16:41) (16 - 18)  SpO2: 96% (01-14-20 @ 16:41) (94% - 97%)  Wt(kg): --Vital Signs Last 24 Hrs  T(C): 36.5 (14 Jan 2020 16:41), Max: 36.7 (14 Jan 2020 02:20)  T(F): 97.7 (14 Jan 2020 16:41), Max: 98.1 (14 Jan 2020 06:16)  HR: 85 (14 Jan 2020 16:41) (82 - 105)  BP: 100/48 (14 Jan 2020 16:41) (100/48 - 133/75)  BP(mean): --  RR: 16 (14 Jan 2020 16:41) (16 - 18)  SpO2: 96% (14 Jan 2020 16:41) (94% - 97%)    PHYSICAL EXAM:  General: NAD, lying comfortably in bed  HEENT: NC/AT; moist mucous membrances  Cardiovascular: +S1/S2; RRR; grade 2 holosystolic murmur loudest over RSB  Respiratory: CTAB; no wheezes/rales/rhonchi  Gastrointestinal: soft, NT/ND; +BSx4  Extremities: WWP; no edema, clubbing or cyanosis; no calf tenderness  Vascular: 2+ radial, DP/PT pulses B/L  Neurological: AAOx3; lower extremities strength 5/5 b/l; lower extremities sensation to light touch intact b/l    Consultant(s) Notes Reviewed:  [x ] YES  [ ] NO  Care Discussed with Consultants/Other Providers [ x] YES  [ ] NO    LABS:  CBC   01-14-20 @ 05:42  Hematcorit 34.2  Hemoglobin 11.4  Mean Cell Hemoglobin 28.2  Platelet Count-Automated 211  RBC Count 4.04  Red Cell Distrib Width 14.0  Wbc Count 7.39      BMP  01-14-20 @ 05:42  Anion Gap. Serum 11  Blood Urea Nitrogen,Serm 26  Calcium, Total Serum 8.6  Carbon Dioxide, Serum 18  Chloride, Serum 100  Creatinine, Serum 0.87  eGFR in  91  eGFR in Non Afican American 78  Gloucose, serum 120  Potassium, Serum 4.5  Sodium, Serum 129              01-13-20 @ 05:48  Anion Gap. Serum 11  Blood Urea Nitrogen,Serm 28  Calcium, Total Serum 8.7  Carbon Dioxide, Serum 20  Chloride, Serum 102  Creatinine, Serum 0.87  eGFR in  91  eGFR in Non Afican American 78  Gloucose, serum 118  Potassium, Serum 5.2  Sodium, Serum 133              01-12-20 @ 07:03  Anion Gap. Serum 10  Blood Urea Nitrogen,Serm 34  Calcium, Total Serum 8.6  Carbon Dioxide, Serum 18  Chloride, Serum 103  Creatinine, Serum 0.91  eGFR in  88  eGFR in Non Afican American 76  Gloucose, serum 173  Potassium, Serum 4.6  Sodium, Serum 131                  CMP  01-14-20 @ 05:42  Kerry Aminotransferase(ALT/SGPT)--  Albumin, Serum --  Alkaline Phosphatase, Serum --  Anion Gap, Serum 11  Aspartate Aminotransferase (AST/SGOT)--  Bilirubin Total, Serum --  Blood Urea Nitrogen, Serum 26  Calcium,Total Serum 8.6  Carbon Dioxide, Serum 18  Chloride, Serum 100  Creatinine, Serum 0.87  eGFR if  91  eGFR if Non African American 78  Glucose, Serum 120  Potassium, Serum 4.5  Protein Total, Serum --  Sodium, Serum 129                      01-13-20 @ 05:48  Kerry Aminotransferase(ALT/SGPT)--  Albumin, Serum --  Alkaline Phosphatase, Serum --  Anion Gap, Serum 11  Aspartate Aminotransferase (AST/SGOT)--  Bilirubin Total, Serum --  Blood Urea Nitrogen, Serum 28  Calcium,Total Serum 8.7  Carbon Dioxide, Serum 20  Chloride, Serum 102  Creatinine, Serum 0.87  eGFR if  91  eGFR if Non African American 78  Glucose, Serum 118  Potassium, Serum 5.2  Protein Total, Serum --  Sodium, Serum 133                      01-12-20 @ 07:03  Kerry Aminotransferase(ALT/SGPT)--  Albumin, Serum --  Alkaline Phosphatase, Serum --  Anion Gap, Serum 10  Aspartate Aminotransferase (AST/SGOT)--  Bilirubin Total, Serum --  Blood Urea Nitrogen, Serum 34  Calcium,Total Serum 8.6  Carbon Dioxide, Serum 18  Chloride, Serum 103  Creatinine, Serum 0.91  eGFR if  88  eGFR if Non African American 76  Glucose, Serum 173  Potassium, Serum 4.6  Protein Total, Serum --  Sodium, Serum 131                          PT/INR      Amylase/Lipase            RADIOLOGY & ADDITIONAL TESTS:    Imaging Personally Reviewed:  [ ] YES  [ ] NO

## 2020-01-17 ENCOUNTER — TRANSCRIPTION ENCOUNTER (OUTPATIENT)
Age: 85
End: 2020-01-17

## 2020-01-17 VITALS
RESPIRATION RATE: 16 BRPM | DIASTOLIC BLOOD PRESSURE: 63 MMHG | OXYGEN SATURATION: 99 % | TEMPERATURE: 98 F | SYSTOLIC BLOOD PRESSURE: 100 MMHG | HEART RATE: 91 BPM

## 2020-01-17 LAB
ANION GAP SERPL CALC-SCNC: 14 MMOL/L — SIGNIFICANT CHANGE UP (ref 5–17)
BUN SERPL-MCNC: 35 MG/DL — HIGH (ref 7–23)
CALCIUM SERPL-MCNC: 8.5 MG/DL — SIGNIFICANT CHANGE UP (ref 8.4–10.5)
CHLORIDE SERPL-SCNC: 99 MMOL/L — SIGNIFICANT CHANGE UP (ref 96–108)
CO2 SERPL-SCNC: 18 MMOL/L — LOW (ref 22–31)
CREAT SERPL-MCNC: 1.02 MG/DL — SIGNIFICANT CHANGE UP (ref 0.5–1.3)
GLUCOSE BLDC GLUCOMTR-MCNC: 114 MG/DL — HIGH (ref 70–99)
GLUCOSE BLDC GLUCOMTR-MCNC: 136 MG/DL — HIGH (ref 70–99)
GLUCOSE BLDC GLUCOMTR-MCNC: 144 MG/DL — HIGH (ref 70–99)
GLUCOSE SERPL-MCNC: 151 MG/DL — HIGH (ref 70–99)
HCT VFR BLD CALC: 35.6 % — LOW (ref 39–50)
HGB BLD-MCNC: 11.5 G/DL — LOW (ref 13–17)
MAGNESIUM SERPL-MCNC: 2.1 MG/DL — SIGNIFICANT CHANGE UP (ref 1.6–2.6)
MCHC RBC-ENTMCNC: 27.5 PG — SIGNIFICANT CHANGE UP (ref 27–34)
MCHC RBC-ENTMCNC: 32.3 GM/DL — SIGNIFICANT CHANGE UP (ref 32–36)
MCV RBC AUTO: 85.2 FL — SIGNIFICANT CHANGE UP (ref 80–100)
NRBC # BLD: 0 /100 WBCS — SIGNIFICANT CHANGE UP (ref 0–0)
PLATELET # BLD AUTO: 245 K/UL — SIGNIFICANT CHANGE UP (ref 150–400)
POTASSIUM SERPL-MCNC: 4 MMOL/L — SIGNIFICANT CHANGE UP (ref 3.5–5.3)
POTASSIUM SERPL-SCNC: 4 MMOL/L — SIGNIFICANT CHANGE UP (ref 3.5–5.3)
RBC # BLD: 4.18 M/UL — LOW (ref 4.2–5.8)
RBC # FLD: 14.1 % — SIGNIFICANT CHANGE UP (ref 10.3–14.5)
SODIUM SERPL-SCNC: 131 MMOL/L — LOW (ref 135–145)
WBC # BLD: 6.46 K/UL — SIGNIFICANT CHANGE UP (ref 3.8–10.5)
WBC # FLD AUTO: 6.46 K/UL — SIGNIFICANT CHANGE UP (ref 3.8–10.5)

## 2020-01-17 PROCEDURE — 93005 ELECTROCARDIOGRAM TRACING: CPT

## 2020-01-17 PROCEDURE — 86901 BLOOD TYPING SEROLOGIC RH(D): CPT

## 2020-01-17 PROCEDURE — 81001 URINALYSIS AUTO W/SCOPE: CPT

## 2020-01-17 PROCEDURE — 87070 CULTURE OTHR SPECIMN AEROBIC: CPT

## 2020-01-17 PROCEDURE — 99285 EMERGENCY DEPT VISIT HI MDM: CPT | Mod: 25

## 2020-01-17 PROCEDURE — 86140 C-REACTIVE PROTEIN: CPT

## 2020-01-17 PROCEDURE — 84300 ASSAY OF URINE SODIUM: CPT

## 2020-01-17 PROCEDURE — 80053 COMPREHEN METABOLIC PANEL: CPT

## 2020-01-17 PROCEDURE — 70450 CT HEAD/BRAIN W/O DYE: CPT

## 2020-01-17 PROCEDURE — 86850 RBC ANTIBODY SCREEN: CPT

## 2020-01-17 PROCEDURE — 96374 THER/PROPH/DIAG INJ IV PUSH: CPT | Mod: XU

## 2020-01-17 PROCEDURE — 85027 COMPLETE CBC AUTOMATED: CPT

## 2020-01-17 PROCEDURE — 73070 X-RAY EXAM OF ELBOW: CPT

## 2020-01-17 PROCEDURE — 72158 MRI LUMBAR SPINE W/O & W/DYE: CPT

## 2020-01-17 PROCEDURE — 20225 BONE BIOPSY TROCAR/NDL DEEP: CPT

## 2020-01-17 PROCEDURE — 74177 CT ABD & PELVIS W/CONTRAST: CPT

## 2020-01-17 PROCEDURE — 83935 ASSAY OF URINE OSMOLALITY: CPT

## 2020-01-17 PROCEDURE — A9585: CPT

## 2020-01-17 PROCEDURE — 71045 X-RAY EXAM CHEST 1 VIEW: CPT | Mod: 26

## 2020-01-17 PROCEDURE — 82570 ASSAY OF URINE CREATININE: CPT

## 2020-01-17 PROCEDURE — 83735 ASSAY OF MAGNESIUM: CPT

## 2020-01-17 PROCEDURE — 87186 SC STD MICRODIL/AGAR DIL: CPT

## 2020-01-17 PROCEDURE — 87040 BLOOD CULTURE FOR BACTERIA: CPT

## 2020-01-17 PROCEDURE — 77012 CT SCAN FOR NEEDLE BIOPSY: CPT

## 2020-01-17 PROCEDURE — 87150 DNA/RNA AMPLIFIED PROBE: CPT

## 2020-01-17 PROCEDURE — 85652 RBC SED RATE AUTOMATED: CPT

## 2020-01-17 PROCEDURE — 83690 ASSAY OF LIPASE: CPT

## 2020-01-17 PROCEDURE — 80048 BASIC METABOLIC PNL TOTAL CA: CPT

## 2020-01-17 PROCEDURE — 62267 INTERDISCAL PERQ ASPIR DX: CPT

## 2020-01-17 PROCEDURE — 87205 SMEAR GRAM STAIN: CPT

## 2020-01-17 PROCEDURE — C1769: CPT

## 2020-01-17 PROCEDURE — 73030 X-RAY EXAM OF SHOULDER: CPT

## 2020-01-17 PROCEDURE — 87075 CULTR BACTERIA EXCEPT BLOOD: CPT

## 2020-01-17 PROCEDURE — 96361 HYDRATE IV INFUSION ADD-ON: CPT

## 2020-01-17 PROCEDURE — 99239 HOSP IP/OBS DSCHRG MGMT >30: CPT | Mod: GC

## 2020-01-17 PROCEDURE — 71045 X-RAY EXAM CHEST 1 VIEW: CPT

## 2020-01-17 PROCEDURE — 36573 INSJ PICC RS&I 5 YR+: CPT

## 2020-01-17 PROCEDURE — 97161 PT EVAL LOW COMPLEX 20 MIN: CPT

## 2020-01-17 PROCEDURE — 82962 GLUCOSE BLOOD TEST: CPT

## 2020-01-17 PROCEDURE — 36415 COLL VENOUS BLD VENIPUNCTURE: CPT

## 2020-01-17 PROCEDURE — 80202 ASSAY OF VANCOMYCIN: CPT

## 2020-01-17 PROCEDURE — 93306 TTE W/DOPPLER COMPLETE: CPT

## 2020-01-17 PROCEDURE — 97116 GAIT TRAINING THERAPY: CPT

## 2020-01-17 RX ORDER — CEFTRIAXONE 500 MG/1
2 INJECTION, POWDER, FOR SOLUTION INTRAMUSCULAR; INTRAVENOUS
Qty: 0 | Refills: 0 | DISCHARGE
Start: 2020-01-17

## 2020-01-17 RX ORDER — VANCOMYCIN HCL 1 G
1 VIAL (EA) INTRAVENOUS
Qty: 0 | Refills: 0 | DISCHARGE
Start: 2020-01-17

## 2020-01-17 RX ADMIN — FINASTERIDE 5 MILLIGRAM(S): 5 TABLET, FILM COATED ORAL at 11:55

## 2020-01-17 RX ADMIN — CEFTRIAXONE 100 MILLIGRAM(S): 500 INJECTION, POWDER, FOR SOLUTION INTRAMUSCULAR; INTRAVENOUS at 19:51

## 2020-01-17 RX ADMIN — CHLORHEXIDINE GLUCONATE 1 APPLICATION(S): 213 SOLUTION TOPICAL at 05:56

## 2020-01-17 RX ADMIN — OXYCODONE AND ACETAMINOPHEN 1 TABLET(S): 5; 325 TABLET ORAL at 15:20

## 2020-01-17 RX ADMIN — Medication 250 MILLIGRAM(S): at 18:04

## 2020-01-17 RX ADMIN — Medication 25 MILLIGRAM(S): at 05:55

## 2020-01-17 RX ADMIN — OXYCODONE AND ACETAMINOPHEN 1 TABLET(S): 5; 325 TABLET ORAL at 14:29

## 2020-01-17 RX ADMIN — CLOPIDOGREL BISULFATE 75 MILLIGRAM(S): 75 TABLET, FILM COATED ORAL at 11:55

## 2020-01-17 RX ADMIN — SODIUM CHLORIDE 1 GRAM(S): 9 INJECTION INTRAMUSCULAR; INTRAVENOUS; SUBCUTANEOUS at 05:55

## 2020-01-17 RX ADMIN — Medication 40 MILLIGRAM(S): at 05:55

## 2020-01-17 RX ADMIN — LORATADINE 10 MILLIGRAM(S): 10 TABLET ORAL at 11:55

## 2020-01-17 RX ADMIN — BRIMONIDINE TARTRATE 1 DROP(S): 2 SOLUTION/ DROPS OPHTHALMIC at 05:55

## 2020-01-17 RX ADMIN — LOSARTAN POTASSIUM 25 MILLIGRAM(S): 100 TABLET, FILM COATED ORAL at 05:55

## 2020-01-17 RX ADMIN — BRIMONIDINE TARTRATE 1 DROP(S): 2 SOLUTION/ DROPS OPHTHALMIC at 14:29

## 2020-01-17 RX ADMIN — Medication 5 MILLIGRAM(S): at 14:28

## 2020-01-17 RX ADMIN — Medication 10 MILLIGRAM(S): at 11:55

## 2020-01-17 RX ADMIN — DORZOLAMIDE HYDROCHLORIDE TIMOLOL MALEATE 1 DROP(S): 20; 5 SOLUTION/ DROPS OPHTHALMIC at 12:01

## 2020-01-17 RX ADMIN — SODIUM CHLORIDE 1 GRAM(S): 9 INJECTION INTRAMUSCULAR; INTRAVENOUS; SUBCUTANEOUS at 18:04

## 2020-01-17 RX ADMIN — Medication 40 MILLIGRAM(S): at 18:04

## 2020-01-17 RX ADMIN — Medication 250 MILLIGRAM(S): at 05:55

## 2020-01-17 RX ADMIN — OXYCODONE AND ACETAMINOPHEN 1 TABLET(S): 5; 325 TABLET ORAL at 04:57

## 2020-01-17 RX ADMIN — Medication 5 MILLIGRAM(S): at 05:56

## 2020-01-17 RX ADMIN — POLYETHYLENE GLYCOL 3350 17 GRAM(S): 17 POWDER, FOR SOLUTION ORAL at 14:29

## 2020-01-17 RX ADMIN — OXYCODONE AND ACETAMINOPHEN 1 TABLET(S): 5; 325 TABLET ORAL at 03:57

## 2020-01-17 NOTE — DISCHARGE NOTE NURSING/CASE MANAGEMENT/SOCIAL WORK - PATIENT PORTAL LINK FT
You can access the FollowMyHealth Patient Portal offered by [facility] by registering at the following website: [email or web address]. By joining [employer]’s FollowMyHealth portal, you will also be able to view your health information using other applications (apps) compatible with our system.

## 2020-01-17 NOTE — PROGRESS NOTE ADULT - SUBJECTIVE AND OBJECTIVE BOX
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 Pain difficult to describe and just described as severe. States that pain is somewhat relieved by hydrocodone but then returns once hydrocodone wears off. Seen by pain specialist (Dr. Deleon) and had lumbar epidural and L hip injection 2 weeks ago without relief. No weakness, numbness, paresthesias, bowel/bladder incontinence, fevers or chills, n/v/d/c. Endorses constipation, last BM 3 days ago. No black/bloody stools. Last ambulated today 2 blocks w/ cane, which is increased from his usual activity. Per notes, patient has been advised to use walker by PMD, however patient has been using cane since he lives on a 4th story walk-up. Has not called his pain specialist or PMD in past 2 weeks to discuss symptoms.    In the ED, vitals were T 97.2F,  (), BP /70s, RR 18, SpO2 98% on room air. Labs s/f CBC unremarkable, elevation in BUN/Cr (compared to 12/19) to 33/1.02, lipase 64. UA with trace blood, bacteria present and few hyaline casts. CT abd/pelvis with findings worrisome for development of osteomyelitis/discitis at L2/3 and findings of prostate malignancy (known). Neurosurgery was consulted who recommended IR guided of L2/3, MRI w/wo contrast, blood cultures. Patient given Toradol 15mg IV, and 500cc NS. (11 Jan 2020 18:15)                            11.5   6.46  )-----------( 245      ( 17 Jan 2020 05:35 )             35.6       01-17    131<L>  |  99  |  35<H>  ----------------------------<  151<H>  4.0   |  18<L>  |  1.02    Ca    8.5      17 Jan 2020 05:35  Mg     2.1     01-17      Vital Signs Last 24 Hrs  T(C): 36.4 (17 Jan 2020 16:48), Max: 36.6 (16 Jan 2020 21:21)  T(F): 97.6 (17 Jan 2020 16:48), Max: 97.9 (16 Jan 2020 21:21)  HR: 91 (17 Jan 2020 16:48) (91 - 98)  BP: 100/63 (17 Jan 2020 16:48) (100/63 - 153/89)  BP(mean): --  RR: 16 (17 Jan 2020 16:48) (16 - 19)  SpO2: 99% (17 Jan 2020 16:48) (96% - 99%)    MEDICATIONS  (STANDING):  atorvastatin 40 milliGRAM(s) Oral at bedtime  bisacodyl Suppository 10 milliGRAM(s) Rectal daily  brimonidine 0.2% Ophthalmic Solution 1 Drop(s) Both EYES three times a day  cefTRIAXone   IVPB 2 milliGRAM(s) IV Intermittent every 24 hours  chlorhexidine 2% Cloths 1 Application(s) Topical <User Schedule>  clopidogrel Tablet 75 milliGRAM(s) Oral daily  dextrose 5%. 1000 milliLiter(s) (50 mL/Hr) IV Continuous <Continuous>  dextrose 50% Injectable 12.5 Gram(s) IV Push once  dextrose 50% Injectable 25 Gram(s) IV Push once  dextrose 50% Injectable 25 Gram(s) IV Push once  dorzolamide 2%/timolol 0.5% Ophthalmic Solution 1 Drop(s) Both EYES two times a day  enoxaparin Injectable 40 milliGRAM(s) SubCutaneous every 24 hours  finasteride 5 milliGRAM(s) Oral daily  furosemide    Tablet 40 milliGRAM(s) Oral two times a day  insulin lispro (HumaLOG) corrective regimen sliding scale   SubCutaneous Before meals and at bedtime  latanoprost 0.005% Ophthalmic Solution 1 Drop(s) Both EYES at bedtime  loratadine 10 milliGRAM(s) Oral daily  losartan 25 milliGRAM(s) Oral daily  metoprolol succinate ER 25 milliGRAM(s) Oral daily  oxybutynin 5 milliGRAM(s) Oral three times a day  senna 2 Tablet(s) Oral at bedtime  sodium chloride 1 Gram(s) Oral every 12 hours  tamsulosin 0.4 milliGRAM(s) Oral at bedtime  vancomycin  IVPB 1000 milliGRAM(s) IV Intermittent every 12 hours    MEDICATIONS  (PRN):  dextrose 40% Gel 15 Gram(s) Oral once PRN Blood Glucose LESS THAN 70 milliGRAM(s)/deciliter  glucagon  Injectable 1 milliGRAM(s) IntraMuscular once PRN Glucose LESS THAN 70 milligrams/deciliter  oxycodone    5 mG/acetaminophen 325 mG 1 Tablet(s) Oral every 6 hours PRN Severe Pain (7 - 10)  polyethylene glycol 3350 17 Gram(s) Oral daily PRN Constipation  sodium chloride 0.9% lock flush 10 milliLiter(s) IV Push every 1 hour PRN Pre/post blood products, medications, blood draw, and to maintain line patency    Currently Undergoing Physical Therapy at bedside.    Functional Status Assessment:    Pain Assessment/Number Scale (0-10) Adult  Presence of Pain: denies pain/discomfort    Therapeutic Interventions      Bed Mobility  Bed Mobility Training Sit-to-Supine: independent  Bed Mobility Training Supine-to-Sit: independent    Sit-Stand Transfer Training  Transfer Training Sit-to-Stand Transfer: independent;  full weight-bearing   straight cane  Transfer Training Stand-to-Sit Transfer: independent;  full weight-bearing   straight cane  Sit-to-Stand Transfer Training Transfer Safety Analysis: straight cane    Gait Training  Gait Training: supervsion;  supervision;  full weight-bearing   straight cane;  200 feet  Gait Analysis: 2-point gait   decreased theron;  200 feet;  straight cane  Gait Number of Times:: x 1    Stair Training  Physical Assist/Nonphysical Assist: supervision;  verbal cues  Weight-Bearing Restrictions: full weight-bearing  Assistive Device: straight cane  Number of Stairs: 12           PM&R Impression: as above    Current Disposition Plan Recommendations: d/c home, outpatient physical therapy

## 2020-01-17 NOTE — DISCHARGE NOTE NURSING/CASE MANAGEMENT/SOCIAL WORK - NSDCPEPTCAREGIVEDUMATLIST _GEN_ALL_CORE
Addended by: TREVOR STALLWORTH on: 6/8/2018 08:08 AM     Modules accepted: Orders     Diabetes/Influenza Vaccination

## 2020-01-17 NOTE — PROGRESS NOTE ADULT - ASSESSMENT
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 Patient was admitted for further w/u for possible discitis/osteomyelitis. S/p MRI showing L2-L3 discitis osteomyelitis w/ small fluid collections within L paraspinal muscles measuring ~1.7cm. S/p IR-guided biopsy. Pt started on empiric therapy for osteomyelitis with vancomycin likely from contiguous spread in the setting of recent epidural.    Problem/Plan - 1:  ·  Problem: Discitis of lumbar region.  Plan: Pt presenting with persistent lower back pain not relieved by outpatient tx w/ hydrocodone and lumbar epidural 2 weeks prior to admission. MRI lumbar spine shows discitis/osteomyelitis at L2-3 w/ severe degenerative changes; no evidence of epidural abscess. Etiology likely 2/2 contiguous spread (hx of recent lumbar epidural) rather than hematologic spread from bacteremia as pt did not meet SIRS crit on admission and denied fevers or other signs of infection. Patient has remained afebrile with WBC WNL.   - Neurosurgery consult: No acute intervention indicated at this time.  - continue home Plavix (resumed 1/14 after IR biopsy)  - trend weekly ESR, CRP (baseline: ESR 61, CRP 10.6)  - BCx (1/11): Staph epidermidis in 1 of 4 bottles - likely due to contamination given other bottles show NGTD and patient shows no clinical signs of systemic infection and no leukocytosis.   - f/u surveillance BCx (1/13): NGTD  - f/u results from IR bx on 1/13  - ID consult: Recommending vancomycin. Vanc 1g q12hrs started 1/14 6PM for empiric coverage. F/u regarding duration of abx course  - Continue vancomycin 1g q12h given vanc trough was within therapeutic range  - Plan for PICC line for abx administration upon discharge  - f/u SW for DAMIAN placement    #r/o bacteremia  On admission, 1/4 blood cultures grew Staph epidermidis. Pt afebrile on admission, w/o leukocytosis or other signs of systemic infection. Positive culture likely contamination, will follow up with surveillance cultures.  -surveillance BCx (1/13): NGTD.     Problem/Plan - 2:  ·  Problem: Fall with injury, initial encounter.  Plan: Pt lost his footing and fell this morning. Endorses pain over left arm, but denies LOC, HA, N/V. No focal neurological deficits on physical exam. CT head shows no acute hemorrhage or fracture.  - f/u XR left shoulder/elbow.     Problem/Plan - 3:  ·  Problem: Back pain, chronic.  Plan: Hx of lower back and R hip pain after a fall in early December, which persists at severe intensity despite outpatient tx w/ hydrocodone and steroid injections in the epidural lumbar spine and R hip 2 weeks ago.   - pain management with Percocet 5/325 1 tab q4-6h PRN  - PT eval: recommending OT  - f/u OT eval  - fall precautions.     Problem/Plan - 4:  ·  Problem: CAD (coronary artery disease).  Plan: Hx of CAD s/p PCI/CABG and hx of PAD. No chest pain, SOB, palpitations.  - continue home atorvastatin 40mg qhs  - continue home Toprol 25mg XL daily    #grade 2 holosystolic murmur loudest over RSB  - Pt has hx of aortic valve insufficiency and stenosis, non-rheumatic, dx 1 year ago (per PCP's office)  - Pt denying lightheadedness, dizziness  - followed by cardiologist at [facility]  - TTE (1/15) shows EF 59%, mild-to-moderate aortic regurgitation, aortic sclerosis without significant stenosis.     Problem/Plan - 5:  ·  Problem: Essential hypertension.  Plan: Hx of HTN.  - continue home losartan 25mg daily  - continue home Toprol 25mg daily    #Hyperlipidemia  - Hx of HLD  - continue home atorvastatin 40mg qhs.     Problem/Plan - 6:  Problem: Malignant neoplasm of prostate. Plan: Hx of prostate cancer, s/p TURP, no CTx or RTx. CT abd/pelvis showing avidly enhancing nodule within the peripheral zone of the left lobe of the prostate gland measuring 11 mm and an additional avidly enhancing nodule centrally measuring 3 cm extending to the posterior wall of the bladder, consistent with known prostate carcinoma, no free fluid, no adenopathy. No acute complaints at this time.  - continue home finasteride 5mg daily  - continue home Tamsulosin 0.4mg qhs  - continue home oxybutynin 5mg TID.    Problem/Plan - 7:  ·  Problem: Type 2 diabetes mellitus.  Plan: DM2 not on any medications. A1c 6 in 12/19.  - ISS    #Constipation- reports constipation.  - continue senna and Miralax PRN, c/w dulcolax suppository PRN daily  - continue bowel regimen while on opioid medications.     Problem/Plan - 8:  ·  Problem: MARIYA (acute kidney injury).  Plan: RESOLVED  Elevated BUN/Cr 33/1.02 on admission, suggestive of prerenal MARIYA likely 2/2 dehydration given that pt on fluid restriction and Lasix for SIADH.  - BUN/Cr 31/0.92 stable  - continue home Lasix 40mg BID  - encourage PO intake w/ limited fluid intake given hx of SIADH  - avoid nephrotoxic agents    #Hyponatremia 2/2 SIADH- Baseline Na 128-134 per PCP's office. Na has remained at baseline on Lasix and salt tabs. Pt denies HA, fatigue, N/V.   - monitor Cr  - continue salt tabs BID  - continue Lasix considering Cr has returned to baseline.     Problem/Plan - 9:  ·  Problem: Nutrition, metabolism, and development symptoms.  Plan: F: Fluid restriction given hx of SIADH  E: replete K>4, Mg>2  N: Dash/TLC    VTE Prophylaxis: lovenox 40mg QD  C: Full Code  D: RM[facility].

## 2020-01-18 LAB
CULTURE RESULTS: SIGNIFICANT CHANGE UP
SPECIMEN SOURCE: SIGNIFICANT CHANGE UP

## 2020-01-23 DIAGNOSIS — Z95.1 PRESENCE OF AORTOCORONARY BYPASS GRAFT: ICD-10-CM

## 2020-01-23 DIAGNOSIS — S60.512A ABRASION OF LEFT HAND, INITIAL ENCOUNTER: ICD-10-CM

## 2020-01-23 DIAGNOSIS — E22.2 SYNDROME OF INAPPROPRIATE SECRETION OF ANTIDIURETIC HORMONE: ICD-10-CM

## 2020-01-23 DIAGNOSIS — E11.51 TYPE 2 DIABETES MELLITUS WITH DIABETIC PERIPHERAL ANGIOPATHY WITHOUT GANGRENE: ICD-10-CM

## 2020-01-23 DIAGNOSIS — Z98.61 CORONARY ANGIOPLASTY STATUS: ICD-10-CM

## 2020-01-23 DIAGNOSIS — I73.9 PERIPHERAL VASCULAR DISEASE, UNSPECIFIED: ICD-10-CM

## 2020-01-23 DIAGNOSIS — K59.00 CONSTIPATION, UNSPECIFIED: ICD-10-CM

## 2020-01-23 DIAGNOSIS — I25.2 OLD MYOCARDIAL INFARCTION: ICD-10-CM
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
## 2020-01-23 DIAGNOSIS — I35.1 NONRHEUMATIC AORTIC (VALVE) INSUFFICIENCY: ICD-10-CM

## 2020-01-23 DIAGNOSIS — M54.9 DORSALGIA, UNSPECIFIED: ICD-10-CM

## 2020-01-23 DIAGNOSIS — I10 ESSENTIAL (PRIMARY) HYPERTENSION: ICD-10-CM

## 2020-01-23 DIAGNOSIS — Z85.46 PERSONAL HISTORY OF MALIGNANT NEOPLASM OF PROSTATE: ICD-10-CM

## 2020-01-23 DIAGNOSIS — M46.46 DISCITIS, UNSPECIFIED, LUMBAR REGION: ICD-10-CM

## 2020-01-23 DIAGNOSIS — N17.9 ACUTE KIDNEY FAILURE, UNSPECIFIED: ICD-10-CM

## 2020-01-23 DIAGNOSIS — E78.5 HYPERLIPIDEMIA, UNSPECIFIED: ICD-10-CM

## 2020-01-23 DIAGNOSIS — M46.26 OSTEOMYELITIS OF VERTEBRA, LUMBAR REGION: ICD-10-CM

## 2020-01-23 DIAGNOSIS — Z87.891 PERSONAL HISTORY OF NICOTINE DEPENDENCE: ICD-10-CM

## 2020-01-23 DIAGNOSIS — N40.0 BENIGN PROSTATIC HYPERPLASIA WITHOUT LOWER URINARY TRACT SYMPTOMS: ICD-10-CM

## 2020-01-23 DIAGNOSIS — I25.10 ATHEROSCLEROTIC HEART DISEASE OF NATIVE CORONARY ARTERY WITHOUT ANGINA PECTORIS: ICD-10-CM

## 2020-01-23 DIAGNOSIS — H26.9 UNSPECIFIED CATARACT: ICD-10-CM

## 2020-01-29 ENCOUNTER — APPOINTMENT (OUTPATIENT)
Dept: UROLOGY | Facility: CLINIC | Age: 85
End: 2020-01-29

## 2020-02-01 LAB
CULTURE RESULTS: NO GROWTH — SIGNIFICANT CHANGE UP
SPECIMEN SOURCE: SIGNIFICANT CHANGE UP

## 2020-02-03 ENCOUNTER — APPOINTMENT (OUTPATIENT)
Dept: INFECTIOUS DISEASE | Facility: CLINIC | Age: 85
End: 2020-02-03
Payer: MEDICARE

## 2020-02-03 ENCOUNTER — LABORATORY RESULT (OUTPATIENT)
Age: 85
End: 2020-02-03

## 2020-02-03 VITALS
HEART RATE: 103 BPM | WEIGHT: 148.44 LBS | RESPIRATION RATE: 16 BRPM | OXYGEN SATURATION: 97 % | SYSTOLIC BLOOD PRESSURE: 109 MMHG | DIASTOLIC BLOOD PRESSURE: 63 MMHG | BODY MASS INDEX: 24.73 KG/M2 | TEMPERATURE: 97.3 F | HEIGHT: 65 IN

## 2020-02-03 DIAGNOSIS — M46.46 DISCITIS, UNSPECIFIED, LUMBAR REGION: ICD-10-CM

## 2020-02-03 PROCEDURE — 99204 OFFICE O/P NEW MOD 45 MIN: CPT

## 2020-02-03 NOTE — HISTORY OF PRESENT ILLNESS
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  He was admitted to [facility] 1/11-1/17 for LBP following a fall earlier in the month.  He was seen by pain specialist (Dr. Deleon) and had lumbar epidural and R hip injection 2 about a week prior to admission.  Back pain continued and he was referred to ED.  Blood culture grew staph epidermis - possible contaminant.  CT abd/pelvis with findings worrisome for development of osteomyelitis/discitis at L2/3 and findings of prostate malignancy (known).  IR  drained the fluid collection, culture did not grow.  He continues on 6 weeks of ceftriaxone and vancomycin.  Patient is accompanied by son.  His son reports that back pain is improving. \par

## 2020-02-03 NOTE — PHYSICAL EXAM
[Sclera] : the sclera and conjunctiva were normal [General Appearance - In No Acute Distress] : in no acute distress [Outer Ear] : the ears and nose were normal in appearance [Neck Appearance] : the appearance of the neck was normal [Heart Rate And Rhythm] : heart rate was normal and rhythm regular [] : no respiratory distress [Edema] : there was no peripheral edema [No Palpable Adenopathy] : no palpable adenopathy [Bowel Sounds] : normal bowel sounds [Abdomen Soft] : soft [Musculoskeletal - Swelling] : no joint swelling [Oriented To Time, Place, And Person] : oriented to person, place, and time [FreeTextEntry1] : ambulates with cane

## 2020-02-04 ENCOUNTER — LABORATORY RESULT (OUTPATIENT)
Age: 85
End: 2020-02-04

## 2020-02-07 LAB
BRUCELLA  AB SCREEN IGM, S: NEGATIVE
BRUCELLA AB SCREEN IGG, S: ABNORMAL
INTERPRETATION: NORMAL

## 2020-02-12 ENCOUNTER — INPATIENT (INPATIENT)
Facility: HOSPITAL | Age: 85
LOS: 1 days | Discharge: NURSING, REHAB OR LONG-TERM CARE | DRG: 914 | End: 2020-02-14
Attending: STUDENT IN AN ORGANIZED HEALTH CARE EDUCATION/TRAINING PROGRAM | Admitting: STUDENT IN AN ORGANIZED HEALTH CARE EDUCATION/TRAINING PROGRAM
Payer: MEDICARE

## 2020-02-12 VITALS
RESPIRATION RATE: 17 BRPM | WEIGHT: 151.02 LBS | DIASTOLIC BLOOD PRESSURE: 65 MMHG | OXYGEN SATURATION: 96 % | SYSTOLIC BLOOD PRESSURE: 119 MMHG | TEMPERATURE: 97 F | HEART RATE: 94 BPM | HEIGHT: 68 IN
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
## 2020-02-12 DIAGNOSIS — Z98.89 OTHER SPECIFIED POSTPROCEDURAL STATES: Chronic | ICD-10-CM

## 2020-02-12 DIAGNOSIS — M86.9 OSTEOMYELITIS, UNSPECIFIED: ICD-10-CM

## 2020-02-12 DIAGNOSIS — I25.10 ATHEROSCLEROTIC HEART DISEASE OF NATIVE CORONARY ARTERY WITHOUT ANGINA PECTORIS: ICD-10-CM

## 2020-02-12 DIAGNOSIS — E78.5 HYPERLIPIDEMIA, UNSPECIFIED: ICD-10-CM

## 2020-02-12 DIAGNOSIS — Z29.9 ENCOUNTER FOR PROPHYLACTIC MEASURES, UNSPECIFIED: ICD-10-CM

## 2020-02-12 DIAGNOSIS — F32.9 MAJOR DEPRESSIVE DISORDER, SINGLE EPISODE, UNSPECIFIED: ICD-10-CM

## 2020-02-12 DIAGNOSIS — H26.9 UNSPECIFIED CATARACT: Chronic | ICD-10-CM

## 2020-02-12 DIAGNOSIS — C61 MALIGNANT NEOPLASM OF PROSTATE: ICD-10-CM

## 2020-02-12 DIAGNOSIS — R63.8 OTHER SYMPTOMS AND SIGNS CONCERNING FOOD AND FLUID INTAKE: ICD-10-CM

## 2020-02-12 DIAGNOSIS — E87.1 HYPO-OSMOLALITY AND HYPONATREMIA: ICD-10-CM

## 2020-02-12 DIAGNOSIS — I10 ESSENTIAL (PRIMARY) HYPERTENSION: ICD-10-CM

## 2020-02-12 DIAGNOSIS — Z98.890 OTHER SPECIFIED POSTPROCEDURAL STATES: Chronic | ICD-10-CM

## 2020-02-12 LAB
ALBUMIN SERPL ELPH-MCNC: 3.3 G/DL — SIGNIFICANT CHANGE UP (ref 3.3–5)
ALP SERPL-CCNC: 72 U/L — SIGNIFICANT CHANGE UP (ref 40–120)
ALT FLD-CCNC: 13 U/L — SIGNIFICANT CHANGE UP (ref 10–45)
ANION GAP SERPL CALC-SCNC: 12 MMOL/L — SIGNIFICANT CHANGE UP (ref 5–17)
ANION GAP SERPL CALC-SCNC: 13 MMOL/L — SIGNIFICANT CHANGE UP (ref 5–17)
APPEARANCE UR: CLEAR — SIGNIFICANT CHANGE UP
AST SERPL-CCNC: 17 U/L — SIGNIFICANT CHANGE UP (ref 10–40)
BASOPHILS # BLD AUTO: 0.06 K/UL — SIGNIFICANT CHANGE UP (ref 0–0.2)
BASOPHILS NFR BLD AUTO: 1.8 % — SIGNIFICANT CHANGE UP (ref 0–2)
BILIRUB SERPL-MCNC: 0.4 MG/DL — SIGNIFICANT CHANGE UP (ref 0.2–1.2)
BILIRUB UR-MCNC: NEGATIVE — SIGNIFICANT CHANGE UP
BUN SERPL-MCNC: 11 MG/DL — SIGNIFICANT CHANGE UP (ref 7–23)
BUN SERPL-MCNC: 12 MG/DL — SIGNIFICANT CHANGE UP (ref 7–23)
CALCIUM SERPL-MCNC: 8.7 MG/DL — SIGNIFICANT CHANGE UP (ref 8.4–10.5)
CALCIUM SERPL-MCNC: 9.2 MG/DL — SIGNIFICANT CHANGE UP (ref 8.4–10.5)
CHLORIDE SERPL-SCNC: 95 MMOL/L — LOW (ref 96–108)
CHLORIDE SERPL-SCNC: 95 MMOL/L — LOW (ref 96–108)
CO2 SERPL-SCNC: 21 MMOL/L — LOW (ref 22–31)
CO2 SERPL-SCNC: 22 MMOL/L — SIGNIFICANT CHANGE UP (ref 22–31)
COLOR SPEC: YELLOW — SIGNIFICANT CHANGE UP
CREAT SERPL-MCNC: 0.99 MG/DL — SIGNIFICANT CHANGE UP (ref 0.5–1.3)
CREAT SERPL-MCNC: 1.01 MG/DL — SIGNIFICANT CHANGE UP (ref 0.5–1.3)
CRP SERPL-MCNC: 0.37 MG/DL — SIGNIFICANT CHANGE UP (ref 0–0.4)
DIFF PNL FLD: ABNORMAL
EOSINOPHIL # BLD AUTO: 0.2 K/UL — SIGNIFICANT CHANGE UP (ref 0–0.5)
EOSINOPHIL NFR BLD AUTO: 5.5 % — SIGNIFICANT CHANGE UP (ref 0–6)
ERYTHROCYTE [SEDIMENTATION RATE] IN BLOOD: 13 MM/HR — SIGNIFICANT CHANGE UP
GLUCOSE BLDC GLUCOMTR-MCNC: 149 MG/DL — HIGH (ref 70–99)
GLUCOSE SERPL-MCNC: 114 MG/DL — HIGH (ref 70–99)
GLUCOSE SERPL-MCNC: 158 MG/DL — HIGH (ref 70–99)
GLUCOSE UR QL: NEGATIVE — SIGNIFICANT CHANGE UP
HBA1C BLD-MCNC: 6 % — HIGH (ref 4–5.6)
HCT VFR BLD CALC: 28.3 % — LOW (ref 39–50)
HGB BLD-MCNC: 9.8 G/DL — LOW (ref 13–17)
KETONES UR-MCNC: NEGATIVE — SIGNIFICANT CHANGE UP
LEUKOCYTE ESTERASE UR-ACNC: NEGATIVE — SIGNIFICANT CHANGE UP
LYMPHOCYTES # BLD AUTO: 0.4 K/UL — LOW (ref 1–3.3)
LYMPHOCYTES # BLD AUTO: 11 % — LOW (ref 13–44)
MCHC RBC-ENTMCNC: 28 PG — SIGNIFICANT CHANGE UP (ref 27–34)
MCHC RBC-ENTMCNC: 34.6 GM/DL — SIGNIFICANT CHANGE UP (ref 32–36)
MCV RBC AUTO: 80.9 FL — SIGNIFICANT CHANGE UP (ref 80–100)
MONOCYTES # BLD AUTO: 0.53 K/UL — SIGNIFICANT CHANGE UP (ref 0–0.9)
MONOCYTES NFR BLD AUTO: 14.7 % — HIGH (ref 2–14)
NEUTROPHILS # BLD AUTO: 2.42 K/UL — SIGNIFICANT CHANGE UP (ref 1.8–7.4)
NEUTROPHILS NFR BLD AUTO: 67 % — SIGNIFICANT CHANGE UP (ref 43–77)
NITRITE UR-MCNC: NEGATIVE — SIGNIFICANT CHANGE UP
OSMOLALITY SERPL: 263 MOSMOL/KG — LOW (ref 280–301)
PH UR: 6 — SIGNIFICANT CHANGE UP (ref 5–8)
PLATELET # BLD AUTO: 228 K/UL — SIGNIFICANT CHANGE UP (ref 150–400)
POTASSIUM SERPL-MCNC: 3.6 MMOL/L — SIGNIFICANT CHANGE UP (ref 3.5–5.3)
POTASSIUM SERPL-MCNC: 4.2 MMOL/L — SIGNIFICANT CHANGE UP (ref 3.5–5.3)
POTASSIUM SERPL-SCNC: 3.6 MMOL/L — SIGNIFICANT CHANGE UP (ref 3.5–5.3)
POTASSIUM SERPL-SCNC: 4.2 MMOL/L — SIGNIFICANT CHANGE UP (ref 3.5–5.3)
PROT SERPL-MCNC: 6 G/DL — SIGNIFICANT CHANGE UP (ref 6–8.3)
PROT UR-MCNC: NEGATIVE MG/DL — SIGNIFICANT CHANGE UP
RBC # BLD: 3.5 M/UL — LOW (ref 4.2–5.8)
RBC # FLD: 15.4 % — HIGH (ref 10.3–14.5)
SODIUM SERPL-SCNC: 129 MMOL/L — LOW (ref 135–145)
SODIUM SERPL-SCNC: 129 MMOL/L — LOW (ref 135–145)
SP GR SPEC: 1.01 — SIGNIFICANT CHANGE UP (ref 1–1.03)
TSH SERPL-MCNC: 1.32 UIU/ML — SIGNIFICANT CHANGE UP (ref 0.35–4.94)
UROBILINOGEN FLD QL: 0.2 E.U./DL — SIGNIFICANT CHANGE UP
VANCOMYCIN FLD-MCNC: 23.9 UG/ML — SIGNIFICANT CHANGE UP
WBC # BLD: 3.61 K/UL — LOW (ref 3.8–10.5)
WBC # FLD AUTO: 3.61 K/UL — LOW (ref 3.8–10.5)

## 2020-02-12 PROCEDURE — 73700 CT LOWER EXTREMITY W/O DYE: CPT | Mod: 26,50

## 2020-02-12 PROCEDURE — 99285 EMERGENCY DEPT VISIT HI MDM: CPT

## 2020-02-12 PROCEDURE — 99222 1ST HOSP IP/OBS MODERATE 55: CPT | Mod: GC

## 2020-02-12 RX ORDER — METOPROLOL TARTRATE 50 MG
1 TABLET ORAL
Qty: 0 | Refills: 0 | DISCHARGE

## 2020-02-12 RX ORDER — FINASTERIDE 5 MG/1
1 TABLET, FILM COATED ORAL
Qty: 0 | Refills: 0 | DISCHARGE

## 2020-02-12 RX ORDER — ENOXAPARIN SODIUM 100 MG/ML
40 INJECTION SUBCUTANEOUS EVERY 24 HOURS
Refills: 0 | Status: DISCONTINUED | OUTPATIENT
Start: 2020-02-12 | End: 2020-02-14

## 2020-02-12 RX ORDER — LOSARTAN POTASSIUM 100 MG/1
25 TABLET, FILM COATED ORAL DAILY
Refills: 0 | Status: DISCONTINUED | OUTPATIENT
Start: 2020-02-13 | End: 2020-02-14

## 2020-02-12 RX ORDER — OXYBUTYNIN CHLORIDE 5 MG
5 TABLET ORAL THREE TIMES A DAY
Refills: 0 | Status: DISCONTINUED | OUTPATIENT
Start: 2020-02-12 | End: 2020-02-14

## 2020-02-12 RX ORDER — POLYETHYLENE GLYCOL 3350 17 G/17G
17 POWDER, FOR SOLUTION ORAL EVERY 12 HOURS
Refills: 0 | Status: DISCONTINUED | OUTPATIENT
Start: 2020-02-12 | End: 2020-02-14

## 2020-02-12 RX ORDER — TAMSULOSIN HYDROCHLORIDE 0.4 MG/1
1 CAPSULE ORAL
Qty: 0 | Refills: 0 | DISCHARGE

## 2020-02-12 RX ORDER — CLOPIDOGREL BISULFATE 75 MG/1
75 TABLET, FILM COATED ORAL DAILY
Refills: 0 | Status: DISCONTINUED | OUTPATIENT
Start: 2020-02-13 | End: 2020-02-14

## 2020-02-12 RX ORDER — LATANOPROST 0.05 MG/ML
1 SOLUTION/ DROPS OPHTHALMIC; TOPICAL
Qty: 0 | Refills: 0 | DISCHARGE

## 2020-02-12 RX ORDER — ATORVASTATIN CALCIUM 80 MG/1
40 TABLET, FILM COATED ORAL AT BEDTIME
Refills: 0 | Status: DISCONTINUED | OUTPATIENT
Start: 2020-02-12 | End: 2020-02-14

## 2020-02-12 RX ORDER — BRIMONIDINE TARTRATE 2 MG/MG
1 SOLUTION/ DROPS OPHTHALMIC THREE TIMES A DAY
Refills: 0 | Status: DISCONTINUED | OUTPATIENT
Start: 2020-02-12 | End: 2020-02-14

## 2020-02-12 RX ORDER — FINASTERIDE 5 MG/1
5 TABLET, FILM COATED ORAL DAILY
Refills: 0 | Status: DISCONTINUED | OUTPATIENT
Start: 2020-02-12 | End: 2020-02-14

## 2020-02-12 RX ORDER — DORZOLAMIDE HYDROCHLORIDE TIMOLOL MALEATE 20; 5 MG/ML; MG/ML
1 SOLUTION/ DROPS OPHTHALMIC
Qty: 0 | Refills: 0 | DISCHARGE

## 2020-02-12 RX ORDER — ATORVASTATIN CALCIUM 80 MG/1
1 TABLET, FILM COATED ORAL
Qty: 0 | Refills: 0 | DISCHARGE

## 2020-02-12 RX ORDER — FUROSEMIDE 40 MG
40 TABLET ORAL
Refills: 0 | Status: DISCONTINUED | OUTPATIENT
Start: 2020-02-12 | End: 2020-02-12

## 2020-02-12 RX ORDER — DORZOLAMIDE HYDROCHLORIDE TIMOLOL MALEATE 20; 5 MG/ML; MG/ML
1 SOLUTION/ DROPS OPHTHALMIC
Refills: 0 | Status: DISCONTINUED | OUTPATIENT
Start: 2020-02-12 | End: 2020-02-14

## 2020-02-12 RX ORDER — BRIMONIDINE TARTRATE 2 MG/MG
1 SOLUTION/ DROPS OPHTHALMIC
Qty: 0 | Refills: 0 | DISCHARGE

## 2020-02-12 RX ORDER — SENNA PLUS 8.6 MG/1
2 TABLET ORAL AT BEDTIME
Refills: 0 | Status: DISCONTINUED | OUTPATIENT
Start: 2020-02-12 | End: 2020-02-14

## 2020-02-12 RX ORDER — METOPROLOL TARTRATE 50 MG
25 TABLET ORAL DAILY
Refills: 0 | Status: DISCONTINUED | OUTPATIENT
Start: 2020-02-13 | End: 2020-02-14

## 2020-02-12 RX ORDER — ACETAMINOPHEN 500 MG
650 TABLET ORAL ONCE
Refills: 0 | Status: COMPLETED | OUTPATIENT
Start: 2020-02-12 | End: 2020-02-12

## 2020-02-12 RX ORDER — SODIUM CHLORIDE 9 MG/ML
2 INJECTION INTRAMUSCULAR; INTRAVENOUS; SUBCUTANEOUS EVERY 12 HOURS
Refills: 0 | Status: DISCONTINUED | OUTPATIENT
Start: 2020-02-12 | End: 2020-02-14

## 2020-02-12 RX ORDER — OXYBUTYNIN CHLORIDE 5 MG
1 TABLET ORAL
Qty: 0 | Refills: 0 | DISCHARGE

## 2020-02-12 RX ORDER — LATANOPROST 0.05 MG/ML
1 SOLUTION/ DROPS OPHTHALMIC; TOPICAL AT BEDTIME
Refills: 0 | Status: DISCONTINUED | OUTPATIENT
Start: 2020-02-12 | End: 2020-02-14

## 2020-02-12 RX ORDER — CLOPIDOGREL BISULFATE 75 MG/1
1 TABLET, FILM COATED ORAL
Qty: 0 | Refills: 0 | DISCHARGE

## 2020-02-12 RX ORDER — CEFTRIAXONE 500 MG/1
2000 INJECTION, POWDER, FOR SOLUTION INTRAMUSCULAR; INTRAVENOUS EVERY 24 HOURS
Refills: 0 | Status: DISCONTINUED | OUTPATIENT
Start: 2020-02-13 | End: 2020-02-14

## 2020-02-12 RX ORDER — DESLORATADINE 5 MG/1
1 TABLET, FILM COATED ORAL
Qty: 0 | Refills: 0 | DISCHARGE

## 2020-02-12 RX ORDER — TAMSULOSIN HYDROCHLORIDE 0.4 MG/1
0.4 CAPSULE ORAL AT BEDTIME
Refills: 0 | Status: DISCONTINUED | OUTPATIENT
Start: 2020-02-12 | End: 2020-02-14

## 2020-02-12 RX ORDER — LORATADINE 10 MG/1
10 TABLET ORAL DAILY
Refills: 0 | Status: DISCONTINUED | OUTPATIENT
Start: 2020-02-12 | End: 2020-02-14

## 2020-02-12 RX ORDER — INSULIN LISPRO 100/ML
VIAL (ML) SUBCUTANEOUS
Refills: 0 | Status: DISCONTINUED | OUTPATIENT
Start: 2020-02-12 | End: 2020-02-14

## 2020-02-12 RX ORDER — LOSARTAN POTASSIUM 100 MG/1
1 TABLET, FILM COATED ORAL
Qty: 0 | Refills: 0 | DISCHARGE

## 2020-02-12 RX ADMIN — ATORVASTATIN CALCIUM 40 MILLIGRAM(S): 80 TABLET, FILM COATED ORAL at 21:41

## 2020-02-12 RX ADMIN — BRIMONIDINE TARTRATE 1 DROP(S): 2 SOLUTION/ DROPS OPHTHALMIC at 22:08

## 2020-02-12 RX ADMIN — LATANOPROST 1 DROP(S): 0.05 SOLUTION/ DROPS OPHTHALMIC; TOPICAL at 22:09

## 2020-02-12 RX ADMIN — ENOXAPARIN SODIUM 40 MILLIGRAM(S): 100 INJECTION SUBCUTANEOUS at 21:39

## 2020-02-12 RX ADMIN — TAMSULOSIN HYDROCHLORIDE 0.4 MILLIGRAM(S): 0.4 CAPSULE ORAL at 21:40

## 2020-02-12 RX ADMIN — Medication 5 MILLIGRAM(S): at 21:41

## 2020-02-12 RX ADMIN — SENNA PLUS 2 TABLET(S): 8.6 TABLET ORAL at 21:39

## 2020-02-12 RX ADMIN — SODIUM CHLORIDE 2 GRAM(S): 9 INJECTION INTRAMUSCULAR; INTRAVENOUS; SUBCUTANEOUS at 23:24

## 2020-02-12 RX ADMIN — Medication 650 MILLIGRAM(S): at 18:00

## 2020-02-12 NOTE — ED ADULT NURSE NOTE - CHPI ED NUR SYMPTOMS NEG
no loss of consciousness/no deformity/no confusion/no bleeding/no weakness/no numbness/no fever/no tingling/no vomiting/no abrasion

## 2020-02-12 NOTE — ED PROVIDER NOTE - OBJECTIVE STATEMENT
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 Denies any head injury or trauma. Now w/ mild L hip pain worse w/ movement. Son also notes weight loss ~10# and mild increased generalized weakness since his discharge. No syncope, no cp, no sob, no abd pain, no f/c, no n/v/d.

## 2020-02-12 NOTE — ED PROVIDER NOTE - CLINICAL SUMMARY MEDICAL DECISION MAKING FREE TEXT BOX
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 Denies any head injury or trauma. Now w/ mild L hip pain worse w/ movement. Son also notes weight loss ~10# and mild increased generalized weakness since his discharge. No syncope, no cp, no sob, no abd pain, no f/c, no n/v/d. Exam noted for min bruise at L hip and min pain w/ ROM L hip, NV intact. Concern mechanical fall, consider dehydration, hyponatremia, no head/neck pain or e/o trauma by hx or exam to head. 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 Denies any head injury or trauma. Now w/ mild L hip pain worse w/ movement. Son also notes weight loss ~10# and mild increased generalized weakness since his discharge. No syncope, no cp, no sob, no abd pain, no f/c, no n/v/d. Exam noted for min bruise at L hip and min pain w/ ROM L hip, NV intact. Concern mechanical fall, consider dehydration, hyponatremia, no head/neck pain or e/o trauma by hx or exam to head. NO acute findings, pt recs rehab

## 2020-02-12 NOTE — H&P ADULT - NSHPREVIEWOFSYSTEMS_GEN_ALL_CORE
REVIEW OF SYSTEMS:    CONSTITUTIONAL: No weakness, fevers or chills  EYES/ENT: No visual changes;  No vertigo or throat pain   NECK: No pain or stiffness  RESPIRATORY: No cough, wheezing, hemoptysis; No shortness of breath  CARDIOVASCULAR: No chest pain or palpitations  GASTROINTESTINAL: No abdominal or epigastric pain. No nausea, vomiting, or hematemesis; No diarrhea or constipation. No melena or hematochezia.  GENITOURINARY: No dysuria, frequency or hematuria  NEUROLOGICAL: No numbness or weakness  SKIN: No itching, rashes REVIEW OF SYSTEMS:    CONSTITUTIONAL: No weakness, fevers or chills  EYES/ENT: No visual changes;  No vertigo or throat pain   NECK: No pain or stiffness  RESPIRATORY: No cough, wheezing, hemoptysis; No shortness of breath  CARDIOVASCULAR: No chest pain or palpitations  GASTROINTESTINAL: No abdominal or epigastric pain. No nausea, vomiting, or hematemesis; No diarrhea or constipation. No melena or hematochezia.  GENITOURINARY: No dysuria, frequency or hematuria  NEUROLOGICAL: No numbness or weakness  SKIN: No itching, rashes  PSYCH: +Depression  MSK: Left-sided hip pain REVIEW OF SYSTEMS:    CONSTITUTIONAL: No weakness, fevers or chills  EYES/ENT: No visual changes;  No vertigo or throat pain   NECK: No pain or stiffness  RESPIRATORY: No cough, wheezing, hemoptysis; No shortness of breath  CARDIOVASCULAR: No chest pain or palpitations  GASTROINTESTINAL: + constipation, No abdominal or epigastric pain. No nausea, vomiting, or hematemesis; No diarrhea. No melena or hematochezia.  GENITOURINARY: No dysuria, frequency or hematuria  NEUROLOGICAL: No numbness or weakness  SKIN: No itching, rashes  PSYCH: +Depression, no current SI   MSK: Left-sided hip pain

## 2020-02-12 NOTE — ED ADULT TRIAGE NOTE - OTHER COMPLAINTS
reports L hip pain s/p fall. denies head trauma. presents with L picc in place, receiving at home antibiotic infusions. pt able to ambulate with cane assist.

## 2020-02-12 NOTE — ED ADULT NURSE NOTE - OBJECTIVE STATEMENT
PT came to ED under direction of home RN for r/o hip fx.  Pt reports fall yesterday, states he lost his balance fell backwards while at refridgerator. PT reports he fell onto left hip, denies LOC or head trauma. Pt presents with soreness and bruising to left hip. No head trauma or obvious injury found to head.  Pt is alert and oriented x3, remembers events.  Positive PMS x4 extremities, no leg shortening or rotation found at this time.  No other obvious DCAPBTLS found at this time.  Denies all other medical complaints at this time.

## 2020-02-12 NOTE — H&P ADULT - NSHPSOCIALHISTORY_GEN_ALL_CORE
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

## 2020-02-12 NOTE — H&P ADULT - PROBLEM SELECTOR PLAN 5
Hx of CAD s/p PCI/CABG and hx of PAD. No chest pain, SOB, palpitations.  - c/w home clopidogrel 75 mg oral tablet daily  - c/w franko atorvastatin 40 mg oral tablet daily   - c/w home Toprol 25mg XL daily Hx of CAD s/p PCI/CABG and hx of PAD. No chest pain, SOB, palpitations.   - c/w home clopidogrel 75 mg oral tablet daily  - c/w home atorvastatin 40 mg oral tablet daily   - c/w home Toprol 25mg XL daily    #Thoracic aortic aneurysm (4.5cm): Follows outpatient with Dr. Palacio and cardiology.

## 2020-02-12 NOTE — H&P ADULT - PROBLEM SELECTOR PLAN 9
F: Fluid restriction given hx of SIADH  E: replete K<4, Mg<2  N: Dash/TLC    VTE Prophylaxis: HSQ 5000 q8.  C: Full Code  D: [facility]. F: Fluid restriction given hx of SIADH  E: replete K<4, Mg<2  N: DASH/TLC

## 2020-02-12 NOTE — PHYSICAL THERAPY INITIAL EVALUATION ADULT - GAIT TRAINING, PT EVAL
TO BE ACHIEVED IN 1 WEEK by 2/19/2020: from 30' to 120' with contact guard assist with rolling walker

## 2020-02-12 NOTE — H&P ADULT - PROBLEM SELECTOR PLAN 10
VTE Prophylaxis: lovenox q24   FULL CODE; attempted to start conversation but unable to teach back understanding VTE Prophylaxis: lovenox q24   GI ppx: None   FULL CODE; attempted to start conversation but unable to teach back understanding

## 2020-02-12 NOTE — PHYSICAL THERAPY INITIAL EVALUATION ADULT - CRITERIA FOR SKILLED THERAPEUTIC INTERVENTIONS
impairments found/therapy frequency/functional limitations in following categories/rehab potential/anticipated discharge recommendation

## 2020-02-12 NOTE — ED PROVIDER NOTE - PHYSICAL EXAMINATION
VITAL SIGNS: I have reviewed nursing notes and confirm.  CONSTITUTIONAL: Well-developed; well-nourished; in no acute distress.  SKIN: Skin is warm and dry, no acute rash.  HEAD: Normocephalic; atraumatic.  EYES:  EOM intact; conjunctiva and sclera clear.  ENT: No nasal discharge; airway clear.  NECK: Supple; Voluntary FROM  CARD: No rubs appreciated, Regular rate and rhythm.  RESP: No wheezes, no rales. No respiratory distress  ABD: Soft; non-distended; non-tender; no rebound or guarding  Pelvis: min L lateral buttock bruise,   EXT: Normal ROM. No cyanosis or edema. 5/5 BLE. SILT. well perfused. FROM Hip/knee/ankle  NEURO: Alert, oriented. Grossly unremarkable.  PSYCH: Cooperative, appropriate.

## 2020-02-12 NOTE — H&P ADULT - PROBLEM SELECTOR PLAN 4
Patient with major depressive sxs, SSRI stopped d/t hyponatremia. Continues to endorse thoughts of depression, denies SI currently. Decreased appetite. Has had recent thoughts of SI, but is happy that he will go to rehab to get better and be less of burden for son.   - please consult psych for depression

## 2020-02-12 NOTE — H&P ADULT - PROBLEM SELECTOR PLAN 7
- c/w atorvastatin 40 mg oral tablet: 1 tab(s) orally once a day - c/w atorvastatin 40 mg oral tablet: 1 tab(s) orally once a day    #Glaucoma/Cataracts:   - Confirm home eye drops in the AM w/ pharmacy   - brimonidine 0.2% ophthalmic solution: 1 drop(s) to each affected eye 3 times a day   - dorzolamide-timolol 2%-0.5% preservative-free ophthalmic solution: 1 drop(s) to each affected eye 2 times a day   - latanoprost 0.005% ophthalmic solution: 1 drop(s) to each affected eye once a day (in the evening)

## 2020-02-12 NOTE — PHYSICAL THERAPY INITIAL EVALUATION ADULT - PERTINENT HX OF CURRENT PROBLEM, REHAB EVAL
86M p/w mechanical fall this am while trying to get form bed to walker, falling on L hip. Denies any head injury or trauma. Now w/ mild L hip pain worse w/ movement. Son also notes weight loss ~10# and mild increased generalized weakness since his discharge.

## 2020-02-12 NOTE — H&P ADULT - PROBLEM SELECTOR PLAN 2
- c/w cefTRIAXone: 2 gram(s) intravenous once a day  - vancomycin trough on 2/13 at 10am; dose vancomycin according to trough - home dose currently vancomycin 1 g q24 On last admission patient found to have MRI lumbar spine showed discitis/osteomyelitis at L2-3 w/ severe degenerative changes; followed by ID and on ceftriaxone 2g q24 and vancomycin till 2/25/2020. Vancomycin dose changed outpatient to 1g q24 based on trough.    - c/w ceftriaxone: 2 gram(s) intravenous once a day  - vancomycin trough on 2/13 at 10am; dose vancomycin according to trough - home dose currently vancomycin 1 g q24

## 2020-02-12 NOTE — H&P ADULT - PROBLEM SELECTOR PLAN 6
- c/w losartan 25 mg oral tablet: 1 tab(s) orally once a day   - c/w metoprolol succinate 25 mg oral tablet, extended release: 1 tab(s) orally once a day Hx of HTN. /64 - 143/81.   - c/w losartan 25 mg oral tablet daily   - c/w metoprolol succinate 25 mg oral tablet, extended release daily

## 2020-02-12 NOTE — H&P ADULT - NSHPLABSRESULTS_GEN_ALL_CORE
LABS:                         9.8    3.61  )-----------( 228      ( 12 Feb 2020 14:31 )             28.3     02-12    129<L>  |  95<L>  |  12  ----------------------------<  114<H>  4.2   |  22  |  1.01    Ca    9.2      12 Feb 2020 14:31    TPro  6.0  /  Alb  3.3  /  TBili  0.4  /  DBili  x   /  AST  17  /  ALT  13  /  AlkPhos  72  02-12          RADIOLOGY, EKG & ADDITIONAL TESTS:     CT Hip No Cont, Bilateral (02.12.20 @ 16:46)  FINDINGS:  There is no acute fracture or dislocation. Mild productive changes of the left femoral head. Musculature surrounding bilateral hips is intact, symmetric, and unremarkable. There are degenerative changes of the lower lumbar spine including multilevelintervertebral disc space loss and 4 mm anterolisthesis of L4 on L5. Posterior disc bulges are noted at L4-L5 and L5-S1. Productive changes are seen noted within bilateral facet joints of the lower lumbar spine.  A penile prosthesis is noted. Thereare aortoiliac calcifications. There is a tiny fat-containing right inguinal hernia. The prostate is enlarged measuring approximately 5.6 x 4.0 x 4.2 cm.  IMPRESSION:  No acute fracture.    Echocardiogram (01.15.20 @ 12:16)  CONCLUSIONS:   1. Normal left and right ventricular size and function.   2. Moderate symmetric left ventricular hypertrophy.   3. Grade I left ventricular diastolic dysfunction.   4. Mild-to-moderate aortic regurgitation.   5. Aortic sclerosis without significant stenosis.   6. No evidence of pulmonary hypertension.   7. No pericardial effusion.   8. The aortic root is mildly dilated and measures 4.2 cm. The ascending aorta is moderately dilated and measures 4.6 cm. LABS:                         9.8    3.61  )-----------( 228      ( 12 Feb 2020 14:31 )             28.3     02-12    129<L>  |  95<L>  |  12  ----------------------------<  114<H>  4.2   |  22  |  1.01    Ca    9.2      12 Feb 2020 14:31    TPro  6.0  /  Alb  3.3  /  TBili  0.4  /  DBili  x   /  AST  17  /  ALT  13  /  AlkPhos  72  02-12    Thyroid Stimulating Hormone, Serum (02.12.20 @ 14:31)    Thyroid Stimulating Hormone, Serum: 1.325 uIU/mL    Sedimentation Rate, Erythrocyte (02.12.20 @ 14:31)    Sedimentation Rate, Erythrocyte: 13 mm/Hr          RADIOLOGY, EKG & ADDITIONAL TESTS:     CT Hip No Cont, Bilateral (02.12.20 @ 16:46)  FINDINGS:  There is no acute fracture or dislocation. Mild productive changes of the left femoral head. Musculature surrounding bilateral hips is intact, symmetric, and unremarkable. There are degenerative changes of the lower lumbar spine including multilevelintervertebral disc space loss and 4 mm anterolisthesis of L4 on L5. Posterior disc bulges are noted at L4-L5 and L5-S1. Productive changes are seen noted within bilateral facet joints of the lower lumbar spine.  A penile prosthesis is noted. Thereare aortoiliac calcifications. There is a tiny fat-containing right inguinal hernia. The prostate is enlarged measuring approximately 5.6 x 4.0 x 4.2 cm.  IMPRESSION:  No acute fracture.    Echocardiogram (01.15.20 @ 12:16)  CONCLUSIONS:   1. Normal left and right ventricular size and function.   2. Moderate symmetric left ventricular hypertrophy.   3. Grade I left ventricular diastolic dysfunction.   4. Mild-to-moderate aortic regurgitation.   5. Aortic sclerosis without significant stenosis.   6. No evidence of pulmonary hypertension.   7. No pericardial effusion.   8. The aortic root is mildly dilated and measures 4.2 cm. The ascending aorta is moderately dilated and measures 4.6 cm. LABS:                         9.8    3.61  )-----------( 228      ( 12 Feb 2020 14:31 )             28.3     02-12    129<L>  |  95<L>  |  12  ----------------------------<  114<H>  4.2   |  22  |  1.01    Ca    9.2      12 Feb 2020 14:31    TPro  6.0  /  Alb  3.3  /  TBili  0.4  /  DBili  x   /  AST  17  /  ALT  13  /  AlkPhos  72  02-12    Thyroid Stimulating Hormone, Serum (02.12.20 @ 14:31)    Thyroid Stimulating Hormone, Serum: 1.325 uIU/mL    Sedimentation Rate, Erythrocyte (02.12.20 @ 14:31)    Sedimentation Rate, Erythrocyte: 13 mm/Hr      RADIOLOGY, EKG & ADDITIONAL TESTS:     CT Hip No Cont, Bilateral (02.12.20 @ 16:46)  FINDINGS:  There is no acute fracture or dislocation. Mild productive changes of the left femoral head. Musculature surrounding bilateral hips is intact, symmetric, and unremarkable. There are degenerative changes of the lower lumbar spine including multilevelintervertebral disc space loss and 4 mm anterolisthesis of L4 on L5. Posterior disc bulges are noted at L4-L5 and L5-S1. Productive changes are seen noted within bilateral facet joints of the lower lumbar spine.  A penile prosthesis is noted. Thereare aortoiliac calcifications. There is a tiny fat-containing right inguinal hernia. The prostate is enlarged measuring approximately 5.6 x 4.0 x 4.2 cm.  IMPRESSION:  No acute fracture.    Echocardiogram (01.15.20 @ 12:16)  CONCLUSIONS:   1. Normal left and right ventricular size and function.   2. Moderate symmetric left ventricular hypertrophy.   3. Grade I left ventricular diastolic dysfunction.   4. Mild-to-moderate aortic regurgitation.   5. Aortic sclerosis without significant stenosis.   6. No evidence of pulmonary hypertension.   7. No pericardial effusion.   8. The aortic root is mildly dilated and measures 4.2 cm. The ascending aorta is moderately dilated and measures 4.6 cm.

## 2020-02-12 NOTE — H&P ADULT - ASSESSMENT
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 Seen by PT in the ED, recommending DAMIAN. Admitted to the [facility] for DAMIAN placement.       brimonidine 0.2% ophthalmic solution: 1 drop(s) to each affected eye 3 times a day (12 Feb 2020 20:27)  desloratadine 5 mg oral tablet: 1 tab(s) orally once a day (12 Feb 2020 20:27)  dorzolamide-timolol 2%-0.5% preservative-free ophthalmic solution: 1 drop(s) to each affected eye 2 times a day (12 Feb 2020 20:27)    latanoprost 0.005% ophthalmic solution: 1 drop(s) to each affected eye once a day (in the evening) (12 Feb 2020 20:27)    polyethylene glycol 3350 oral powder for reconstitution: 17 gram(s) orally every 12 hours (12 Feb 2020 20:27) 85yo M w/ PMHx CAD s/p PCI/CABG, PAD s/p stent RLE, thoracic aortic aneurysm (4.5cm), HTN, HLD, BPH, prostate ca s/p TURP, depression, hx of SBO, 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

## 2020-02-12 NOTE — H&P ADULT - PROBLEM SELECTOR PLAN 1
Patient with mechanical fall at home and presenting with left sided hip pain. No fracture seen on CT hip. Seen by PT in the ED - recommending DAMIAN. Patient with 3 falls in last 3 months, and alone in 4th floor walk up and highly unsafe to return home and presents at high fall risk within home.

## 2020-02-12 NOTE — PHYSICAL THERAPY INITIAL EVALUATION ADULT - DISCHARGE DISPOSITION, PT EVAL
Subacute Rehab Subacute Rehab; patient with 3 falls in last 3 months, and alone in 4th floor walk up; highly unsafe to return home and presents at high fall risk within home

## 2020-02-12 NOTE — H&P ADULT - PROBLEM SELECTOR PLAN 3
Patient with a hx of chronic hyponatremia found to be 2/2 SIADH. Sodium on admission 129. On home Lasix and salt tabs  - monitor serum sodium and Cr   - continue salt tabs BID and Lasix BID   - Plan to see Dr. Alatorre outpatient per son appointment in March Patient with a hx of chronic hyponatremia found to be 2/2 SIADH. Sodium on admission 129. On home Lasix and salt tabs. Currently denies nausea, HA, confusion, fatigue.   - repeat urine lytes, BMP   - hold salt tabs and lasix and re-work-up hyponatremia   - Plan to see Dr. 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 Sodium on admission 129. On home Lasix and salt tabs. Currently denies nausea, HA, confusion, fatigue.   - repeat serum/urin osms, urine lytes, BMP --> again consistent with SIADH --> SSRI use less likely now and pain may be the etiology.   - Resume home salt tabs and lasix   - Plan to see Dr. Alatorre outpatient per son appointment in March

## 2020-02-12 NOTE — PHYSICAL THERAPY INITIAL EVALUATION ADULT - ADDITIONAL COMMENTS
3 falls in last 3 months, alone, 4th floor walk up, independent prior to arrival, patient's son visits every day after work to administer IV antibiotics

## 2020-02-12 NOTE — H&P ADULT - NSHPPHYSICALEXAM_GEN_ALL_CORE
.  VITAL SIGNS:  T(C): 36.6 (02-12-20 @ 18:05), Max: 36.6 (02-12-20 @ 18:05)  T(F): 97.8 (02-12-20 @ 18:05), Max: 97.8 (02-12-20 @ 18:05)  HR: 95 (02-12-20 @ 18:05) (90 - 95)  BP: 143/81 (02-12-20 @ 18:05) (110/64 - 143/81)  BP(mean): --  RR: 20 (02-12-20 @ 18:05) (17 - 20)  SpO2: 98% (02-12-20 @ 18:05) (96% - 98%)  Wt(kg): --    PHYSICAL EXAM:    Constitutional: WDWN resting comfortably in bed; NAD  Head: NC/AT  Eyes: PERRL, EOMI, anicteric sclera  ENT: no nasal discharge; uvula midline, no oropharyngeal erythema or exudates; MMM  Neck: supple; no JVD or thyromegaly  Respiratory: CTA B/L; no W/R/R, no retractions  Cardiac: +S1/S2; RRR; no M/R/G; PMI non-displaced  Gastrointestinal: abdomen soft, NT/ND; no rebound or guarding; +BSx4  Back: spine midline, no bony tenderness or step-offs; no CVAT B/L  Extremities: WWP, no clubbing or cyanosis; no peripheral edema  Musculoskeletal: NROM x4; no joint swelling, tenderness or erythema  Vascular: 2+ radial, femoral, DP/PT pulses B/L  Dermatologic: skin warm, dry and intact; no rashes, wounds, or scars  Lymphatic: no submandibular or cervical LAD  Neurologic: AAOx3; CNII-XII grossly intact; no focal deficits  Psychiatric: affect and characteristics of appearance, verbalizations, behaviors are appropriate VITAL SIGNS:  T(C): 36.6 (02-12-20 @ 18:05), Max: 36.6 (02-12-20 @ 18:05)  T(F): 97.8 (02-12-20 @ 18:05), Max: 97.8 (02-12-20 @ 18:05)  HR: 95 (02-12-20 @ 18:05) (90 - 95)  BP: 143/81 (02-12-20 @ 18:05) (110/64 - 143/81)  BP(mean): --  RR: 20 (02-12-20 @ 18:05) (17 - 20)  SpO2: 98% (02-12-20 @ 18:05) (96% - 98%)  Wt(kg): --    PHYSICAL EXAM:  Constitutional: WDWN resting comfortably in bed; NAD  Head: NC/AT  Eyes: PERRL, EOMI, anicteric sclera  ENT: no nasal discharge; uvula midline, no oropharyngeal erythema or exudates; MMM  Neck: supple; no JVD or thyromegaly  Respiratory: CTA B/L; no W/R/R, no retractions  Cardiac: +S1/S2; RRR; no M/R/G; PMI non-displaced  Gastrointestinal: abdomen soft, NT/ND; no rebound or guarding; +BSx4  Back: spine midline, no bony tenderness or step-offs; no CVAT B/L  Extremities: WWP, no clubbing or cyanosis; no peripheral edema  Musculoskeletal: NROM x4; no joint swelling, tenderness or erythema  Vascular: 2+ radial, femoral, DP/PT pulses B/L  Dermatologic: skin warm, dry and intact; no rashes, wounds, or scars  Lymphatic: no submandibular or cervical LAD  Neurologic: AAOx3; CNII-XII grossly intact; no focal deficits  Psychiatric: affect and characteristics of appearance, verbalizations, behaviors are appropriate VITAL SIGNS:  T(C): 36.6 (02-12-20 @ 18:05), Max: 36.6 (02-12-20 @ 18:05)  T(F): 97.8 (02-12-20 @ 18:05), Max: 97.8 (02-12-20 @ 18:05)  HR: 95 (02-12-20 @ 18:05) (90 - 95)  BP: 143/81 (02-12-20 @ 18:05) (110/64 - 143/81)  BP(mean): --  RR: 20 (02-12-20 @ 18:05) (17 - 20)  SpO2: 98% (02-12-20 @ 18:05) (96% - 98%)  Wt(kg): --    PHYSICAL EXAM:  Constitutional: WDWN resting comfortably in bed; NAD  Head: NC/AT  Eyes: PERRL, EOMI, anicteric sclera  ENT: dry MM  Neck: supple; no JVD or thyromegaly  Respiratory: CTA B/L; no W/R/R, no retractions  Cardiac: +S1/S2; RRR; no M/R/G; PMI non-displaced  Gastrointestinal: abdomen soft, NT/ND  Back: spine midline, no bony tenderness or step-offs  Extremities: WWP, no clubbing or cyanosis; no peripheral edema  Musculoskeletal: NROM x4; no joint swelling, tenderness or erythema  Dermatologic: mild bruising noted on left hip   Lymphatic: no submandibular or cervical LAD  Neurologic: AAOx3; CNII-XII grossly intact; no focal deficits  Psychiatric: depressed

## 2020-02-12 NOTE — PHYSICAL THERAPY INITIAL EVALUATION ADULT - GAIT DEVIATIONS NOTED, PT EVAL
unsteady, antalgic gait ,pain L hip, mod VCs to keep hands on rolling walker; R foot ER, narrow KIRSTEN, shuffle type gait, dec safety awareness/decreased step length/decreased weight-shifting ability

## 2020-02-12 NOTE — H&P ADULT - HISTORY OF PRESENT ILLNESS
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 Patient was trying to get from bed to walker and then fell on his left hip. Denies any head injury or trauma. Denies any LOC. He states after his fall he was able to crawl to his phone and call his son. This morning his son came to the apartment and at the recommendation of nursing from [facility] was told to bring in father to the hospital. Patient complains of left hip pain worse with movement. Patient also with recent weight loss in the past 2 months - losing about 20 pounds per son. Patient with poor PO intake and decreased appetite. Patient also endorses depressed mood and states that he recently asked his physician about physician-assisted suicide; currently denies any suicidal ideations. He states he wants to go to rehab to get stronger. Patient currently lives alone, his son lives in [place] and has been coming to administer his antibiotics. He lives in a 4 story walk up, since his discharge on 1/17/2020 he has only left his home for medical appointments with his son, prior to that he was ambulatory and would attend AA meetings regularly.  Patient complains of generalized weakness, but no numbness/tingling, paresthesias, bowel/bladder incontinence, fevers or chills, n/v/d/c, chest pain, shortness of breath, palpitations, dizziness.     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 Patient states that yesterday he was getting something from the refrigerator when he slipped and fell. He laid there for 20minutes until he could get his strength to crawl to his bed when he called his son. Denies any head injury or trauma. Denies any LOC. Denies any chest pain or shortness of breath. This morning his son came to the apartment and at the recommendation of nurse from [facility] ([place], [place]) was told to bring in father to the hospital due to hip pain and rule out fracture. Patient complains of left hip pain worse with movement; denies any numbness, tingling. Using Tylenol over the counter.  Patient able to ambulate with cane independently. Patient also with recent weight loss in the past 2 months - losing about 20 pounds per son. Patient with poor PO intake and decreased appetite. Patient also endorses depressed mood, currently denies any suicidal ideations but he is depressed because of the burden he causes his son for the past four months - . Patient's anti-depressants were stopped 2/2 SIADH.  He states he wants to go to rehab to get stronger. Patient currently lives alone, his son lives in [place] and has been coming to administer his antibiotics daily. He lives in a 4 story walk up, since his discharge on 1/17/2020 he has only left his home for medical appointments with his son, prior to that he was ambulatory and would attend AA meetings regularly.  Patient complains of generalized weakness, but no numbness/tingling, paresthesias, bowel/bladder incontinence, fevers or chills, n/v/d/c, chest pain, shortness of breath, palpitations, dizziness.     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 Patient states that yesterday he was getting something from the refrigerator when he slipped and fell. He laid there for 20minutes until he could get his strength to crawl to his bed when he called his son. Denies any head injury or trauma. Denies any LOC. Denies any chest pain or shortness of breath. This morning his son came to the apartment and at the recommendation of nurse from [facility] was told to bring in father to the hospital due to hip pain and rule out fracture. Patient complains of left hip pain worse with movement; denies any numbness, tingling. Uses Tylenol over the counter for pain. Patient able to ambulate with cane independently. Patient also with recent weight loss in the past 2 months - losing about 20 pounds per son. Patient with poor PO intake and decreased appetite. Patient also endorses depressed mood, currently denies any suicidal ideations but he is depressed because of the burden he causes his son for the past four months - . Patient's anti-depressants were stopped 2/2 SIADH.  He states he wants to go to rehab to get stronger. Patient currently lives alone, his son lives in [place] and has been coming to administer his antibiotics daily. He lives in a 4 story walk up, homebound for the last 3 months, prior to that he was ambulatory and would attend AA meetings regularly.  Patient complains of generalized weakness and constipation, no numbness/tingling, paresthesias, bowel/bladder incontinence, fevers or chills, n/v/d, chest pain, shortness of breath, palpitations, dizziness.     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 Patient states that yesterday he was getting something from the refrigerator when he slipped and fell. He laid there for 20minutes until he could get his strength to crawl to his bed when he called his son. Denies any head injury or trauma. Denies any LOC. Denies any chest pain or shortness of breath. This morning his son came to the apartment and at the recommendation of nurse from [facility] was told to bring in father to the hospital due to hip pain and rule out fracture. Patient complains of left hip pain worse with movement; denies any numbness, tingling. Uses Tylenol over the counter for pain. Patient able to ambulate with cane independently but with ataxic and unsteady gait. Patient also with recent weight loss in the past 2 months - losing about 20 pounds per son. Patient with poor PO intake and decreased appetite. Patient also endorses depressed mood, currently denies any suicidal ideations but he is depressed because of the burden he causes his son for the past four months. Patient's anti-depressants were stopped 2/2 SIADH.  He states he wants to go to rehab to get stronger. Patient currently lives alone, his son lives in [place] and has been coming to administer his antibiotics daily. He lives in a 4 story walk up, homebound for the last 3 months, prior to that he was ambulatory and would attend AA meetings regularly.  Patient complains of generalized weakness and constipation, no numbness/tingling, paresthesias, bowel/bladder incontinence, fevers or chills, n/v/d, chest pain, shortness of breath, palpitations, dizziness.     ED VS TMax: 97.8 , HR 90 - 95, BP: 110/64 - 143/81, RR: 17 - 20, SpO2: 98%  ED Medications: acetaminophen 650mg POx1

## 2020-02-12 NOTE — H&P ADULT - PROBLEM SELECTOR PLAN 8
Hx of prostate cancer, s/p TURP, no hx of chemo or radiation therapy. Prior CT abd/pelvis showing avidly enhancing nodule within the peripheral zone of the left lobe of the prostate gland measuring 11 mm and an additional avidly enhancing nodule centrally measuring 3 cm extending to the posterior wall of the bladder, consistent with known prostate carcinoma, no free fluid, no adenopathy.  - continue home Finasteride 5mg daily  - continue home Tamsulosin 0.4mg qhs  - continue home oxybutynin 5mg TID Hx of prostate cancer, s/p TURP, no hx of chemo or radiation therapy. Prior CT abd/pelvis showing avidly enhancing nodule within the peripheral zone of the left lobe of the prostate gland measuring 11 mm and an additional avidly enhancing nodule centrally measuring 3 cm extending to the posterior wall of the bladder, consistent with known prostate carcinoma, no free fluid, no adenopathy.  - continue home Finasteride 5mg daily  - continue home Tamsulosin 0.4mg qhs  - continue home oxybutynin 5mg TID    #Pulmonary nodule: Follows outpatient with Dr. Patel

## 2020-02-13 DIAGNOSIS — F43.21 ADJUSTMENT DISORDER WITH DEPRESSED MOOD: ICD-10-CM

## 2020-02-13 LAB
ANION GAP SERPL CALC-SCNC: 12 MMOL/L — SIGNIFICANT CHANGE UP (ref 5–17)
BLD GP AB SCN SERPL QL: NEGATIVE — SIGNIFICANT CHANGE UP
BUN SERPL-MCNC: 12 MG/DL — SIGNIFICANT CHANGE UP (ref 7–23)
CALCIUM SERPL-MCNC: 8.5 MG/DL — SIGNIFICANT CHANGE UP (ref 8.4–10.5)
CHLORIDE SERPL-SCNC: 95 MMOL/L — LOW (ref 96–108)
CO2 SERPL-SCNC: 19 MMOL/L — LOW (ref 22–31)
CREAT SERPL-MCNC: 1.02 MG/DL — SIGNIFICANT CHANGE UP (ref 0.5–1.3)
GLUCOSE BLDC GLUCOMTR-MCNC: 102 MG/DL — HIGH (ref 70–99)
GLUCOSE BLDC GLUCOMTR-MCNC: 143 MG/DL — HIGH (ref 70–99)
GLUCOSE BLDC GLUCOMTR-MCNC: 177 MG/DL — HIGH (ref 70–99)
GLUCOSE BLDC GLUCOMTR-MCNC: 90 MG/DL — SIGNIFICANT CHANGE UP (ref 70–99)
GLUCOSE SERPL-MCNC: 93 MG/DL — SIGNIFICANT CHANGE UP (ref 70–99)
HBA1C BLD-MCNC: 6 % — HIGH (ref 4–5.6)
HCT VFR BLD CALC: 26.8 % — LOW (ref 39–50)
HGB BLD-MCNC: 9.4 G/DL — LOW (ref 13–17)
MAGNESIUM SERPL-MCNC: 1.4 MG/DL — LOW (ref 1.6–2.6)
MCHC RBC-ENTMCNC: 28.4 PG — SIGNIFICANT CHANGE UP (ref 27–34)
MCHC RBC-ENTMCNC: 35.1 GM/DL — SIGNIFICANT CHANGE UP (ref 32–36)
MCV RBC AUTO: 81 FL — SIGNIFICANT CHANGE UP (ref 80–100)
NRBC # BLD: 0 /100 WBCS — SIGNIFICANT CHANGE UP (ref 0–0)
OSMOLALITY SERPL: 261 MOSMOL/KG — LOW (ref 280–301)
OSMOLALITY UR: 382 MOSMOL/KG — SIGNIFICANT CHANGE UP (ref 100–650)
PHOSPHATE SERPL-MCNC: 4.3 MG/DL — SIGNIFICANT CHANGE UP (ref 2.5–4.5)
PLATELET # BLD AUTO: 213 K/UL — SIGNIFICANT CHANGE UP (ref 150–400)
POTASSIUM SERPL-MCNC: 3.5 MMOL/L — SIGNIFICANT CHANGE UP (ref 3.5–5.3)
POTASSIUM SERPL-SCNC: 3.5 MMOL/L — SIGNIFICANT CHANGE UP (ref 3.5–5.3)
RBC # BLD: 3.31 M/UL — LOW (ref 4.2–5.8)
RBC # FLD: 15.3 % — HIGH (ref 10.3–14.5)
RH IG SCN BLD-IMP: POSITIVE — SIGNIFICANT CHANGE UP
SODIUM SERPL-SCNC: 126 MMOL/L — LOW (ref 135–145)
SODIUM UR-SCNC: 79 MMOL/L — SIGNIFICANT CHANGE UP
VANCOMYCIN TROUGH SERPL-MCNC: 16.9 UG/ML — SIGNIFICANT CHANGE UP (ref 10–20)
WBC # BLD: 3.24 K/UL — LOW (ref 3.8–10.5)
WBC # FLD AUTO: 3.24 K/UL — LOW (ref 3.8–10.5)

## 2020-02-13 PROCEDURE — 99233 SBSQ HOSP IP/OBS HIGH 50: CPT | Mod: GC

## 2020-02-13 PROCEDURE — 99223 1ST HOSP IP/OBS HIGH 75: CPT

## 2020-02-13 RX ORDER — POTASSIUM CHLORIDE 20 MEQ
40 PACKET (EA) ORAL ONCE
Refills: 0 | Status: COMPLETED | OUTPATIENT
Start: 2020-02-13 | End: 2020-02-13

## 2020-02-13 RX ORDER — VANCOMYCIN HCL 1 G
1000 VIAL (EA) INTRAVENOUS EVERY 24 HOURS
Refills: 0 | Status: DISCONTINUED | OUTPATIENT
Start: 2020-02-13 | End: 2020-02-14

## 2020-02-13 RX ORDER — ACETAMINOPHEN 500 MG
650 TABLET ORAL EVERY 6 HOURS
Refills: 0 | Status: DISCONTINUED | OUTPATIENT
Start: 2020-02-13 | End: 2020-02-14

## 2020-02-13 RX ORDER — MAGNESIUM SULFATE 500 MG/ML
4 VIAL (ML) INJECTION ONCE
Refills: 0 | Status: COMPLETED | OUTPATIENT
Start: 2020-02-13 | End: 2020-02-13

## 2020-02-13 RX ORDER — MIRTAZAPINE 45 MG/1
7.5 TABLET, ORALLY DISINTEGRATING ORAL AT BEDTIME
Refills: 0 | Status: DISCONTINUED | OUTPATIENT
Start: 2020-02-13 | End: 2020-02-14

## 2020-02-13 RX ADMIN — Medication 5 MILLIGRAM(S): at 21:08

## 2020-02-13 RX ADMIN — Medication 5 MILLIGRAM(S): at 06:42

## 2020-02-13 RX ADMIN — MIRTAZAPINE 7.5 MILLIGRAM(S): 45 TABLET, ORALLY DISINTEGRATING ORAL at 21:08

## 2020-02-13 RX ADMIN — ATORVASTATIN CALCIUM 40 MILLIGRAM(S): 80 TABLET, FILM COATED ORAL at 21:08

## 2020-02-13 RX ADMIN — ENOXAPARIN SODIUM 40 MILLIGRAM(S): 100 INJECTION SUBCUTANEOUS at 19:20

## 2020-02-13 RX ADMIN — Medication 650 MILLIGRAM(S): at 02:45

## 2020-02-13 RX ADMIN — POLYETHYLENE GLYCOL 3350 17 GRAM(S): 17 POWDER, FOR SOLUTION ORAL at 18:24

## 2020-02-13 RX ADMIN — SODIUM CHLORIDE 2 GRAM(S): 9 INJECTION INTRAMUSCULAR; INTRAVENOUS; SUBCUTANEOUS at 22:55

## 2020-02-13 RX ADMIN — Medication 5 MILLIGRAM(S): at 13:19

## 2020-02-13 RX ADMIN — LOSARTAN POTASSIUM 25 MILLIGRAM(S): 100 TABLET, FILM COATED ORAL at 06:18

## 2020-02-13 RX ADMIN — CEFTRIAXONE 100 MILLIGRAM(S): 500 INJECTION, POWDER, FOR SOLUTION INTRAMUSCULAR; INTRAVENOUS at 09:51

## 2020-02-13 RX ADMIN — SENNA PLUS 2 TABLET(S): 8.6 TABLET ORAL at 21:08

## 2020-02-13 RX ADMIN — Medication 100 GRAM(S): at 10:42

## 2020-02-13 RX ADMIN — TAMSULOSIN HYDROCHLORIDE 0.4 MILLIGRAM(S): 0.4 CAPSULE ORAL at 21:08

## 2020-02-13 RX ADMIN — LATANOPROST 1 DROP(S): 0.05 SOLUTION/ DROPS OPHTHALMIC; TOPICAL at 21:09

## 2020-02-13 RX ADMIN — BRIMONIDINE TARTRATE 1 DROP(S): 2 SOLUTION/ DROPS OPHTHALMIC at 13:18

## 2020-02-13 RX ADMIN — DORZOLAMIDE HYDROCHLORIDE TIMOLOL MALEATE 1 DROP(S): 20; 5 SOLUTION/ DROPS OPHTHALMIC at 19:19

## 2020-02-13 RX ADMIN — SODIUM CHLORIDE 2 GRAM(S): 9 INJECTION INTRAMUSCULAR; INTRAVENOUS; SUBCUTANEOUS at 10:42

## 2020-02-13 RX ADMIN — FINASTERIDE 5 MILLIGRAM(S): 5 TABLET, FILM COATED ORAL at 12:21

## 2020-02-13 RX ADMIN — Medication 40 MILLIEQUIVALENT(S): at 09:51

## 2020-02-13 RX ADMIN — BRIMONIDINE TARTRATE 1 DROP(S): 2 SOLUTION/ DROPS OPHTHALMIC at 06:18

## 2020-02-13 RX ADMIN — BRIMONIDINE TARTRATE 1 DROP(S): 2 SOLUTION/ DROPS OPHTHALMIC at 21:09

## 2020-02-13 RX ADMIN — CLOPIDOGREL BISULFATE 75 MILLIGRAM(S): 75 TABLET, FILM COATED ORAL at 12:21

## 2020-02-13 RX ADMIN — Medication 1: at 13:17

## 2020-02-13 RX ADMIN — DORZOLAMIDE HYDROCHLORIDE TIMOLOL MALEATE 1 DROP(S): 20; 5 SOLUTION/ DROPS OPHTHALMIC at 06:18

## 2020-02-13 RX ADMIN — POLYETHYLENE GLYCOL 3350 17 GRAM(S): 17 POWDER, FOR SOLUTION ORAL at 06:18

## 2020-02-13 RX ADMIN — LORATADINE 10 MILLIGRAM(S): 10 TABLET ORAL at 12:21

## 2020-02-13 RX ADMIN — Medication 250 MILLIGRAM(S): at 12:20

## 2020-02-13 RX ADMIN — Medication 650 MILLIGRAM(S): at 01:54

## 2020-02-13 RX ADMIN — Medication 25 MILLIGRAM(S): at 06:18

## 2020-02-13 NOTE — PROGRESS NOTE ADULT - SUBJECTIVE AND OBJECTIVE BOX
OVERNIGHT EVENTS: no events overnight    SUBJECTIVE / INTERVAL HPI: Patient seen and examined at bedside. Patient says he was at home and slipped while opening the refrigerator door and was unable to stand up. He denies head trauma and loss of consciousness. He reports his last fall was 4 months ago which was due to his hyponatremia at the time. He complains of continued constipation, last bowel movement was 2 days ago. He denies headache, dizziness, changes in vision, SOB, CP, abdominal, hematochezia, melena. He reports 30lb weight loss in the past several months due to decreased appetite and PO intake. He also says he has trouble sleeping, only sleeps several hours per night.    VITAL SIGNS:  Vital Signs Last 24 Hrs  T(C): 36.4 (2020 08:30), Max: 36.6 (2020 18:05)  T(F): 97.5 (2020 08:30), Max: 97.8 (2020 18:05)  HR: 88 (2020 08:30) (88 - 95)  BP: 149/72 (2020 08:30) (110/64 - 149/72)  BP(mean): --  RR: 18 (2020 08:30) (16 - 20)  SpO2: 96% (2020 08:30) (96% - 98%)    PHYSICAL EXAM:    General: WDWN  HEENT: NC/AT; PERRL, anicteric sclera; dry mucous membranes  Neck: supple  Cardiovascular: +S1/S2; diastolic murmur loudest in upper left sternal border  Respiratory: CTA B/L; no W/R/R  Gastrointestinal: soft, NT/ND; +BSx4  Extremities: WWP; no edema, clubbing or cyanosis. 3x2 cm ecchymosis on lateral aspect of Left buttock.   Vascular: 2+ radial, PT pulses B/L  Neurological: AAOx3; no focal deficits    MEDICATIONS:  MEDICATIONS  (STANDING):  atorvastatin 40 milliGRAM(s) Oral at bedtime  brimonidine 0.2% Ophthalmic Solution 1 Drop(s) Both EYES three times a day  cefTRIAXone   IVPB 2000 milliGRAM(s) IV Intermittent every 24 hours  clopidogrel Tablet 75 milliGRAM(s) Oral daily  dorzolamide 2%/timolol 0.5% Ophthalmic Solution 1 Drop(s) Both EYES two times a day  enoxaparin Injectable 40 milliGRAM(s) SubCutaneous every 24 hours  finasteride 5 milliGRAM(s) Oral daily  insulin lispro (HumaLOG) corrective regimen sliding scale   SubCutaneous Before meals and at bedtime  latanoprost 0.005% Ophthalmic Solution 1 Drop(s) Both EYES at bedtime  loratadine 10 milliGRAM(s) Oral daily  losartan 25 milliGRAM(s) Oral daily  metoprolol succinate ER 25 milliGRAM(s) Oral daily  oxybutynin 5 milliGRAM(s) Oral three times a day  polyethylene glycol 3350 17 Gram(s) Oral every 12 hours  senna 2 Tablet(s) Oral at bedtime  sodium chloride 2 Gram(s) Oral every 12 hours  tamsulosin 0.4 milliGRAM(s) Oral at bedtime    MEDICATIONS  (PRN):  acetaminophen   Tablet .. 650 milliGRAM(s) Oral every 6 hours PRN Mild Pain (1 - 3)      ALLERGIES:  Allergies    No Known Drug Allergies  Originally Entered as [Rash-General] reaction to [Other][[place]] (Rash)  Originally Entered as [Rash-General] reaction to [Other][Ragweed] (Rash)  ragweed and birch (Unknown)    Intolerances        LABS:                        9.4    3.24  )-----------( 213      ( 2020 06:16 )             26.8     02-13    126<L>  |  95<L>  |  12  ----------------------------<  93  3.5   |  19<L>  |  1.02    Ca    8.5      2020 06:16  Phos  4.3     02-13  Mg     1.4     -    TPro  6.0  /  Alb  3.3  /  TBili  0.4  /  DBili  x   /  AST  17  /  ALT  13  /  AlkPhos  72  02-12      Urinalysis Basic - ( 2020 22:10 )    Color: Yellow / Appearance: Clear / S.015 / pH: x  Gluc: x / Ketone: NEGATIVE  / Bili: Negative / Urobili: 0.2 E.U./dL   Blood: x / Protein: NEGATIVE mg/dL / Nitrite: NEGATIVE   Leuk Esterase: NEGATIVE / RBC: < 5 /HPF / WBC < 5 /HPF   Sq Epi: x / Non Sq Epi: 0-5 /HPF / Bacteria: Present /HPF      CAPILLARY BLOOD GLUCOSE      POCT Blood Glucose.: 90 mg/dL (2020 09:16)      RADIOLOGY & ADDITIONAL TESTS: Reviewed.    < from: CT Hip No Cont, Bilateral (20 @ 16:46) >  IMPRESSION:  No acute fracture.      < end of copied text > OVERNIGHT EVENTS: no events overnight    SUBJECTIVE / INTERVAL HPI: Patient seen and examined at bedside. Patient says he was at home and slipped while opening the refrigerator door and was unable to stand up. He denies head trauma and loss of consciousness. He reports his last fall was 4 months ago which was due to his hyponatremia at the time. He complains of continued constipation, last bowel movement was 2 days ago. He denies headache, dizziness, changes in vision, SOB, CP, abdominal, hematochezia, melena. He reports 30lb weight loss in the past several months due to decreased appetite and PO intake. He also says he has trouble sleeping, only sleeps several hours per night.    VITAL SIGNS:  Vital Signs Last 24 Hrs  T(C): 36.4 (2020 08:30), Max: 36.6 (2020 18:05)  T(F): 97.5 (2020 08:30), Max: 97.8 (2020 18:05)  HR: 88 (2020 08:30) (88 - 95)  BP: 149/72 (2020 08:30) (110/64 - 149/72)  BP(mean): --  RR: 18 (2020 08:30) (16 - 20)  SpO2: 96% (2020 08:30) (96% - 98%)    PHYSICAL EXAM:    General: WDWN  HEENT: NC/AT; PERRL, anicteric sclera; dry mucous membranes  Neck: supple  Cardiovascular: +S1/S2; diastolic murmur loudest in upper left sternal border  Respiratory: CTA B/L; no W/R/R  Gastrointestinal: soft, NT/ND; +BSx4  Extremities: WWP; no edema, clubbing or cyanosis. 3x2 cm ecchymosis on lateral aspect of Left buttock.   Vascular: 2+ radial, PT pulses B/L  Neurological: AAOx3; no focal deficits    MEDICATIONS:  MEDICATIONS  (STANDING):  atorvastatin 40 milliGRAM(s) Oral at bedtime  brimonidine 0.2% Ophthalmic Solution 1 Drop(s) Both EYES three times a day  cefTRIAXone   IVPB 2000 milliGRAM(s) IV Intermittent every 24 hours  clopidogrel Tablet 75 milliGRAM(s) Oral daily  dorzolamide 2%/timolol 0.5% Ophthalmic Solution 1 Drop(s) Both EYES two times a day  enoxaparin Injectable 40 milliGRAM(s) SubCutaneous every 24 hours  finasteride 5 milliGRAM(s) Oral daily  insulin lispro (HumaLOG) corrective regimen sliding scale   SubCutaneous Before meals and at bedtime  latanoprost 0.005% Ophthalmic Solution 1 Drop(s) Both EYES at bedtime  loratadine 10 milliGRAM(s) Oral daily  losartan 25 milliGRAM(s) Oral daily  metoprolol succinate ER 25 milliGRAM(s) Oral daily  oxybutynin 5 milliGRAM(s) Oral three times a day  polyethylene glycol 3350 17 Gram(s) Oral every 12 hours  senna 2 Tablet(s) Oral at bedtime  sodium chloride 2 Gram(s) Oral every 12 hours  tamsulosin 0.4 milliGRAM(s) Oral at bedtime    MEDICATIONS  (PRN):  acetaminophen   Tablet .. 650 milliGRAM(s) Oral every 6 hours PRN Mild Pain (1 - 3)      ALLERGIES:  Allergies    No Known Drug Allergies  Originally Entered as [Rash-General] reaction to [Other][[place]] (Rash)  Originally Entered as [Rash-General] reaction to [Other][Ragweed] (Rash)  ragweed and birch (Unknown)    Intolerances        LABS:                        9.4    3.24  )-----------( 213      ( 2020 06:16 )             26.8     02-13    126<L>  |  95<L>  |  12  ----------------------------<  93  3.5   |  19<L>  |  1.02    Ca    8.5      2020 06:16  Phos  4.3     02-13  Mg     1.4     -    TPro  6.0  /  Alb  3.3  /  TBili  0.4  /  DBili  x   /  AST  17  /  ALT  13  /  AlkPhos  72  02-12      Urinalysis Basic - ( 2020 22:10 )    Color: Yellow / Appearance: Clear / S.015 / pH: x  Gluc: x / Ketone: NEGATIVE  / Bili: Negative / Urobili: 0.2 E.U./dL   Blood: x / Protein: NEGATIVE mg/dL / Nitrite: NEGATIVE   Leuk Esterase: NEGATIVE / RBC: < 5 /HPF / WBC < 5 /HPF   Sq Epi: x / Non Sq Epi: 0-5 /HPF / Bacteria: Present /HPF      POCT Blood Glucose.: 90 mg/dL (2020 09:16)      	Thyroid Stimulating Hormone, Serum (20 @ 14:31)  	  Thyroid Stimulating Hormone, Serum: 1.325 uIU/mL    	Sedimentation Rate, Erythrocyte (20 @ 14:31)  	  Sedimentation Rate, Erythrocyte: 13 mm/Hr      RADIOLOGY & ADDITIONAL TESTS: Reviewed.    < from: CT Hip No Cont, Bilateral (20 @ 16:46) >  IMPRESSION:  No acute fracture.      < end of copied text >      	Echocardiogram (01.15.20 @ 12:16)  	CONCLUSIONS:  	 1. Normal left and right ventricular size and function.  	 2. Moderate symmetric left ventricular hypertrophy.  	 3. Grade I left ventricular diastolic dysfunction.  	 4. Mild-to-moderate aortic regurgitation.  	 5. Aortic sclerosis without significant stenosis.  	 6. No evidence of pulmonary hypertension.

## 2020-02-13 NOTE — DIETITIAN INITIAL EVALUATION ADULT. - PROBLEM SELECTOR PLAN 2
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 Vancomycin dose changed outpatient to 1g q24 based on trough.    - c/w ceftriaxone: 2 gram(s) intravenous once a day  - vancomycin trough on 2/13 at 10am; dose vancomycin according to trough - home dose currently vancomycin 1 g q24

## 2020-02-13 NOTE — PROGRESS NOTE ADULT - PROBLEM SELECTOR PLAN 1
Patient with mechanical fall at home and presenting with left sided hip pain.   - No acute fracture on CT hip.  - PT recommends DAMIAN  - Patient with 3 falls in last 3 months, and alone in 4th floor walk up and highly unsafe to return home and presents at high fall risk within home.

## 2020-02-13 NOTE — PROGRESS NOTE ADULT - PROBLEM SELECTOR PLAN 7
- c/w atorvastatin 40 mg oral tablet: 1 tab(s) orally once a day    #Glaucoma/Cataracts:   - Confirm home eye drops in the AM w/ pharmacy   - brimonidine 0.2% ophthalmic solution: 1 drop(s) to each affected eye 3 times a day   - dorzolamide-timolol 2%-0.5% preservative-free ophthalmic solution: 1 drop(s) to each affected eye 2 times a day   - latanoprost 0.005% ophthalmic solution: 1 drop(s) to each affected eye once a day (in the evening)

## 2020-02-13 NOTE — CHART NOTE - NSCHARTNOTEFT_GEN_A_CORE
Upon Nutritional Assessment by the Registered Dietitian your patient was determined to meet criteria / has evidence of the following diagnosis/diagnoses:          [ ]  Mild Protein Calorie Malnutrition        [ X ]  Moderate Protein Calorie Malnutrition        [ ] Severe Protein Calorie Malnutrition        [ ] Unspecified Protein Calorie Malnutrition        [ ] Underweight / BMI <19        [ ] Morbid Obesity / BMI > 40      Findings as based on:  •  Comprehensive nutrition assessment and consultation  1) <75% EER x >1 month   2) 11.2% wt loss x 3 months   3) NFPE findings include mild muscle wasting in clavicle/shouler regions; Suspect pt with moderate PCM in context of chronic illness based on pt meeting 3 criteria.       Treatment:    The following diet has been recommended:  1) Recommend change to regular, mechanical soft diet w/ FR per MD. (Currently: 1000mL fluid restriction).   *Pt endorses forgetting to bring full upper/lower dentures from home.   2) Encourage PO intake throughout the day.   3) Monitor lytes and replete prn.   4) Trend wts.   5) Nutrition education provided-please see diet initial.     PROVIDER Section:     By signing this assessment you are acknowledging and agree with the diagnosis/diagnoses assigned by the Registered Dietitian    Comments:

## 2020-02-13 NOTE — DIETITIAN INITIAL EVALUATION ADULT. - PROBLEM SELECTOR PLAN 3
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 Sodium on admission 129. On home Lasix and salt tabs. Currently denies nausea, HA, confusion, fatigue.   - repeat serum/urin osms, urine lytes, BMP --> again consistent with SIADH --> SSRI use less likely now and pain may be the etiology.   - Resume home salt tabs and lasix   - Plan to see Dr. Alatorre outpatient per son appointment in March

## 2020-02-13 NOTE — BEHAVIORAL HEALTH ASSESSMENT NOTE - HPI (INCLUDE ILLNESS QUALITY, SEVERITY, DURATION, TIMING, CONTEXT, MODIFYING FACTORS, ASSOCIATED SIGNS AND SYMPTOMS)
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 Patient is known to [facility] psychiat[facility] during a prior admission when his antidepressant was discontinued due to SIDH and low Na. Psychiatry was reconsulted as the patient appeared depressed. Upon interview patient reports that he feel better today knowing the he will go to a NH after he leaves the hospital. He admits feeling depressed in context of losing his GF 3 years ago and coping with his medical problems. He denies SI/HI and presents future oriented.

## 2020-02-13 NOTE — PROGRESS NOTE ADULT - PROBLEM SELECTOR PLAN 6
Hx of HTN. /64 - 143/81.   - c/w losartan 25 mg oral tablet daily   - c/w metoprolol succinate 25 mg oral tablet, extended release daily

## 2020-02-13 NOTE — DIETITIAN INITIAL EVALUATION ADULT. - DIET TYPE
Diet, DASH/TLC:   Sodium & Cholesterol Restricted  1000mL Fluid Restriction ([id number]) (02-12-20 @ 19:13) 1000ml/regular

## 2020-02-13 NOTE — DIETITIAN INITIAL EVALUATION ADULT. - NS FNS WEIGHT USED FOR CALC
Please contact patient;  Total testosterone level normal, again this is an accurate representation as this is an a.m. blood draw, what is used to diagnose low testosterone.  His other labs normal, kidney function is improving.   Follow-up as planned.   current/151lbs (2/12)

## 2020-02-13 NOTE — PROGRESS NOTE ADULT - PROBLEM SELECTOR PLAN 10
VTE Prophylaxis: lovenox q24   GI ppx: None   FULL CODE; attempted to start conversation but unable to teach back understanding

## 2020-02-13 NOTE — PROGRESS NOTE ADULT - PROBLEM SELECTOR PLAN 2
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 Vancomycin dose changed outpatient to 1g q24 based on trough.    - c/w ceftriaxone: 2 gram(s) intravenous once a day  - c/w vancomycin 1 g IV once a day  - vancomycin trough on 2/13 was 16.9

## 2020-02-13 NOTE — BEHAVIORAL HEALTH ASSESSMENT NOTE - SUMMARY
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 Patient presents with depressive symptoms in context of unresolved grieving process and adjustment with current medical problems. Due to his low Na, his antidepressant medication was stopped approx 2 months ago.  Plan;  -start mirtazapine 7.5mg po qhs (lower risk for hyponatremia); monitor the Na level  -no need for 1:1 observation  -[facility] to continue to follow

## 2020-02-13 NOTE — PROGRESS NOTE ADULT - PROBLEM SELECTOR PLAN 8
Hx of prostate cancer, s/p TURP, no hx of chemo or radiation therapy. Prior CT abd/pelvis showing avidly enhancing nodule within the peripheral zone of the left lobe of the prostate gland measuring 11 mm and an additional avidly enhancing nodule centrally measuring 3 cm extending to the posterior wall of the bladder, consistent with known prostate carcinoma, no free fluid, no adenopathy.  - continue home Finasteride 5mg daily  - continue home Tamsulosin 0.4mg qhs  - continue home oxybutynin 5mg TID    #Pulmonary nodule: Follows outpatient with Dr. Patel

## 2020-02-13 NOTE — PROGRESS NOTE ADULT - PROBLEM SELECTOR PLAN 5
Hx of CAD s/p PCI/CABG and hx of PAD. No chest pain, SOB, palpitations.   - c/w home clopidogrel 75 mg oral tablet daily  - c/w home atorvastatin 40 mg oral tablet daily   - c/w home Toprol 25mg XL daily    #Thoracic aortic aneurysm (4.5cm): Follows outpatient with Dr. Palacio and cardiology.

## 2020-02-13 NOTE — PROGRESS NOTE ADULT - ASSESSMENT
OVERNIGHT EVENTS: no events overnight    SUBJECTIVE / INTERVAL HPI: Patient seen and examined at bedside. Patient says he was at home and slipped while opening the refrigerator door and was unable to stand up. He denies head trauma and loss of consciousness. He reports his last fall was 4 months ago which was due to his hyponatremia at the time. He complains of continued constipation, last bowel movement was 2 days ago. He denies headache, dizziness, changes in vision, SOB, CP, abdominal, hematochezia, melena. He reports 30lb weight loss in the past several months due to decreased appetite and PO intake. He also says he has trouble sleeping, only sleeps several hours per night.    VITAL SIGNS:  Vital Signs Last 24 Hrs  T(C): 36.4 (2020 08:30), Max: 36.6 (2020 18:05)  T(F): 97.5 (2020 08:30), Max: 97.8 (2020 18:05)  HR: 88 (2020 08:30) (88 - 95)  BP: 149/72 (2020 08:30) (110/64 - 149/72)  BP(mean): --  RR: 18 (2020 08:30) (16 - 20)  SpO2: 96% (2020 08:30) (96% - 98%)    PHYSICAL EXAM:    General: WDWN  HEENT: NC/AT; PERRL, anicteric sclera; dry mucous membranes  Neck: supple  Cardiovascular: +S1/S2; diastolic murmur loudest in upper left sternal border  Respiratory: CTA B/L; no W/R/R  Gastrointestinal: soft, NT/ND; +BSx4  Extremities: WWP; no edema, clubbing or cyanosis. 3x2 cm ecchymosis on lateral aspect of Left buttock.   Vascular: 2+ radial, PT pulses B/L  Neurological: AAOx3; no focal deficits    MEDICATIONS:  MEDICATIONS  (STANDING):  atorvastatin 40 milliGRAM(s) Oral at bedtime  brimonidine 0.2% Ophthalmic Solution 1 Drop(s) Both EYES three times a day  cefTRIAXone   IVPB 2000 milliGRAM(s) IV Intermittent every 24 hours  clopidogrel Tablet 75 milliGRAM(s) Oral daily  dorzolamide 2%/timolol 0.5% Ophthalmic Solution 1 Drop(s) Both EYES two times a day  enoxaparin Injectable 40 milliGRAM(s) SubCutaneous every 24 hours  finasteride 5 milliGRAM(s) Oral daily  insulin lispro (HumaLOG) corrective regimen sliding scale   SubCutaneous Before meals and at bedtime  latanoprost 0.005% Ophthalmic Solution 1 Drop(s) Both EYES at bedtime  loratadine 10 milliGRAM(s) Oral daily  losartan 25 milliGRAM(s) Oral daily  metoprolol succinate ER 25 milliGRAM(s) Oral daily  oxybutynin 5 milliGRAM(s) Oral three times a day  polyethylene glycol 3350 17 Gram(s) Oral every 12 hours  senna 2 Tablet(s) Oral at bedtime  sodium chloride 2 Gram(s) Oral every 12 hours  tamsulosin 0.4 milliGRAM(s) Oral at bedtime    MEDICATIONS  (PRN):  acetaminophen   Tablet .. 650 milliGRAM(s) Oral every 6 hours PRN Mild Pain (1 - 3)      ALLERGIES:  Allergies    No Known Drug Allergies  Originally Entered as [Rash-General] reaction to [Other][[place]] (Rash)  Originally Entered as [Rash-General] reaction to [Other][[place]] (Rash)  ragweed and birch (Unknown)    Intolerances        LABS:                        9.4    3.24  )-----------( 213      ( 2020 06:16 )             26.8     02-13    126<L>  |  95<L>  |  12  ----------------------------<  93  3.5   |  19<L>  |  1.02    Ca    8.5      2020 06:16  Phos  4.3     02-13  Mg     1.4     -    TPro  6.0  /  Alb  3.3  /  TBili  0.4  /  DBili  x   /  AST  17  /  ALT  13  /  AlkPhos  72  02-12      Urinalysis Basic - ( 2020 22:10 )    Color: Yellow / Appearance: Clear / S.015 / pH: x  Gluc: x / Ketone: NEGATIVE  / Bili: Negative / Urobili: 0.2 E.U./dL   Blood: x / Protein: NEGATIVE mg/dL / Nitrite: NEGATIVE   Leuk Esterase: NEGATIVE / RBC: < 5 /HPF / WBC < 5 /HPF   Sq Epi: x / Non Sq Epi: 0-5 /HPF / Bacteria: Present /HPF      CAPILLARY BLOOD GLUCOSE      POCT Blood Glucose.: 90 mg/dL (2020 09:16)      RADIOLOGY & ADDITIONAL TESTS: Reviewed.    < from: CT Hip No Cont, Bilateral (20 @ 16:46) >  IMPRESSION:  No acute fracture.      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

## 2020-02-13 NOTE — PROGRESS NOTE ADULT - PROBLEM SELECTOR PLAN 4
Patient with major depressive sxs, SSRI stopped d/t hyponatremia. Continues to endorse thoughts of depression, denies SI currently. Decreased appetite. Has had recent thoughts of SI, but is happy that he will go to rehab to get better and be less of burden for son.   - f/u psych for depression

## 2020-02-13 NOTE — PROGRESS NOTE ADULT - PROBLEM SELECTOR PLAN 3
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 Sodium on admission 129. On home Lasix and salt tabs. Currently denies nausea, HA, confusion, fatigue.   - repeat serum/urin osms, urine lytes, BMP --> again consistent with SIADH --> SSRI use less likely now and pain may be the etiology.   - Resume home salt tabs and lasix   - Plan to see Dr. 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 Sodium on admission 129. On home Lasix and salt tabs. Currently denies nausea, HA, confusion, fatigue.   - repeat serum/urin osms, urine lytes, BMP --> again consistent with SIADH --> SSRI use less likely now and pain may be the etiology.   - Resume home salt tabs. Held lasix for now.  - Plan to see Dr. Alatorre outpatient per son appointment in March

## 2020-02-13 NOTE — DIETITIAN INITIAL EVALUATION ADULT. - ADD RECOMMEND
1) Recommend change to regular, mechanical soft diet w/ FR per MD. (Currently: 1000mL fluid restriction). *Pt endorses forgetting to bring full upper/lower dentures from home. 2) Encourage PO intake throughout the day. 3) Monitor lytes and replete prn. 4) Trend wts.

## 2020-02-13 NOTE — DIETITIAN INITIAL EVALUATION ADULT. - MALNUTRITION
1) <75% EER x >1 month 2) 11.2% wt loss x 3 months 3) NFPE findings include mild muscle wasting in clavicle/shouler regions; Suspect pt with moderate PCM in context of chronic illness based on pt meeting 3 criteria. (Suspect moderate PCM in context of chronic illness)

## 2020-02-13 NOTE — DIETITIAN INITIAL EVALUATION ADULT. - PERTINENT MEDS FT
Humalog sliding scale, losartan, magnesium sulfate IV, metoprolol succinate, atorvastatin, polyethylene glycol 3350, senna, sodium chloride

## 2020-02-13 NOTE — CONSULT NOTE ADULT - ASSESSMENT
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       Problem/Plan - 1:  ·  Problem: Fall.  Plan: Patient with mechanical fall at home and presenting with left sided hip pain.   - No acute fracture on CT hip.  - PT recommends DAMIAN  - Patient with 3 falls in last 3 months, and alone in 4th floor walk up and highly unsafe to return home and presents at high fall risk within home.     Problem/Plan - 2:  ·  Problem: Osteomyelitis.  Plan: On last admission patient found to have MRI lumbar spine showed discitis/osteomyelitis at L2-3 w/ severe degenerative changes; followed by ID and on ceftriaxone 2g q24 and vancomycin till 2/25/2020. Vancomycin dose changed outpatient to 1g q24 based on trough.    - c/w ceftriaxone: 2 gram(s) intravenous once a day  - c/w vancomycin 1 g IV once a day  - vancomycin trough on 2/13 was 16.9.     Problem/Plan - 3:  ·  Problem: Hyponatremia.  Plan: Patient with a hx of chronic hyponatremia found to be 2/2 SIADH thought to be from SSRI use (which has since been discontinued) vs pain. Sodium on admission 129. On home Lasix and salt tabs. Currently denies nausea, HA, confusion, fatigue.   - repeat serum/urin osms, urine lytes, BMP --> again consistent with SIADH --> SSRI use less likely now and pain may be the etiology.   - Resume home salt tabs and lasix   - Plan to see Dr. Alatorre outpatient per son appointment in March.     Problem/Plan - 4:  ·  Problem: Depression.  Plan: Patient with major depressive sxs, SSRI stopped d/t hyponatremia. Continues to endorse thoughts of depression, denies SI currently. Decreased appetite. Has had recent thoughts of SI, but is happy that he will go to rehab to get better and be less of burden for son.   - f/u psych for depression.     Problem/Plan - 5:  ·  Problem: CAD (coronary artery disease).  Plan: Hx of CAD s/p PCI/CABG and hx of PAD. No chest pain, SOB, palpitations.   - c/w home clopidogrel 75 mg oral tablet daily  - c/w home atorvastatin 40 mg oral tablet daily   - c/w home Toprol 25mg XL daily    #Thoracic aortic aneurysm (4.5cm): Follows outpatient with Dr. Palacio and cardiology.     Problem/Plan - 6:  Problem: Essential hypertension. Plan: Hx of HTN. /64 - 143/81.   - c/w losartan 25 mg oral tablet daily   - c/w metoprolol succinate 25 mg oral tablet, extended release daily.    Problem/Plan - 7:  ·  Problem: Hyperlipidemia.  Plan: - c/w atorvastatin 40 mg oral tablet: 1 tab(s) orally once a day    #Glaucoma/Cataracts:   - Confirm home eye drops in the AM w/ pharmacy   - brimonidine 0.2% ophthalmic solution: 1 drop(s) to each affected eye 3 times a day   - dorzolamide-timolol 2%-0.5% preservative-free ophthalmic solution: 1 drop(s) to each affected eye 2 times a day   - latanoprost 0.005% ophthalmic solution: 1 drop(s) to each affected eye once a day (in the evening).     Problem/Plan - 8:  ·  Problem: Prostate CA.  Plan: Hx of prostate cancer, s/p TURP, no hx of chemo or radiation therapy. Prior CT abd/pelvis showing avidly enhancing nodule within the peripheral zone of the left lobe of the prostate gland measuring 11 mm and an additional avidly enhancing nodule centrally measuring 3 cm extending to the posterior wall of the bladder, consistent with known prostate carcinoma, no free fluid, no adenopathy.  - continue home Finasteride 5mg daily  - continue home Tamsulosin 0.4mg qhs  - continue home oxybutynin 5mg TID    #Pulmonary nodule: Follows outpatient with Dr. Patel.     Problem/Plan - 9:  ·  Problem: Nutrition, metabolism, and development symptoms.  Plan: F: Fluid restriction given hx of SIADH  E: replete K<4, Mg<2  N: DASH/TLC.     Problem/Plan - 10:  Problem: Prophylactic measure. Plan; VTE Prophylaxis: lovenox q24   GI ppx: None   FULL CODE; attempted to start conversation but unable to teach back understanding.

## 2020-02-13 NOTE — CONSULT NOTE ADULT - SUBJECTIVE AND OBJECTIVE BOX
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 Patient states that yesterday he was getting something from the refrigerator when he slipped and fell. He laid there for 20minutes until he could get his strength to crawl to his bed when he called his son. Denies any head injury or trauma. Denies any LOC. Denies any chest pain or shortness of breath. This morning his son came to the apartment and at the recommendation of nurse from [facility] was told to bring in father to the hospital due to hip pain and rule out fracture. Patient complains of left hip pain worse with movement; denies any numbness, tingling. Uses Tylenol over the counter for pain. Patient able to ambulate with cane independently but with ataxic and unsteady gait. Patient also with recent weight loss in the past 2 months - losing about 20 pounds per son. Patient with poor PO intake and decreased appetite. Patient also endorses depressed mood, currently denies any suicidal ideations but he is depressed because of the burden he causes his son for the past four months. Patient's anti-depressants were stopped 2/2 SIADH.  He states he wants to go to rehab to get stronger. Patient currently lives alone, his son lives in [place] and has been coming to administer his antibiotics daily. He lives in a 4 story walk up, homebound for the last 3 months, prior to that he was ambulatory and would attend AA meetings regularly.  Patient complains of generalized weakness and constipation, no numbness/tingling, paresthesias, bowel/bladder incontinence, fevers or chills, n/v/d, chest pain, shortness of breath, palpitations, dizziness.     ED VS TMax: 97.8 , HR 90 - 95, BP: 110/64 - 143/81, RR: 17 - 20, SpO2: 98%  ED Medications: acetaminophen 650mg POx1 (2020 19:04)      PAST MEDICAL & SURGICAL HISTORY:  Essential hypertension: Hypertension  Type 2 diabetes mellitus: Diabetes  Hyperlipidemia: Hyperlipidemia  Type 2 diabetes mellitus: DM (diabetes mellitus), type 2  Malignant neoplasm of prostate: Prostate cancer  Acute myocardial infarction: Patient denies MI  S/p bilateral blepharoplasty  H/O transurethral destruction of bladder lesion  Bilateral cataracts  Status post aorto-coronary artery bypass graft: S/P CABG (coronary artery bypass graft)  Other postprocedural status: History of penile implant      MEDICATIONS  (STANDING):  atorvastatin 40 milliGRAM(s) Oral at bedtime  brimonidine 0.2% Ophthalmic Solution 1 Drop(s) Both EYES three times a day  cefTRIAXone   IVPB 2000 milliGRAM(s) IV Intermittent every 24 hours  clopidogrel Tablet 75 milliGRAM(s) Oral daily  dorzolamide 2%/timolol 0.5% Ophthalmic Solution 1 Drop(s) Both EYES two times a day  enoxaparin Injectable 40 milliGRAM(s) SubCutaneous every 24 hours  finasteride 5 milliGRAM(s) Oral daily  insulin lispro (HumaLOG) corrective regimen sliding scale   SubCutaneous Before meals and at bedtime  latanoprost 0.005% Ophthalmic Solution 1 Drop(s) Both EYES at bedtime  loratadine 10 milliGRAM(s) Oral daily  losartan 25 milliGRAM(s) Oral daily  metoprolol succinate ER 25 milliGRAM(s) Oral daily  oxybutynin 5 milliGRAM(s) Oral three times a day  polyethylene glycol 3350 17 Gram(s) Oral every 12 hours  senna 2 Tablet(s) Oral at bedtime  sodium chloride 2 Gram(s) Oral every 12 hours  tamsulosin 0.4 milliGRAM(s) Oral at bedtime  vancomycin  IVPB 1000 milliGRAM(s) IV Intermittent every 24 hours    MEDICATIONS  (PRN):  acetaminophen   Tablet .. 650 milliGRAM(s) Oral every 6 hours PRN Mild Pain (1 - 3)      Social History:           -  Occupation: retired artist           -  Home Living Status: lives alone in a 4th floor walkup apartment, son visits daily           -  Prior Home Care Services: X    Baseline Functional Level Prior to Admission:           - ADL's:  independent           - ambulates with a cane, has a walker    FAMILY HISTORY:  FH: alcohol abuse: mother; father&#x27;s hx is unknown      CBC Full  -  ( 2020 06:16 )  WBC Count : 3.24 K/uL  RBC Count : 3.31 M/uL  Hemoglobin : 9.4 g/dL  Hematocrit : 26.8 %  Platelet Count - Automated : 213 K/uL  Mean Cell Volume : 81.0 fl  Mean Cell Hemoglobin : 28.4 pg  Mean Cell Hemoglobin Concentration : 35.1 gm/dL  Auto Neutrophil # : x  Auto Lymphocyte # : x  Auto Monocyte # : x  Auto Eosinophil # : x  Auto Basophil # : x  Auto Neutrophil % : x  Auto Lymphocyte % : x  Auto Monocyte % : x  Auto Eosinophil % : x  Auto Basophil % : x      02    126<L>  |  95<L>  |  12  ----------------------------<  93  3.5   |  19<L>  |  1.02    Ca    8.5      2020 06:16  Phos  4.3     -  Mg     1.4         TPro  6.0  /  Alb  3.3  /  TBili  0.4  /  DBili  x   /  AST  17  /  ALT  13  /  AlkPhos  72  02-12      Urinalysis Basic - ( 2020 22:10 )    Color: Yellow / Appearance: Clear / S.015 / pH: x  Gluc: x / Ketone: NEGATIVE  / Bili: Negative / Urobili: 0.2 E.U./dL   Blood: x / Protein: NEGATIVE mg/dL / Nitrite: NEGATIVE   Leuk Esterase: NEGATIVE / RBC: < 5 /HPF / WBC < 5 /HPF   Sq Epi: x / Non Sq Epi: 0-5 /HPF / Bacteria: Present /HPF          Radiology:    < from: CT Hip No Cont, Bilateral (20 @ 16:46) >  EXAM:  CT HIP ONLY BI                          PROCEDURE DATE:  2020          INTERPRETATION:  CT SCAN OF THE PELVIS dated 2020 4:46 PM:    CLINICAL INDICATION: Fall, left hip pain    TECHNIQUE: Axial imaging of the pelvis with attentionto the left hip hip was performed without administration of intravenous or oral contrast. Data of the was reformatted into sagittal and coronal planes.    FINDINGS:  There is no acute fracture or dislocation. Marginal osteophyte formation is present at each hip. Musculature surrounding bilateral hips is intact, symmetric, and unremarkable. There are degenerative changes of the lower lumbar spine including multilevel intervertebral disc space loss and 4 mm anterolisthesis of L4 on L5. Posterior disc bulges are noted at L4-L5 and L5-S1. Productive changes are seen noted within bilateral facet joints of the lower lumbar spine.    A penile prosthesis is noted. There are aortoiliac calcifications. There is a tiny fat-containing right inguinal hernia. The prostate is enlarged measuring approximately 5.6 x 4.0 x 4.2 cm.    IMPRESSION:  No acute fracture.            Vital Signs Last 24 Hrs  T(C): 36.4 (2020 08:30), Max: 36.6 (2020 18:05)  T(F): 97.5 (2020 08:30), Max: 97.8 (2020 18:05)  HR: 88 (2020 08:30) (88 - 95)  BP: 149/72 (2020 08:30) (121/67 - 149/72)  BP(mean): --  RR: 18 (2020 08:30) (16 - 20)  SpO2: 96% (2020 08:30) (96% - 98%)    REVIEW OF SYSTEMS:    CONSTITUTIONAL:  fatigue  EYES: No eye pain, visual disturbances, or discharge  ENMT:  No difficulty hearing, tinnitus, vertigo; No sinus or throat pain  NECK: No pain or stiffness  BREASTS: No pain, masses, or nipple discharge  RESPIRATORY: No cough, wheezing, chills or hemoptysis; No shortness of breath  CARDIOVASCULAR: No chest pain, palpitations, dizziness, or leg swelling  GASTROINTESTINAL: No abdominal or epigastric pain. No nausea, vomiting, or hematemesis; No diarrhea or constipation. No melena or hematochezia.  GENITOURINARY: No dysuria, frequency, hematuria, or incontinence  NEUROLOGICAL: No headaches, memory loss, loss of strength, numbness, or tremors  SKIN: No itching, burning, rashes, or lesions   LYMPH NODES: No enlarged glands  ENDOCRINE: No heat or cold intolerance; No hair loss  MUSCULOSKELETAL: No joint pain or swelling; No muscle, back, or extremity pain  PSYCHIATRIC: No depression, anxiety, mood swings, or difficulty sleeping  HEME/LYMPH: No easy bruising, or bleeding gums  ALLERGY AND IMMUNOLOGIC: No hives or eczema  VASCULAR: no swelling, erythema        Physical Exam:  87 yo  gentleman lying in semi Quinonez's position, no acute complaints    Head: normocephalic, atraumatic    Eyes: PERRLA, EOMI, no nystagmus, sclera anicteric    ENT: nasal discharge, uvula midline, no oropharyngeal erythema/exudate    Neck: supple, negative JVD, negative carotid bruits, no thyromegaly    Chest: CTA bilaterally, neg wheeze/ rhonchi/ rales/ crackles/ egophany    Cardiovascular: regular rate and rhythm, neg murmurs/rubs/gallops    Abdomen: soft, non distended, non tender to palpation in all 4 quadrants, negative rebound/guarding, normal bowel sounds    Extremities: WWP, neg cyanosis/clubbing/edema, negative calf tenderness to palpation, negative Tori's sign, left buttock bruise w/o hematoma      :     Neurologic Exam:      Motor Exam:    Upper Extremities:     RIght:  no focal weakness               negative drift    Left :    no focal weakness               negative drift    Lower Extremities:                 Right:   no focal weakness                 Left:     no focal weakness                 Sensory:    intact to LT/PP in all UE/LE dermatomes                     DTR:            = biceps/     triceps/     brachioradialis                      = patella/   medial hamstring/ankle                      neg clonus                      neg Babinski                        Finger to Nose:  wnl    Heel to Shin:  wnl    Rapid Alternating movements:  wnl    Joint Position Sense:  intact    Romberg:  not tested    Tandem Walking:  not tested    Gait:  not tested        PM&R Impression:    1) s/p fall  2) deconditioned  3) no focal weakness  4) h/o lumbar disciitis/ osteomyelitis        Recommendations:    1) Physical therapy focusing on therapeutic exercises, bed mobility/transfer out of bed evaluation, progressive ambulation with assistive devices prn.    2) Anticipated Disposition Plan/Recs: subacute rehab placement

## 2020-02-13 NOTE — DIETITIAN INITIAL EVALUATION ADULT. - ENERGY NEEDS
Ht: 68" , Wt(2/12): 151lbs, IBW: 154lbs (+/-10%), 98%IBW, BMI: 23.0 kg/m2   ABW (68.4 kg) used for EER as pt between % of IBW.  Nutrient needs based on [facility] standards of care for older adults. Needs adjusted for suspected malnutrition.   Fluids deferred to team 2/2 hyponatremia.

## 2020-02-13 NOTE — DIETITIAN INITIAL EVALUATION ADULT. - OTHER INFO
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 Pending psych consult for depression.     Pt visited, seen resting in bed. Pt reports decreased appetite since November (x3 months ago) d/t onset of illness (pt w/ multiple recent admissions). Pt known to nutrition services from previous admit, reported living alone, reciving meals on wheels services. Endorses using full upper and lower dentures and reports not bringing it to the hospital. Expresses preference for softer foods. Confirms NKFA or dietary restrictions. Denies chewing difficulties. Pt states UBW 170lbs --> now 151lbs (2/12) which indicates 19lbs (11.2%) wt loss x 3 months. Nutrition focused physical exam conducted w/ pt's consent, findings include mild muscle wasting; Suspect pt with moderate PCM in context of chronic illness based on pt meeting 3 criteria; Please see malnutrition note. Pt is currently on DASH/TLC, 1000ml FR diet, tolerating, with ~50% intake of meals. Denies N/V/D/C. +BM 2/12. No pain reported at this time. Skin: intact pressure-wise noted. Discussed purpose of fluid restriction. Encouraged PO intake. RD to f/u per protocol.

## 2020-02-14 ENCOUNTER — TRANSCRIPTION ENCOUNTER (OUTPATIENT)
Age: 85
End: 2020-02-14

## 2020-02-14 VITALS
HEART RATE: 82 BPM | OXYGEN SATURATION: 95 % | SYSTOLIC BLOOD PRESSURE: 125 MMHG | DIASTOLIC BLOOD PRESSURE: 74 MMHG | TEMPERATURE: 98 F | RESPIRATION RATE: 17 BRPM

## 2020-02-14 LAB
ANION GAP SERPL CALC-SCNC: 10 MMOL/L — SIGNIFICANT CHANGE UP (ref 5–17)
BUN SERPL-MCNC: 12 MG/DL — SIGNIFICANT CHANGE UP (ref 7–23)
CALCIUM SERPL-MCNC: 8.3 MG/DL — LOW (ref 8.4–10.5)
CHLORIDE SERPL-SCNC: 100 MMOL/L — SIGNIFICANT CHANGE UP (ref 96–108)
CO2 SERPL-SCNC: 19 MMOL/L — LOW (ref 22–31)
CREAT SERPL-MCNC: 1.06 MG/DL — SIGNIFICANT CHANGE UP (ref 0.5–1.3)
GLUCOSE BLDC GLUCOMTR-MCNC: 128 MG/DL — HIGH (ref 70–99)
GLUCOSE BLDC GLUCOMTR-MCNC: 136 MG/DL — HIGH (ref 70–99)
GLUCOSE BLDC GLUCOMTR-MCNC: 160 MG/DL — HIGH (ref 70–99)
GLUCOSE SERPL-MCNC: 102 MG/DL — HIGH (ref 70–99)
HCT VFR BLD CALC: 28 % — LOW (ref 39–50)
HGB BLD-MCNC: 9.7 G/DL — LOW (ref 13–17)
MAGNESIUM SERPL-MCNC: 2.2 MG/DL — SIGNIFICANT CHANGE UP (ref 1.6–2.6)
MCHC RBC-ENTMCNC: 28.5 PG — SIGNIFICANT CHANGE UP (ref 27–34)
MCHC RBC-ENTMCNC: 34.6 GM/DL — SIGNIFICANT CHANGE UP (ref 32–36)
MCV RBC AUTO: 82.4 FL — SIGNIFICANT CHANGE UP (ref 80–100)
NRBC # BLD: 0 /100 WBCS — SIGNIFICANT CHANGE UP (ref 0–0)
PLATELET # BLD AUTO: 216 K/UL — SIGNIFICANT CHANGE UP (ref 150–400)
POTASSIUM SERPL-MCNC: 4.8 MMOL/L — SIGNIFICANT CHANGE UP (ref 3.5–5.3)
POTASSIUM SERPL-SCNC: 4.8 MMOL/L — SIGNIFICANT CHANGE UP (ref 3.5–5.3)
RBC # BLD: 3.4 M/UL — LOW (ref 4.2–5.8)
RBC # FLD: 15.7 % — HIGH (ref 10.3–14.5)
SODIUM SERPL-SCNC: 129 MMOL/L — LOW (ref 135–145)
WBC # BLD: 3.64 K/UL — LOW (ref 3.8–10.5)
WBC # FLD AUTO: 3.64 K/UL — LOW (ref 3.8–10.5)

## 2020-02-14 PROCEDURE — 97161 PT EVAL LOW COMPLEX 20 MIN: CPT

## 2020-02-14 PROCEDURE — 80053 COMPREHEN METABOLIC PANEL: CPT

## 2020-02-14 PROCEDURE — 73700 CT LOWER EXTREMITY W/O DYE: CPT

## 2020-02-14 PROCEDURE — 86901 BLOOD TYPING SEROLOGIC RH(D): CPT

## 2020-02-14 PROCEDURE — 83735 ASSAY OF MAGNESIUM: CPT

## 2020-02-14 PROCEDURE — 84300 ASSAY OF URINE SODIUM: CPT

## 2020-02-14 PROCEDURE — 36415 COLL VENOUS BLD VENIPUNCTURE: CPT

## 2020-02-14 PROCEDURE — 85025 COMPLETE CBC W/AUTO DIFF WBC: CPT

## 2020-02-14 PROCEDURE — 86140 C-REACTIVE PROTEIN: CPT

## 2020-02-14 PROCEDURE — 81001 URINALYSIS AUTO W/SCOPE: CPT

## 2020-02-14 PROCEDURE — 83036 HEMOGLOBIN GLYCOSYLATED A1C: CPT

## 2020-02-14 PROCEDURE — 84443 ASSAY THYROID STIM HORMONE: CPT

## 2020-02-14 PROCEDURE — 97116 GAIT TRAINING THERAPY: CPT

## 2020-02-14 PROCEDURE — 99232 SBSQ HOSP IP/OBS MODERATE 35: CPT

## 2020-02-14 PROCEDURE — 80048 BASIC METABOLIC PNL TOTAL CA: CPT

## 2020-02-14 PROCEDURE — 80202 ASSAY OF VANCOMYCIN: CPT

## 2020-02-14 PROCEDURE — 99285 EMERGENCY DEPT VISIT HI MDM: CPT

## 2020-02-14 PROCEDURE — 83930 ASSAY OF BLOOD OSMOLALITY: CPT

## 2020-02-14 PROCEDURE — 99239 HOSP IP/OBS DSCHRG MGMT >30: CPT | Mod: GC

## 2020-02-14 PROCEDURE — 82962 GLUCOSE BLOOD TEST: CPT

## 2020-02-14 PROCEDURE — 97530 THERAPEUTIC ACTIVITIES: CPT

## 2020-02-14 PROCEDURE — 85027 COMPLETE CBC AUTOMATED: CPT

## 2020-02-14 PROCEDURE — 86850 RBC ANTIBODY SCREEN: CPT

## 2020-02-14 PROCEDURE — 83935 ASSAY OF URINE OSMOLALITY: CPT

## 2020-02-14 PROCEDURE — 84100 ASSAY OF PHOSPHORUS: CPT

## 2020-02-14 PROCEDURE — 85652 RBC SED RATE AUTOMATED: CPT

## 2020-02-14 RX ORDER — LANOLIN ALCOHOL/MO/W.PET/CERES
1 CREAM (GRAM) TOPICAL ONCE
Refills: 0 | Status: DISCONTINUED | OUTPATIENT
Start: 2020-02-14 | End: 2020-02-14

## 2020-02-14 RX ORDER — LANOLIN ALCOHOL/MO/W.PET/CERES
3 CREAM (GRAM) TOPICAL ONCE
Refills: 0 | Status: COMPLETED | OUTPATIENT
Start: 2020-02-14 | End: 2020-02-14

## 2020-02-14 RX ORDER — CHLORHEXIDINE GLUCONATE 213 G/1000ML
1 SOLUTION TOPICAL DAILY
Refills: 0 | Status: DISCONTINUED | OUTPATIENT
Start: 2020-02-14 | End: 2020-02-14

## 2020-02-14 RX ORDER — MIRTAZAPINE 45 MG/1
1 TABLET, ORALLY DISINTEGRATING ORAL
Qty: 0 | Refills: 0 | DISCHARGE
Start: 2020-02-14

## 2020-02-14 RX ADMIN — CHLORHEXIDINE GLUCONATE 1 APPLICATION(S): 213 SOLUTION TOPICAL at 11:04

## 2020-02-14 RX ADMIN — CLOPIDOGREL BISULFATE 75 MILLIGRAM(S): 75 TABLET, FILM COATED ORAL at 11:05

## 2020-02-14 RX ADMIN — LORATADINE 10 MILLIGRAM(S): 10 TABLET ORAL at 11:05

## 2020-02-14 RX ADMIN — POLYETHYLENE GLYCOL 3350 17 GRAM(S): 17 POWDER, FOR SOLUTION ORAL at 06:25

## 2020-02-14 RX ADMIN — Medication 250 MILLIGRAM(S): at 11:00

## 2020-02-14 RX ADMIN — SODIUM CHLORIDE 2 GRAM(S): 9 INJECTION INTRAMUSCULAR; INTRAVENOUS; SUBCUTANEOUS at 10:59

## 2020-02-14 RX ADMIN — Medication 25 MILLIGRAM(S): at 06:25

## 2020-02-14 RX ADMIN — LOSARTAN POTASSIUM 25 MILLIGRAM(S): 100 TABLET, FILM COATED ORAL at 06:25

## 2020-02-14 RX ADMIN — Medication 5 MILLIGRAM(S): at 13:37

## 2020-02-14 RX ADMIN — CEFTRIAXONE 100 MILLIGRAM(S): 500 INJECTION, POWDER, FOR SOLUTION INTRAMUSCULAR; INTRAVENOUS at 09:52

## 2020-02-14 RX ADMIN — BRIMONIDINE TARTRATE 1 DROP(S): 2 SOLUTION/ DROPS OPHTHALMIC at 06:25

## 2020-02-14 RX ADMIN — FINASTERIDE 5 MILLIGRAM(S): 5 TABLET, FILM COATED ORAL at 11:05

## 2020-02-14 RX ADMIN — DORZOLAMIDE HYDROCHLORIDE TIMOLOL MALEATE 1 DROP(S): 20; 5 SOLUTION/ DROPS OPHTHALMIC at 06:24

## 2020-02-14 RX ADMIN — Medication 1: at 09:05

## 2020-02-14 RX ADMIN — DORZOLAMIDE HYDROCHLORIDE TIMOLOL MALEATE 1 DROP(S): 20; 5 SOLUTION/ DROPS OPHTHALMIC at 17:32

## 2020-02-14 RX ADMIN — Medication 5 MILLIGRAM(S): at 06:25

## 2020-02-14 RX ADMIN — BRIMONIDINE TARTRATE 1 DROP(S): 2 SOLUTION/ DROPS OPHTHALMIC at 13:37

## 2020-02-14 NOTE — DISCHARGE NOTE PROVIDER - PROVIDER TOKENS
PROVIDER:[TOKEN:[[id number]]] PROVIDER:[TOKEN:[[id number]]],PROVIDER:[TOKEN:[[id number]],SCHEDULEDAPPT:[02/19/2020]],PROVIDER:[TOKEN:[[id number]],SCHEDULEDAPPT:[03/10/2020]] PROVIDER:[TOKEN:[[id number]]],PROVIDER:[TOKEN:[[id number]],SCHEDULEDAPPT:[02/19/2020],SCHEDULEDAPPTTIME:[10:40 AM]],PROVIDER:[TOKEN:[[id number]],SCHEDULEDAPPT:[03/10/2020]]

## 2020-02-14 NOTE — PROGRESS NOTE ADULT - SUBJECTIVE AND OBJECTIVE BOX
OVERNIGHT EVENTS:    SUBJECTIVE / INTERVAL HPI: Patient seen and examined at bedside.     VITAL SIGNS:  Vital Signs Last 24 Hrs  T(C): 36.4 (2020 08:30), Max: 36.6 (2020 05:23)  T(F): 97.5 (2020 08:30), Max: 97.8 (2020 05:23)  HR: 77 (2020 08:30) (66 - 83)  BP: 117/65 (2020 08:30) (112/63 - 137/69)  BP(mean): --  RR: 16 (2020 08:30) (16 - 18)  SpO2: 97% (2020 08:30) (96% - 97%)    PHYSICAL EXAM:    General: WDWN  HEENT: NC/AT; PERRL, anicteric sclera; MMM  Neck: supple  Cardiovascular: +S1/S2; RRR  Respiratory: CTA B/L; no W/R/R  Gastrointestinal: soft, NT/ND; +BSx4  Extremities: WWP; no edema, clubbing or cyanosis  Vascular: 2+ radial, DP/PT pulses B/L  Neurological: AAOx3; no focal deficits    MEDICATIONS:  MEDICATIONS  (STANDING):  atorvastatin 40 milliGRAM(s) Oral at bedtime  brimonidine 0.2% Ophthalmic Solution 1 Drop(s) Both EYES three times a day  cefTRIAXone   IVPB 2000 milliGRAM(s) IV Intermittent every 24 hours  chlorhexidine 2% Cloths 1 Application(s) Topical daily  clopidogrel Tablet 75 milliGRAM(s) Oral daily  dorzolamide 2%/timolol 0.5% Ophthalmic Solution 1 Drop(s) Both EYES two times a day  enoxaparin Injectable 40 milliGRAM(s) SubCutaneous every 24 hours  finasteride 5 milliGRAM(s) Oral daily  insulin lispro (HumaLOG) corrective regimen sliding scale   SubCutaneous Before meals and at bedtime  latanoprost 0.005% Ophthalmic Solution 1 Drop(s) Both EYES at bedtime  loratadine 10 milliGRAM(s) Oral daily  losartan 25 milliGRAM(s) Oral daily  metoprolol succinate ER 25 milliGRAM(s) Oral daily  mirtazapine 7.5 milliGRAM(s) Oral at bedtime  oxybutynin 5 milliGRAM(s) Oral three times a day  polyethylene glycol 3350 17 Gram(s) Oral every 12 hours  senna 2 Tablet(s) Oral at bedtime  sodium chloride 2 Gram(s) Oral every 12 hours  tamsulosin 0.4 milliGRAM(s) Oral at bedtime  vancomycin  IVPB 1000 milliGRAM(s) IV Intermittent every 24 hours    MEDICATIONS  (PRN):  acetaminophen   Tablet .. 650 milliGRAM(s) Oral every 6 hours PRN Mild Pain (1 - 3)      ALLERGIES:  Allergies    No Known Drug Allergies  Originally Entered as [Rash-General] reaction to [Other][[place]] (Rash)  Originally Entered as [Rash-General] reaction to [Other][Ragweed] (Rash)  ragweed and birch (Unknown)    Intolerances        LABS:                        9.7    3.64  )-----------( 216      ( 2020 06:12 )             28.0     02-14    129<L>  |  100  |  12  ----------------------------<  102<H>  4.8   |  19<L>  |  1.06    Ca    8.3<L>      2020 06:12  Phos  4.3     02-13  Mg     2.2     02-14    TPro  6.0  /  Alb  3.3  /  TBili  0.4  /  DBili  x   /  AST  17  /  ALT  13  /  AlkPhos  72  02-12      Urinalysis Basic - ( 2020 22:10 )    Color: Yellow / Appearance: Clear / S.015 / pH: x  Gluc: x / Ketone: NEGATIVE  / Bili: Negative / Urobili: 0.2 E.U./dL   Blood: x / Protein: NEGATIVE mg/dL / Nitrite: NEGATIVE   Leuk Esterase: NEGATIVE / RBC: < 5 /HPF / WBC < 5 /HPF   Sq Epi: x / Non Sq Epi: 0-5 /HPF / Bacteria: Present /HPF      CAPILLARY BLOOD GLUCOSE      POCT Blood Glucose.: 160 mg/dL (2020 08:39)      RADIOLOGY & ADDITIONAL TESTS: Reviewed.

## 2020-02-14 NOTE — PROGRESS NOTE BEHAVIORAL HEALTH - NSBHFUPINTERVALHXFT_PSY_A_CORE
Patient seen at bedside. Reports improved sleep and stable mood. Present future oriented thinking. No SI/HI/no AVH/PI. Patient seen at bedside. Reports improved sleep and stable mood. Present future oriented thinking. No SI/HI/no AVH/PI.  Sodium trending up from 126 to 129.

## 2020-02-14 NOTE — DISCHARGE NOTE PROVIDER - NSDCCPCAREPLAN_GEN_ALL_CORE_FT
PRINCIPAL DISCHARGE DIAGNOSIS  Diagnosis: Fall  Assessment and Plan of Treatment: You presented to the hospital after a mechanical fall at home. You complained of left hip pain and the CT scan showed no fracture of your hip. You will be discharged to a rehabilitation center for further conditioning. Please use you cane/walker at all times. You may take tylenol 650 mg as needed every 6 hours for the pain.      SECONDARY DISCHARGE DIAGNOSES  Diagnosis: Osteomyelitis  Assessment and Plan of Treatment: Osteomyelitis is the bone infection you are currently being treated for. Please continue the IV ceftriaxone and IV vancomycin as previously prescribed. A vancomycin trough level should be drawn before giving the 4th dose. The 4th dose of vancomycin should be adjusted accordingly based on the vancomycin trough level.    Diagnosis: Adjustment disorder with depressed mood  Assessment and Plan of Treatment: You expressed feelings of depression. Please seek immediate medical attention if you have any thoughts of hurting yourself PRINCIPAL DISCHARGE DIAGNOSIS  Diagnosis: Fall  Assessment and Plan of Treatment: You presented to the hospital after a mechanical fall at home. You complained of left hip pain and the CT scan showed no fracture of your hip. You will be discharged to a rehabilitation center for further conditioning. Please use you cane/walker at all times. You may take tylenol 650 mg as needed every 6 hours for the pain.      SECONDARY DISCHARGE DIAGNOSES  Diagnosis: Osteomyelitis  Assessment and Plan of Treatment: Osteomyelitis is the bone infection you are currently being treated for. Please continue the IV ceftriaxone and IV vancomycin as previously prescribed. A vancomycin trough level should be drawn before giving the 4th dose. The 4th dose of vancomycin should be adjusted accordingly based on the vancomycin trough level.    Diagnosis: Adjustment disorder with depressed mood  Assessment and Plan of Treatment: You expressed feelings of depression. Please seek immediate medical attention if you have any thoughts of hurting yourself. Please continue with the mirtazipine 7.5 mg at bedtime.    Diagnosis: Hyponatremia  Assessment and Plan of Treatment: Your sodium level is chronically low. You were continued on salt tabs and your lasix were held. Please continue your salt tabs and lasix on discharge and follow up with Dr. Alatorre, nephrology on 3/20/2020. Please seek medical attention if you develop any headaches, seizures, LOC, changes in mental status, weakness, nausea or vomiting. PRINCIPAL DISCHARGE DIAGNOSIS  Diagnosis: Fall  Assessment and Plan of Treatment: You presented to the hospital after a mechanical fall at home. You complained of left hip pain and the CT scan showed no fracture of your hip. You will be discharged to a rehabilitation center for further conditioning. Please use you cane/walker at all times. You may take tylenol 650 mg as needed every 6 hours for the pain.      SECONDARY DISCHARGE DIAGNOSES  Diagnosis: Osteomyelitis  Assessment and Plan of Treatment: Osteomyelitis is the bone infection you are currently being treated for. Please continue the IV ceftriaxone 2 g daily and IV vancomycin as previously prescribed. A vancomycin trough level should be drawn before giving the 4th dose. The 4th dose of vancomycin should be adjusted accordingly based on the vancomycin trough level.  Check weekly CBC, CMP, ESR, CRP with visiting nurse. Last day of antibiotics is 2/25/2020 for a total of 6 weeks of treatment.    Diagnosis: Adjustment disorder with depressed mood  Assessment and Plan of Treatment: You expressed feelings of depression. Please seek immediate medical attention if you have any thoughts of hurting yourself. Please continue with the mirtazipine 7.5 mg at bedtime.    Diagnosis: Hyponatremia  Assessment and Plan of Treatment: Your sodium level is chronically low. You were continued on salt tabs and your lasix were held. Please continue your salt tabs and lasix on discharge and follow up with Dr. Alatorre, nephrology on 3/20/2020. Please seek medical attention if you develop any headaches, seizures, LOC, changes in mental status, weakness, nausea or vomiting. PRINCIPAL DISCHARGE DIAGNOSIS  Diagnosis: Fall  Assessment and Plan of Treatment: You presented to the hospital after a mechanical fall at home. You complained of left hip pain and the CT scan showed no fracture of your hip. You will be discharged to a rehabilitation center for further conditioning. Please use you cane/walker at all times. You may take tylenol 650 mg as needed every 6 hours for the pain.      SECONDARY DISCHARGE DIAGNOSES  Diagnosis: Osteomyelitis  Assessment and Plan of Treatment: Osteomyelitis is the bone infection you are currently being treated for. Please continue the IV ceftriaxone 2 g daily and IV vancomycin 1 g daily as previously prescribed.   Please check weekly CBC, CMP, ESR, CRP and vanc trough level and fax results to Dr. Butt at [phone]. Last day of antibiotics is 2/25/2020 for a total of 6 weeks of treatment.  Please follow up with Dr. Butt on 2/19/2020.    Diagnosis: Adjustment disorder with depressed mood  Assessment and Plan of Treatment: You expressed feelings of depression. Please seek immediate medical attention if you have any thoughts of hurting yourself. Please continue with the mirtazipine 7.5 mg at bedtime.    Diagnosis: Hyponatremia  Assessment and Plan of Treatment: Your sodium level is chronically low. You were continued on salt tabs and your lasix were held. Please continue your salt tabs 2 g twice a day and lasix 40 mg twice a day on discharge and follow up with Dr. Alatorre, nephrology on 3/20/2020. Please seek medical attention if you develop any headaches, seizures, LOC, changes in mental status, weakness, nausea or vomiting.

## 2020-02-14 NOTE — DISCHARGE NOTE NURSING/CASE MANAGEMENT/SOCIAL WORK - PATIENT PORTAL LINK FT
You can access the FollowMyHealth Patient Portal offered by [facility] by registering at the following website: [email or web address]. By joining "[employer]’s FollowMyHealth portal, you will also be able to view your health information using other applications (apps) compatible with our system.

## 2020-02-14 NOTE — DISCHARGE NOTE PROVIDER - NSDCMRMEDTOKEN_GEN_ALL_CORE_FT
atorvastatin 40 mg oral tablet: 1 tab(s) orally once a day  brimonidine 0.2% ophthalmic solution: 1 drop(s) to each affected eye 3 times a day  cefTRIAXone: 2 gram(s) intravenous once a day  clopidogrel 75 mg oral tablet: 1 tab(s) orally once a day  desloratadine 5 mg oral tablet: 1 tab(s) orally once a day  dorzolamide-timolol 2%-0.5% preservative-free ophthalmic solution: 1 drop(s) to each affected eye 2 times a day  finasteride 5 mg oral tablet: 1 tab(s) orally once a day  Lasix 40 mg oral tablet: 40 milligram(s) orally 2 times a day   latanoprost 0.005% ophthalmic solution: 1 drop(s) to each affected eye once a day (in the evening)  losartan 25 mg oral tablet: 1 tab(s) orally once a day  metoprolol succinate 25 mg oral tablet, extended release: 1 tab(s) orally once a day  oxybutynin 5 mg oral tablet: 1 tab(s) orally 3 times a day  polyethylene glycol 3350 oral powder for reconstitution: 17 gram(s) orally every 12 hours  sodium chloride 1 g oral tablet: 2 tab(s) orally every 12 hours  tamsulosin 0.4 mg oral capsule: 1 cap(s) orally once a day  vancomycin 1 g intravenous injection: 1 gram(s) intravenous every 24 hours atorvastatin 40 mg oral tablet: 1 tab(s) orally once a day  brimonidine 0.2% ophthalmic solution: 1 drop(s) to each affected eye 3 times a day  cefTRIAXone: 2 gram(s) intravenous once a day  clopidogrel 75 mg oral tablet: 1 tab(s) orally once a day  desloratadine 5 mg oral tablet: 1 tab(s) orally once a day  dorzolamide-timolol 2%-0.5% preservative-free ophthalmic solution: 1 drop(s) to each affected eye 2 times a day  finasteride 5 mg oral tablet: 1 tab(s) orally once a day  Lasix 40 mg oral tablet: 40 milligram(s) orally 2 times a day   latanoprost 0.005% ophthalmic solution: 1 drop(s) to each affected eye once a day (in the evening)  losartan 25 mg oral tablet: 1 tab(s) orally once a day  metoprolol succinate 25 mg oral tablet, extended release: 1 tab(s) orally once a day  mirtazapine 7.5 mg oral tablet: 1 tab(s) orally once a day (at bedtime)  oxybutynin 5 mg oral tablet: 1 tab(s) orally 3 times a day  polyethylene glycol 3350 oral powder for reconstitution: 17 gram(s) orally every 12 hours  sodium chloride 1 g oral tablet: 2 tab(s) orally every 12 hours  tamsulosin 0.4 mg oral capsule: 1 cap(s) orally once a day  vancomycin 1 g intravenous injection: 1 gram(s) intravenous every 24 hours

## 2020-02-14 NOTE — DISCHARGE NOTE PROVIDER - NSDCFUADDAPPT_GEN_ALL_CORE_FT
Please follow up with Dr. Story in 1-2 weeks.  Please follow up with Dr. Alatorre, nephrology on 3/10/2020 for your low sodium levels. Please follow up with Dr. Story in 1-2 weeks.  Please follow up with Dr. Alatorre, nephrology on 3/10/2020 for your low sodium levels.  Follow up with Dr. Butt on 2/19/2020 at 10:40 AM.

## 2020-02-14 NOTE — DISCHARGE NOTE PROVIDER - CARE PROVIDERS DIRECT ADDRESSES
[email or web address]

## 2020-02-14 NOTE — DISCHARGE NOTE NURSING/CASE MANAGEMENT/SOCIAL WORK - NSDCFUADDAPPT_GEN_ALL_CORE_FT
Please follow up with Dr. Story in 1-2 weeks.  Please follow up with Dr. Alatorre, nephrology on 3/10/2020 for your low sodium levels.

## 2020-02-14 NOTE — DISCHARGE NOTE PROVIDER - NSDCFUSCHEDAPPT_GEN_ALL_CORE_FT
СВЕТЛАНА PARRA ; 02/19/2020 ; NPP Med  [address]  СВТЕЛАНА PARRA ; 03/10/2020 ; NPP Nephro [address] СВЕТЛАНА PARRA ; 02/19/2020 ; NPP Med  [address]  СВЕТЛАНА PARRA ; 03/10/2020 ; NPP Nephro [address] СВЕТЛАНА PARRA ; 02/19/2020 ; NPP Med  [address]  СВЕТЛАНА PARRA ; 03/10/2020 ; NPP Nephro [address] СВЕТЛАНА PARRA ; 02/19/2020 ; NPP Med  [address]  СВЕТЛАНА PARRA ; 03/10/2020 ; NPP Nephro [address] СВЕТЛАНА PARRA ; 02/19/2020 ; NPP Med  [address]  СВЕТЛАНА PARRA ; 03/10/2020 ; NPP Nephro [address] СВЕТЛАНА PARRA ; 02/19/2020 ; NPP Med  [address]  СВЕТЛАНА PARRA ; 03/10/2020 ; NPP Nephro [address]

## 2020-02-14 NOTE — DISCHARGE NOTE PROVIDER - CARE PROVIDER_API CALL
Meeta Story)  Internal Medicine  [address] [place] [address]  Phone: [phone]  Fax: [phone]  Follow Up Time: Meeta Story)  Internal Medicine  [address] [address]  Phone: [phone]  Fax: [phone]  Follow Up Time:     Adam Butt)  Internal Medicine  [address] [place] [address]  Phone: [phone]  Fax: [phone]  Scheduled Appointment: 02/19/2020    Jennifer Alatorre)  Nephrology  [address] [place] [address]  Phone: [phone]  Fax: [phone]  Scheduled Appointment: 03/10/2020 Meeta Story)  Internal Medicine  [address] [address]  Phone: [phone]  Fax: [phone]  Follow Up Time:     Adam Butt)  Internal Medicine  [address], [address] [place] [address]  Phone: [phone]  Fax: [phone]  Scheduled Appointment: 02/19/2020 10:40 AM    Jennifer Alatorre)  Nephrology  [address], [address] [place] [address]  Phone: [phone]  Fax: [phone]  Scheduled Appointment: 03/10/2020

## 2020-02-14 NOTE — PROGRESS NOTE BEHAVIORAL HEALTH - SUMMARY
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 Patient presents with depressive symptoms in context of unresolved grieving process and adjustment with current medical problems. Due to his low Na, his antidepressant medication was stopped approx 2 months ago.  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 Patient presents with depressive symptoms in context of unresolved grieving process and adjustment with current medical problems. Due to his low Na, his antidepressant medication was stopped approx 2 months ago.  Plan;  -continue mirtazapine 7.5mg po qhs (lower risk for hyponatremia); monitor the Na level  -no need for 1:1 observation  -CL to continue to follow

## 2020-02-14 NOTE — DISCHARGE NOTE PROVIDER - HOSPITAL COURSE
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 Pt stable for discharge to [facility].        Problem List/Main Diagnoses (system-based):     Fall    - Patient with mechanical fall at home and presenting with left sided hip pain.     - No acute fracture on CT hip.    - Patient with 3 falls in last 3 months, and alone in 4th floor walk up and highly unsafe to return home and presents at high fall risk within home.     - Pt to be discharged to [facility]        Osteomyelitis    - On last admission patient found to have MRI lumbar spine showed discitis/osteomyelitis at L2-3 w/ severe degenerative changes; followed by ID and on ceftriaxone 2g q24 and vancomycin till 2/25/2020. Vancomycin dose changed outpatient to 1g q24 based on trough.      - pt continued on ceftriaxone: 2 gram(s) intravenous once a day    - vancomycin trough on 2/13 was 16.9. Pt continued on vancomycin 1 g IV once a day    - Next vanc trough 2/17 before the 4th vanc dose.        Hyponatremia.      - Patient with a hx of chronic hyponatremia found to be 2/2 SIADH thought to be from SSRI use (which has since been discontinued) vs pain. On home Lasix and salt tabs. No nausea, HA, confusion, fatigue.     - Resume home salt tabs. Held lasix     - Nephro saw in pt while     - Plan to see Dr. Alatorre outpatient per son appointment in March.         Depression.     - Patient with major depressive sxs, SSRI stopped d/t hyponatremia. Continues to endorse thoughts of depression, denies SI currently. Decreased appetite.    - started on mirtazipine 7.5 mg QHS per psych recommendation    - monitor Na                Inpatient treatment course: PT evaluation for [facility]. CT Hip was negative for fx.    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 Pt stable for discharge to [facility].        Problem List/Main Diagnoses (system-based):     Fall    - Patient with mechanical fall at home and presenting with left sided hip pain.     - No acute fracture on CT hip.    - Patient with 3 falls in last 3 months, and alone in 4th floor walk up and highly unsafe to return home and presents at high fall risk within home.     - Pt to be discharged to [facility]        Osteomyelitis    - On last admission patient found to have MRI lumbar spine showed discitis/osteomyelitis at L2-3 w/ severe degenerative changes; followed by ID and on ceftriaxone 2g q24 and vancomycin till 2/25/2020. Vancomycin dose changed outpatient to 1g q24 based on trough.      - pt continued on ceftriaxone: 2 gram(s) intravenous once a day    - vancomycin trough on 2/13 was 16.9. Pt continued on vancomycin 1 g IV once a day    - Next vanc trough 2/17 before the 4th vanc dose.        Hyponatremia.      - Patient with a hx of chronic hyponatremia found to be 2/2 SIADH thought to be from SSRI use (which has since been discontinued) vs pain. On home Lasix and salt tabs. No nausea, HA, confusion, fatigue.     - Resume home salt tabs. Held lasix     - Nephro saw in pt while     - Plan to see Dr. Alatorre outpatient per son appointment in March.         Depression.     - Patient with major depressive sxs, SSRI stopped d/t hyponatremia. Continues to endorse thoughts of depression, denies SI currently. Decreased appetite.    - started on mirtazipine 7.5 mg QHS per psych recommendation    - monitor Na            Inpatient treatment course: PT evaluation for [facility]. CT Hip was negative for fx.    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 Pt stable for discharge to [facility].        Problem List/Main Diagnoses (system-based):     Fall    - Patient with mechanical fall at home and presenting with left sided hip pain.     - No acute fracture on CT hip.    - Patient with 3 falls in last 3 months, and alone in 4th floor walk up and highly unsafe to return home and presents at high fall risk within home.     - Pt to be discharged to [facility]        Osteomyelitis    - On last admission patient found to have MRI lumbar spine showed discitis/osteomyelitis at L2-3 w/ severe degenerative changes; followed by ID and on ceftriaxone 2g q24 and vancomycin till 2/25/2020. Vancomycin dose changed outpatient to 1g q24 based on trough.      - pt continued on ceftriaxone: 2 gram(s) intravenous once a day    - vancomycin trough on 2/13 was 16.9. Pt continued on vancomycin 1 g IV once a day    - f/u with Dr. Butt on 2/19/2020. Weekly CBC, CMP, ESR, CRP and vanc trough levels.         Hyponatremia.      - Patient with a hx of chronic hyponatremia found to be 2/2 SIADH thought to be from SSRI use (which has since been discontinued) vs pain. On home Lasix and salt tabs. No nausea, HA, confusion, fatigue.     - Resume home salt tabs. Held lasix     - Nephro saw in pt while     - Plan to see Dr. Alatorre outpatient per son appointment in March.         Depression.     - Patient with major depressive sxs, SSRI stopped d/t hyponatremia. Continues to endorse thoughts of depression, denies SI currently. Decreased appetite.    - started on mirtazipine 7.5 mg QHS per psych recommendation    - monitor Na        Inpatient treatment course: PT evaluation for [facility]. CT Hip was negative for fx.    New medications: None    Labs to be followed outpatient: CBC, CMP, ESR, CRP, vanc trough    Exam to be followed outpatient: N/a

## 2020-02-14 NOTE — PROGRESS NOTE BEHAVIORAL HEALTH - NSBHFUPINTERVALCCFT_PSY_A_CORE
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 Patient presents with depressive symptoms in context of unresolved grieving process and adjustment with current medical problems. Due to his low Na, his antidepressant medication was stopped approx 2 months ago.

## 2020-02-19 ENCOUNTER — APPOINTMENT (OUTPATIENT)
Dept: INFECTIOUS DISEASE | Facility: CLINIC | Age: 85
End: 2020-02-19

## 2020-02-20 DIAGNOSIS — H40.9 UNSPECIFIED GLAUCOMA: ICD-10-CM
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
## 2020-02-20 DIAGNOSIS — M25.559 PAIN IN UNSPECIFIED HIP: ICD-10-CM

## 2020-02-20 DIAGNOSIS — Z95.5 PRESENCE OF CORONARY ANGIOPLASTY IMPLANT AND GRAFT: ICD-10-CM

## 2020-02-20 DIAGNOSIS — M46.26 OSTEOMYELITIS OF VERTEBRA, LUMBAR REGION: ICD-10-CM

## 2020-02-20 DIAGNOSIS — F32.9 MAJOR DEPRESSIVE DISORDER, SINGLE EPISODE, UNSPECIFIED: ICD-10-CM

## 2020-02-20 DIAGNOSIS — E11.9 TYPE 2 DIABETES MELLITUS WITHOUT COMPLICATIONS: ICD-10-CM

## 2020-02-20 DIAGNOSIS — H26.9 UNSPECIFIED CATARACT: ICD-10-CM

## 2020-02-20 DIAGNOSIS — I10 ESSENTIAL (PRIMARY) HYPERTENSION: ICD-10-CM

## 2020-02-20 DIAGNOSIS — I71.2 THORACIC AORTIC ANEURYSM, WITHOUT RUPTURE: ICD-10-CM
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
## 2020-02-20 DIAGNOSIS — Z87.891 PERSONAL HISTORY OF NICOTINE DEPENDENCE: ICD-10-CM

## 2020-02-20 DIAGNOSIS — Z85.46 PERSONAL HISTORY OF MALIGNANT NEOPLASM OF PROSTATE: ICD-10-CM

## 2020-02-20 DIAGNOSIS — I25.2 OLD MYOCARDIAL INFARCTION: ICD-10-CM

## 2020-02-20 DIAGNOSIS — I25.10 ATHEROSCLEROTIC HEART DISEASE OF NATIVE CORONARY ARTERY WITHOUT ANGINA PECTORIS: ICD-10-CM

## 2020-02-20 DIAGNOSIS — R91.1 SOLITARY PULMONARY NODULE: ICD-10-CM

## 2020-02-20 DIAGNOSIS — E87.1 HYPO-OSMOLALITY AND HYPONATREMIA: ICD-10-CM

## 2020-02-20 DIAGNOSIS — I73.9 PERIPHERAL VASCULAR DISEASE, UNSPECIFIED: ICD-10-CM

## 2020-02-20 DIAGNOSIS — Z95.1 PRESENCE OF AORTOCORONARY BYPASS GRAFT: ICD-10-CM

## 2020-02-20 DIAGNOSIS — E78.5 HYPERLIPIDEMIA, UNSPECIFIED: ICD-10-CM

## 2020-02-20 DIAGNOSIS — S79.812A OTHER SPECIFIED INJURIES OF LEFT HIP, INITIAL ENCOUNTER: ICD-10-CM

## 2020-03-10 ENCOUNTER — APPOINTMENT (OUTPATIENT)
Dept: NEPHROLOGY | Facility: CLINIC | Age: 85
End: 2020-03-10

## 2020-07-29 ENCOUNTER — RX RENEWAL (OUTPATIENT)
Age: 85
End: 2020-07-29

## 2020-09-14 ENCOUNTER — APPOINTMENT (OUTPATIENT)
Dept: PULMONOLOGY | Facility: CLINIC | Age: 85
End: 2020-09-14
Payer: MEDICARE

## 2020-09-14 ENCOUNTER — APPOINTMENT (OUTPATIENT)
Dept: UROLOGY | Facility: CLINIC | Age: 85
End: 2020-09-14
Payer: MEDICARE

## 2020-09-14 VITALS
TEMPERATURE: 97.4 F | DIASTOLIC BLOOD PRESSURE: 70 MMHG | WEIGHT: 150 LBS | HEIGHT: 65 IN | SYSTOLIC BLOOD PRESSURE: 128 MMHG | HEART RATE: 61 BPM | BODY MASS INDEX: 24.99 KG/M2 | OXYGEN SATURATION: 98 %

## 2020-09-14 VITALS — SYSTOLIC BLOOD PRESSURE: 126 MMHG | DIASTOLIC BLOOD PRESSURE: 77 MMHG | TEMPERATURE: 98.1 F

## 2020-09-14 PROCEDURE — 99213 OFFICE O/P EST LOW 20 MIN: CPT

## 2020-09-14 RX ORDER — LEVOCETIRIZINE DIHYDROCHLORIDE 5 MG/1
5 TABLET ORAL
Qty: 30 | Refills: 0 | Status: ACTIVE | COMMUNITY
Start: 2020-08-24

## 2020-09-14 RX ORDER — MIRTAZAPINE 7.5 MG/1
7.5 TABLET, FILM COATED ORAL
Qty: 30 | Refills: 0 | Status: ACTIVE | COMMUNITY
Start: 2020-04-23

## 2020-09-14 RX ORDER — ESCITALOPRAM OXALATE 10 MG/1
10 TABLET, FILM COATED ORAL
Refills: 0 | Status: DISCONTINUED | COMMUNITY
End: 2020-09-14

## 2020-09-14 RX ORDER — BRIMONIDINE TARTRATE 2 MG/MG
0.2 SOLUTION/ DROPS OPHTHALMIC
Qty: 5 | Refills: 0 | Status: ACTIVE | COMMUNITY
Start: 2019-08-27

## 2020-09-14 RX ORDER — ERGOCALCIFEROL 1.25 MG/1
1.25 MG CAPSULE, LIQUID FILLED ORAL
Qty: 5 | Refills: 0 | Status: ACTIVE | COMMUNITY
Start: 2020-09-03

## 2020-09-14 NOTE — ASSESSMENT
[FreeTextEntry1] : 88yo male with reported history of prostate cancer on watchful waiting for several years, stable PSA\par Significant LUTS but on maximal medical therapy and poor surgical candidate\par Recent labs reviewed, PSA essentially stable\par Medications renewed \par F/u 6 months

## 2020-09-14 NOTE — CONSULT LETTER
[Dear  ___] : Dear  [unfilled], [Courtesy Letter:] : I had the pleasure of seeing your patient, [unfilled], in my office today. [Please see my note below.] : Please see my note below. [Consult Closing:] : Thank you very much for allowing me to participate in the care of this patient.  If you have any questions, please do not hesitate to contact me. [Sincerely,] : Sincerely, [FreeTextEntry2] : Meeta Story MD\par [address] \[place] [address] [FreeTextEntry3] : Yara Oquendo MD, FCCP\par

## 2020-09-14 NOTE — PHYSICAL EXAM
[No Acute Distress] : no acute distress [Normal Rate/Rhythm] : normal rate/rhythm [Normal S1, S2] : normal s1, s2 [Clear to Auscultation Bilaterally] : clear to auscultation bilaterally [No Resp Distress] : no resp distress [TextBox_54] : III/VI SM

## 2020-09-14 NOTE — ASSESSMENT
[FreeTextEntry1] : Data reviewed:\par \par Quantiferon 12/2019 negative\par \par CT chest 8/2020 [facility] personally reviewed and compared to 12/2019: no sig change in ground glass lesion R apex with what appears to be pleural thickening/scarring vs solid component\par \par Impression:\par Subsolid lesion R apex, former smoker\par \par Plan:\par Repeat imaging 6 mos and see me at that time.

## 2020-09-14 NOTE — LETTER BODY
[Dear  ___] : Dear  [unfilled], [Courtesy Letter:] : I had the pleasure of seeing your patient, [unfilled], in my office today. [Please see my note below.] : Please see my note below. [Consult Closing:] : Thank you very much for allowing me to participate in the care of this patient.  If you have any questions, please do not hesitate to contact me. [Sincerely,] : Sincerely, [FreeTextEntry2] : Meeta Story MD\par [address]\[place] [address]  [FreeTextEntry3] : Ronal Tijerina MD

## 2020-09-14 NOTE — HISTORY OF PRESENT ILLNESS
[TextBox_4] : 09/14/2020: Seen by Dr Patel in 12/2019 for hyponatremia/eval for pulmonary etiology. He had a subsolid lesion in the RUL needing follow up imaging. Here w his son today. He speaks Thai, but English is fine. He is a former smoker; 20-30py and quit 40 yrs ago. Denies dyspnea but produces white sputum. No diagnosed lung disease. 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 He also noted loss of taste and smell back in Feb, which have returned. Never fever or seriously ill. Has also been in [facility] in the interim due to falls and need for rehab. Never had documented Covid.

## 2020-09-14 NOTE — HISTORY OF PRESENT ILLNESS
[FreeTextEntry1] : This is an 85yo male with a history of prostate cancer on watchful waiting and urinary frequency here to establish care. He is here with his son. He was reportedly diagnosed with low risk prostate cancer more than 10 years ago. His PSA has been in 3-4 range. He takes tamsulosin, finasteride and oxybutynin for urinary frequency. He has significant comorbid conditions including a previous MI. \par \par 12/20/17 Here for f/u. No significant change in urinary symptoms, controlled with medications. Last PSA was 2.62, essentially stable. \par \par 6/20/18 Here for f/u. No significant change in urinary symptoms, controlled with medications. Last PSA was 2.62, essentially stable. \par \par 12/19/18 Here for f/u. No significant changes. Recent PSA is 3.8. \par \par 9/14/20 Here for f/u. Most recent PSA = 5.8.  [Urinary Urgency] : urinary urgency [Urinary Frequency] : urinary frequency [Nocturia] : nocturia [Straining] : straining [None] : None

## 2020-09-29 NOTE — ED ADULT TRIAGE NOTE - MODE OF ARRIVAL
-- Message is from the [facility]--    Order Request  Lab: BLOOD WORK    Message / reason: Patient's  is requesting a order for blood work.                Preferred Delivery Method   Call when ready for pickup - phone number to notify: [phone]     Caller Information       Type Contact Phone    02/08/2019 02:26 PM Phone (Incoming) DEVEN JAQUEZ (Emergency Contact) [phone] (H)          Alternative phone number: na    Turnaround time given to caller:   \"This message will be sent to [state Provider's name]. The clinical team will fulfill your request as soon as they review your message.\"  
Detailed msg left to VM.   
Msg left to  to call back.   
Per pt, was adivsed from last visit that she can get lab works done before appt. Pending appt 2/20/19.   
Public Transport
within normal limits

## 2020-10-19 NOTE — PROGRESS NOTE ADULT - PROBLEM SELECTOR PLAN 6
Mom wanted to notify Dr. Glenda Sun that she was in ER last night for pancreatitis and was unable to take [facility] to Urology appt this a.m. I advised mom to call urology and make them aware of situation and RS appt. She said she would. Hx of HLD.  - continue home atorvastatin 40mg qhs    #Hyponatremia 2/2 SIADH- Na now normal on Lasix and salt tabs  - monitor Scr  - continue salt tabs BID  - holding Lasix for now, resume if Scr returns to baseline

## 2020-12-15 ENCOUNTER — FORM ENCOUNTER (OUTPATIENT)
Age: 85
End: 2020-12-15

## 2020-12-16 ENCOUNTER — OUTPATIENT (OUTPATIENT)
Dept: OUTPATIENT SERVICES | Facility: HOSPITAL | Age: 85
LOS: 1 days | End: 2020-12-16
Payer: MEDICARE

## 2020-12-16 ENCOUNTER — APPOINTMENT (OUTPATIENT)
Dept: CARDIOTHORACIC SURGERY | Facility: CLINIC | Age: 85
End: 2020-12-16
Payer: MEDICARE

## 2020-12-16 ENCOUNTER — RESULT REVIEW (OUTPATIENT)
Age: 85
End: 2020-12-16

## 2020-12-16 ENCOUNTER — APPOINTMENT (OUTPATIENT)
Dept: CT IMAGING | Facility: HOSPITAL | Age: 85
End: 2020-12-16
Payer: MEDICARE

## 2020-12-16 DIAGNOSIS — I71.4 ABDOMINAL AORTIC ANEURYSM, WITHOUT RUPTURE: ICD-10-CM

## 2020-12-16 DIAGNOSIS — H26.9 UNSPECIFIED CATARACT: Chronic | ICD-10-CM

## 2020-12-16 DIAGNOSIS — Z98.890 OTHER SPECIFIED POSTPROCEDURAL STATES: Chronic | ICD-10-CM

## 2020-12-16 DIAGNOSIS — Z98.89 OTHER SPECIFIED POSTPROCEDURAL STATES: Chronic | ICD-10-CM

## 2020-12-16 PROCEDURE — 71275 CT ANGIOGRAPHY CHEST: CPT | Mod: 26,MH

## 2020-12-16 PROCEDURE — 74174 CTA ABD&PLVS W/CONTRAST: CPT

## 2020-12-16 PROCEDURE — 71275 CT ANGIOGRAPHY CHEST: CPT

## 2020-12-16 PROCEDURE — 99442: CPT | Mod: 95

## 2020-12-16 PROCEDURE — 93306 TTE W/DOPPLER COMPLETE: CPT

## 2020-12-16 PROCEDURE — 93306 TTE W/DOPPLER COMPLETE: CPT | Mod: 26

## 2020-12-16 PROCEDURE — 74174 CTA ABD&PLVS W/CONTRAST: CPT | Mod: 26,MH

## 2020-12-16 RX ORDER — UBIDECARENONE/VIT E ACET 100MG-5
CAPSULE ORAL
Refills: 0 | Status: DISCONTINUED | COMMUNITY
End: 2020-12-16

## 2020-12-16 RX ORDER — BRIMONIDINE TARTRATE 1.5 MG/ML
0.15 SOLUTION/ DROPS OPHTHALMIC
Refills: 0 | Status: DISCONTINUED | COMMUNITY
End: 2020-12-16

## 2020-12-16 RX ORDER — OXYBUTYNIN CHLORIDE 5 MG/1
5 TABLET, EXTENDED RELEASE ORAL
Qty: 90 | Refills: 3 | Status: DISCONTINUED | COMMUNITY
Start: 2016-09-19 | End: 2020-12-16

## 2021-01-28 NOTE — BEHAVIORAL HEALTH ASSESSMENT NOTE - NSBHATTESTSEENBY_PSY_A_CORE
Spontaneous, unlabored and symmetrical Trainee with telephonic supervision from Attending Psychiatrist

## 2021-02-22 NOTE — DIETITIAN INITIAL EVALUATION ADULT. - LAB (SPECIFY)
Spoke with patient via phone for follow up anticoagulation visit.    INR done via POC at lab.  Last INR on 2/8 was 2.5.  Dose maintained.   Today's INR is 3.2 and is above goal range.      Patient reports eating less overall and eating less vitamin K foods. She will be receiving the covid vaccine tomorrow. According to [employer], the covid vaccine should not alter the INR in patients who also take Warfarin. Patient verbalized understanding.       Current warfarin total weekly dose of 32.5 mg verified.  Informed the INR result is above therapeutic range and instructed to decrease current dose per protocol. Discussed dose and return date of 2 weeks via POC at lab for next INR. See Anticoagulation flowsheet.    Dr. Barksdale is in the office today supervising the treatment.    Call your physician or seek medical care immediately if you notice any of the following symptoms of a bleed:   Red, dark, coffee or cola colored urine  Red or tar like stools  Excessive bleeding from gums or nose  Vomiting coffee colored or bright red material  Coughing up red tinged sputum  Severe or unprovoked pain (ex: severe Headache or Abdominal pain)  Sudden, spontaneous bruising for no reason  For female patients:  Excessive menstrual bleeding  A cut that will not stop bleeding within 10-15 mins  Symptoms associated with abnormal bleeding/high INR reviewed.    Encouraged to avoid activities that may result in a serious fall or injury and verbalizes understanding.    Instructed to contact the clinic with any unusual bleeding or bruising, any changes in medications, diet, health status, lifestyle, or any other changes, questions or concerns. Verbalized understanding of all discussed.     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]  Encounter for monitoring Coumadin therapy [Z51.81  Z79.01]  Saddle embolus of pulmonary artery without acute cor pulmonale  unspecified chronicity (CMS/HCC) (Resolved) [I26.92]  Deep vein thrombosis (DVT) of distal vein of left lower extremity  unspecified chronicity (CMS/HCC) [I82.4Z2]             Anticoagulation Episode Summary     INR check location:  Anticoagulation Clinic    Preferred lab:      Send INR reminders to:  [facility] (OPEN ENROLLMENT) [place]    Comments:        Anticoagulation Care Providers     Provider Role Specialty Phone number    Torey Teixeira MD Referring Internal Medicine [phone]          H/H, Na, BUN, BG

## 2021-03-15 ENCOUNTER — APPOINTMENT (OUTPATIENT)
Dept: UROLOGY | Facility: CLINIC | Age: 86
End: 2021-03-15

## 2021-04-12 ENCOUNTER — APPOINTMENT (OUTPATIENT)
Dept: PULMONOLOGY | Facility: CLINIC | Age: 86
End: 2021-04-12
Payer: MEDICARE

## 2021-04-12 VITALS
DIASTOLIC BLOOD PRESSURE: 80 MMHG | HEART RATE: 89 BPM | TEMPERATURE: 98.6 F | SYSTOLIC BLOOD PRESSURE: 136 MMHG | HEIGHT: 65 IN | OXYGEN SATURATION: 98 %

## 2021-04-12 PROCEDURE — 99213 OFFICE O/P EST LOW 20 MIN: CPT

## 2021-04-12 NOTE — HISTORY OF PRESENT ILLNESS
[TextBox_4] : 09/14/2020: Seen by Dr Patel in 12/2019 for hyponatremia/eval for pulmonary etiology. He had a subsolid lesion in the RUL needing follow up imaging. Here w his son today. He speaks Bolivian, but English is fine. He is a former smoker; 20-30py and quit 40 yrs ago. Denies dyspnea but produces white sputum. No diagnosed lung disease. 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 He also noted loss of taste and smell back in Feb, which have returned. Never fever or seriously ill. Has also been in [facility] in the interim due to falls and need for rehab. Never had documented Covid.\par \par 4/12/21: Here w son. Has been well, weight stable, had first dose Covid vaccine without sig AEs, just arm soreness. Longstanding cough white sputum, no blood, no dyspnea. No other interval health problems.

## 2021-04-12 NOTE — PHYSICAL EXAM
[No Acute Distress] : no acute distress [Normal Rate/Rhythm] : normal rate/rhythm [Normal S1, S2] : normal s1, s2 [No Resp Distress] : no resp distress [Clear to Auscultation Bilaterally] : clear to auscultation bilaterally [TextBox_54] : 2/6 SM

## 2021-04-12 NOTE — ASSESSMENT
[FreeTextEntry1] : Data reviewed:\par \par Quantiferon 12/2019 negative\par \par CT chest 3/2021 [facility] personally reviewed and compared to both priors: no sig change in subsolid RUL lesion adjacent to apical scarring\par \par Impression:\par Subsolid lesion R apex, former smoker\par \par Plan:\par Repeat imaging 6 mos again, because of the size of the lesion, and see me at that time.

## 2021-05-02 ENCOUNTER — EMERGENCY (EMERGENCY)
Facility: HOSPITAL | Age: 86
LOS: 1 days | Discharge: HOME | End: 2021-05-02
Attending: EMERGENCY MEDICINE | Admitting: EMERGENCY MEDICINE
Payer: MEDICARE

## 2021-05-02 VITALS
RESPIRATION RATE: 24 BRPM | HEIGHT: 68 IN | HEART RATE: 98 BPM | SYSTOLIC BLOOD PRESSURE: 164 MMHG | DIASTOLIC BLOOD PRESSURE: 84 MMHG | WEIGHT: 149.91 LBS | OXYGEN SATURATION: 95 % | TEMPERATURE: 97 F

## 2021-05-02 DIAGNOSIS — H26.9 UNSPECIFIED CATARACT: Chronic | ICD-10-CM

## 2021-05-02 DIAGNOSIS — Z95.5 PRESENCE OF CORONARY ANGIOPLASTY IMPLANT AND GRAFT: ICD-10-CM

## 2021-05-02 DIAGNOSIS — Z98.890 OTHER SPECIFIED POSTPROCEDURAL STATES: Chronic | ICD-10-CM

## 2021-05-02 DIAGNOSIS — Z86.59 PERSONAL HISTORY OF OTHER MENTAL AND BEHAVIORAL DISORDERS: ICD-10-CM

## 2021-05-02 DIAGNOSIS — I10 ESSENTIAL (PRIMARY) HYPERTENSION: ICD-10-CM

## 2021-05-02 DIAGNOSIS — Z85.46 PERSONAL HISTORY OF MALIGNANT NEOPLASM OF PROSTATE: ICD-10-CM

## 2021-05-02 DIAGNOSIS — Z98.89 OTHER SPECIFIED POSTPROCEDURAL STATES: Chronic | ICD-10-CM

## 2021-05-02 DIAGNOSIS — K59.00 CONSTIPATION, UNSPECIFIED: ICD-10-CM

## 2021-05-02 DIAGNOSIS — Z87.09 PERSONAL HISTORY OF OTHER DISEASES OF THE RESPIRATORY SYSTEM: ICD-10-CM

## 2021-05-02 DIAGNOSIS — I25.2 OLD MYOCARDIAL INFARCTION: ICD-10-CM

## 2021-05-02 DIAGNOSIS — Z90.79 ACQUIRED ABSENCE OF OTHER GENITAL ORGAN(S): ICD-10-CM

## 2021-05-02 DIAGNOSIS — E11.9 TYPE 2 DIABETES MELLITUS WITHOUT COMPLICATIONS: ICD-10-CM

## 2021-05-02 DIAGNOSIS — E78.5 HYPERLIPIDEMIA, UNSPECIFIED: ICD-10-CM

## 2021-05-02 DIAGNOSIS — Z98.890 OTHER SPECIFIED POSTPROCEDURAL STATES: ICD-10-CM

## 2021-05-02 DIAGNOSIS — Z86.79 PERSONAL HISTORY OF OTHER DISEASES OF THE CIRCULATORY SYSTEM: ICD-10-CM

## 2021-05-02 DIAGNOSIS — Z87.39 PERSONAL HISTORY OF OTHER DISEASES OF THE MUSCULOSKELETAL SYSTEM AND CONNECTIVE TISSUE: ICD-10-CM

## 2021-05-02 DIAGNOSIS — Z91.048 OTHER NONMEDICINAL SUBSTANCE ALLERGY STATUS: ICD-10-CM

## 2021-05-02 DIAGNOSIS — N40.0 BENIGN PROSTATIC HYPERPLASIA WITHOUT LOWER URINARY TRACT SYMPTOMS: ICD-10-CM

## 2021-05-02 DIAGNOSIS — Z79.02 LONG TERM (CURRENT) USE OF ANTITHROMBOTICS/ANTIPLATELETS: ICD-10-CM

## 2021-05-02 DIAGNOSIS — Z87.891 PERSONAL HISTORY OF NICOTINE DEPENDENCE: ICD-10-CM

## 2021-05-02 DIAGNOSIS — Z79.899 OTHER LONG TERM (CURRENT) DRUG THERAPY: ICD-10-CM

## 2021-05-02 DIAGNOSIS — Z95.1 PRESENCE OF AORTOCORONARY BYPASS GRAFT: ICD-10-CM

## 2021-05-02 PROCEDURE — 99283 EMERGENCY DEPT VISIT LOW MDM: CPT

## 2021-05-02 RX ORDER — MULTIVIT WITH MIN/MFOLATE/K2 340-15/3 G
1 POWDER (GRAM) ORAL ONCE
Refills: 0 | Status: COMPLETED | OUTPATIENT
Start: 2021-05-02 | End: 2021-05-02

## 2021-05-02 RX ADMIN — Medication 1 BOTTLE: at 09:09

## 2021-05-02 NOTE — ED ADULT NURSE NOTE - OBJECTIVE STATEMENT
Pt CO Constipation x3 days.  Primarily Nigerien speaking, pt can speak English, and adult son @ bedside who is bilingual acts as  when needed.  Pt states "I tried a stool softener and a laxative and when I am constipated that's usually all it takes for me to go."  Denies N/V/D, SOB, Fevers, CP and Dizziness.

## 2021-05-02 NOTE — ED PROVIDER NOTE - PHYSICAL EXAMINATION
CONSTITUTIONAL: Well-appearing; well-nourished; in no apparent distress.   HEAD: Normocephalic; atraumatic.   EYES:  conjunctiva and sclera clear  ENT: normal nose; no rhinorrhea;  NECK: Supple; full ROM  RESPIRATORY: Breathing easily; no resp difficulty  GI: soft non tender no masses felt  EXT: No cyanosis or edema;  SKIN: Normal for age and race; warm; dry; good turgor; no apparent lesions or rash.   NEURO: A & O x 3; face symmetric; grossly unremarkable.   PSYCHOLOGICAL: The patient’s mood and manner are appropriate.

## 2021-05-02 NOTE — ED PROVIDER NOTE - PATIENT PORTAL LINK FT
You can access the FollowMyHealth Patient Portal offered by [facility] by registering at the following website: [email or web address]. By joining [facility]’s FollowMyHealth portal, you will also be able to view your health information using other applications (apps) compatible with our system.

## 2021-05-02 NOTE — ED PROVIDER NOTE - CLINICAL SUMMARY MEDICAL DECISION MAKING FREE TEXT BOX
given lack of other symptoms and benign belly will start with enema. if no improvement, may need further workup as very elderly

## 2021-05-27 NOTE — PHYSICAL THERAPY INITIAL EVALUATION ADULT - GAIT PATTERN USED, PT EVAL
On 5/27/2021, at 1344, writer called patient at mobile to confirm Virtual Visit. Writer unable to make contact with patient. Writer left detailed voice message for callback. [phone], left as call back number. KATIUSKA Escalante, EMT     2-point gait

## 2021-06-06 NOTE — PATIENT PROFILE ADULT - NSPROEDAABILITYLEARN_GEN_A_NUR
Patient discharged home self care. Patient lives in [place]. Discharge instructions were reviewed with pt/ he will pick prescriptions up at [address] [facility]. IV and Telemetry was removed. No questions or concerns. Patient wheeled out via wheelchair to his truck.
none

## 2021-08-02 ENCOUNTER — APPOINTMENT (OUTPATIENT)
Dept: UROLOGY | Facility: CLINIC | Age: 86
End: 2021-08-02
Payer: MEDICARE

## 2021-08-02 VITALS — TEMPERATURE: 98.3 F | HEART RATE: 70 BPM | SYSTOLIC BLOOD PRESSURE: 136 MMHG | DIASTOLIC BLOOD PRESSURE: 79 MMHG

## 2021-08-02 PROCEDURE — 99213 OFFICE O/P EST LOW 20 MIN: CPT | Mod: 25

## 2021-08-02 PROCEDURE — 51798 US URINE CAPACITY MEASURE: CPT

## 2021-08-03 NOTE — HISTORY OF PRESENT ILLNESS
[Urinary Urgency] : urinary urgency [Urinary Frequency] : urinary frequency [Nocturia] : nocturia [Straining] : straining [None] : None [FreeTextEntry1] : This is an 85yo male with a history of prostate cancer on watchful waiting and urinary frequency here to establish care. He is here with his son. He was reportedly diagnosed with low risk prostate cancer more than 10 years ago. His PSA has been in 3-4 range. He takes tamsulosin, finasteride and oxybutynin for urinary frequency. He has significant comorbid conditions including a previous MI. \par \par 12/20/17 Here for f/u. No significant change in urinary symptoms, controlled with medications. Last PSA was 2.62, essentially stable. \par \par 6/20/18 Here for f/u. No significant change in urinary symptoms, controlled with medications. Last PSA was 2.62, essentially stable. \par \par 12/19/18 Here for f/u. No significant changes. Recent PSA is 3.8. \par \par 9/14/20 Here for f/u. Most recent PSA = 5.8. \par \par 8/02/21 Here for f/u. Most recent PSA = 6.1. No significant changes in voiding symptoms. He stopped tamsulosin.

## 2021-08-03 NOTE — LETTER BODY
[Dear  ___] : Dear  [unfilled], [Courtesy Letter:] : I had the pleasure of seeing your patient, [unfilled], in my office today. [Please see my note below.] : Please see my note below. [Consult Closing:] : Thank you very much for allowing me to participate in the care of this patient.  If you have any questions, please do not hesitate to contact me. [Sincerely,] : Sincerely, [FreeTextEntry2] : Meeta Story MD\par [address]\[place] [address]  [FreeTextEntry3] : Ronal Tijerina MD

## 2021-08-03 NOTE — ASSESSMENT
[FreeTextEntry1] : 89yo male with reported history of prostate cancer on watchful waiting for several years, stable PSA\par Significant LUTS, previously on maximal medical therapy and poor surgical candidate\par He stopped tamsulosin with no change in symptoms \par Recent labs reviewed, PSA essentially stable\par Medications renewed \par F/u 6 months

## 2021-08-03 NOTE — PHYSICAL EXAM
[General Appearance - Well Developed] : well developed [General Appearance - Well Nourished] : well nourished [Normal Appearance] : normal appearance [Well Groomed] : well groomed [General Appearance - In No Acute Distress] : no acute distress [Abdomen Soft] : soft [Abdomen Tenderness] : non-tender [Costovertebral Angle Tenderness] : no ~M costovertebral angle tenderness [Oriented To Time, Place, And Person] : oriented to person, place, and time [Mood] : the mood was normal [Affect] : the affect was normal [Not Anxious] : not anxious [Normal Station and Gait] : the gait and station were normal for the patient's age [No Focal Deficits] : no focal deficits [FreeTextEntry1] : PVR = 74cc

## 2021-08-09 ENCOUNTER — APPOINTMENT (OUTPATIENT)
Dept: PULMONOLOGY | Facility: CLINIC | Age: 86
End: 2021-08-09
Payer: MEDICARE

## 2021-08-09 VITALS
BODY MASS INDEX: 25.99 KG/M2 | HEIGHT: 65 IN | DIASTOLIC BLOOD PRESSURE: 60 MMHG | SYSTOLIC BLOOD PRESSURE: 108 MMHG | HEART RATE: 69 BPM | OXYGEN SATURATION: 97 % | WEIGHT: 156 LBS | TEMPERATURE: 97.6 F

## 2021-08-09 PROCEDURE — 99213 OFFICE O/P EST LOW 20 MIN: CPT | Mod: 25

## 2021-08-09 NOTE — ASSESSMENT
[FreeTextEntry1] : Data reviewed:\par \par Quantiferon 12/2019 negative\par \par CT chest 3/2021 [facility] personally reviewed and compared to both priors: no sig change in subsolid RUL lesion adjacent to apical scarring\par \par Impression:\par Subsolid lesion R apex, former smoker\par \par Plan:\par Visit is premature, was meant to visit after 6 mos follow up scan.\par The scan is already scheduled for end Sept.\par I will call the son at that point.\par Son Israel Reyes tel [phone].\par Son encouraged not to think too much about biopsy etc as this may not be necessary for this stable lesion.

## 2021-08-09 NOTE — CONSULT LETTER
[Dear  ___] : Dear  [unfilled], [Courtesy Letter:] : I had the pleasure of seeing your patient, [unfilled], in my office today. [Please see my note below.] : Please see my note below. [Consult Closing:] : Thank you very much for allowing me to participate in the care of this patient.  If you have any questions, please do not hesitate to contact me. [Sincerely,] : Sincerely, [FreeTextEntry2] : Meeta Story MD\par [address] \[place] [address] [FreeTextEntry3] : Yara Oquendo MD, FCCP\par

## 2021-08-09 NOTE — HISTORY OF PRESENT ILLNESS
[TextBox_4] : 09/14/2020: Seen by Dr Paetl in 12/2019 for hyponatremia/eval for pulmonary etiology. He had a subsolid lesion in the RUL needing follow up imaging. Here w his son today. He speaks Palauan, but English is fine. He is a former smoker; 20-30py and quit 40 yrs ago. Denies dyspnea but produces white sputum. No diagnosed lung disease. 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 He also noted loss of taste and smell back in Feb, which have returned. Never fever or seriously ill. Has also been in [facility] in the interim due to falls and need for rehab. Never had documented Covid.\par \par 4/12/21: Here w son. Has been well, weight stable, had first dose Covid vaccine without sig AEs, just arm soreness. Longstanding cough white sputum, no blood, no dyspnea. No other interval health problems.\par \par 8/9/21: Returns w son, sent back by Dr Story though scheduled for repeat CT end September. Complains of various pains. Son perceives that he was a little more frail in Feb but has rallied. Following a number of issues like hematuria. No hemoptysis. Persistent cough no change. Fully vaccinated, never had Covid.

## 2021-08-12 ENCOUNTER — APPOINTMENT (OUTPATIENT)
Dept: NEPHROLOGY | Facility: CLINIC | Age: 86
End: 2021-08-12
Payer: MEDICARE

## 2021-08-12 ENCOUNTER — LABORATORY RESULT (OUTPATIENT)
Age: 86
End: 2021-08-12

## 2021-08-12 VITALS
SYSTOLIC BLOOD PRESSURE: 102 MMHG | WEIGHT: 157 LBS | HEIGHT: 65 IN | BODY MASS INDEX: 26.16 KG/M2 | HEART RATE: 68 BPM | DIASTOLIC BLOOD PRESSURE: 56 MMHG

## 2021-08-12 DIAGNOSIS — R73.03 PREDIABETES.: ICD-10-CM

## 2021-08-12 DIAGNOSIS — E55.9 VITAMIN D DEFICIENCY, UNSPECIFIED: ICD-10-CM

## 2021-08-12 DIAGNOSIS — M48.061 SPINAL STENOSIS, LUMBAR REGION WITHOUT NEUROGENIC CLAUDICATION: ICD-10-CM

## 2021-08-12 PROCEDURE — 99204 OFFICE O/P NEW MOD 45 MIN: CPT

## 2021-08-12 NOTE — CONSULT LETTER
[Dear  ___] : Dear  [unfilled], [Consult Letter:] : I had the pleasure of evaluating your patient, [unfilled]. [Please see my note below.] : Please see my note below. [Consult Closing:] : Thank you very much for allowing me to participate in the care of this patient.  If you have any questions, please do not hesitate to contact me. [Sincerely,] : Sincerely, [FreeTextEntry2] : Dr david [FreeTextEntry3] : Sincerely, \par \par Freddy Bautista MD, FACP

## 2021-08-12 NOTE — PHYSICAL EXAM
[General Appearance - Alert] : alert [General Appearance - In No Acute Distress] : in no acute distress [Sclera] : the sclera and conjunctiva were normal [PERRL With Normal Accommodation] : pupils were equal in size, round, and reactive to light [Outer Ear] : the ears and nose were normal in appearance [Neck Appearance] : the appearance of the neck was normal [Neck Cervical Mass (___cm)] : no neck mass was observed [Jugular Venous Distention Increased] : there was no jugular-venous distention [Auscultation Breath Sounds / Voice Sounds] : lungs were clear to auscultation bilaterally [Heart Rate And Rhythm] : heart rate was normal and rhythm regular [Heart Sounds] : normal S1 and S2 [Heart Sounds Gallop] : no gallops [Murmurs] : no murmurs [Heart Sounds Pericardial Friction Rub] : no pericardial rub [Edema] : there was no peripheral edema [No CVA Tenderness] : no ~M costovertebral angle tenderness [Abnormal Walk] : normal gait [Nail Clubbing] : no clubbing  or cyanosis of the fingernails [Musculoskeletal - Swelling] : no joint swelling seen [Motor Tone] : muscle strength and tone were normal [FreeTextEntry1] : uses cane [Skin Color & Pigmentation] : normal skin color and pigmentation [Skin Turgor] : normal skin turgor [] : no rash [Deep Tendon Reflexes (DTR)] : deep tendon reflexes were 2+ and symmetric [No Focal Deficits] : no focal deficits [Oriented To Time, Place, And Person] : oriented to person, place, and time [Impaired Insight] : insight and judgment were intact [Affect] : the affect was normal

## 2021-08-12 NOTE — REVIEW OF SYSTEMS
[Hesitancy] : urinary hesitancy [Nocturia] : nocturia [Negative] : Heme/Lymph [FreeTextEntry8] : frerussell

## 2021-08-12 NOTE — HISTORY OF PRESENT ILLNESS
[FreeTextEntry1] : 88-year-old man referred by  because of a creatinine of 1.32 with a GFR of 48, 1+ proteinuria, and minimal hyponatremia with a sodium of 133.  Hypertension has been treated with losartan 25 mg daily and metoprolol 25 mg daily.  He has a history of a thoracic, ascending aorta aneurysm.  He is not dizzy or lightheaded.  He has a history of prostate CA and LUTS -watchful waiting has been the approach so far, for cancer, and he is on tamsulosin and finasteride for LUTS.  He was just seen by urology last week, with a BP of 136/79, and no real change in symptoms with stable urinary frequency.  He was accompanied by his devoted son.  He does have a history of coronary artery disease, and underwent a stent in the past.  He is allergic to NSAIDs so has not taken anything but Tylenol for pain.  He also has a history of chronic hyponatremia, perhaps related to pulmonary nodule and prior smoking history with a degree of COPD perhaps.

## 2021-08-12 NOTE — ASSESSMENT
[FreeTextEntry1] : 88-year-old man referred by Dr. TONG because of CKD 3, proteinuria, and hyponatremia.  His BP is very well controlled, and ordinarily at his age, I would consider cutting back on antihypertensive medication -but because of his thoracic aneurysm, I will not recommend that.  I have ordered a renal ultrasound to assess kidney size, echogenicity, and rule out obstructive uropathy.  I have ordered labs to include DANIEL, ANCA, BMP, phosphorus, PTH, urine microalbumin, uric acid, TSH, random urine for sodium and osmolality.  He will continue on his current medications including losartan, metoprolol, and vitamin D.

## 2021-08-16 LAB
ANION GAP SERPL CALC-SCNC: 12 MMOL/L
BUN SERPL-MCNC: 21 MG/DL
CALCIUM SERPL-MCNC: 9.4 MG/DL
CALCIUM SERPL-MCNC: 9.4 MG/DL
CHLORIDE SERPL-SCNC: 98 MMOL/L
CO2 SERPL-SCNC: 21 MMOL/L
CREAT SERPL-MCNC: 1.49 MG/DL
CREAT SPEC-SCNC: 75 MG/DL
DEPRECATED KAPPA LC FREE/LAMBDA SER: 1.04 RATIO
GLUCOSE SERPL-MCNC: 89 MG/DL
KAPPA LC CSF-MCNC: 2.06 MG/DL
KAPPA LC SERPL-MCNC: 2.15 MG/DL
MICROALBUMIN 24H UR DL<=1MG/L-MCNC: 37.7 MG/DL
MICROALBUMIN/CREAT 24H UR-RTO: 506 MG/G
MPO AB + PR3 PNL SER: NORMAL
OSMOLALITY UR: 414 MOSM/KG
PARATHYROID HORMONE INTACT: 50 PG/ML
PHOSPHATE SERPL-MCNC: 4.1 MG/DL
POTASSIUM SERPL-SCNC: 5 MMOL/L
SODIUM ?TM SUB UR QN: 76 MMOL/L
SODIUM SERPL-SCNC: 130 MMOL/L
TSH SERPL-ACNC: 2.78 UIU/ML
URATE SERPL-MCNC: 5.7 MG/DL

## 2021-08-16 RX ORDER — UREA 15 G
15 POWDER IN PACKET (EA) ORAL
Qty: 30 | Refills: 6 | Status: ACTIVE | COMMUNITY
Start: 2021-08-16 | End: 1900-01-01

## 2021-10-01 ENCOUNTER — APPOINTMENT (OUTPATIENT)
Dept: PULMONOLOGY | Facility: CLINIC | Age: 86
End: 2021-10-01
Payer: MEDICARE

## 2021-10-01 PROCEDURE — 99441: CPT | Mod: 95

## 2021-10-04 NOTE — HISTORY OF PRESENT ILLNESS
[TextBox_4] : 09/14/2020: Seen by Dr Patel in 12/2019 for hyponatremia/eval for pulmonary etiology. He had a subsolid lesion in the RUL needing follow up imaging. Here w his son today. He speaks Panamanian, but English is fine. He is a former smoker; 20-30py and quit 40 yrs ago. Denies dyspnea but produces white sputum. No diagnosed lung disease. 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 He also noted loss of taste and smell back in Feb, which have returned. Never fever or seriously ill. Has also been in [facility] in the interim due to falls and need for rehab. Never had documented Covid.\par \par 4/12/21: Here w son. Has been well, weight stable, had first dose Covid vaccine without sig AEs, just arm soreness. Longstanding cough white sputum, no blood, no dyspnea. No other interval health problems.\par \par 8/9/21: Returns w son, sent back by Dr Story though scheduled for repeat CT end September. Complains of various pains. Son perceives that he was a little more frail in Feb but has rallied. Following a number of issues like hematuria. No hemoptysis. Persistent cough no change. Fully vaccinated, never had Covid.\par \par 10/1/21: Follow up visit conducted by telehealth due to Covid pandemic. The patient gives consent for telehealth services, the patient and I are both in [facility], and the patient, his son and I are the only participants on the call. He has no new complaints, he was just seen last month. He has the chronic productive cough but nothing new. He had his Covid vaccines and son will investigate the booster for him.\par

## 2021-10-04 NOTE — ASSESSMENT
[FreeTextEntry1] : Data reviewed:\par \par Quantiferon 12/2019 negative\par \par CT chest 9/2021 [facility] personally reviewed and compared to 3/2021 and also 12/2019: to my eye there is still no sig change in subsolid RUL lesion adjacent to apical scarring\par \par Impression:\par Subsolid lesion R apex, former smoker\par \par Plan:\par Unless the radiologist sees something markedly different I would rec repeat scan in one year if still clinically appropriate and desired.\par Son Israel Reyes tel [phone] is the appropriate contact.\par Son to check which vaccine he got re booster.\par --\par Report agrees, stability, repeat one year.

## 2021-10-18 NOTE — PROGRESS NOTE BEHAVIORAL HEALTH - THOUGHT PROCESS
Linear
Zygomaticofacial Flap Text: Given the location of the defect, shape of the defect and the proximity to free margins a zygomaticofacial flap was deemed most appropriate for repair.  Using a sterile surgical marker, the appropriate flap was drawn incorporating the defect and placing the expected incisions within the relaxed skin tension lines where possible. The area thus outlined was incised deep to adipose tissue with a #15 scalpel blade with preservation of a vascular pedicle.  The skin margins were undermined to an appropriate distance in all directions utilizing iris scissors.  The flap was then placed into the defect and anchored with interrupted buried subcutaneous sutures.

## 2021-11-16 ENCOUNTER — FORM ENCOUNTER (OUTPATIENT)
Age: 86
End: 2021-11-16

## 2021-11-17 ENCOUNTER — APPOINTMENT (OUTPATIENT)
Dept: CARDIOTHORACIC SURGERY | Facility: CLINIC | Age: 86
End: 2021-11-17
Payer: MEDICARE

## 2021-11-17 ENCOUNTER — OUTPATIENT (OUTPATIENT)
Dept: OUTPATIENT SERVICES | Facility: HOSPITAL | Age: 86
LOS: 1 days | End: 2021-11-17
Payer: MEDICARE

## 2021-11-17 VITALS
TEMPERATURE: 96.9 F | OXYGEN SATURATION: 95 % | WEIGHT: 162 LBS | DIASTOLIC BLOOD PRESSURE: 58 MMHG | BODY MASS INDEX: 26.99 KG/M2 | SYSTOLIC BLOOD PRESSURE: 122 MMHG | RESPIRATION RATE: 17 BRPM | HEART RATE: 69 BPM | HEIGHT: 65 IN

## 2021-11-17 DIAGNOSIS — I71.2 THORACIC AORTIC ANEURYSM, W/OUT RUPTURE: ICD-10-CM

## 2021-11-17 DIAGNOSIS — Z98.890 OTHER SPECIFIED POSTPROCEDURAL STATES: Chronic | ICD-10-CM

## 2021-11-17 DIAGNOSIS — I71.9 AORTIC ANEURYSM OF UNSPECIFIED SITE, WITHOUT RUPTURE: ICD-10-CM

## 2021-11-17 DIAGNOSIS — Z98.89 OTHER SPECIFIED POSTPROCEDURAL STATES: Chronic | ICD-10-CM

## 2021-11-17 DIAGNOSIS — H26.9 UNSPECIFIED CATARACT: Chronic | ICD-10-CM

## 2021-11-17 PROCEDURE — 93306 TTE W/DOPPLER COMPLETE: CPT

## 2021-11-17 PROCEDURE — 99213 OFFICE O/P EST LOW 20 MIN: CPT

## 2021-11-17 PROCEDURE — 93306 TTE W/DOPPLER COMPLETE: CPT | Mod: 26

## 2021-11-29 NOTE — PHYSICAL EXAM
[Sclera] : the sclera and conjunctiva were normal [Neck Appearance] : the appearance of the neck was normal [] : no respiratory distress [Heart Rate And Rhythm] : heart rate was normal and rhythm regular [Heart Sounds] : normal S1 and S2 [Examination Of The Chest] : the chest was normal in appearance [2+] : left 2+ [No Abnormalities] : the abdominal aorta was not enlarged and no bruit was heard [Bowel Sounds] : normal bowel sounds [Abdomen Soft] : soft [No CVA Tenderness] : no ~M costovertebral angle tenderness [Abnormal Walk] : normal gait [Skin Color & Pigmentation] : normal skin color and pigmentation [No Focal Deficits] : no focal deficits [Oriented To Time, Place, And Person] : oriented to person, place, and time [FreeTextEntry1] : deferred

## 2021-11-29 NOTE — DATA REVIEWED
[FreeTextEntry1] : as noted in HPI. \par \par Echocardiogram 10/22/18: EF60%, mild AS, severe AR.\par \par Echocardiogram 12/13/2019:\par 1. Normal left and right ventricular size and function. \par  2. Grade I left ventricular diastolic dysfunction.\par  3. Mild aortic regurgitation. \par  4. Mild aortic stenosis. \par  5. The aortic root is 3.9 cm. The ascending aorta is moderately dilated and measures 4.5 cm. \par  6. The aortic root is 3.9 cm. The ascending aorta is moderately dilated and measures 4.5 cm. \par  7. No pericardial effusion.\par \par CT chest non contrast 12/2/19:4.5 cm ascending aortic aneurysm, 3.1cm ground glass nodule in the right apex, recommending followup in 6-12 months, severe coronary artery calcifications. (Patient has an appointment with Dr. Madhavi Patel for evaluation of pulmonary nodule). \par \par \par \par CTA today revealed 12/16/20: stable 4.5cm ascending aorta. \par \par Echocardiogram today 12/16/20\par  1. Normal left ventricular size and systolic function.\par  2. Normal right ventricular size and systolic function.\par  3. Normal atria.\par  4. Mild-to-moderate aortic regurgitation.\par  5. No other significant valvular disease.\par  6. No evidence of pulmonary hypertension.\par  7. No pericardial effusion.\par  8. The aortic root is dilated measuring 4.30 cm.\par  9. The proximal ascending aorta is dilated measuring 4.80 cm at approximately 6 cm from the aortic valve \par 10. Compared to the previous TTE performed on 1/15/2020, there have been no significant interval changes.\par

## 2021-11-29 NOTE — CONSULT LETTER
[Dear  ___] : Dear  [unfilled], [FreeTextEntry2] : Genevieve Story MD\par [address], \[place] [address]\par FAX: [phone]\par Phone: [phone]\par \par  [FreeTextEntry1] : I had the pleasure of seeing your patient, СВЕТЛАНА PARRA, in my office today. We take a multidisciplinary team approach to patient care and consider you, the referring physician, an extension of our team. We will maintain an open line of communication with you throughout your patient's treatment course.  \par \par Mr. PARRA presents for one year follow up visit for evaluation and management of thoracic aortic aneurysm. I have reviewed all of his medical records and diagnostic images at the time of his office consultation. I have enclosed a copy for your records. \par \par I have reviewed the indications for surgery,and used our webpage [email or web address] <[email or web address].org> to illustrate the aorta and anatomy of the heart. The patient does not meet size criteria for surgical intervention at the time. Review of the imaging shows his  aortic pathology has remained stable. Therefore I have discharged the patient from cardiothoracic surgery service and instructed the patient to follow up with you. \par \par It is our commitment to provide your patient with the highest quality of advanced therapeutic options. On behalf of the Cardiothoracic Surgery Team, thank you for sending your patient to the [facility] based at [facility] [place] [facility].  If there are any questions or concerns, please call me directly at [phone].  \par \par Please see my note below. \par \par Sincerely, \par \par \par \par \par \par John Palacio M.D.\par Professor of Cardiovascular and Thoracic Surgery\par Minimally Invasive Valve Surgeon\par Director of Aortic Surgery, [facility]\par Cell: [phone]\par Email: [email or web address] \par \par [facility] [place] [facility]:\par [address], [address], [place] [address]\par Office: [phone]\par Fax: [phone]\par \par [facility]:\par Department of Cardiovascular and Thoracic Surgery\par [address] [place], [address]\par Office: [phone]\par Fax: [phone]\par \par Practice Manager: Ms. Suha Katz\par Email: [email or web address]\par Phone: [phone]\par

## 2021-11-29 NOTE — HISTORY OF PRESENT ILLNESS
[FreeTextEntry1] : 88 year old male with a past medical history of prostate cancer s/p TURP, CAD (s/p PCI and MIDCAB 6/5/2014), HTN, HLD, DM, presents today for a 1 year follow up visit for evaluation and management of an ascending aortic aneurysm and aortic valve regurgitation. He is under the care of Meeta Story MD. \par \par CT chest without contrast 9/29/21: [id number] ascending aorta. My measurement of the ascending aorta is 45mm. \par \par TTE today: \par  1. Normal left and right ventricular size and systolic function.\par  2. Moderate symmetric left ventricular hypertrophy.\par  3. Grade I left ventricular diastolic dysfunction.\par  4. Moderate aortic regurgitation.\par  5. Mild aortic stenosis.\par  6. No evidence of pulmonary hypertension.\par  7. No pericardial effusion.\par  8. The aortic root measures 4.30 cm at level of the sinuses of Valsalva (normal 3.1-3.7 cm for men, 2.7-3.3 cm for women). The proximal ascending aorta measures 4.20 cm (normal 2.6-3.4 cm for men, 2.3-3.1 cm for women).\par \par Patient is doing well and denies recent hospitalization, ER visits, or surgeries. He  denies fever, chills, fatigue, headache, blurred vision, dizziness, syncope, chest pain, palpitations, shortness of breath, orthopnea, paroxysmal nocturnal dyspnea, nausea, vomiting, abdominal pain, back pain, BRBPR or swelling to legs.

## 2021-11-29 NOTE — ASSESSMENT
[FreeTextEntry1] : 88 year old male with a past medical history of prostate cancer s/p TURP, CAD (s/p PCI and MIDCAB 6/5/2014), HTN, HLD, DM, presents today for a 1 year follow up visit for evaluation and management of an ascending aortic aneurysm and aortic valve regurgitation.\par \par I have reviewed the patient's medical records, diagnostic images during the time of this office consultation and have made the following recommendation. Review of the imaging shows his aortic pathology has remained stable and does not require surgical intervention.\par \par Plan\par \par 1. The patient is discharged from cardiothoracic surgery service. Patient can be seen as PRN. \par 2. Continue medication regimen.\par 3. Follow up with cardiologist and PCP.\par 4. Blood pressure management.

## 2021-11-29 NOTE — PROCEDURE
[FreeTextEntry1] : Dr. Palacio reviewed the indications for surgery, and used our webpage [email or web address].org <[email or web address].org> to illustrate the aorta and anatomy of the heart. Those indications are the following: size greater than 5.0 cm, symptomatic aneurysms, family history of aortic dissection or aneurysm death with a size greater than 4.5 cm, other necessary cardiac procedures such as coronary artery bypass grafting or valvular disorders with an aneurysm greater than 4.5 cm, or connective tissue disorders with an aneurysm size greater than 4.5 cm. The patient does not meet size criteria for surgical intervention at the time.\par \par Dr. Palacio discussed activity restrictions with the patient, and would advise exercise at a moderate amount with no heavy lifting over one third of body weight, and avoiding heart rates that exceed 140 beats per minute. In addition, every patient should abstain from tobacco abuse and to avoid all illicit drug use, especially stimulants such as cocaine or methamphetamine. Dr. Palacio also counseled regarding maintaining a healthy heart diet, and losing any excessive weight as this also put undue stress on both the aorta and entire cardiovascular system. First degree family members should be screened for bicuspid valve disease, and ascending aortic aneurysms. \par \par Patient was advised to view the educational video prior to this visit regarding aortic pathology, risk factors, surgical procedures, and lifestyle modifications. Video can be retrieved at [email or web address].com/watch?[email or web address]=[email or web address].\par

## 2021-11-29 NOTE — END OF VISIT
[Time Spent: ___ minutes] : I have spent [unfilled] minutes of time on the encounter. [FreeTextEntry3] : \par I, DANIEL LUTZU , am scribing for and in the presence of BERTO TYSON the following sections: History of present illness, past Medical/family/surgical/family/social history, review of systems, vital signs, physical exam and disposition.\par \par I personally performed the services described in the documentation, reviewed the documentation recorded by the scribe in my presence and it accurately and completely records my words and actions.\par

## 2022-01-27 NOTE — PHYSICAL THERAPY INITIAL EVALUATION ADULT - PLANNED THERAPY INTERVENTIONS, PT EVAL
96
bed mobility training/transfer training/neuromuscular re-education/gait training/postural re-education/strengthening/balance training

## 2022-02-02 ENCOUNTER — APPOINTMENT (OUTPATIENT)
Dept: UROLOGY | Facility: CLINIC | Age: 87
End: 2022-02-02
Payer: MEDICARE

## 2022-02-02 VITALS
TEMPERATURE: 97.6 F | HEIGHT: 65 IN | HEART RATE: 118 BPM | DIASTOLIC BLOOD PRESSURE: 70 MMHG | WEIGHT: 180 LBS | BODY MASS INDEX: 29.99 KG/M2 | SYSTOLIC BLOOD PRESSURE: 132 MMHG

## 2022-02-02 PROCEDURE — 99213 OFFICE O/P EST LOW 20 MIN: CPT

## 2022-02-04 ENCOUNTER — NON-APPOINTMENT (OUTPATIENT)
Age: 87
End: 2022-02-04

## 2022-02-04 LAB — PSA SERPL-MCNC: 11.4 NG/ML

## 2022-02-04 NOTE — HISTORY OF PRESENT ILLNESS
[FreeTextEntry1] : This is an 83yo male with a history of prostate cancer on watchful waiting and urinary frequency here to establish care. He is here with his son. He was reportedly diagnosed with low risk prostate cancer more than 10 years ago. His PSA has been in 3-4 range. He takes tamsulosin, finasteride and oxybutynin for urinary frequency. He has significant comorbid conditions including a previous MI. \par \par 12/20/17 Here for f/u. No significant change in urinary symptoms, controlled with medications. Last PSA was 2.62, essentially stable. \par \par 6/20/18 Here for f/u. No significant change in urinary symptoms, controlled with medications. Last PSA was 2.62, essentially stable. \par \par 12/19/18 Here for f/u. No significant changes. Recent PSA is 3.8. \par \par 9/14/20 Here for f/u. Most recent PSA = 5.8. \par \par 8/02/21 Here for f/u. Most recent PSA = 6.1. No significant changes in voiding symptoms. He stopped tamsulosin. \par \par 2/02/22 Here for f/u. No recent PSA. No significant change in voiding symptoms.  [Urinary Urgency] : urinary urgency [Urinary Frequency] : urinary frequency [Nocturia] : nocturia [Straining] : straining [None] : None

## 2022-02-04 NOTE — ASSESSMENT
[FreeTextEntry1] : 89yo male with reported history of prostate cancer on watchful waiting for several years, PSA has remained stable\par Repeat PSA today\par \par Significant LUTS, previously on maximal medical therapy and poor surgical candidate\par He stopped tamsulosin with no change in symptoms \par \par F/u 12 months

## 2022-02-04 NOTE — LETTER BODY
[Dear  ___] : Dear  [unfilled], [Courtesy Letter:] : I had the pleasure of seeing your patient, [unfilled], in my office today. [Please see my note below.] : Please see my note below. [Consult Closing:] : Thank you very much for allowing me to participate in the care of this patient.  If you have any questions, please do not hesitate to contact me. [Sincerely,] : Sincerely, [FreeTextEntry2] : Meeta Story MD\par [address]\[place] [address]  [FreeTextEntry3] : Ronal Tijerina MD

## 2022-04-22 NOTE — ED PROVIDER NOTE - OBJECTIVE STATEMENT
Judie Tabares is a 23 month old female presenting to the walk-in clinic today for right leg pain and refusal to bear weight on her right leg. Parent's first noticed this around 0930 today. Patient's mother states that roughly 5 minutes ago patient did take two steps in the waiting room.  Patient's mother states she is not sensitive to touch.    Patient's mother did not hear or see any injury.       PPE worn during room process    Writer: N95, Face shield/eye protection, gown, gloves    Patient: mask         Patient would like communication of their results via:         Cell Phone:   [phone]  Okay to leave a message containing results? 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 last BM 3 days ago. straining and feels like he needs to go but unable to evacuate. Denies abdominal pain. feels like prior episodes of constipation, took his usual laxative powder (doesn't know name) but did not work. Lives independently without issue. accompanied by son.

## 2022-06-20 ENCOUNTER — RX RENEWAL (OUTPATIENT)
Age: 87
End: 2022-06-20

## 2022-08-03 ENCOUNTER — APPOINTMENT (OUTPATIENT)
Dept: UROLOGY | Facility: CLINIC | Age: 87
End: 2022-08-03

## 2022-08-03 VITALS
HEIGHT: 65 IN | HEART RATE: 114 BPM | WEIGHT: 180 LBS | TEMPERATURE: 97.2 F | SYSTOLIC BLOOD PRESSURE: 99 MMHG | BODY MASS INDEX: 29.99 KG/M2 | DIASTOLIC BLOOD PRESSURE: 56 MMHG

## 2022-08-03 DIAGNOSIS — I25.10 ATHEROSCLEROTIC HEART DISEASE OF NATIVE CORONARY ARTERY W/OUT ANGINA PECTORIS: ICD-10-CM

## 2022-08-03 LAB — PSA SERPL-MCNC: 10.7 NG/ML

## 2022-08-03 PROCEDURE — 51798 US URINE CAPACITY MEASURE: CPT

## 2022-08-03 PROCEDURE — 99213 OFFICE O/P EST LOW 20 MIN: CPT | Mod: 25

## 2022-08-03 NOTE — HISTORY OF PRESENT ILLNESS
[Urinary Urgency] : urinary urgency [Urinary Frequency] : urinary frequency [Nocturia] : nocturia [Straining] : straining [None] : None [FreeTextEntry1] : This is an 83yo male with a history of prostate cancer on watchful waiting and urinary frequency here to establish care. He is here with his son. He was reportedly diagnosed with low risk prostate cancer more than 10 years ago. His PSA has been in 3-4 range. He takes tamsulosin, finasteride and oxybutynin for urinary frequency. He has significant comorbid conditions including a previous MI. \par \par 12/20/17 Here for f/u. No significant change in urinary symptoms, controlled with medications. Last PSA was 2.62, essentially stable. \par \par 6/20/18 Here for f/u. No significant change in urinary symptoms, controlled with medications. Last PSA was 2.62, essentially stable. \par \par 12/19/18 Here for f/u. No significant changes. Recent PSA is 3.8. \par \par 9/14/20 Here for f/u. Most recent PSA = 5.8. \par \par 8/02/21 Here for f/u. Most recent PSA = 6.1. No significant changes in voiding symptoms. He stopped tamsulosin. \par \par 2/02/22 Here for f/u. No recent PSA. No significant change in voiding symptoms. \par \par 8/3/22 Here for f/u. Doing well, no sig changes in voiding symptoms. Last PSA 2/2022 11.4.

## 2022-08-03 NOTE — PHYSICAL EXAM
[General Appearance - Well Developed] : well developed [General Appearance - Well Nourished] : well nourished [Normal Appearance] : normal appearance [Well Groomed] : well groomed [General Appearance - In No Acute Distress] : no acute distress [Abdomen Soft] : soft [Abdomen Tenderness] : non-tender [Costovertebral Angle Tenderness] : no ~M costovertebral angle tenderness [Oriented To Time, Place, And Person] : oriented to person, place, and time [Affect] : the affect was normal [Mood] : the mood was normal [Not Anxious] : not anxious [Normal Station and Gait] : the gait and station were normal for the patient's age [No Focal Deficits] : no focal deficits [Edema] : no peripheral edema [] : no respiratory distress [Respiration, Rhythm And Depth] : normal respiratory rhythm and effort [Exaggerated Use Of Accessory Muscles For Inspiration] : no accessory muscle use

## 2022-08-03 NOTE — LETTER BODY
[Dear  ___] : Dear  [unfilled], [Courtesy Letter:] : I had the pleasure of seeing your patient, [unfilled], in my office today. [Please see my note below.] : Please see my note below. [Consult Closing:] : Thank you very much for allowing me to participate in the care of this patient.  If you have any questions, please do not hesitate to contact me. [Sincerely,] : Sincerely, [FreeTextEntry2] : Meeta Story MD\par [address]\[place] [address]  [FreeTextEntry3] : Ronal Tijerina MD

## 2022-08-03 NOTE — ASSESSMENT
[FreeTextEntry1] : 89yo male with reported history of prostate cancer on watchful waiting for several years, PSA has remained stable\par Repeat PSA today\par \par Significant LUTS, previously on maximal medical therapy and poor surgical candidate\par He stopped tamsulosin with no change in symptoms \par Low PVR today\par \par F/u 12 months

## 2022-08-03 NOTE — END OF VISIT
[FreeTextEntry3] : I, Dr. Tijerina, personally performed the evaluation and management (E/M) services for this established patient who presents today with (a) new problem(s)/exacerbation of (an) existing condition(s).  That E/M includes conducting the examination, assessing all new/exacerbated conditions, and establishing a new plan of care.  Today, our ACP, Tamanna Pearce, was here to observe the evaluation and management services for this new problem/exacerbated condition to be followed going forward.\par

## 2022-08-05 ENCOUNTER — NON-APPOINTMENT (OUTPATIENT)
Age: 87
End: 2022-08-05

## 2022-09-03 NOTE — PHYSICAL THERAPY INITIAL EVALUATION ADULT - GAIT DISTANCE, PT EVAL
13979/1     Georgiana Quesada MRN: [id number]  AGE: 73 year old  ADMIT DATE: 8/15/2022    CODE STATUS: Full Resuscitation  ISOLATION STATUS: No active isolations   DIET: Fiber Restricted (residue Low), Consistent Carb Moderate (45-75 Gm/meal) Diet  2 Times/day W Breakfast & Dinner; Ensure High Protein/high Protein Low Carbohydrate Supplement, Vanilla Oral Nutrition Supplement    ALLERGIES:  Patient has no known allergies.     DX:Other hypervolemia  (primary encounter diagnosis)  Hypoxia  Multiple subsegmental pulmonary emboli without acute cor pulmonale (CMS/HCC)  ESRD (end stage renal disease) (CMS/[facility])  Hypervolemia, unspecified hypervolemia type  Gastrointestinal hemorrhage associated with duodenal ulcer  Abdominal pain, unspecified abdominal location     Att: Anmol Hernandez MD  PCP: Mario Wilson MD  IP Consult Orders (From admission, onward)             Start     Ordered    08/17/22 1247  Inpatient Consult to Interventional Radiology  ONE TIME        Comments: PE and DVT with GI blood loss   Provider:  George Behrens, MD    08/17/22 1247    08/16/22 0745  Inpatient consult to Hematology  ONE TIME        Provider:  Erika Klein MD    08/16/22 0744    08/16/22 0433  Inpatient consult to GI  ONE TIME        Provider:  Chris Rincon MD    08/16/22 0432    08/15/22 1545  Inpatient consult to Nephrology  ONE TIME        Provider:  Honorio Klein MD    08/15/22 1545                    BP: 119/56  Temp: 97.9 °F (36.6 °C)  Temp src: Oral  Heart Rate: (!) 56  Resp: 19  SpO2: 100 %  Height: 5' 1\" (154.9 cm)  Weight: 113.2 kg (249 lb 9 oz)   Weight change: -0.109 kg (-3.8 oz)     Recent Labs   Lab 09/02/22  0744 09/02/22  1232 09/02/22  1728 09/02/22  2039 09/03/22  0721   GLUCOSE BEDSIDE 166* 129* 143* 203* 151*        Creatinine (mg/dL)   Date Value   09/03/2022 3.44 (H)   09/02/2022 2.40 (H)     PTT (sec)   Date Value   09/03/2022 113 (HH)     INR (no units)   Date Value   09/01/2022 1.2     WBC (K/mcL)   Date Value    09/03/2022 10.5   09/02/2022 11.8 (H)        I/O last 3 completed shifts:  In: -   Out: 3000 [Other:3000]                         IMPORTANT EVENTS THIS SHIFT:    Pt. Stable overnight  Heparin held and adjusted per protocol   Running at 9u  IMPORTANT EVENTS COMING UP/GOALS (PLEASE INCLUDE WHITE BOARD AND DISCHARGE BOARD UPDATES):    Discharge to [facility], [place]   PATIENT SPECIAL NEEDS/ACCOMMODATIONS:    Max assist, jered steady   .5 L O2 nc   Dialysis MWF                            150 feet

## 2022-10-20 ENCOUNTER — RX RENEWAL (OUTPATIENT)
Age: 87
End: 2022-10-20

## 2022-11-02 NOTE — PROGRESS NOTE ADULT - PROBLEM SELECTOR PROBLEM 10
Transition of care performed with sharing of clinical summary H Plasty Text: Given the location of the defect, shape of the defect and the proximity to free margins a H-plasty was deemed most appropriate for repair.  Using a sterile surgical marker, the appropriate advancement arms of the H-plasty were drawn incorporating the defect and placing the expected incisions within the relaxed skin tension lines where possible. The area thus outlined was incised deep to adipose tissue with a #15 scalpel blade. The skin margins were undermined to an appropriate distance in all directions utilizing iris scissors.  The opposing advancement arms were then advanced into place in opposite direction and anchored with interrupted buried subcutaneous sutures.

## 2022-11-07 ENCOUNTER — APPOINTMENT (OUTPATIENT)
Dept: NEPHROLOGY | Facility: CLINIC | Age: 87
End: 2022-11-07

## 2022-12-16 NOTE — PROGRESS NOTE ADULT - PROBLEM/PLAN-5
DISPLAY PLAN FREE TEXT Male Completion Statement: After discussing his treatment course we decided to discontinue isotretinoin therapy at this time. He shouldn't donate blood for one month after the last dose. He should call with any new symptoms of depression.

## 2023-02-09 NOTE — ED ADULT TRIAGE NOTE - TEMPERATURE IN FAHRENHEIT (DEGREES F)
CC:  Samanta Barnard is here today for Dr. Capellan.    Medications: medications verified, no change  Refills needed today?  NO  denies Latex allergy or sensitivity  Tobacco history: verified    PMD - Verify Pcp    Sometimes having periods. On estradiol patch and constantly itches. Red when taking off at the end of the week.     Patient would like communication of their results via:  [employer]    Patient's current [facility] status: Active.  Health Maintenance   Topic Date Due   • Hepatitis B Vaccine (1 of 3 - 3-dose series) Never done   • Colorectal Cancer Screen-  Never done   • Shingles Vaccine (1 of 2) Never done   • Depression Screening  12/10/2022   • Breast Cancer Screening  07/19/2023   • Cervical Cancer Screen 30-64 -  05/12/2026   • DTaP/Tdap/Td Vaccine (7 - Td or Tdap) 09/28/2030   • Pneumococcal Vaccine 0-64 (3 - PPSV23 if available, else PCV20) 03/09/2037   • Influenza Vaccine  Completed   • COVID-19 Vaccine  Completed   • Meningococcal Vaccine  Aged Out   • HPV Vaccine  Aged Out     No chaperone needed   97.2

## 2023-05-17 NOTE — DIETITIAN INITIAL EVALUATION ADULT. - PROBLEM/PLAN-5
Practice dexterous toe exercise to reduce cramps    Get second COVID bivalent boosters
DISPLAY PLAN FREE TEXT

## 2023-06-08 ENCOUNTER — APPOINTMENT (OUTPATIENT)
Dept: PULMONOLOGY | Facility: CLINIC | Age: 88
End: 2023-06-08
Payer: MEDICARE

## 2023-06-08 VITALS
HEART RATE: 92 BPM | DIASTOLIC BLOOD PRESSURE: 50 MMHG | HEIGHT: 65 IN | BODY MASS INDEX: 27.82 KG/M2 | WEIGHT: 167 LBS | SYSTOLIC BLOOD PRESSURE: 118 MMHG | TEMPERATURE: 98.3 F | OXYGEN SATURATION: 97 %

## 2023-06-08 PROCEDURE — 99213 OFFICE O/P EST LOW 20 MIN: CPT

## 2023-06-08 NOTE — ASSESSMENT
[FreeTextEntry1] : Data reviewed:\par \par Quantiferon 12/2019 negative\par \par CT chest 10/2022 [facility] personally reviewed and in part:\par 1. Similar size and appearance of subsolid nodule right upper lobe. This appears similar to more recent prior studies and slightly increased from 2020. \par 2. Additional nodules including a subsolid nodule right upper lobe measuring 0.7 cm. \par 5. Redemonstrated chronic reticular/chronic interstitial change. Mild bronchiectasis and tracheal mucous/debris.\par 6. Healing left rib fractures.\par \par Impression:\par Subsolid lesion R apex, former smoker\par \par Plan:\par Repeat scan Oct 2023. He remains a very fit 89.\par Son Israel Reyes tel [phone] is the appropriate contact.\par Discussed vaccines.

## 2023-06-08 NOTE — HISTORY OF PRESENT ILLNESS
[TextBox_4] : 09/14/2020: Seen by Dr Patel in 12/2019 for hyponatremia/eval for pulmonary etiology. He had a subsolid lesion in the RUL needing follow up imaging. Here w his son today. He speaks Russian, but English is fine. He is a former smoker; 20-30py and quit 40 yrs ago. Denies dyspnea but produces white sputum. No diagnosed lung disease. 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 He also noted loss of taste and smell back in Feb, which have returned. Never fever or seriously ill. Has also been in [facility] in the interim due to falls and need for rehab. Never had documented Covid.\par \par 4/12/21: Here w son. Has been well, weight stable, had first dose Covid vaccine without sig AEs, just arm soreness. Longstanding cough white sputum, no blood, no dyspnea. No other interval health problems.\par \par 8/9/21: Returns w son, sent back by Dr Story though scheduled for repeat CT end September. Complains of various pains. Son perceives that he was a little more frail in Feb but has rallied. Following a number of issues like hematuria. No hemoptysis. Persistent cough no change. Fully vaccinated, never had Covid.\par \par 10/1/21: Follow up visit conducted by telehealth due to Covid pandemic. The patient gives consent for telehealth services, the patient and I are both in [facility], and the patient, his son and I are the only participants on the call. He has no new complaints, he was just seen last month. He has the chronic productive cough but nothing new. He had his Covid vaccines and son will investigate the booster for him.\par \par 6/8/2023: Here w son. Living in a 4th floor walkup and goes out daily to his AA meeting 2 blocks away. Here to review his scan from October, delayed simply due to son's health issues. Again the patient quit smoking in his 40s. No major infections in the interim.

## 2023-06-08 NOTE — CONSULT LETTER
[Dear  ___] : Dear  [unfilled], [Courtesy Letter:] : I had the pleasure of seeing your patient, [unfilled], in my office today. [Please see my note below.] : Please see my note below. [Consult Closing:] : Thank you very much for allowing me to participate in the care of this patient.  If you have any questions, please do not hesitate to contact me. [Sincerely,] : Sincerely, [FreeTextEntry2] : Meeta Story MD\par [address] \[place] [address] [FreeTextEntry3] : Yara Oquendo MD, FCCP\par

## 2023-06-15 ENCOUNTER — APPOINTMENT (OUTPATIENT)
Dept: NEPHROLOGY | Facility: CLINIC | Age: 88
End: 2023-06-15
Payer: MEDICARE

## 2023-06-15 VITALS
HEIGHT: 65 IN | DIASTOLIC BLOOD PRESSURE: 46 MMHG | HEART RATE: 88 BPM | WEIGHT: 166 LBS | BODY MASS INDEX: 27.66 KG/M2 | SYSTOLIC BLOOD PRESSURE: 98 MMHG

## 2023-06-15 PROCEDURE — 99215 OFFICE O/P EST HI 40 MIN: CPT

## 2023-06-15 NOTE — HISTORY OF PRESENT ILLNESS
[FreeTextEntry1] : 89-year-old man last seen 2 years ago, whose CKD has worsened significantly, with creatinine rising from 1.22 up to 1.55 and now 1.86, with BUN rising from 16 up to 37.  K has risen from 4.7 up to 5.3, CO2 down to 22, hemoglobin 12.8.  His appetite is intact.  But he has slowed down generally according to his son.  He denies dizziness or lightheadedness, but is described as being unsteady on his feet often.  He also complains of nocturia x3.  He has previously exhibited severe generalized vascular disease, with a heavily calcified aorta, ascending aorta aneurysm.  His kidneys looked okay on CT 3 years ago.  His PCP is Dr. Story

## 2023-06-15 NOTE — PHYSICAL EXAM
[General Appearance - Alert] : alert [General Appearance - In No Acute Distress] : in no acute distress [Sclera] : the sclera and conjunctiva were normal [PERRL With Normal Accommodation] : pupils were equal in size, round, and reactive to light [Outer Ear] : the ears and nose were normal in appearance [Neck Appearance] : the appearance of the neck was normal [Neck Cervical Mass (___cm)] : no neck mass was observed [Jugular Venous Distention Increased] : there was no jugular-venous distention [Auscultation Breath Sounds / Voice Sounds] : lungs were clear to auscultation bilaterally [Heart Rate And Rhythm] : heart rate was normal and rhythm regular [Heart Sounds] : normal S1 and S2 [Heart Sounds Gallop] : no gallops [Murmurs] : no murmurs [Heart Sounds Pericardial Friction Rub] : no pericardial rub [Edema] : there was no peripheral edema [No CVA Tenderness] : no ~M costovertebral angle tenderness [Abnormal Walk] : normal gait [Musculoskeletal - Swelling] : no joint swelling seen [Nail Clubbing] : no clubbing  or cyanosis of the fingernails [Motor Tone] : muscle strength and tone were normal [FreeTextEntry1] : uses cane [Skin Color & Pigmentation] : normal skin color and pigmentation [Skin Turgor] : normal skin turgor [] : no rash [Deep Tendon Reflexes (DTR)] : deep tendon reflexes were 2+ and symmetric [No Focal Deficits] : no focal deficits [Oriented To Time, Place, And Person] : oriented to person, place, and time [Impaired Insight] : insight and judgment were intact [Affect] : the affect was normal

## 2023-06-15 NOTE — ASSESSMENT
[FreeTextEntry1] : 89-year-old man with worsening renal function over the last 2 years for reasons that are not yet clear.  Possibilities include : 1.  Bilateral renal artery stenosis/ischemic nephropathy -he is on losartan 25 mg.  I am asking him to stop.   2.  Obstructive uropathy due to BPH or other issues  -I have ordered a renal ultrasound/duplex to rule out bilateral renal artery stenosis, or hydronephrosis.     3.  Relative hypotension  -his BP of 96/48 is probably too low and may exceed the lower limits of renal autoregulation.  I have asked him to reduce doxazosin from 4 down to 2 mg.  We may need to reconsider his dose of furosemide as well at some point.  I have ordered labs to be done today to include BMP, Cystatin C, PTH, phosphorus.  He will return in 4 months.   Time spent 45 minutes

## 2023-06-15 NOTE — CONSULT LETTER
[Dear  ___] : Dear  [unfilled], [Consult Letter:] : I had the pleasure of evaluating your patient, [unfilled]. [Please see my note below.] : Please see my note below. [Consult Closing:] : Thank you very much for allowing me to participate in the care of this patient.  If you have any questions, please do not hesitate to contact me. [Sincerely,] : Sincerely, [FreeTextEntry2] : Dr Story [FreeTextEntry3] : Sincerely, \par \par Freddy Bautista MD, FACP\par

## 2023-06-19 LAB
ANION GAP SERPL CALC-SCNC: 13 MMOL/L
BUN SERPL-MCNC: 38 MG/DL
CALCIUM SERPL-MCNC: 9.3 MG/DL
CALCIUM SERPL-MCNC: 9.3 MG/DL
CHLORIDE SERPL-SCNC: 104 MMOL/L
CO2 SERPL-SCNC: 20 MMOL/L
CREAT SERPL-MCNC: 2.05 MG/DL
CYSTATIN C SERPL-MCNC: 2.37 MG/L
EGFR: 30 ML/MIN/1.73M2
GFR/BSA.PRED SERPLBLD CYS-BASED-ARV: 22 ML/MIN/1.73M2
GLUCOSE SERPL-MCNC: 82 MG/DL
PARATHYROID HORMONE INTACT: 93 PG/ML
PHOSPHATE SERPL-MCNC: 4.6 MG/DL
POTASSIUM SERPL-SCNC: 5.4 MMOL/L
SODIUM SERPL-SCNC: 137 MMOL/L

## 2023-08-09 ENCOUNTER — APPOINTMENT (OUTPATIENT)
Dept: UROLOGY | Facility: CLINIC | Age: 88
End: 2023-08-09
Payer: MEDICARE

## 2023-08-09 VITALS
BODY MASS INDEX: 27.66 KG/M2 | DIASTOLIC BLOOD PRESSURE: 65 MMHG | HEART RATE: 76 BPM | WEIGHT: 166 LBS | HEIGHT: 65 IN | TEMPERATURE: 97.7 F | SYSTOLIC BLOOD PRESSURE: 160 MMHG

## 2023-08-09 VITALS — SYSTOLIC BLOOD PRESSURE: 138 MMHG | HEART RATE: 68 BPM | DIASTOLIC BLOOD PRESSURE: 55 MMHG

## 2023-08-09 DIAGNOSIS — C61 MALIGNANT NEOPLASM OF PROSTATE: ICD-10-CM

## 2023-08-09 DIAGNOSIS — R35.0 FREQUENCY OF MICTURITION: ICD-10-CM

## 2023-08-09 LAB — PSA SERPL-MCNC: 9.73 NG/ML

## 2023-08-09 PROCEDURE — 99213 OFFICE O/P EST LOW 20 MIN: CPT

## 2023-08-10 ENCOUNTER — NON-APPOINTMENT (OUTPATIENT)
Age: 88
End: 2023-08-10

## 2023-08-10 NOTE — ASSESSMENT
[FreeTextEntry1] : 89 yo male with reported history of prostate cancer on watchful waiting for several years, PSA has remained stable around 10  Repeat PSA today  Significant LUTS, previously on maximal medical therapy and poor surgical candidate Has been off tamsulosin for over 1 year due to dizziness with no change in symptoms  PVR today 7 cc  F/u 12 months

## 2023-08-10 NOTE — PHYSICAL EXAM
[General Appearance - Well Developed] : well developed [General Appearance - Well Nourished] : well nourished [Normal Appearance] : normal appearance [Well Groomed] : well groomed [General Appearance - In No Acute Distress] : no acute distress [Abdomen Soft] : soft [Abdomen Tenderness] : non-tender [Costovertebral Angle Tenderness] : no ~M costovertebral angle tenderness [Urethral Meatus] : meatus normal [Penis Abnormality] : normal circumcised penis [Urinary Bladder Findings] : the bladder was normal on palpation [Rectal Exam - Rectum] : digital rectal exam was normal [Edema] : no peripheral edema [] : no respiratory distress [Respiration, Rhythm And Depth] : normal respiratory rhythm and effort [Exaggerated Use Of Accessory Muscles For Inspiration] : no accessory muscle use [Oriented To Time, Place, And Person] : oriented to person, place, and time [Affect] : the affect was normal [Mood] : the mood was normal [Not Anxious] : not anxious [No Focal Deficits] : no focal deficits [FreeTextEntry1] : uses cane

## 2023-08-10 NOTE — PHYSICAL EXAM
[General Appearance - Well Developed] : well developed [General Appearance - Well Nourished] : well nourished [Normal Appearance] : normal appearance [Well Groomed] : well groomed [General Appearance - In No Acute Distress] : no acute distress [Abdomen Soft] : soft [Abdomen Tenderness] : non-tender [Costovertebral Angle Tenderness] : no ~M costovertebral angle tenderness [Urethral Meatus] : meatus normal [Penis Abnormality] : normal circumcised penis [Urinary Bladder Findings] : the bladder was normal on palpation [Rectal Exam - Rectum] : digital rectal exam was normal [Edema] : no peripheral edema [Respiration, Rhythm And Depth] : normal respiratory rhythm and effort [] : no respiratory distress [Exaggerated Use Of Accessory Muscles For Inspiration] : no accessory muscle use [Oriented To Time, Place, And Person] : oriented to person, place, and time [Affect] : the affect was normal [Mood] : the mood was normal [Not Anxious] : not anxious [No Focal Deficits] : no focal deficits [FreeTextEntry1] : uses cane

## 2023-08-10 NOTE — END OF VISIT
[FreeTextEntry3] : I, Dr. Tijerina, personally performed the evaluation and management (E/M) services for this established patient who presents today with (a) new problem(s)/exacerbation of (an) existing condition(s).  That E/M includes conducting the examination, assessing all new/exacerbated conditions, and establishing a new plan of care.  Today, our ACP, Aleksandra Cr, was here to observe the evaluation and management services for this new problem/exacerbated condition to be followed going forward.

## 2023-08-10 NOTE — HISTORY OF PRESENT ILLNESS
[FreeTextEntry1] : This is an 83yo male with a history of prostate cancer on watchful waiting and urinary frequency here to establish care. He is here with his son. He was reportedly diagnosed with low risk prostate cancer more than 10 years ago. His PSA has been in 3-4 range. He takes tamsulosin, finasteride and oxybutynin for urinary frequency. He has significant comorbid conditions including a previous MI.   12/20/17 Here for f/u. No significant change in urinary symptoms, controlled with medications. Last PSA was 2.62, essentially stable.   6/20/18 Here for f/u. No significant change in urinary symptoms, controlled with medications. Last PSA was 2.62, essentially stable.   12/19/18 Here for f/u. No significant changes. Recent PSA is 3.8.   9/14/20 Here for f/u. Most recent PSA = 5.8.   8/02/21 Here for f/u. Most recent PSA = 6.1. No significant changes in voiding symptoms. He stopped tamsulosin.   2/02/22 Here for f/u. No recent PSA. No significant change in voiding symptoms.   8/3/22 Here for f/u. Doing well, no sig changes in voiding symptoms. Last PSA 2/2022 11.4.   8/9/23-- here for annual visit. Increased urinary frequency. PSA 8/2022 10.7 [Urinary Frequency] : urinary frequency [None] : None

## 2023-09-06 NOTE — ED PROVIDER NOTE - DATE/TIME 1
Physical Therapy Daily Treatment Note    [x]Daily Tx Note    []Progress Note    [] Discharge Summary       Date:  2023    Patient Name:  Natalia Walters    :  1989  MRN: [id number]      Medical/Treatment Diagnosis Information:  Diagnosis: M51.36 (ICD-10-CM) - DDD (degenerative disc disease), lumbar  M48.061 (ICD-10-CM) - Foraminal stenosis of lumbar region  Treatment Diagnosis: increased pain, decreased lumbar AROM, difficulty with prolonged walking, decreased LLE strength    Insurance/Certification information:  PT Insurance Information: [facility]    Physician Information:  Referring Provider (secondary): Andrew Zuleta PA-C      Plan of care sent to provider:      []Faxed   [x]Co-signature    (attempts: 1[x] 23 2 []3[])     Plan of care signed :  [x]  Yes  [] No      Is this a Progress Report:     [x]  Yes  [x]  No      If Yes:  Date Range for reporting period:  Beginning 2023  PN: 23  Ending    Progress report will be due (10 Rx or 30 days whichever is less):     90  Recertification will be due (POC Duration  / 90 days whichever is less): 10/30/23      Visit # Insurance Allowable Auth Required   30 PT [x]  Yes []  No    Auth:   32 units 23-23   24 additional units 23-10/13/23    Functional Scale:  Oswestry    2023: 22/50  23: 20/50    Latex Allergy:  [x]NO      []YES  Preferred Language for Healthcare:   [x]English       []other:    RESTRICTIONS/PRECAUTIONS:  asthma    SUBJECTIVE:  Back has not been too bad, however does notice she is going to have some pain in RLE (which is new and unusual). Pain has increased while sleeping over the past couple of weeks.      Pain level:  4/10 low back      Plan Moving Forward/ For next visit:    stretching   Core stability      OBJECTIVE:   Update 23:  Lumbar Range of MotionTesting      [x]All WFL except as marked below  ROM  Deficits         *denotes pain AROM  (% Decreased) COMMENTS   Flexion 70*  60
11-Jan-2020 13:15

## 2023-10-14 LAB
ANION GAP SERPL CALC-SCNC: 12 MMOL/L
BUN SERPL-MCNC: 26 MG/DL
CALCIUM SERPL-MCNC: 9.5 MG/DL
CALCIUM SERPL-MCNC: 9.5 MG/DL
CHLORIDE SERPL-SCNC: 105 MMOL/L
CO2 SERPL-SCNC: 17 MMOL/L
CREAT SERPL-MCNC: 1.71 MG/DL
CREAT SPEC-SCNC: 67 MG/DL
CYSTATIN C SERPL-MCNC: 1.96 MG/L
EGFR: 38 ML/MIN/1.73M2
GFR/BSA.PRED SERPLBLD CYS-BASED-ARV: 28 ML/MIN/1.73M2
GLUCOSE SERPL-MCNC: 103 MG/DL
MICROALBUMIN 24H UR DL<=1MG/L-MCNC: 87.1 MG/DL
MICROALBUMIN/CREAT 24H UR-RTO: 1308 MG/G
PARATHYROID HORMONE INTACT: 72 PG/ML
POTASSIUM SERPL-SCNC: 4.6 MMOL/L
SODIUM SERPL-SCNC: 135 MMOL/L

## 2023-10-16 ENCOUNTER — APPOINTMENT (OUTPATIENT)
Dept: NEPHROLOGY | Facility: CLINIC | Age: 88
End: 2023-10-16
Payer: MEDICARE

## 2023-10-16 VITALS
HEIGHT: 65 IN | WEIGHT: 165 LBS | DIASTOLIC BLOOD PRESSURE: 65 MMHG | BODY MASS INDEX: 27.49 KG/M2 | SYSTOLIC BLOOD PRESSURE: 112 MMHG

## 2023-10-16 PROCEDURE — 99214 OFFICE O/P EST MOD 30 MIN: CPT

## 2023-10-16 RX ORDER — FUROSEMIDE 40 MG/1
40 TABLET ORAL
Qty: 30 | Refills: 0 | Status: DISCONTINUED | COMMUNITY
Start: 2020-04-23 | End: 2023-10-16

## 2023-10-16 RX ORDER — DOXAZOSIN 4 MG/1
4 TABLET ORAL
Refills: 0 | Status: DISCONTINUED | COMMUNITY
End: 2023-10-16

## 2023-11-29 ENCOUNTER — EMERGENCY (EMERGENCY)
Facility: HOSPITAL | Age: 88
LOS: 1 days | Discharge: HOME | End: 2023-11-29
Attending: EMERGENCY MEDICINE | Admitting: EMERGENCY MEDICINE
Payer: MEDICARE

## 2023-11-29 VITALS
SYSTOLIC BLOOD PRESSURE: 156 MMHG | DIASTOLIC BLOOD PRESSURE: 68 MMHG | HEART RATE: 86 BPM | TEMPERATURE: 98 F | WEIGHT: 169.98 LBS | OXYGEN SATURATION: 99 % | RESPIRATION RATE: 18 BRPM

## 2023-11-29 VITALS — DIASTOLIC BLOOD PRESSURE: 69 MMHG | SYSTOLIC BLOOD PRESSURE: 152 MMHG

## 2023-11-29 DIAGNOSIS — Z87.39 PERSONAL HISTORY OF OTHER DISEASES OF THE MUSCULOSKELETAL SYSTEM AND CONNECTIVE TISSUE: ICD-10-CM

## 2023-11-29 DIAGNOSIS — I25.10 ATHEROSCLEROTIC HEART DISEASE OF NATIVE CORONARY ARTERY WITHOUT ANGINA PECTORIS: ICD-10-CM

## 2023-11-29 DIAGNOSIS — Z87.438 PERSONAL HISTORY OF OTHER DISEASES OF MALE GENITAL ORGANS: ICD-10-CM

## 2023-11-29 DIAGNOSIS — E78.5 HYPERLIPIDEMIA, UNSPECIFIED: ICD-10-CM

## 2023-11-29 DIAGNOSIS — Z90.79 ACQUIRED ABSENCE OF OTHER GENITAL ORGAN(S): ICD-10-CM

## 2023-11-29 DIAGNOSIS — Z87.891 PERSONAL HISTORY OF NICOTINE DEPENDENCE: ICD-10-CM

## 2023-11-29 DIAGNOSIS — Z87.09 PERSONAL HISTORY OF OTHER DISEASES OF THE RESPIRATORY SYSTEM: ICD-10-CM

## 2023-11-29 DIAGNOSIS — F32.A DEPRESSION, UNSPECIFIED: ICD-10-CM

## 2023-11-29 DIAGNOSIS — L03.313 CELLULITIS OF CHEST WALL: ICD-10-CM

## 2023-11-29 DIAGNOSIS — Z87.19 PERSONAL HISTORY OF OTHER DISEASES OF THE DIGESTIVE SYSTEM: ICD-10-CM

## 2023-11-29 DIAGNOSIS — I44.0 ATRIOVENTRICULAR BLOCK, FIRST DEGREE: ICD-10-CM

## 2023-11-29 DIAGNOSIS — H91.90 UNSPECIFIED HEARING LOSS, UNSPECIFIED EAR: ICD-10-CM

## 2023-11-29 DIAGNOSIS — R07.89 OTHER CHEST PAIN: ICD-10-CM

## 2023-11-29 DIAGNOSIS — Z98.890 OTHER SPECIFIED POSTPROCEDURAL STATES: Chronic | ICD-10-CM

## 2023-11-29 DIAGNOSIS — Z85.46 PERSONAL HISTORY OF MALIGNANT NEOPLASM OF PROSTATE: ICD-10-CM

## 2023-11-29 DIAGNOSIS — I50.9 HEART FAILURE, UNSPECIFIED: ICD-10-CM

## 2023-11-29 DIAGNOSIS — Z95.1 PRESENCE OF AORTOCORONARY BYPASS GRAFT: ICD-10-CM

## 2023-11-29 DIAGNOSIS — E87.1 HYPO-OSMOLALITY AND HYPONATREMIA: ICD-10-CM

## 2023-11-29 DIAGNOSIS — H26.9 UNSPECIFIED CATARACT: Chronic | ICD-10-CM

## 2023-11-29 DIAGNOSIS — Z98.89 OTHER SPECIFIED POSTPROCEDURAL STATES: Chronic | ICD-10-CM

## 2023-11-29 DIAGNOSIS — Z91.048 OTHER NONMEDICINAL SUBSTANCE ALLERGY STATUS: ICD-10-CM

## 2023-11-29 DIAGNOSIS — Z95.820 PERIPHERAL VASCULAR ANGIOPLASTY STATUS WITH IMPLANTS AND GRAFTS: ICD-10-CM

## 2023-11-29 DIAGNOSIS — I11.0 HYPERTENSIVE HEART DISEASE WITH HEART FAILURE: ICD-10-CM

## 2023-11-29 DIAGNOSIS — Z86.79 PERSONAL HISTORY OF OTHER DISEASES OF THE CIRCULATORY SYSTEM: ICD-10-CM

## 2023-11-29 LAB
ALBUMIN SERPL ELPH-MCNC: 4.2 G/DL — SIGNIFICANT CHANGE UP (ref 3.3–5)
ALBUMIN SERPL ELPH-MCNC: 4.2 G/DL — SIGNIFICANT CHANGE UP (ref 3.3–5)
ALP SERPL-CCNC: 49 U/L — SIGNIFICANT CHANGE UP (ref 40–120)
ALP SERPL-CCNC: 49 U/L — SIGNIFICANT CHANGE UP (ref 40–120)
ALT FLD-CCNC: 7 U/L — LOW (ref 10–45)
ALT FLD-CCNC: 7 U/L — LOW (ref 10–45)
ANION GAP SERPL CALC-SCNC: 10 MMOL/L — SIGNIFICANT CHANGE UP (ref 5–17)
ANION GAP SERPL CALC-SCNC: 10 MMOL/L — SIGNIFICANT CHANGE UP (ref 5–17)
APTT BLD: 35.3 SEC — SIGNIFICANT CHANGE UP (ref 24.5–35.6)
APTT BLD: 35.3 SEC — SIGNIFICANT CHANGE UP (ref 24.5–35.6)
AST SERPL-CCNC: 10 U/L — SIGNIFICANT CHANGE UP (ref 10–40)
AST SERPL-CCNC: 10 U/L — SIGNIFICANT CHANGE UP (ref 10–40)
BASOPHILS # BLD AUTO: 0.02 K/UL — SIGNIFICANT CHANGE UP (ref 0–0.2)
BASOPHILS # BLD AUTO: 0.02 K/UL — SIGNIFICANT CHANGE UP (ref 0–0.2)
BASOPHILS NFR BLD AUTO: 0.5 % — SIGNIFICANT CHANGE UP (ref 0–2)
BASOPHILS NFR BLD AUTO: 0.5 % — SIGNIFICANT CHANGE UP (ref 0–2)
BILIRUB SERPL-MCNC: 0.6 MG/DL — SIGNIFICANT CHANGE UP (ref 0.2–1.2)
BILIRUB SERPL-MCNC: 0.6 MG/DL — SIGNIFICANT CHANGE UP (ref 0.2–1.2)
BUN SERPL-MCNC: 25 MG/DL — HIGH (ref 7–23)
BUN SERPL-MCNC: 25 MG/DL — HIGH (ref 7–23)
CALCIUM SERPL-MCNC: 8.9 MG/DL — SIGNIFICANT CHANGE UP (ref 8.4–10.5)
CALCIUM SERPL-MCNC: 8.9 MG/DL — SIGNIFICANT CHANGE UP (ref 8.4–10.5)
CHLORIDE SERPL-SCNC: 100 MMOL/L — SIGNIFICANT CHANGE UP (ref 96–108)
CHLORIDE SERPL-SCNC: 100 MMOL/L — SIGNIFICANT CHANGE UP (ref 96–108)
CO2 SERPL-SCNC: 20 MMOL/L — LOW (ref 22–31)
CO2 SERPL-SCNC: 20 MMOL/L — LOW (ref 22–31)
CREAT SERPL-MCNC: 1.74 MG/DL — HIGH (ref 0.5–1.3)
CREAT SERPL-MCNC: 1.74 MG/DL — HIGH (ref 0.5–1.3)
EGFR: 37 ML/MIN/1.73M2 — LOW
EGFR: 37 ML/MIN/1.73M2 — LOW
EOSINOPHIL # BLD AUTO: 0.24 K/UL — SIGNIFICANT CHANGE UP (ref 0–0.5)
EOSINOPHIL # BLD AUTO: 0.24 K/UL — SIGNIFICANT CHANGE UP (ref 0–0.5)
EOSINOPHIL NFR BLD AUTO: 5.5 % — SIGNIFICANT CHANGE UP (ref 0–6)
EOSINOPHIL NFR BLD AUTO: 5.5 % — SIGNIFICANT CHANGE UP (ref 0–6)
GLUCOSE SERPL-MCNC: 103 MG/DL — HIGH (ref 70–99)
GLUCOSE SERPL-MCNC: 103 MG/DL — HIGH (ref 70–99)
HCT VFR BLD CALC: 30.3 % — LOW (ref 39–50)
HCT VFR BLD CALC: 30.3 % — LOW (ref 39–50)
HGB BLD-MCNC: 10.2 G/DL — LOW (ref 13–17)
HGB BLD-MCNC: 10.2 G/DL — LOW (ref 13–17)
IMM GRANULOCYTES NFR BLD AUTO: 0.2 % — SIGNIFICANT CHANGE UP (ref 0–0.9)
IMM GRANULOCYTES NFR BLD AUTO: 0.2 % — SIGNIFICANT CHANGE UP (ref 0–0.9)
INR BLD: 1.02 — SIGNIFICANT CHANGE UP (ref 0.85–1.18)
INR BLD: 1.02 — SIGNIFICANT CHANGE UP (ref 0.85–1.18)
LYMPHOCYTES # BLD AUTO: 1.49 K/UL — SIGNIFICANT CHANGE UP (ref 1–3.3)
LYMPHOCYTES # BLD AUTO: 1.49 K/UL — SIGNIFICANT CHANGE UP (ref 1–3.3)
LYMPHOCYTES # BLD AUTO: 34.3 % — SIGNIFICANT CHANGE UP (ref 13–44)
LYMPHOCYTES # BLD AUTO: 34.3 % — SIGNIFICANT CHANGE UP (ref 13–44)
MCHC RBC-ENTMCNC: 29 PG — SIGNIFICANT CHANGE UP (ref 27–34)
MCHC RBC-ENTMCNC: 29 PG — SIGNIFICANT CHANGE UP (ref 27–34)
MCHC RBC-ENTMCNC: 33.7 GM/DL — SIGNIFICANT CHANGE UP (ref 32–36)
MCHC RBC-ENTMCNC: 33.7 GM/DL — SIGNIFICANT CHANGE UP (ref 32–36)
MCV RBC AUTO: 86.1 FL — SIGNIFICANT CHANGE UP (ref 80–100)
MCV RBC AUTO: 86.1 FL — SIGNIFICANT CHANGE UP (ref 80–100)
MONOCYTES # BLD AUTO: 0.47 K/UL — SIGNIFICANT CHANGE UP (ref 0–0.9)
MONOCYTES # BLD AUTO: 0.47 K/UL — SIGNIFICANT CHANGE UP (ref 0–0.9)
MONOCYTES NFR BLD AUTO: 10.8 % — SIGNIFICANT CHANGE UP (ref 2–14)
MONOCYTES NFR BLD AUTO: 10.8 % — SIGNIFICANT CHANGE UP (ref 2–14)
NEUTROPHILS # BLD AUTO: 2.11 K/UL — SIGNIFICANT CHANGE UP (ref 1.8–7.4)
NEUTROPHILS # BLD AUTO: 2.11 K/UL — SIGNIFICANT CHANGE UP (ref 1.8–7.4)
NEUTROPHILS NFR BLD AUTO: 48.7 % — SIGNIFICANT CHANGE UP (ref 43–77)
NEUTROPHILS NFR BLD AUTO: 48.7 % — SIGNIFICANT CHANGE UP (ref 43–77)
NRBC # BLD: 0 /100 WBCS — SIGNIFICANT CHANGE UP (ref 0–0)
NRBC # BLD: 0 /100 WBCS — SIGNIFICANT CHANGE UP (ref 0–0)
NT-PROBNP SERPL-SCNC: 4383 PG/ML — HIGH (ref 0–300)
NT-PROBNP SERPL-SCNC: 4383 PG/ML — HIGH (ref 0–300)
PLATELET # BLD AUTO: 157 K/UL — SIGNIFICANT CHANGE UP (ref 150–400)
PLATELET # BLD AUTO: 157 K/UL — SIGNIFICANT CHANGE UP (ref 150–400)
POTASSIUM SERPL-MCNC: 4.6 MMOL/L — SIGNIFICANT CHANGE UP (ref 3.5–5.3)
POTASSIUM SERPL-MCNC: 4.6 MMOL/L — SIGNIFICANT CHANGE UP (ref 3.5–5.3)
POTASSIUM SERPL-SCNC: 4.6 MMOL/L — SIGNIFICANT CHANGE UP (ref 3.5–5.3)
POTASSIUM SERPL-SCNC: 4.6 MMOL/L — SIGNIFICANT CHANGE UP (ref 3.5–5.3)
PROT SERPL-MCNC: 6.8 G/DL — SIGNIFICANT CHANGE UP (ref 6–8.3)
PROT SERPL-MCNC: 6.8 G/DL — SIGNIFICANT CHANGE UP (ref 6–8.3)
PROTHROM AB SERPL-ACNC: 11.6 SEC — SIGNIFICANT CHANGE UP (ref 9.5–13)
PROTHROM AB SERPL-ACNC: 11.6 SEC — SIGNIFICANT CHANGE UP (ref 9.5–13)
RBC # BLD: 3.52 M/UL — LOW (ref 4.2–5.8)
RBC # BLD: 3.52 M/UL — LOW (ref 4.2–5.8)
RBC # FLD: 13.9 % — SIGNIFICANT CHANGE UP (ref 10.3–14.5)
RBC # FLD: 13.9 % — SIGNIFICANT CHANGE UP (ref 10.3–14.5)
SODIUM SERPL-SCNC: 130 MMOL/L — LOW (ref 135–145)
SODIUM SERPL-SCNC: 130 MMOL/L — LOW (ref 135–145)
TROPONIN T, HIGH SENSITIVITY RESULT: 61 NG/L — CRITICAL HIGH (ref 0–51)
TROPONIN T, HIGH SENSITIVITY RESULT: 61 NG/L — CRITICAL HIGH (ref 0–51)
TROPONIN T, HIGH SENSITIVITY RESULT: 65 NG/L — CRITICAL HIGH (ref 0–51)
TROPONIN T, HIGH SENSITIVITY RESULT: 65 NG/L — CRITICAL HIGH (ref 0–51)
WBC # BLD: 4.34 K/UL — SIGNIFICANT CHANGE UP (ref 3.8–10.5)
WBC # BLD: 4.34 K/UL — SIGNIFICANT CHANGE UP (ref 3.8–10.5)
WBC # FLD AUTO: 4.34 K/UL — SIGNIFICANT CHANGE UP (ref 3.8–10.5)
WBC # FLD AUTO: 4.34 K/UL — SIGNIFICANT CHANGE UP (ref 3.8–10.5)

## 2023-11-29 PROCEDURE — 85610 PROTHROMBIN TIME: CPT

## 2023-11-29 PROCEDURE — 80053 COMPREHEN METABOLIC PANEL: CPT

## 2023-11-29 PROCEDURE — 93005 ELECTROCARDIOGRAM TRACING: CPT

## 2023-11-29 PROCEDURE — 99285 EMERGENCY DEPT VISIT HI MDM: CPT

## 2023-11-29 PROCEDURE — 96375 TX/PRO/DX INJ NEW DRUG ADDON: CPT | Mod: XU

## 2023-11-29 PROCEDURE — 85730 THROMBOPLASTIN TIME PARTIAL: CPT

## 2023-11-29 PROCEDURE — 87040 BLOOD CULTURE FOR BACTERIA: CPT

## 2023-11-29 PROCEDURE — 85025 COMPLETE CBC W/AUTO DIFF WBC: CPT

## 2023-11-29 PROCEDURE — 83880 ASSAY OF NATRIURETIC PEPTIDE: CPT

## 2023-11-29 PROCEDURE — 71045 X-RAY EXAM CHEST 1 VIEW: CPT

## 2023-11-29 PROCEDURE — 93306 TTE W/DOPPLER COMPLETE: CPT

## 2023-11-29 PROCEDURE — 93306 TTE W/DOPPLER COMPLETE: CPT | Mod: 26

## 2023-11-29 PROCEDURE — 99285 EMERGENCY DEPT VISIT HI MDM: CPT | Mod: 25

## 2023-11-29 PROCEDURE — 96374 THER/PROPH/DIAG INJ IV PUSH: CPT | Mod: XU

## 2023-11-29 PROCEDURE — 84484 ASSAY OF TROPONIN QUANT: CPT

## 2023-11-29 PROCEDURE — 71045 X-RAY EXAM CHEST 1 VIEW: CPT | Mod: 26

## 2023-11-29 PROCEDURE — 36415 COLL VENOUS BLD VENIPUNCTURE: CPT

## 2023-11-29 RX ORDER — CEPHALEXIN 500 MG
1 CAPSULE ORAL
Qty: 14 | Refills: 0
Start: 2023-11-29 | End: 2023-12-05

## 2023-11-29 RX ORDER — FUROSEMIDE 40 MG
20 TABLET ORAL ONCE
Refills: 0 | Status: COMPLETED | OUTPATIENT
Start: 2023-11-29 | End: 2023-11-29

## 2023-11-29 RX ORDER — SODIUM CHLORIDE 9 MG/ML
500 INJECTION INTRAMUSCULAR; INTRAVENOUS; SUBCUTANEOUS ONCE
Refills: 0 | Status: COMPLETED | OUTPATIENT
Start: 2023-11-29 | End: 2023-11-29

## 2023-11-29 RX ORDER — ASPIRIN/CALCIUM CARB/MAGNESIUM 324 MG
325 TABLET ORAL ONCE
Refills: 0 | Status: COMPLETED | OUTPATIENT
Start: 2023-11-29 | End: 2023-11-29

## 2023-11-29 RX ORDER — CEFAZOLIN SODIUM 1 G
1000 VIAL (EA) INJECTION ONCE
Refills: 0 | Status: COMPLETED | OUTPATIENT
Start: 2023-11-29 | End: 2023-11-29

## 2023-11-29 RX ORDER — ACETAMINOPHEN 500 MG
650 TABLET ORAL ONCE
Refills: 0 | Status: COMPLETED | OUTPATIENT
Start: 2023-11-29 | End: 2023-11-29

## 2023-11-29 RX ADMIN — Medication 325 MILLIGRAM(S): at 10:49

## 2023-11-29 RX ADMIN — SODIUM CHLORIDE 500 MILLILITER(S): 9 INJECTION INTRAMUSCULAR; INTRAVENOUS; SUBCUTANEOUS at 10:25

## 2023-11-29 RX ADMIN — Medication 100 MILLIGRAM(S): at 11:36

## 2023-11-29 RX ADMIN — Medication 20 MILLIGRAM(S): at 16:36

## 2023-11-29 RX ADMIN — Medication 650 MILLIGRAM(S): at 10:23

## 2023-11-29 NOTE — ED PROVIDER NOTE - CARE PLAN
Principal Discharge DX:	Chest pain   1 Principal Discharge DX:	Chest pain  Secondary Diagnosis:	Cellulitis  Secondary Diagnosis:	Congestive heart failure

## 2023-11-29 NOTE — ED ADULT NURSE NOTE - OBJECTIVE STATEMENT
Pt arrived to ED c/o cp, pt reports left sided cp at site of previous stent placement scar from 2016 for x1hr. Redness and swelling noted to the scan. pt reports pain as pinching and constant

## 2023-11-29 NOTE — ED PROVIDER NOTE - PATIENT PORTAL LINK FT
You can access the FollowMyHealth Patient Portal offered by [facility] by registering at the following website: [email or web address]. By joining [employer]’s FollowMyHealth portal, you will also be able to view your health information using other applications (apps) compatible with our system.

## 2023-11-29 NOTE — ED PROVIDER NOTE - PRINCIPAL DIAGNOSIS
Procedure: 55373 - Shoulder Arthroscopy, w/ Rotator Cuff Repair Left  07395 - Shoulder Arthroscopy, Decompression Left  Tentative Date: December 6, 2019  Tenative Time: To be determined  Duration of Procedure: 1 hr  Hospital: [facility]  Anesthesia: General and Regional  Length of Stay: Day Surgery  Equipment: - CPM  - [employer]  Films Needed for OR: none needed  Pre-Op Done By: PCP  Consent: To be signed at hospital  -------------------  Hospital Orders: Antibiotics  Ancef two gram IV. If patient is allergic to PCN, please give Vancomycin one gram IV.  Patient was given 2 bottles of chlorhexidine as well as a [facility] instruction sheet for [employer] - fywb [id number].     Chest pain

## 2023-11-29 NOTE — ED PROVIDER NOTE - OBJECTIVE STATEMENT
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 last BM 3 days ago. straining and feels like he needs to go but unable to evacuate. Denies abdominal pain. feels like prior episodes of constipation, took his usual laxative powder (doesn't know name) but did not work. Lives independently without issue. accompanied by son. 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 States pain is "pinching" and has become more constant in the past hr. No f/c, no sob, no dizziness or syncope, no n/t/w in ext. Here with son.

## 2023-11-29 NOTE — ED PROVIDER NOTE - NSFOLLOWUPINSTRUCTIONS_ED_ALL_ED_FT
36.7
1. You were seen for chest pain, chest wall cellulitis and mild heart failure exacerbation. Please start taking furosemide again, please follow up with a cardiologist.  . A copy of any of your resulted labs, imaging, and findings have been provided to you. Make sure to view any test results that may not have yet resulted at the time of your discharge by creating a FollowMyHealth account at: [email or web address] to sign up for FollowMyHealth.   2. Continue to take your home medications as prescribed.   3. Please follow up with your primary care physician and cardiologist. You may call our referrals coordinator at [phone] Monday to Friday 11am-7pm for assistance with making an appointment. Or you can call [phone] to make an appointment.  4. Return to the emergency department for new, persistent, or worsening symptoms or signs, or for any concerning symptoms.   5. For your for health, you should make healthy food choices and be physically active. Also, you should not smoke or use drugs, and you should not drink alcohol in excess. Please visit [email or web address] for resources and more information.    Cellulitis    Cellulitis is a skin infection caused by bacteria. This condition occurs most often in the arms and lower legs but can occur anywhere over the body. Symptoms include redness, swelling, warm skin, tenderness, and chills/fever. If you were prescribed an antibiotic medicine, take it as told by your health care provider. Do not stop taking the antibiotic even if you start to feel better.    SEEK IMMEDIATE MEDICAL CARE IF YOU HAVE ANY OF THE FOLLOWING SYMPTOMS: worsening fever, red streaks coming from affected area, vomiting or diarrhea, or dizziness/lightheadedness.    Chest Pain    Chest pain can be caused by many different conditions which may or may not be dangerous. Causes include heartburn, lung infections, heart attack, blood clot in lungs, skin infections, strain or damage to muscle, cartilage, or bones, etc. In addition to a history and physical examination, an electrocardiogram (ECG) or other lab tests may have been performed to determine the cause of your chest pain. Follow up with your primary care provider or with a cardiologist as instructed.     SEEK IMMEDIATE MEDICAL CARE IF YOU HAVE ANY OF THE FOLLOWING SYMPTOMS: worsening chest pain, coughing up blood, unexplained back/neck/jaw pain, severe abdominal pain, dizziness or lightheadedness, fainting, shortness of breath, sweaty or clammy skin, vomiting, or racing heart beat. These symptoms may represent a serious problem that is an emergency. Do not wait to see if the symptoms will go away. Get medical help right away. Call 911 and do not drive yourself to the hospital.    Congestive Heart Failure (CHF)    Congestive heart failure is a chronic condition in which the heart has trouble pumping blood. In some cases of heart failure, fluid may back up into your lungs or you may have swelling (edema) in your lower legs. There are many causes of heart failure including high blood pressure, coronary artery disease, abnormal heart valves, heart muscle disease, lung disease, diabetes, etc. Symptoms include shortness of breath with activity or when lying flat, cough, swelling of the legs, fatigue, or increased urination during the night.     Treatment is aimed at managing the symptoms of heart failure and may include lifestyle changes, medications, or surgical procedures. Take medicines only as directed by your health care provider and do not stop unless instructed to do so. Eat heart-healthy foods with low or no trans/saturated fats, cholesterol and salt. Weigh yourself every day for early recognition of fluid accumulation.    SEEK IMMEDIATE MEDICAL CARE IF YOU HAVE ANY OF THE FOLLOWING SYMPTOMS: shortness of breath, change in mental status, chest pain, lightheadedness/dizziness/fainting, or worsening of symptoms including not being able to conduct normal physical activity.

## 2023-11-29 NOTE — ED ADULT NURSE NOTE - NSFALLUNIVINTERV_ED_ALL_ED
Bed/Stretcher in lowest position, wheels locked, appropriate side rails in place/Call bell, personal items and telephone in reach/Instruct patient to call for assistance before getting out of bed/chair/stretcher/Non-slip footwear applied when patient is off stretcher/[place] to call system/Physically safe environment - no spills, clutter or unnecessary equipment/Purposeful proactive rounding/Room/bathroom lighting operational, light cord in reach

## 2023-11-29 NOTE — ED PROVIDER NOTE - CLINICAL SUMMARY MEDICAL DECISION MAKING FREE TEXT BOX
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 States pain is "pinching" and has become more constant in the past hr. No f/c, no sob, no dizziness or syncope, no n/t/w in ext. Here with son. 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 States pain is "pinching" and has become more constant in the past hr. No f/c, no sob, no dizziness or syncope, no n/t/w in ext. Here with son.    Plan for labs, CXR, CT surg consult for cellulitis near cabg scar.   Cards was also consulted for possible admission for CP and +trop. Dr. Garnica saw the patient and recommends echo.  TTE performed and c/w mild decompensated HF, pt states he has not been taking lasix at home. he was given dose in ER and recommend outpt follow up with cardiology. He was offered admission but patient and son states they would prefer to go home. 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 States pain is "pinching" and has become more constant in the past hr. No f/c, no sob, no dizziness or syncope, no n/t/w in ext. Here with son.    Plan for labs, CXR, CT surg consult for cellulitis near cabg scar.   Cards was also consulted for possible admission for CP and +trop. Dr. Garnica saw the patient and recommends echo.  TTE performed and c/w mild decompensated HF, pt states he has not been taking lasix at home. he was given dose in ER and recommend outpt follow up with cardiology. He was offered admission but patient and son states they would prefer to go home. Stable for DC with return precautions and recommend

## 2023-11-29 NOTE — ED ADULT TRIAGE NOTE - CHIEF COMPLAINT QUOTE
+CP to L side at site of previous scar from stent placement 2016 x 1 hour, denies SOB  +Tenderness to touch to L chest since last night  PMH MI 2016 with stent

## 2023-12-04 LAB
CULTURE RESULTS: SIGNIFICANT CHANGE UP
SPECIMEN SOURCE: SIGNIFICANT CHANGE UP

## 2023-12-10 ENCOUNTER — RX RENEWAL (OUTPATIENT)
Age: 88
End: 2023-12-10

## 2023-12-10 RX ORDER — SODIUM BICARBONATE 650 MG/1
650 TABLET ORAL TWICE DAILY
Qty: 180 | Refills: 3 | Status: ACTIVE | COMMUNITY
Start: 2021-08-16 | End: 1900-01-01

## 2024-02-16 ENCOUNTER — EMERGENCY (EMERGENCY)
Facility: HOSPITAL | Age: 89
LOS: 1 days | Discharge: HOME | End: 2024-02-16
Attending: STUDENT IN AN ORGANIZED HEALTH CARE EDUCATION/TRAINING PROGRAM | Admitting: STUDENT IN AN ORGANIZED HEALTH CARE EDUCATION/TRAINING PROGRAM
Payer: MEDICARE

## 2024-02-16 VITALS
TEMPERATURE: 98 F | HEIGHT: 66 IN | OXYGEN SATURATION: 97 % | RESPIRATION RATE: 22 BRPM | WEIGHT: 149.91 LBS | HEART RATE: 103 BPM | SYSTOLIC BLOOD PRESSURE: 141 MMHG | DIASTOLIC BLOOD PRESSURE: 63 MMHG

## 2024-02-16 VITALS
DIASTOLIC BLOOD PRESSURE: 76 MMHG | OXYGEN SATURATION: 96 % | HEART RATE: 87 BPM | TEMPERATURE: 98 F | RESPIRATION RATE: 19 BRPM | SYSTOLIC BLOOD PRESSURE: 161 MMHG

## 2024-02-16 DIAGNOSIS — Z95.5 PRESENCE OF CORONARY ANGIOPLASTY IMPLANT AND GRAFT: ICD-10-CM

## 2024-02-16 DIAGNOSIS — Z98.890 OTHER SPECIFIED POSTPROCEDURAL STATES: Chronic | ICD-10-CM

## 2024-02-16 DIAGNOSIS — Z95.1 PRESENCE OF AORTOCORONARY BYPASS GRAFT: ICD-10-CM

## 2024-02-16 DIAGNOSIS — Z01.89 ENCOUNTER FOR OTHER SPECIFIED SPECIAL EXAMINATIONS: ICD-10-CM

## 2024-02-16 DIAGNOSIS — R79.89 OTHER SPECIFIED ABNORMAL FINDINGS OF BLOOD CHEMISTRY: ICD-10-CM

## 2024-02-16 DIAGNOSIS — H26.9 UNSPECIFIED CATARACT: Chronic | ICD-10-CM

## 2024-02-16 DIAGNOSIS — E78.5 HYPERLIPIDEMIA, UNSPECIFIED: ICD-10-CM

## 2024-02-16 DIAGNOSIS — Z91.09 OTHER ALLERGY STATUS, OTHER THAN TO DRUGS AND BIOLOGICAL SUBSTANCES: ICD-10-CM

## 2024-02-16 DIAGNOSIS — Z98.89 OTHER SPECIFIED POSTPROCEDURAL STATES: Chronic | ICD-10-CM

## 2024-02-16 DIAGNOSIS — I25.10 ATHEROSCLEROTIC HEART DISEASE OF NATIVE CORONARY ARTERY WITHOUT ANGINA PECTORIS: ICD-10-CM

## 2024-02-16 DIAGNOSIS — I73.9 PERIPHERAL VASCULAR DISEASE, UNSPECIFIED: ICD-10-CM

## 2024-02-16 DIAGNOSIS — F32.A DEPRESSION, UNSPECIFIED: ICD-10-CM

## 2024-02-16 DIAGNOSIS — Z95.820 PERIPHERAL VASCULAR ANGIOPLASTY STATUS WITH IMPLANTS AND GRAFTS: ICD-10-CM

## 2024-02-16 DIAGNOSIS — Z20.822 CONTACT WITH AND (SUSPECTED) EXPOSURE TO COVID-19: ICD-10-CM

## 2024-02-16 DIAGNOSIS — I10 ESSENTIAL (PRIMARY) HYPERTENSION: ICD-10-CM

## 2024-02-16 LAB
ANION GAP SERPL CALC-SCNC: 11 MMOL/L — SIGNIFICANT CHANGE UP (ref 5–17)
BASE EXCESS BLDV CALC-SCNC: -4.5 MMOL/L — LOW (ref -2–3)
BASOPHILS # BLD AUTO: 0.02 K/UL — SIGNIFICANT CHANGE UP (ref 0–0.2)
BASOPHILS NFR BLD AUTO: 0.5 % — SIGNIFICANT CHANGE UP (ref 0–2)
BUN SERPL-MCNC: 21 MG/DL — SIGNIFICANT CHANGE UP (ref 7–23)
CA-I SERPL-SCNC: 1.24 MMOL/L — SIGNIFICANT CHANGE UP (ref 1.15–1.33)
CALCIUM SERPL-MCNC: 9.4 MG/DL — SIGNIFICANT CHANGE UP (ref 8.4–10.5)
CHLORIDE SERPL-SCNC: 104 MMOL/L — SIGNIFICANT CHANGE UP (ref 96–108)
CO2 BLDV-SCNC: 23 MMOL/L — SIGNIFICANT CHANGE UP (ref 22–26)
CO2 SERPL-SCNC: 21 MMOL/L — LOW (ref 22–31)
CREAT SERPL-MCNC: 1.75 MG/DL — HIGH (ref 0.5–1.3)
EGFR: 37 ML/MIN/1.73M2 — LOW
EOSINOPHIL # BLD AUTO: 0.29 K/UL — SIGNIFICANT CHANGE UP (ref 0–0.5)
EOSINOPHIL NFR BLD AUTO: 7.7 % — HIGH (ref 0–6)
FLUAV AG NPH QL: SIGNIFICANT CHANGE UP
FLUBV AG NPH QL: SIGNIFICANT CHANGE UP
GAS PNL BLDV: 134 MMOL/L — LOW (ref 136–145)
GAS PNL BLDV: SIGNIFICANT CHANGE UP
GAS PNL BLDV: SIGNIFICANT CHANGE UP
GLUCOSE SERPL-MCNC: 116 MG/DL — HIGH (ref 70–99)
HCO3 BLDV-SCNC: 22 MMOL/L — SIGNIFICANT CHANGE UP (ref 22–29)
HCT VFR BLD CALC: 35.8 % — LOW (ref 39–50)
HGB BLD-MCNC: 12.1 G/DL — LOW (ref 13–17)
IMM GRANULOCYTES NFR BLD AUTO: 0.3 % — SIGNIFICANT CHANGE UP (ref 0–0.9)
LYMPHOCYTES # BLD AUTO: 1.01 K/UL — SIGNIFICANT CHANGE UP (ref 1–3.3)
LYMPHOCYTES # BLD AUTO: 26.9 % — SIGNIFICANT CHANGE UP (ref 13–44)
MCHC RBC-ENTMCNC: 29.2 PG — SIGNIFICANT CHANGE UP (ref 27–34)
MCHC RBC-ENTMCNC: 33.8 GM/DL — SIGNIFICANT CHANGE UP (ref 32–36)
MCV RBC AUTO: 86.5 FL — SIGNIFICANT CHANGE UP (ref 80–100)
MONOCYTES # BLD AUTO: 0.46 K/UL — SIGNIFICANT CHANGE UP (ref 0–0.9)
MONOCYTES NFR BLD AUTO: 12.3 % — SIGNIFICANT CHANGE UP (ref 2–14)
NEUTROPHILS # BLD AUTO: 1.96 K/UL — SIGNIFICANT CHANGE UP (ref 1.8–7.4)
NEUTROPHILS NFR BLD AUTO: 52.3 % — SIGNIFICANT CHANGE UP (ref 43–77)
NRBC # BLD: 0 /100 WBCS — SIGNIFICANT CHANGE UP (ref 0–0)
NT-PROBNP SERPL-SCNC: 4913 PG/ML — HIGH (ref 0–300)
PCO2 BLDV: 43 MMHG — SIGNIFICANT CHANGE UP (ref 42–55)
PH BLDV: 7.31 — LOW (ref 7.32–7.43)
PLATELET # BLD AUTO: 157 K/UL — SIGNIFICANT CHANGE UP (ref 150–400)
PO2 BLDV: 49 MMHG — HIGH (ref 25–45)
POTASSIUM BLDV-SCNC: 4.7 MMOL/L — SIGNIFICANT CHANGE UP (ref 3.5–5.1)
POTASSIUM SERPL-MCNC: 4.1 MMOL/L — SIGNIFICANT CHANGE UP (ref 3.5–5.3)
POTASSIUM SERPL-SCNC: 4.1 MMOL/L — SIGNIFICANT CHANGE UP (ref 3.5–5.3)
RBC # BLD: 4.14 M/UL — LOW (ref 4.2–5.8)
RBC # FLD: 13.2 % — SIGNIFICANT CHANGE UP (ref 10.3–14.5)
RSV RNA NPH QL NAA+NON-PROBE: SIGNIFICANT CHANGE UP
SAO2 % BLDV: 77 % — SIGNIFICANT CHANGE UP (ref 67–88)
SARS-COV-2 RNA SPEC QL NAA+PROBE: SIGNIFICANT CHANGE UP
SODIUM SERPL-SCNC: 136 MMOL/L — SIGNIFICANT CHANGE UP (ref 135–145)
TROPONIN T, HIGH SENSITIVITY RESULT: 55 NG/L — CRITICAL HIGH (ref 0–51)
TROPONIN T, HIGH SENSITIVITY RESULT: 55 NG/L — CRITICAL HIGH (ref 0–51)
WBC # BLD: 3.75 K/UL — LOW (ref 3.8–10.5)
WBC # FLD AUTO: 3.75 K/UL — LOW (ref 3.8–10.5)

## 2024-02-16 PROCEDURE — 99283 EMERGENCY DEPT VISIT LOW MDM: CPT | Mod: 25

## 2024-02-16 PROCEDURE — 83880 ASSAY OF NATRIURETIC PEPTIDE: CPT

## 2024-02-16 PROCEDURE — 36415 COLL VENOUS BLD VENIPUNCTURE: CPT

## 2024-02-16 PROCEDURE — 82803 BLOOD GASES ANY COMBINATION: CPT

## 2024-02-16 PROCEDURE — 84484 ASSAY OF TROPONIN QUANT: CPT

## 2024-02-16 PROCEDURE — 85025 COMPLETE CBC W/AUTO DIFF WBC: CPT

## 2024-02-16 PROCEDURE — 84295 ASSAY OF SERUM SODIUM: CPT

## 2024-02-16 PROCEDURE — 80048 BASIC METABOLIC PNL TOTAL CA: CPT

## 2024-02-16 PROCEDURE — 82330 ASSAY OF CALCIUM: CPT

## 2024-02-16 PROCEDURE — 71045 X-RAY EXAM CHEST 1 VIEW: CPT

## 2024-02-16 PROCEDURE — 84132 ASSAY OF SERUM POTASSIUM: CPT

## 2024-02-16 PROCEDURE — 99285 EMERGENCY DEPT VISIT HI MDM: CPT

## 2024-02-16 PROCEDURE — 71045 X-RAY EXAM CHEST 1 VIEW: CPT | Mod: 26

## 2024-02-16 PROCEDURE — 87637 SARSCOV2&INF A&B&RSV AMP PRB: CPT

## 2024-02-16 NOTE — ED PROVIDER NOTE - PHYSICAL EXAMINATION
General: comfortable, resting in ED  HEENT: atraumatic, no eye erythema or discharge  Pulm: no cyanosis, no added work of breathing, speaking in full sentences, no rales in bilateral lung fields, no wheezing/rhonchi  Cardiac: extremities warm, intact peripheral pulse  GI: no abdominal distension  Neuro: alert, conversant  Psych: neutral affect, cooperative  Msk: no gross deformity or instability, no pitting edema bilaterally  Skin: no erythema or rash

## 2024-02-16 NOTE — ED PROVIDER NOTE - PATIENT PORTAL LINK FT
You can access the FollowMyHealth Patient Portal offered by [facility] by registering at the following website: [email or web address]. By joining [employer]’s FollowMyHealth portal, you will also be able to view your health information using other applications (apps) compatible with our system.

## 2024-02-16 NOTE — ED PROVIDER NOTE - OBJECTIVE STATEMENT
As per patient, 89M with HTN HLD, CAD s/p stent s/p CABG, PAD s/p stent, thoracic aortic aneurysm, prostate ca s/p TURP, depression, chronic hyponatremia, now referred to ED by PCP because he as "fluid in his lungs."  Patient has had good exercise tolerance, with baseline and function today able to walk up 4 floors in his 4th floor walkup apartment.  Does not have chest pain, fever, or trouble breathing.    Efforts to reach Dr. Story via answering service ongoing.
Breathing spontaneous and unlabored. Breath sounds clear and equal bilaterally with regular rhythm.

## 2024-02-16 NOTE — ED ADULT NURSE NOTE - NSFALLHARMRISKINTERV_ED_ALL_ED

## 2024-02-16 NOTE — ED ADULT TRIAGE NOTE - CHIEF COMPLAINT QUOTE
"The doctor said I have water on the lungs". Pallor and mild tachypnea noted. Ambulatory with cane. EKG completed in triage.

## 2024-02-16 NOTE — ED PROVIDER NOTE - PROGRESS NOTE DETAILS
Labs with mildly increased Cr, likely CKD given age of patient and lack of volume overload on exam/XR.  No gross electrolyte abnormality 2/2 renal function.  Labs also with elevated BNP, however this is consistent with patient's known history of heart disease.  Patient has no peripheral or pulmonary edema, and is therefore not experiencing a CHF exacerbation despite BNP result.  Labs with mildly elevated troponin, however no chest pain, stable troponin on repeat, and no significant ischemic EKG changes, likely decreased clearance 2/2 kidney disease.  Patient continues to be alert, comfortable, and conversant in full sentences.    Although ED work has revealed small metabolic abnormalities in renal function and patient with known history of heart disease, patient is alert, oriented, hemodynamically stable, and without signs of volume overload.  Despite patient's age, risks of inpatient hospitalization to manage these abnormalities (including risk of falls, nosocomial infections, and delirium) outweigh the benefits of inpatient hospization.    Patient oriented to home address, current location, current year, and current president.  Asking to go home, and plans to take a taxi home.  Plan to discharge home with return precautions and outpatient followup. Spoke with son to give update, provided education about testing results, agree with plan for dispo home, family plans to follow with outpatient PCP on Tuesday.

## 2024-02-16 NOTE — ED PROVIDER NOTE - NSFOLLOWUPCLINICS_GEN_ALL_ED_FT
[facility]  Family Medicine  [address], [address]  [place], [place] [address]  Phone: [phone]  Fax:

## 2024-02-16 NOTE — ED ADULT NURSE REASSESSMENT NOTE - NS ED NURSE REASSESS COMMENT FT1
Received patient report from KATE Friedman . Patient in stretcher, AOX4. Vital signs as noted in flowsheet.  Patient came in to ER from his PMD due to fluid in his lungs. Denies any chest pain, discomfort, shortness of breath, difficulty breathing and any form of distress. Patient oriented to ED area. Plan of care discussed and verbalized understanding. All needs attended. Patient currently placed on cardiac monitor. Fall risk precautions maintained. Purposeful proactive hourly rounding in progress.

## 2024-02-16 NOTE — ED ADULT NURSE NOTE - OBJECTIVE STATEMENT
89 y.o. Male a/ox3 presents to ED ambulatory with cane sent in by MD "He told me I have water on my lungs." Denies CP, SOB, trouble breathing, pleuritic pain, abd pain, nvd, headache. Denies recent fall or injury.

## 2024-02-16 NOTE — ED PROVIDER NOTE - CLINICAL SUMMARY MEDICAL DECISION MAKING FREE TEXT BOX
Although patient appears to be well with no signs of significant volume overload on exam, patient referred to ED for "water in lungs," will evaluate patient for CHF exacerbation, PNA, pneumothorax, plural effusion, or other acute cardiopulmonary abnormality.  R/o ACS given age of patient and cardiac history.  R/o gross metabolic abnormality ?anemia ?avelina with overload contributing to symptoms.    Patient otherwise with excellent exercise tolerance (able to ambulate to 4th floor walkup).  Will monitor in ED.    Multiple attempts to reach referring physician via answering service not successful.

## 2024-02-21 ENCOUNTER — NON-APPOINTMENT (OUTPATIENT)
Age: 89
End: 2024-02-21

## 2024-02-22 NOTE — ED ADULT NURSE NOTE - NS ED NOTE ABUSE RESPONSE YN
Airway    Performed by: Mynor Alan MD  Authorized by: Jed Mccord MD    Final Airway Type:  Endotracheal airway  Final Endotracheal Airway*:  ETT  ETT Size (mm)*:  7.0  Cuff*:  Regular  Technique Used for Successful ETT Placement:  Video laryngoscopy  Devices/Methods Used in Placement*:  Mask, Bag Valve, Oral ETT, Stylet and Oral Airway  Intubation Procedure*:  ETCO2, Preoxygenation, Atraumatic, Dentition Unchanged and Pharynx Clear  Insertion Site:  Oral  Blade Type*:  Video Laryngoscope  Blade size: D blade.  Cuff Volume (mL):  10  Measured from*:  Lips  Secured at (cm)*:  22  Placement Verified by: auscultation, capnometry, palpation of cuff and equal breath sounds    Glottic View*:  1 - full view of glottis  Attempts*:  1  Ventilation Between Attempts:  2 hand mask  Number of Other Approaches Attempted:  1  Unsuccessful Airway(s) Attempted:  Oropharyngeal  Unsuccessful Approach(es) for ETT:  Flexible bronchoscopy (Asleep FOB attempted unsuccessfully due to difficulty obtaining adequate view of vocal cords. 2nd attempt successful with video laryngoscopy. Manual in-line stabilization maintained for entirety of intubation procedure)   Patient Identified, Procedure confirmed, Emergency equipment available and Safety protocols followed  Location:  OR  Urgency:  Elective  Difficult Airway: No    Indications for Airway Management:  Anesthesia  Sedation Level:  Anesthetized  Manual In-line Stabilization Maintained Throughout: Yes    Mask Difficulty Assessment:  2 - vent by mask + OA or adjuvant +/- NMBA  Start Time: 2/22/2024 2:08 PM       Yes

## 2024-03-05 NOTE — DIETITIAN INITIAL EVALUATION ADULT. - PROBLEM SELECTOR PROBLEM 2
[de-identified] : The patient is a 52-year-old female here for a subsequent reevaluation of her right knee.  She has severe lateral compartment space osteoarthritis.  She was doing well in the beginning of formal physical therapy to about the fourth session when they added an exercise in the part of the knee.  There was a ball placed under her knee and she was to extend her knee and after that she felt clicking and some pain.  Another exercise related on the following week which really exacerbated her pain.
Constipation, unspecified constipation type

## 2024-03-11 ENCOUNTER — APPOINTMENT (OUTPATIENT)
Dept: NEPHROLOGY | Facility: CLINIC | Age: 89
End: 2024-03-11
Payer: MEDICARE

## 2024-03-11 VITALS
BODY MASS INDEX: 27.49 KG/M2 | SYSTOLIC BLOOD PRESSURE: 98 MMHG | HEIGHT: 65 IN | DIASTOLIC BLOOD PRESSURE: 57 MMHG | WEIGHT: 165 LBS

## 2024-03-11 DIAGNOSIS — N18.30 CHRONIC KIDNEY DISEASE, STAGE 3 UNSPECIFIED: ICD-10-CM

## 2024-03-11 DIAGNOSIS — R80.9 PROTEINURIA, UNSPECIFIED: ICD-10-CM

## 2024-03-11 DIAGNOSIS — N17.9 ACUTE KIDNEY FAILURE, UNSPECIFIED: ICD-10-CM

## 2024-03-11 DIAGNOSIS — E87.20 ACIDOSIS, UNSPECIFIED: ICD-10-CM

## 2024-03-11 DIAGNOSIS — E87.1 HYPO-OSMOLALITY AND HYPONATREMIA: ICD-10-CM

## 2024-03-11 PROCEDURE — G2211 COMPLEX E/M VISIT ADD ON: CPT

## 2024-03-11 PROCEDURE — 99214 OFFICE O/P EST MOD 30 MIN: CPT

## 2024-03-11 NOTE — ASSESSMENT
[FreeTextEntry1] : 89-year-old man with CKD, normal serum sodium, and mild hypotension today.  I have asked him to hold losartan for the next 2 days.  He will return in 4 months with labs preceding that visit.  He will follow-up with  & Bharath

## 2024-03-11 NOTE — CONSULT LETTER
[Dear  ___] : Dear  [unfilled], [Consult Letter:] : I had the pleasure of evaluating your patient, [unfilled]. [Please see my note below.] : Please see my note below. [Sincerely,] : Sincerely, [Consult Closing:] : Thank you very much for allowing me to participate in the care of this patient.  If you have any questions, please do not hesitate to contact me. [FreeTextEntry2] : Dr Story [FreeTextEntry3] : Sincerely,   Freddy Bautista MD, FACP

## 2024-03-11 NOTE — HISTORY OF PRESENT ILLNESS
[FreeTextEntry1] : 89-year-old man with a history of CKD, with a creatinine that has risen in the last several months from 1.7 up to 1.79, with a BUN of 21, K4.1, sodium 136 despite a history of hyponatremia.  He denies lightheadedness or dizziness, but his son notes that he does become unsteady occasionally.  He was seen on February 9 with a BP of 126/60, for prostate cancer follow-up.  He is being managed conservatively because of his age.  On February 21, his BP was 112/52, February 28, he was seen by cardiology and his BP was 130/57.  He was noted to have bilateral pleural effusions and was seen in the ER.  No thoracentesis was done and he was discharged in stable condition.  His son reports that there is been no increase in ALEXANDER and he was able to actually climb stairs today.  His cough and sputum production were actually lessened also.
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
## 2024-08-05 ENCOUNTER — APPOINTMENT (OUTPATIENT)
Dept: NEPHROLOGY | Facility: CLINIC | Age: 89
End: 2024-08-05

## 2024-08-05 PROCEDURE — 99214 OFFICE O/P EST MOD 30 MIN: CPT

## 2024-08-05 PROCEDURE — G2211 COMPLEX E/M VISIT ADD ON: CPT

## 2024-08-05 NOTE — HISTORY OF PRESENT ILLNESS
[FreeTextEntry1] : 89-year-old male accompanied by his devoted son, with a history of CKD -his creatinine has been in the 1.7-1.8 range and is currently stable at 1.74, with a BUN of 27, GFR of 37 by creatinine but only 23 by Cystatin C.  He has been chronically hyponatremic, and his sodium is currently 128.  He tends to eat salty foods, which then makes him thirsty and his fluid intake is probably excessive.  He is acidotic with an CO2 of 16, K5.3.  I have asked him to take sodium bicarb 650 mg, 2 twice daily.  He is not clinically uremic, with a good appetite no nausea or vomiting.

## 2024-08-05 NOTE — ASSESSMENT
[FreeTextEntry1] : 89-year-old male with stable stage IIIb to stage IV CKD, with chronic hyponatremia, hyperkalemia, metabolic acidosis.  I have asked him to reduce his fluid intake, cut back on salt intake but increase sodium bicarb to 650 mg to twice daily.  He will return in 3 months with labs to be done before.

## 2024-08-14 ENCOUNTER — APPOINTMENT (OUTPATIENT)
Dept: UROLOGY | Facility: CLINIC | Age: 89
End: 2024-08-14

## 2024-08-14 NOTE — ED PROVIDER NOTE - NSDCPRINTRESULTS_ED_ALL_ED
Filomena Lopez is calling to request a refill on the following medication(s):    Medication Request:    Requested Prescriptions     Pending Prescriptions Disp Refills    atorvastatin (LIPITOR) 80 MG tablet [Pharmacy Med Name: ATORVASTATIN 80 MG ** 80 Tablet] 28 tablet 4     Sig: TAKE 1 TABLET BY MOUTH DAILY    folic acid (FOLVITE) 1 MG tablet [Pharmacy Med Name: FOLIC ACID 1 MG TAB 1 Tablet] 28 tablet 4     Sig: TAKE 1 TABLET BY MOUTH DAILY    losartan (COZAAR) 100 MG tablet [Pharmacy Med Name: LOSARTAN 100 MG  Tablet] 28 tablet 4     Sig: TAKE 1 TABLET BY MOUTH DAILY       Last Visit Date (If Applicable):  5/28/2024    Next Visit Date:    Visit date not found               Patient requests all Lab, Cardiology, and Radiology Results on their Discharge Instructions

## 2024-09-10 ENCOUNTER — EMERGENCY (EMERGENCY)
Facility: HOSPITAL | Age: 89
LOS: 1 days | Discharge: HOME | End: 2024-09-10
Attending: EMERGENCY MEDICINE | Admitting: EMERGENCY MEDICINE
Payer: MEDICARE

## 2024-09-10 VITALS
TEMPERATURE: 98 F | RESPIRATION RATE: 18 BRPM | HEART RATE: 55 BPM | SYSTOLIC BLOOD PRESSURE: 137 MMHG | WEIGHT: 175.05 LBS | DIASTOLIC BLOOD PRESSURE: 63 MMHG | HEIGHT: 67 IN | OXYGEN SATURATION: 98 %

## 2024-09-10 VITALS
TEMPERATURE: 98 F | DIASTOLIC BLOOD PRESSURE: 68 MMHG | RESPIRATION RATE: 18 BRPM | HEART RATE: 74 BPM | SYSTOLIC BLOOD PRESSURE: 162 MMHG | OXYGEN SATURATION: 100 %

## 2024-09-10 DIAGNOSIS — Z98.89 OTHER SPECIFIED POSTPROCEDURAL STATES: Chronic | ICD-10-CM

## 2024-09-10 DIAGNOSIS — Z98.890 OTHER SPECIFIED POSTPROCEDURAL STATES: Chronic | ICD-10-CM

## 2024-09-10 DIAGNOSIS — H26.9 UNSPECIFIED CATARACT: Chronic | ICD-10-CM

## 2024-09-10 LAB
ANION GAP SERPL CALC-SCNC: 8 MMOL/L — SIGNIFICANT CHANGE UP (ref 5–17)
BASOPHILS # BLD AUTO: 0.01 K/UL — SIGNIFICANT CHANGE UP (ref 0–0.2)
BASOPHILS NFR BLD AUTO: 0.2 % — SIGNIFICANT CHANGE UP (ref 0–2)
BUN SERPL-MCNC: 40 MG/DL — HIGH (ref 7–23)
CALCIUM SERPL-MCNC: 9 MG/DL — SIGNIFICANT CHANGE UP (ref 8.4–10.5)
CHLORIDE SERPL-SCNC: 108 MMOL/L — SIGNIFICANT CHANGE UP (ref 96–108)
CO2 SERPL-SCNC: 20 MMOL/L — LOW (ref 22–31)
CREAT SERPL-MCNC: 1.96 MG/DL — HIGH (ref 0.5–1.3)
EGFR: 32 ML/MIN/1.73M2 — LOW
EOSINOPHIL # BLD AUTO: 0.2 K/UL — SIGNIFICANT CHANGE UP (ref 0–0.5)
EOSINOPHIL NFR BLD AUTO: 4.9 % — SIGNIFICANT CHANGE UP (ref 0–6)
GLUCOSE SERPL-MCNC: 93 MG/DL — SIGNIFICANT CHANGE UP (ref 70–99)
HCT VFR BLD CALC: 31.1 % — LOW (ref 39–50)
HGB BLD-MCNC: 10.1 G/DL — LOW (ref 13–17)
IMM GRANULOCYTES NFR BLD AUTO: 0.2 % — SIGNIFICANT CHANGE UP (ref 0–0.9)
LYMPHOCYTES # BLD AUTO: 0.91 K/UL — LOW (ref 1–3.3)
LYMPHOCYTES # BLD AUTO: 22.1 % — SIGNIFICANT CHANGE UP (ref 13–44)
MCHC RBC-ENTMCNC: 27.7 PG — SIGNIFICANT CHANGE UP (ref 27–34)
MCHC RBC-ENTMCNC: 32.5 GM/DL — SIGNIFICANT CHANGE UP (ref 32–36)
MCV RBC AUTO: 85.4 FL — SIGNIFICANT CHANGE UP (ref 80–100)
MONOCYTES # BLD AUTO: 0.52 K/UL — SIGNIFICANT CHANGE UP (ref 0–0.9)
MONOCYTES NFR BLD AUTO: 12.6 % — SIGNIFICANT CHANGE UP (ref 2–14)
NEUTROPHILS # BLD AUTO: 2.47 K/UL — SIGNIFICANT CHANGE UP (ref 1.8–7.4)
NEUTROPHILS NFR BLD AUTO: 60 % — SIGNIFICANT CHANGE UP (ref 43–77)
NRBC # BLD: 0 /100 WBCS — SIGNIFICANT CHANGE UP (ref 0–0)
PLATELET # BLD AUTO: 152 K/UL — SIGNIFICANT CHANGE UP (ref 150–400)
POTASSIUM SERPL-MCNC: 5.4 MMOL/L — HIGH (ref 3.5–5.3)
POTASSIUM SERPL-SCNC: 5.4 MMOL/L — HIGH (ref 3.5–5.3)
RBC # BLD: 3.64 M/UL — LOW (ref 4.2–5.8)
RBC # FLD: 13.9 % — SIGNIFICANT CHANGE UP (ref 10.3–14.5)
SODIUM SERPL-SCNC: 136 MMOL/L — SIGNIFICANT CHANGE UP (ref 135–145)
WBC # BLD: 4.12 K/UL — SIGNIFICANT CHANGE UP (ref 3.8–10.5)
WBC # FLD AUTO: 4.12 K/UL — SIGNIFICANT CHANGE UP (ref 3.8–10.5)

## 2024-09-10 PROCEDURE — 99283 EMERGENCY DEPT VISIT LOW MDM: CPT | Mod: 25

## 2024-09-10 PROCEDURE — 93005 ELECTROCARDIOGRAM TRACING: CPT

## 2024-09-10 PROCEDURE — 80048 BASIC METABOLIC PNL TOTAL CA: CPT

## 2024-09-10 PROCEDURE — 85025 COMPLETE CBC W/AUTO DIFF WBC: CPT

## 2024-09-10 PROCEDURE — 99284 EMERGENCY DEPT VISIT MOD MDM: CPT

## 2024-09-10 PROCEDURE — 36415 COLL VENOUS BLD VENIPUNCTURE: CPT

## 2024-09-10 PROCEDURE — 93010 ELECTROCARDIOGRAM REPORT: CPT

## 2024-09-10 RX ORDER — SODIUM CHLORIDE 9 MG/ML
500 INJECTION INTRAMUSCULAR; INTRAVENOUS; SUBCUTANEOUS ONCE
Refills: 0 | Status: COMPLETED | OUTPATIENT
Start: 2024-09-10 | End: 2024-09-10

## 2024-09-10 RX ADMIN — SODIUM CHLORIDE 500 MILLILITER(S): 9 INJECTION INTRAMUSCULAR; INTRAVENOUS; SUBCUTANEOUS at 12:21

## 2024-09-10 NOTE — ED PROVIDER NOTE - CLINICAL SUMMARY MEDICAL DECISION MAKING FREE TEXT BOX
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 Pt is followed by Dr. gonzáles (PCP) and was sent to Dr. Larios for f/u of Anemia and outpt labs reportedly were abnormal with K 6.2. Pt denies sx, per son he is at his baseline, no c/o pain, no n/v. No other complaints.   VSS, no focal neuro deficits, somewhat dehydrated appearing.   EKG no ischemia.   Labs checked and K minimally elevated at 5.4, Cr increased to 1.9.  No indication for admission, will given IVFs for above and pt has f/u appt with PCP in 2 weeks for recheck of labs.   Pt feeling improved and is stable for DC. ED evaluation and management discussed with the patient in detail.  Close PMD follow up encouraged.  Strict ED return instructions discussed in detail and patient given the opportunity to ask any questions about their discharge diagnosis and instructions. Patient verbalized understanding.

## 2024-09-10 NOTE — ED ADULT TRIAGE NOTE - CHIEF COMPLAINT QUOTE
Per son, "he had blood work done yesterday and his potassium was 6.2. They referred us to the ER." Denies chest pain, shortness of breath, n/v, weakness. EKG obtained. Hx CABG, stent on thinners, HTN.

## 2024-09-10 NOTE — ED ADULT NURSE NOTE - IN THE PAST 12 MONTHS HAVE YOU USED DRUGS OTHER THAN THOSE REQUIRED FOR MEDICAL REASON?
[de-identified] : 71 year old female presents for evaluation bilateral knee pain. Presents today with return of pain in the right knee and left knee x 1 month. She received Euflexxa injection in bilateral knees in 6/2020 which gave her significant relief until recently.  Pain is intermittent brought on with standing after prolonged sitting. Denies buckling, catching, numbness or tingling. 
No

## 2024-09-10 NOTE — ED PROVIDER NOTE - OBJECTIVE STATEMENT
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 Pt is followed by Dr. gonzáles (PCP) and was sent to Dr. Larios for f/u of Anemia and outpt labs reportedly were abnormal with K 6.2. Pt denies sx, per son he is at his baseline, no c/o pain, no n/v. No other complaints.

## 2024-09-10 NOTE — ED ADULT NURSE NOTE - TEMPLATE LIST FOR HEAD TO TOE ASSESSMENT
Lab Results   Component Value Date    HGBA1C 10 9 (H) 10/19/2022   Hemoglobin A1c done in office today is 13 5  Which is very seriously uncontrolled  Patient is also being followed by endocrinologist   Kerry Severe him to follow-up with them as soon as possible for further adjustment  Fluid/Electrolyte/Metabolic

## 2024-09-10 NOTE — ED ADULT NURSE NOTE - OBJECTIVE STATEMENT
89yoM PMH MI, HLD, DM 2,came to ED c/o abnormal labs, Pt states he was receiving follow up blood work following a recent diagnosis of anemia. Pt states they called him today and told him to come to the ED because his potassium was elevated (6.2). Pt denies chest pain/ palpitations. Ambulates with steady gait. EKG performed. IV placed, repeat blood work sent. Pt placed on CCM .

## 2024-09-10 NOTE — ED PROVIDER NOTE - PHYSICAL EXAMINATION
GEN: Elderly, thin,  well developed, awake, alert, oriented to person, place, time/situation and in no apparent distress. NTAF  ENT: Airway patent, Nasal mucosa clear. Mouth with somewhat dry mucosa.  EYES: Clear bilaterally. PERRL, EOMI  RESPIRATORY: Breathing comfortably with normal RR. No W/C/R, no hypoxia or resp distress.  CARDIAC: Regular rate and rhythm, no M/R/G  ABDOMEN: Soft, nontender, +bowel sounds, no rebound, rigidity, or guarding.  MSK: Range of motion is not limited, no deformities noted.  NEURO: Alert and oriented, no focal deficits.  SKIN: Skin normal color for race, warm, dry and intact. No evidence of rash.  PSYCH: Alert and oriented to person, place, time/situation. normal mood and affect. no apparent risk to self or others.

## 2024-09-10 NOTE — ED ADULT NURSE NOTE - CHPI ED NUR SYMPTOMS NEG
no back pain/no chills/no decreased eating/drinking/no fever/no headache/no loss of consciousness/no nausea/no pain/no vomiting Citizen of [place]

## 2024-09-10 NOTE — ED PROVIDER NOTE - PATIENT PORTAL LINK FT
You can access the FollowMyHealth Patient Portal offered by [facility] by registering at the following website: [email or web address]. By joining [facility]’s FollowMyHealth portal, you will also be able to view your health information using other applications (apps) compatible with our system.

## 2024-09-10 NOTE — ED PROVIDER NOTE - NSFOLLOWUPINSTRUCTIONS_ED_ALL_ED_FT
1. You were seen for elevated potassium and creatinine level likely 2/2 dehdyration. You were given IVFs. Please stay hydrated. Follow up with your doctor in 2 weeks.   . A copy of any of your resulted labs, imaging, and findings have been provided to you. Make sure to view any test results that may not have yet resulted at the time of your discharge by creating a FollowMyHealth account at: [email or web address] to sign up for FollowMyHealth.   2. Continue to take your home medications as prescribed.   3. Please follow up with your primary care physician. You may call our referrals coordinator at [phone] Monday to Friday 11am-7pm for assistance with making an appointment. Or you can call [phone] to make an appointment.  4. Return to the emergency department for new, persistent, or worsening symptoms or signs, or for any concerning symptoms.   5. For your for health, you should make healthy food choices and be physically active. Also, you should not smoke or use drugs, and you should not drink alcohol in excess. Please visit [email or web address] for resources and more information.    Dehydration    Dehydration is when there is not enough fluid or water in your body. This happens when you lose more fluids than you take in. People who are age 65 or older have a higher risk of getting dehydrated.    Dehydration can range from mild to very bad. It should be treated right away to keep it from getting very bad.     Symptoms of mild dehydration may include:    Thirst.  Dry lips.  Slightly dry mouth.  Dry, warm skin.  Dizziness.    Symptoms of moderate dehydration may include:    Very dry mouth.  Muscle cramps.  Dark pee (urine). Pee may be the color of tea.  Your body making less pee.  Your eyes making fewer tears.  Heartbeat that is uneven or faster than normal (palpitations).  Headache.  Light-headedness, especially when you stand up from sitting.  Fainting (syncope).    Symptoms of very bad dehydration may include:    Changes in skin, such as:  Cold and clammy skin.  Blotchy (mottled) or pale skin.  Skin that does not quickly return to normal after being lightly pinched and let go (poor skin turgor).  Changes in body fluids, such as:  Feeling very thirsty.  Your eyes making fewer tears.  Not sweating when body temperature is high, such as in hot weather.  Your body making very little pee.  Changes in vital signs, such as:  Weak pulse.  Pulse that is more than 100 beats a minute when you are sitting still.  Fast breathing.  Low blood pressure.  Other changes, such as:  Sunken eyes.  Cold hands and feet.  Confusion.  Lack of energy (lethargy).  Trouble waking up from sleep.  Short-term weight loss.  Unconsciousness.    Follow these instructions at home:  If told by your doctor, drink an ORS:  Make an ORS by using instructions on the package.  Start by drinking small amounts, about ½ cup (120 mL) every 5–10 minutes.  Slowly drink more until you have had the amount that your doctor said to have.  Drink enough clear fluid to keep your pee clear or pale yellow. If you were told to drink an ORS, finish the ORS first, then start slowly drinking clear fluids. Drink fluids such as:  Water. Do not drink only water by itself. Doing that can make the salt (sodium) level in your body get too low (hyponatremia).  Ice chips.  Fruit juice that you have added water to (diluted).  Low-calorie sports drinks.  Avoid:  Alcohol.  Drinks that have a lot of sugar. These include high-calorie sports drinks, fruit juice that does not have water added, and soda.  Caffeine.  Foods that are greasy or have a lot of fat or sugar.  Take over-the-counter and prescription medicines only as told by your doctor.  Do not take salt tablets. Doing that can make the salt level in your body get too high (hypernatremia).  Eat foods that have minerals (electrolytes). Examples include bananas, oranges, potatoes, tomatoes, and spinach.  Keep all follow-up visits as told by your doctor. This is important.     Contact a doctor if:  You have belly (abdominal) pain that:  Gets worse.  Stays in one area (localizes).  You have a rash.  You have a stiff neck.  You get angry or annoyed more easily than normal (irritability).  You are more sleepy than normal.  You have a harder time waking up than normal.  You feel:  Weak.  Dizzy.  Very thirsty.    Get help right away if:  You have symptoms of very bad dehydration.  You cannot drink fluids without throwing up (vomiting).  Your symptoms get worse with treatment.  You have a fever.  You have a very bad headache.  You are throwing up or having watery poop (diarrhea) and it:  Gets worse.  Does not go away.  You have diarrhea for more than 24 hours.  You have blood or something green (bile) in your throw-up.  You have blood in your poop (stool). This may cause poop to look black and tarry.  You have not peed in 6–8 hours.  You have peed (urinated) only a small amount of very dark pee during 6–8 hours.  You pass out (faint).  Your heart rate when you are sitting still is more than 100 beats a minute.  You have trouble breathing.    ADDITIONAL NOTES AND INSTRUCTIONS    Please follow up with your Primary MD in 24-48 hr.  Seek immediate medical care for any new/worsening signs or symptoms.    Hyperkalemia  Hyperkalemia occurs when the level of potassium in the blood is too high. Potassium is an important mineral (electrolyte) that helps the muscles and nerves function normally. It affects how the heart works, and it helps keep fluids and minerals balanced in the body. If there is too much potassium in your blood, it can affect your heart's ability to function normally.    Potassium is normally removed (excreted) from the body by the kidneys. Hyperkalemia can result from various conditions. It can range from mild to severe.    What are the causes?  This condition may be caused by:  Taking in too much potassium. This can happen if:  You use salt substitutes. They contain large amounts of potassium.  You take potassium supplements.  You eat too many foods that are high in potassium if you have kidney disease.  Excreting too little potassium. This can happen if:  Your kidneys are not working properly. Kidney (renal) disease, including short-term or long-term renal failure, is a common cause of hyperkalemia.  You are taking medicines that lower your excretion of potassium, such as ACE inhibitors, angiotensin II receptor blockers (ARBs), or potassium-sparing diuretics, such as spironolactone.  You have Harvey's disease.  You have a urinary tract blockage, such as kidney stones.  You are on treatment to mechanically clean your blood (dialysis) and you skip a treatment.  Your cells releasing a high amount of potassium into the blood. This can happen with:  Injury to muscles (rhabdomyolysis) or other tissues. Most potassium is stored in your muscles.  Severe burns, injuries, or infections.  Acidic blood plasma (acidosis). Acidosis can result from many diseases, such as uncontrolled diabetes.  What increases the risk?  You are more likely to develop this condition if you have alcoholism or if you use drugs heavily.    What are the signs or symptoms?  In many cases, there are no symptoms. However, when your potassium level becomes high enough, you may have symptoms such as:  An irregular or very slow heartbeat.  Nausea.  Tiredness (fatigue).  Confusion.  Tingling of your skin or numbness of your hands or feet.  Muscle cramps.  Muscle weakness.  Not being able to move (paralysis).  How is this diagnosed?  This condition may be diagnosed based on:  Your symptoms and medical history. Your health care provider will ask about your use of over-the-counter and prescription medicines.  A physical exam.  Blood tests.  An electrocardiogram (ECG).  How is this treated?  Treatment depends on the cause and severity of your condition. Treatment may need to be done in the hospital setting. Treatment may include:  Receiving a sugar solution (glucose) through an IV along with insulin to shift potassium out of your blood and into your cells.  Taking a medicine called albuterol to shift potassium out of your blood and into your cells.  Taking medicines to remove the potassium from your body.  Having dialysis to remove the potassium from your body.  Taking calcium to protect your heart from the effects of high potassium, such as irregular rhythms (arrhythmias).  Follow these instructions at home:  A prescription pill bottle with an example of a pill.  Take over-the-counter and prescription medicines only as told by your health care provider.  Do not take any supplements, natural food products, herbs, or vitamins without reviewing them with your health care provider. Certain supplements and natural food products contain high amounts of potassium.  If you drink alcohol, limit how much you have as told by your health care provider.  Do not use illegal drugs. If you need help quitting, ask your health care provider.  If you have kidney disease, you may need to follow a low-potassium diet. A dietitian can help you learn which foods have high or low amounts of potassium.  Keep all follow-up visits. This is important.  Contact a health care provider if:  You have an irregular or very slow heartbeat.  You feel light-headed.  You feel weak.  You are nauseous.  You have tingling or numbness in your hands or feet.  Get help right away if:  You have shortness of breath.  You have chest pain or discomfort.  You faint.  You have muscle paralysis.  These symptoms may be an emergency. Get help right away. Call 911.  Do not wait to see if the symptoms will go away.  Do not drive yourself to the hospital.  Summary  Hyperkalemia occurs when the level of potassium in your blood is too high.  This condition may be caused by taking in too much potassium, excreting too little potassium, or releasing a high amount of potassium from your cells into your blood.  Hyperkalemia can result from many underlying conditions, especially chronic kidney disease, or from taking certain medicines.  Treatment of hyperkalemia may include medicine to shift potassium out of your blood and into your cells or to remove the potassium from your body.  If you have kidney disease, you may need to follow a low-potassium diet. A dietitian can help you learn which foods have high or low amounts of potassium.  This information is not intended to replace advice given to you by your health care provider. Make sure you discuss any questions you have with your health care provider.

## 2024-09-13 DIAGNOSIS — E86.0 DEHYDRATION: ICD-10-CM

## 2024-09-13 DIAGNOSIS — R00.1 BRADYCARDIA, UNSPECIFIED: ICD-10-CM

## 2024-09-13 DIAGNOSIS — I73.9 PERIPHERAL VASCULAR DISEASE, UNSPECIFIED: ICD-10-CM

## 2024-09-13 DIAGNOSIS — R79.9 ABNORMAL FINDING OF BLOOD CHEMISTRY, UNSPECIFIED: ICD-10-CM

## 2024-09-13 DIAGNOSIS — I10 ESSENTIAL (PRIMARY) HYPERTENSION: ICD-10-CM

## 2024-09-13 DIAGNOSIS — I44.0 ATRIOVENTRICULAR BLOCK, FIRST DEGREE: ICD-10-CM

## 2024-09-13 DIAGNOSIS — F32.A DEPRESSION, UNSPECIFIED: ICD-10-CM

## 2024-09-13 DIAGNOSIS — Z95.1 PRESENCE OF AORTOCORONARY BYPASS GRAFT: ICD-10-CM

## 2024-09-13 DIAGNOSIS — E78.5 HYPERLIPIDEMIA, UNSPECIFIED: ICD-10-CM

## 2024-09-13 DIAGNOSIS — I25.10 ATHEROSCLEROTIC HEART DISEASE OF NATIVE CORONARY ARTERY WITHOUT ANGINA PECTORIS: ICD-10-CM

## 2024-09-13 DIAGNOSIS — Z87.891 PERSONAL HISTORY OF NICOTINE DEPENDENCE: ICD-10-CM

## 2024-09-13 DIAGNOSIS — Z85.46 PERSONAL HISTORY OF MALIGNANT NEOPLASM OF PROSTATE: ICD-10-CM

## 2024-09-13 DIAGNOSIS — E87.5 HYPERKALEMIA: ICD-10-CM

## 2024-09-13 NOTE — H&P ADULT - PROBLEM SELECTOR PROBLEM 3
[Well Nourished] : well nourished [Well Developed] : well developed [Alert] : ~L alert [Active] : active [Normal Breathing Pattern] : normal breathing pattern [No Respiratory Distress] : no respiratory distress [No Allergic Shiners] : no allergic shiners [No Drainage] : no drainage [No Conjunctivitis] : no conjunctivitis Essential hypertension [Absence Of Retractions] : absence of retractions [No Acc Muscle Use] : no accessory muscle use [Soft, Non-Tender] : soft, non-tender [Non Distended] : was not ~L distended [Full ROM] : full range of motion [No Cyanosis] : no cyanosis [No Kyphoscoliosis] : no kyphoscoliosis [No Contractures] : no contractures [Alert and  Oriented] : alert and oriented [No Abnormal Focal Findings] : no abnormal focal findings [Normal Muscle Tone And Reflexes] : normal muscle tone and reflexes [No Rashes] : no rashes [FreeTextEntry4] : external exam normal [No Nasal Drainage] : no nasal drainage [No Sinus Tenderness] : no sinus tenderness [Symmetric] : symmetric [Normal Sinus Rhythm] : normal sinus rhythm [No Heart Murmur] : no heart murmur [FreeTextEntry1] : appears with illness [FreeTextEntry3] : erythematous rim surrounding TM, otherwise visible [FreeTextEntry5] : +tonsillar hypertrophy [FreeTextEntry6] : +mild pectus excavatum, chest wall non-tender to palpation [FreeTextEntry7] : intermittent insp/exp. wheeze, coarse breath sounds, no crackles, +rhonchi

## 2024-11-06 NOTE — H&P ADULT - NSHPSOURCEINFOTX_GEN_ALL_CORE
[de-identified] : Right shoulder rotator cuff tendinopathy possible rotator cuff tear.  She states she was told she has a rotator cuff tear in her shoulder and had an MRI although she has no information with her today and does not know where her MRI was done.  Have requested her to send me her MRI images and reports that I may review it and discuss treatment options and if that is not possible to get new MRIs we can better elucidate the problem within her right shoulder.  All questions were answered Armenian translation she will follow-up when she has her MRI for us to review Son: Israel Harden [phone]

## 2025-01-21 ENCOUNTER — APPOINTMENT (OUTPATIENT)
Dept: NEPHROLOGY | Facility: CLINIC | Age: 89
End: 2025-01-21

## 2025-02-25 NOTE — PHYSICAL THERAPY INITIAL EVALUATION ADULT - SITTING BALANCE: DYNAMIC
Verified patient with two types of identifiers.  Called patient to follow up on phone call last week.  Provided information for appointment with Ally FLETCHER on 3/25; location and time confirmed.  Patient confirms that she is taking her candesartan.  She states that since we last spoke, she is taking her clonidine as needed.  Today her SBP was 117.  She states that she does not need a refill of candesartan at this time; will refuse refill request.  Asked patient to call the office if she has issues with elevated BP prior to her upcoming appointment with Ally FLETCHER.  Patient verbalized understanding and will call with any other questions.        Future Appointments   Date Time Provider Department Center   3/25/2025  9:40 AM Ally Duran APRN - NP [facility]   4/16/2025 10:20 AM Destiny Andre MD [facility]   10/17/2025  3:00 PM Ally Duran APRN - NP [facility]        normal balance

## 2025-06-10 ENCOUNTER — INPATIENT (INPATIENT)
Facility: HOSPITAL | Age: 89
LOS: 5 days | Discharge: NURSING, REHAB OR LONG-TERM CARE | End: 2025-06-16
Attending: STUDENT IN AN ORGANIZED HEALTH CARE EDUCATION/TRAINING PROGRAM | Admitting: STUDENT IN AN ORGANIZED HEALTH CARE EDUCATION/TRAINING PROGRAM
Payer: MEDICARE

## 2025-06-10 VITALS
TEMPERATURE: 97 F | WEIGHT: 145.06 LBS | SYSTOLIC BLOOD PRESSURE: 162 MMHG | HEART RATE: 76 BPM | DIASTOLIC BLOOD PRESSURE: 78 MMHG | HEIGHT: 66 IN | OXYGEN SATURATION: 95 % | RESPIRATION RATE: 18 BRPM

## 2025-06-10 DIAGNOSIS — Z98.890 OTHER SPECIFIED POSTPROCEDURAL STATES: Chronic | ICD-10-CM

## 2025-06-10 DIAGNOSIS — I50.33 ACUTE ON CHRONIC DIASTOLIC (CONGESTIVE) HEART FAILURE: ICD-10-CM

## 2025-06-10 DIAGNOSIS — Z98.89 OTHER SPECIFIED POSTPROCEDURAL STATES: Chronic | ICD-10-CM

## 2025-06-10 DIAGNOSIS — I25.10 ATHEROSCLEROTIC HEART DISEASE OF NATIVE CORONARY ARTERY WITHOUT ANGINA PECTORIS: ICD-10-CM

## 2025-06-10 DIAGNOSIS — H26.9 UNSPECIFIED CATARACT: Chronic | ICD-10-CM

## 2025-06-10 LAB
ADD ON TEST-SPECIMEN IN LAB: SIGNIFICANT CHANGE UP
ALBUMIN SERPL ELPH-MCNC: 4 G/DL — SIGNIFICANT CHANGE UP (ref 3.3–5)
ALP SERPL-CCNC: 69 U/L — SIGNIFICANT CHANGE UP (ref 40–120)
ALT FLD-CCNC: 18 U/L — SIGNIFICANT CHANGE UP (ref 10–45)
ANION GAP SERPL CALC-SCNC: 13 MMOL/L — SIGNIFICANT CHANGE UP (ref 5–17)
APTT BLD: 28.6 SEC — SIGNIFICANT CHANGE UP (ref 26.1–36.8)
AST SERPL-CCNC: 16 U/L — SIGNIFICANT CHANGE UP (ref 10–40)
BASOPHILS # BLD AUTO: 0.01 K/UL — SIGNIFICANT CHANGE UP (ref 0–0.2)
BASOPHILS NFR BLD AUTO: 0.2 % — SIGNIFICANT CHANGE UP (ref 0–2)
BILIRUB SERPL-MCNC: 0.5 MG/DL — SIGNIFICANT CHANGE UP (ref 0.2–1.2)
BUN SERPL-MCNC: 43 MG/DL — HIGH (ref 7–23)
CALCIUM SERPL-MCNC: 8.8 MG/DL — SIGNIFICANT CHANGE UP (ref 8.4–10.5)
CHLORIDE SERPL-SCNC: 103 MMOL/L — SIGNIFICANT CHANGE UP (ref 96–108)
CO2 SERPL-SCNC: 18 MMOL/L — LOW (ref 22–31)
CREAT SERPL-MCNC: 2.14 MG/DL — HIGH (ref 0.5–1.3)
EGFR: 28 ML/MIN/1.73M2 — LOW
EGFR: 28 ML/MIN/1.73M2 — LOW
EOSINOPHIL # BLD AUTO: 0.07 K/UL — SIGNIFICANT CHANGE UP (ref 0–0.5)
EOSINOPHIL NFR BLD AUTO: 1.2 % — SIGNIFICANT CHANGE UP (ref 0–6)
GLUCOSE SERPL-MCNC: 143 MG/DL — HIGH (ref 70–99)
HCT VFR BLD CALC: 32.3 % — LOW (ref 39–50)
HGB BLD-MCNC: 11.1 G/DL — LOW (ref 13–17)
IMM GRANULOCYTES NFR BLD AUTO: 0.5 % — SIGNIFICANT CHANGE UP (ref 0–0.9)
INR BLD: 0.99 — SIGNIFICANT CHANGE UP (ref 0.85–1.16)
LYMPHOCYTES # BLD AUTO: 0.81 K/UL — LOW (ref 1–3.3)
LYMPHOCYTES # BLD AUTO: 14 % — SIGNIFICANT CHANGE UP (ref 13–44)
MCHC RBC-ENTMCNC: 29.3 PG — SIGNIFICANT CHANGE UP (ref 27–34)
MCHC RBC-ENTMCNC: 34.4 G/DL — SIGNIFICANT CHANGE UP (ref 32–36)
MCV RBC AUTO: 85.2 FL — SIGNIFICANT CHANGE UP (ref 80–100)
MONOCYTES # BLD AUTO: 0.33 K/UL — SIGNIFICANT CHANGE UP (ref 0–0.9)
MONOCYTES NFR BLD AUTO: 5.7 % — SIGNIFICANT CHANGE UP (ref 2–14)
NEUTROPHILS # BLD AUTO: 4.53 K/UL — SIGNIFICANT CHANGE UP (ref 1.8–7.4)
NEUTROPHILS NFR BLD AUTO: 78.4 % — HIGH (ref 43–77)
NRBC BLD AUTO-RTO: 0 /100 WBCS — SIGNIFICANT CHANGE UP (ref 0–0)
PLATELET # BLD AUTO: 157 K/UL — SIGNIFICANT CHANGE UP (ref 150–400)
POTASSIUM SERPL-MCNC: 4.7 MMOL/L — SIGNIFICANT CHANGE UP (ref 3.5–5.3)
POTASSIUM SERPL-SCNC: 4.7 MMOL/L — SIGNIFICANT CHANGE UP (ref 3.5–5.3)
PROT SERPL-MCNC: 7.2 G/DL — SIGNIFICANT CHANGE UP (ref 6–8.3)
PROTHROM AB SERPL-ACNC: 11.4 SEC — SIGNIFICANT CHANGE UP (ref 9.9–13.4)
RBC # BLD: 3.79 M/UL — LOW (ref 4.2–5.8)
RBC # FLD: 14.1 % — SIGNIFICANT CHANGE UP (ref 10.3–14.5)
SODIUM SERPL-SCNC: 134 MMOL/L — LOW (ref 135–145)
TROPONIN T, HIGH SENSITIVITY RESULT: 47 NG/L — SIGNIFICANT CHANGE UP (ref 0–51)
TROPONIN T, HIGH SENSITIVITY RESULT: 48 NG/L — SIGNIFICANT CHANGE UP (ref 0–51)
WBC # BLD: 5.78 K/UL — SIGNIFICANT CHANGE UP (ref 3.8–10.5)
WBC # FLD AUTO: 5.78 K/UL — SIGNIFICANT CHANGE UP (ref 3.8–10.5)

## 2025-06-10 PROCEDURE — 0042T: CPT

## 2025-06-10 PROCEDURE — 70450 CT HEAD/BRAIN W/O DYE: CPT | Mod: 26,XU

## 2025-06-10 PROCEDURE — 70496 CT ANGIOGRAPHY HEAD: CPT | Mod: 26

## 2025-06-10 PROCEDURE — 71045 X-RAY EXAM CHEST 1 VIEW: CPT | Mod: 26

## 2025-06-10 PROCEDURE — 93010 ELECTROCARDIOGRAM REPORT: CPT

## 2025-06-10 PROCEDURE — 70498 CT ANGIOGRAPHY NECK: CPT | Mod: 26

## 2025-06-10 PROCEDURE — 99291 CRITICAL CARE FIRST HOUR: CPT

## 2025-06-10 RX ORDER — ATORVASTATIN CALCIUM 80 MG/1
80 TABLET, FILM COATED ORAL ONCE
Refills: 0 | Status: COMPLETED | OUTPATIENT
Start: 2025-06-10 | End: 2025-06-10

## 2025-06-10 RX ORDER — DIPHENHYDRAMINE HCL 12.5MG/5ML
25 ELIXIR ORAL ONCE
Refills: 0 | Status: COMPLETED | OUTPATIENT
Start: 2025-06-10 | End: 2025-06-10

## 2025-06-10 RX ORDER — MECLIZINE HCL 12.5 MG
25 TABLET ORAL ONCE
Refills: 0 | Status: COMPLETED | OUTPATIENT
Start: 2025-06-10 | End: 2025-06-10

## 2025-06-10 RX ORDER — FUROSEMIDE 10 MG/ML
40 INJECTION INTRAMUSCULAR; INTRAVENOUS ONCE
Refills: 0 | Status: COMPLETED | OUTPATIENT
Start: 2025-06-10 | End: 2025-06-10

## 2025-06-10 RX ADMIN — FUROSEMIDE 40 MILLIGRAM(S): 10 INJECTION INTRAMUSCULAR; INTRAVENOUS at 22:28

## 2025-06-10 RX ADMIN — Medication 25 MILLIGRAM(S): at 19:51

## 2025-06-10 RX ADMIN — Medication 25 MILLIGRAM(S): at 22:01

## 2025-06-10 RX ADMIN — ATORVASTATIN CALCIUM 80 MILLIGRAM(S): 80 TABLET, FILM COATED ORAL at 22:01

## 2025-06-10 NOTE — ED ADULT NURSE NOTE - CHIEF COMPLAINT QUOTE
Pt reports dizziness x 3 hours that resolved and started again 30min ago. Pt has unsteady gait. Mild right arm drift noted. Denies chest pain, sob, blurred vision, unilateral weakness, numbness/tingling. Stroke code activated. Pt Hebrew speaking and son translating. SO=392xe/dL.

## 2025-06-10 NOTE — H&P ADULT - HISTORY OF PRESENT ILLNESS
89 y/o M former smoker w/ PMH of HFpEF (unknown by pt and family but noted on TTE in 2023 to have grade 3 diastolic dysfunction, EF 58%), CAD s/p MIDCAB in 2014 on Plavix, HTN, HLD, pre-DM, CKD, prostate Ca s/p TURP, depression and chronic hypoNa, p/w complaints of intermittent dizziness described as "room spinning" for several hours. He reports worsening of dizziness with lying flat or turning to the right and improved when upright. Stroke code was called in the ED although symptoms had resolved fully on arrival, imaging was ordered and findings of focal moderate-severe stenosis at origin of dominant left vertebral artery.   Pt then complained of SOB. Pt walks w/ cane at baseline, lives in a fourth floor walkup, states he has trouble getting up the stairs and has to stop frequently due to shortness of breath.     In the ED:  Initial vital signs: T: 97.3F, HR: 76, BP: 162/78, R: 18, SpO2: 95% on RA  Labs: significant for Hgb/Ht 11.1/32.3, Na 134, HCO3 18, BUN 43, Cr 2.14, GFR 28, pBNP [id number]  Imaging:  CXR: B/L pulmonary edema and pleural effusions  CT HEAD: No acute intracranial hemorrhage, mass effect or demarcated infarct comparing to 1/2020.  CT PERFUSION: No deficits reported.  CTA INTRACRANIAL: No arterial steno-occlusive disease or evidence aneurysm.  CTA EXTRACRANIAL: Focal moderate-severe stenosis at origin of dominant left vertebral  artery  EKG: NSR w/ PVC and low voltage @ HR 61, QTc 440  Medications: meclizine 25mg, furosemide 40mg IV, diphenhydramine 25mg, atorvastatin 80mg  Consults: Stroke 89 y/o M former smoker w/ PMH of HFpEF (unknown by pt and family but noted on TTE in 2023 to have grade 3 diastolic dysfunction, EF 58%), CAD s/p PCI and MIDCAB 6/5/2014 on Plavix, HTN, HLD, pre-DM, CKD, prostate Ca s/p TURP, depression and chronic hypoNa, p/w complaints of intermittent dizziness described as "room spinning" for several hours. He reports worsening of dizziness with lying flat or turning to the right and improved when upright. Stroke code was called in the ED although symptoms had resolved fully on arrival, imaging was ordered and findings of focal moderate-severe stenosis at origin of dominant left vertebral artery.   Pt then complained of SOB. Pt walks w/ cane at baseline, lives in a fourth floor walkup, states he has trouble getting up the stairs and has to stop frequently due to shortness of breath.     In the ED:  Initial vital signs: T: 97.3F, HR: 76, BP: 162/78, R: 18, SpO2: 95% on RA  Labs: significant for Hgb/Ht 11.1/32.3, Na 134, HCO3 18, BUN 43, Cr 2.14, GFR 28, pBNP [id number]  Imaging:  CXR: B/L pulmonary edema and pleural effusions  CT HEAD: No acute intracranial hemorrhage, mass effect or demarcated infarct comparing to 1/2020.  CT PERFUSION: No deficits reported.  CTA INTRACRANIAL: No arterial steno-occlusive disease or evidence aneurysm.  CTA EXTRACRANIAL: Focal moderate-severe stenosis at origin of dominant left vertebral  artery  EKG: NSR w/ PVC and low voltage @ HR 61, QTc 440  Medications: meclizine 25mg, furosemide 40mg IV, diphenhydramine 25mg, atorvastatin 80mg  Consults: Stroke 89 y/o M former smoker w/ PMH of grade III diastolic dysfunction (pt and family deny dx of HF in past; TTE 11/2023 shows 3 diastolic dysfunction, EF 58%), CAD s/p PCI and MIDCAB 6/5/2014 on Plavix, HTN, HLD, pre-DM, CKD, prostate Ca s/p TURP, depression and chronic hypoNa, p/w complaints of intermittent dizziness described as "room spinning" for several hours. He reports worsening of dizziness with lying flat or turning to the right and improved when upright. Stroke code was called in the ED although symptoms had resolved fully on arrival, imaging was ordered and findings of focal moderate-severe stenosis at origin of dominant left vertebral artery.   Pt then complained of SOB, son at bedside reports noticing today. Pt walks w/ cane at baseline, lives in a fourth floor walkup, states he has trouble getting up the stairs and has to stop frequently due to shortness of breath.      In the ED:  Initial vital signs: T: 97.3F, HR: 76, BP: 162/78, R: 18, SpO2: 95% on RA  Labs: significant for Hgb/Ht 11.1/32.3, Na 134, HCO3 18, BUN 43, Cr 2.14, GFR 28, pBNP [id number]  Imaging:  CXR: B/L pulmonary edema and pleural effusions  CT HEAD: No acute intracranial hemorrhage, mass effect or demarcated infarct comparing to 1/2020.  CT PERFUSION: No deficits reported.  CTA INTRACRANIAL: No arterial steno-occlusive disease or evidence aneurysm.  CTA EXTRACRANIAL: Focal moderate-severe stenosis at origin of dominant left vertebral  artery  EKG: NSR w/ PVC and low voltage @ HR 61, QTc 440  Medications: meclizine 25mg, furosemide 40mg IV, diphenhydramine 25mg, atorvastatin 80mg  Consults: Stroke 89 y/o M former smoker w/ PMH of grade III diastolic dysfunction (pt and family deny dx of HF in past; TTE 11/2023 shows 3 diastolic dysfunction, EF 58%), CAD s/p PCI and MIDCAB 6/5/2014 on Plavix, ascending AA HTN, HLD, pre-DM, CKD, prostate Ca s/p TURP, depression and chronic hypoNa, p/w complaints of intermittent dizziness described as "room spinning" for several hours. He reports worsening of dizziness with lying flat or turning to the right and improved when upright. Stroke code was called in the ED although symptoms had resolved fully on arrival, imaging was ordered and findings of focal moderate-severe stenosis at origin of dominant left vertebral artery.   Pt then complained of SOB, son at bedside reports noticing today. Pt walks w/ cane at baseline, lives in a fourth floor walkup, states he has trouble getting up the stairs and has to stop frequently due to shortness of breath.      In the ED:  Initial vital signs: T: 97.3F, HR: 76, BP: 162/78, R: 18, SpO2: 95% on RA  Labs: significant for Hgb/Ht 11.1/32.3, Na 134, HCO3 18, BUN 43, Cr 2.14, GFR 28, pBNP [id number]  Imaging:  CXR: B/L pulmonary edema and pleural effusions  CT HEAD: No acute intracranial hemorrhage, mass effect or demarcated infarct comparing to 1/2020.  CT PERFUSION: No deficits reported.  CTA INTRACRANIAL: No arterial steno-occlusive disease or evidence aneurysm.  CTA EXTRACRANIAL: Focal moderate-severe stenosis at origin of dominant left vertebral  artery  EKG: NSR w/ PVC and low voltage @ HR 61, QTc 440  Medications: meclizine 25mg, furosemide 40mg IV, diphenhydramine 25mg, atorvastatin 80mg  Consults: Stroke 89 y/o M former smoker w/ PMH of grade III diastolic dysfunction (pt and family deny dx of HF in past; TTE 11/2023 shows 3 diastolic dysfunction, EF 58%), CAD s/p PCI and MIDCAB 6/5/2014 on Plavix, ascending AA HTN, HLD, pre-DM, CKD, prostate Ca s/p TURP, depression and chronic hypoNa, p/w complaints of intermittent dizziness described as "room spinning" for several hours. He reports worsening of dizziness with lying flat or turning to the right and improved when upright. Stroke code was called in the ED although symptoms had resolved fully on arrival, imaging was ordered and findings of focal moderate-severe stenosis at origin of dominant left vertebral artery. Stroke recommending continuation of Plavix and statin.  Pt then complained of SOB, son at bedside reports noticing today. Pt walks w/ cane at baseline, lives in a fourth floor walkup, states he has trouble getting up the stairs and has to stop frequently due to shortness of breath so he is more homebound in the last few months. On interview in the unit, it is difficult to illicit the nature of the patients symptoms however as described, he has been havening progressively worsening ALEXANDER but denies orthopnea, CP, syncope or edema.     In the ED:  Initial vital signs: T: 97.3F, HR: 76, BP: 162/78, R: 18, SpO2: 95% on RA  Labs: significant for Hgb/Ht 11.1/32.3, Na 134, HCO3 18, BUN 43, Cr 2.14, GFR 28, pBNP [id number]  Imaging:  CXR: B/L pulmonary edema and pleural effusions  CT HEAD: No acute intracranial hemorrhage, mass effect or demarcated infarct comparing to 1/2020.  CT PERFUSION: No deficits reported.  CTA INTRACRANIAL: No arterial steno-occlusive disease or evidence aneurysm.  CTA EXTRACRANIAL: Focal moderate-severe stenosis at origin of dominant left vertebral  artery  EKG: NSR w/ PVC and low voltage @ HR 61, QTc 440  Medications: meclizine 25mg, furosemide 40mg IV, diphenhydramine 25mg, atorvastatin 80mg  Consults: Stroke

## 2025-06-10 NOTE — ED ADULT NURSE REASSESSMENT NOTE - GENERAL PATIENT STATE
son, wife/family/SO at bedside
comfortable appearance/cooperative/family/SO at bedside/resting/sleeping/smiling/interactive

## 2025-06-10 NOTE — ED ADULT NURSE NOTE - NSFALLHARMRISKINTERV_ED_ALL_ED
Assistance OOB with selected safe patient handling equipment if applicable/Assistance with ambulation/Communicate risk of Fall with Harm to all staff, patient, and family/Encourage patient to sit up slowly, dangle for a short time, stand at bedside before walking/Monitor gait and stability/Move patient closer to nursing station/within visual sight of ED staff/Orthostatic vital signs/Provide patient with walking aids/Provide visual cue: red socks, yellow wristband, yellow gown, etc/Reinforce activity limits and safety measures with patient and family/Bed in lowest position, wheels locked, appropriate side rails in place/Call bell, personal items and telephone in reach/Instruct patient to call for assistance before getting out of bed/chair/stretcher/Non-slip footwear applied when patient is off stretcher/[place] to call system/Physically safe environment - no spills, clutter or unnecessary equipment/Purposeful Proactive Rounding/Room/bathroom lighting operational, light cord in reach

## 2025-06-10 NOTE — H&P ADULT - PROBLEM SELECTOR PLAN 3
Baseline appears to be 1.7, on admission noted to be 2.17. Likely pre-renal iso vascular congestion due to HF exacerbation.     Plan:  - Obtain urine studies  - Cautious with nephrotoxic agents  - Trend BMP

## 2025-06-10 NOTE — H&P ADULT - PROBLEM SELECTOR PLAN 9
F: none  E: replete K<4, Mg<2  N: DASH/TLC  D: Lovenox    Dispo: Cards Tele  Code: FULL s/p TURP  Home med: tamsulosin 0.4mg PO    Plan:  - Cont home med

## 2025-06-10 NOTE — H&P ADULT - PROBLEM SELECTOR PLAN 5
BP elevated on admission, no noted antihypertensives in surescripts    Plan:  - Start amlodipine 5mg PO qd  - Monitor BP TTE 11/29/23: Grade III left ventricular diastolic dysfunction, LVEF 58%. Mild-to-moderate aortic regurgitation.  Mild-to-moderate aortic stenosis. The proximal ascending aorta is dilated measuring 4.20 cm.  Pt follows with Dr. Keyona Sinha o/p    Plan:  - Cont Toprol XL 50mg PO qd  - F/U TTE

## 2025-06-10 NOTE — H&P ADULT - NSHPSOCIALHISTORY_GEN_ALL_CORE
Former smoker and AUD; quit roughly 40-45 years ago  Pt ambulates with cane, lives alone in 4 story walk up  Denies recent falls

## 2025-06-10 NOTE — ED ADULT TRIAGE NOTE - NS ED TRIAGE CLINICAL UPGRADE
Deteriorating patient status - Patient was clinically upgraded due to deteriorating patient status.
Walking

## 2025-06-10 NOTE — H&P ADULT - PROBLEM SELECTOR PLAN 6
Appears to be at baseline, no signs of active bleeding.    Plan:  - Obtain iron studies  - Maintain active T&S  - Transfusion goal < 7 BP elevated on admission, no noted antihypertensives in surescripts    Plan:  - Start amlodipine 5mg PO qd  - Monitor BP BP elevated on admission, no noted antihypertensives in surescripts    Plan:  - Monitor BP

## 2025-06-10 NOTE — ED ADULT NURSE NOTE - OBJECTIVE STATEMENT
Pt brought to ED by son c/o severe dizziness with unsteady gait x 3 hours. Stroke code activated.  Pt A&Ox3, in NAD, breathing unlabored, comfortably on RA, speaking clear sentences in preferred language, Frisian.  Denies fever, SOB, headache, chest pain at this time.  Initial NIHSS score 0

## 2025-06-10 NOTE — ED PROVIDER NOTE - PHYSICAL EXAMINATION
CONST: nontoxic NAD speaking in full sentences  HEAD: atraumatic  EYES: conjunctivae clear  NECK: supple  CARD: regular rate  CHEST: mild tachypnea, decreased BS b/l bases, no stridor/retractions/tripoding  EXT: no edema/asymmetry  SKIN: warm, dry  NEURO: a+ox3, no facial asymmetry, no aphasia, PERRL, EOMI, no neglect, CN II-XII intact, ftn wnl, neg pronator, 5/5 strength x4, gross sensation intact x4, normal gait CONST: nontoxic NAD speaking in full sentences  HEAD: atraumatic  EYES: conjunctivae clear, EOMI, mild infraorbital edema b/l  NECK: supple  CARD: regular rate  CHEST: mild tachypnea, decreased BS b/l bases, no stridor/retractions/tripoding  EXT: no edema/asymmetry  SKIN: warm, dry  NEURO: a+ox3, no facial asymmetry, no aphasia, PERRL, EOMI, no neglect, CN II-XII intact, ftn wnl, neg pronator, 5/5 strength x4, gross sensation intact x4, normal gait

## 2025-06-10 NOTE — ED PROVIDER NOTE - CARE PLAN
Principal Discharge DX:	Bilateral pleural effusion  Secondary Diagnosis:	Shortness of breath  Secondary Diagnosis:	Dizziness   1

## 2025-06-10 NOTE — H&P ADULT - ASSESSMENT
89 y/o M former smoker w/ PMH of HFpEF (unknown by pt and family but noted on TTE in 2023 to have grade 3 diastolic dysfunction, EF 58%), CAD s/p MIDCAB in 2014 on Plavix, HTN, HLD, pre-DM, CKD, prostate Ca s/p TURP, depression and chronic hypoNa, p/w complaints of intermittent dizziness described as "room spinning" for several hours and progressively worsening ALEXANDER and SOB for several weeks. 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 Admitted to [facility] for further diuresis.   89 y/o M former smoker w/ PMH of HFpEF (unknown by pt and family but noted on TTE in 2023 to have grade 3 diastolic dysfunction, EF 58%), CAD s/p PCI and MIDCAB 6/5/2014 on Plavix, HTN, HLD, pre-DM, CKD, prostate Ca s/p TURP, depression and chronic hypoNa, p/w complaints of intermittent dizziness described as "room spinning" for several hours and progressively worsening ALEXANDER and SOB for several weeks. 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 Admitted to [facility] for further diuresis.   89 y/o M former smoker w/ PMH of PMH of grade III diastolic dysfunction (pt and family deny dx of HF in past; TTE 11/2023 shows 3 diastolic dysfunction, EF 58%),, CAD s/p PCI and MIDCAB 6/5/2014 on Plavix, HTN, HLD, pre-DM, CKD, prostate Ca s/p TURP, depression and chronic hypoNa, p/w complaints of intermittent dizziness described as "room spinning" for several hours and progressively worsening ALEXANDER and SOB for several weeks. 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 Admitted to [facility] for further diuresis.

## 2025-06-10 NOTE — H&P ADULT - PROBLEM SELECTOR PLAN 1
TTE 11/29/23: Grade III left ventricular diastolic dysfunction, LVEF 58%. Mild-to-moderate aortic regurgitation.  Mild-to-moderate aortic stenosis. The proximal ascending aorta is dilated measuring 4.20 cm.  EKG: NSR w/ PVC and low voltage @ HR 61, QTc 440  CXR: b/l pleural effusions + pulmonary congestion  Trop 47 << 48; pBNP 79113; s,p lasix 40mg IVx1  Home meds: metoprolol succinate 50mg PO qd    Plan:  - Lasix 40mg IV in AM  - Goal net -2L  - Obtain TTE TTE 11/29/23: Grade III left ventricular diastolic dysfunction, LVEF 58%. Mild-to-moderate aortic regurgitation.  Mild-to-moderate aortic stenosis. The proximal ascending aorta is dilated measuring 4.20 cm.  EKG: NSR w/ PVC and low voltage @ HR 61, QTc 440  CXR: b/l pleural effusions + pulmonary congestion  Trop 47 << 48; pBNP 56292; s,p lasix 40mg IVx1  Home meds: metoprolol succinate 50mg PO qd (unclear why pt is prescribed)    Plan:  - Lasix 40mg IV in AM  - Cont toprol xl 50mg PO qd  - F/U lipids, A1c, TSH  - Goal net -2L in 24hrs  - Obtain TTE  - Obtain collateral from o/p cardiologist; Keyona Sinha MD   - Confirm med rec -- current based on Surescripts but son and pt unaware of what is taken and what for

## 2025-06-10 NOTE — ED ADULT TRIAGE NOTE - CHIEF COMPLAINT QUOTE
Pt reports dizziness x 3 hours that resolved and started again 30min ago. Pt has unsteady gait. Denies chest pain, sob, blurred vision, unilateral weakness, numbness/tingling. Stroke code activated. Pt Lao speaking and son translating. QY=257wj/dL. Pt reports dizziness x 3 hours that resolved and started again 30min ago. Pt has unsteady gait. Small right arm drift noted. Denies chest pain, sob, blurred vision, unilateral weakness, numbness/tingling. Stroke code activated. Pt Uzbek speaking and son translating. IG=479ls/dL. Pt reports dizziness x 3 hours that resolved and started again 30min ago. Pt has unsteady gait. Mild right arm drift noted. Denies chest pain, sob, blurred vision, unilateral weakness, numbness/tingling. Stroke code activated. Pt Maltese speaking and son translating. WO=757hx/dL.

## 2025-06-10 NOTE — H&P ADULT - NSHPPHYSICALEXAM_GEN_ALL_CORE
VITALS: T(C): 36.4 (06-10-25 @ 21:42), Max: 36.5 (06-10-25 @ 19:53)  T(F): 97.5 (06-10-25 @ 21:42), Max: 97.7 (06-10-25 @ 19:53)  HR: 72 (06-10-25 @ 21:42) (62 - 76)  BP: 161/72 (06-10-25 @ 21:42) (153/70 - 173/81)  ABP: --  ABP(mean): --  RR: 16 (06-10-25 @ 21:42) (16 - 18)  SpO2: 97% (06-10-25 @ 21:42) (95% - 100%)      GENERAL: NAD, lying in bed comfortably  HEAD:  Atraumatic, Normocephalic  EYES: EOMI, PERRLA, conjunctiva and sclera clear  ENT: Moist mucous membranes  NECK: Supple, No JVD  CHEST/LUNG: Clear to auscultation bilaterally; No rales, rhonchi, wheezing, or rubs. Unlabored respirations  HEART: Regular rate and rhythm; No murmurs, rubs, or gallops  ABDOMEN: Bowel sounds present; Soft, Nontender, Nondistended. No hepatomegally  EXTREMITIES:  2+ Peripheral Pulses, brisk capillary refill. No clubbing, cyanosis, or edema  NERVOUS SYSTEM:  Alert & Oriented X3, speech clear. No deficits   MSK: FROM all 4 extremities, full and equal strength  SKIN: No rashes or lesions VITALS: T(C): 36.4 (06-10-25 @ 21:42), Max: 36.5 (06-10-25 @ 19:53)  T(F): 97.5 (06-10-25 @ 21:42), Max: 97.7 (06-10-25 @ 19:53)  HR: 72 (06-10-25 @ 21:42) (62 - 76)  BP: 161/72 (06-10-25 @ 21:42) (153/70 - 173/81)  ABP: --  ABP(mean): --  RR: 16 (06-10-25 @ 21:42) (16 - 18)  SpO2: 97% (06-10-25 @ 21:42) (95% - 100%)      GENERAL: elederly gentleman in NAD  HEAD:  Atraumatic, Normocephalic  EYES: EOMI, PERRLA, conjunctiva and sclera clear  ENT: Moist mucous membranes  NECK: Supple, +JVD  CHEST/LUNG: Reduced air movement at bases; No rales, rhonchi, wheezing, or rubs. Unlabored respirations  HEART: systolic murmur noted at all auscultation points  ABDOMEN: Bowel sounds present; Soft, Nontender, Nondistended. No hepatomegally  EXTREMITIES:  2+ Peripheral Pulses, brisk capillary refill. No clubbing, cyanosis, or edema  NERVOUS SYSTEM:  Alert & Oriented X3, speech clear. No deficits   MSK: FROM all 4 extremities, full and equal strength  SKIN: No rashes or lesions

## 2025-06-10 NOTE — CONSULT NOTE ADULT - ASSESSMENT
89y Male with PMH    CT head showed:  CTA/CTP with  Initial NIHSS:      Further management  - recommend meclizine and symptomatic treatment  - will follow up final CTA reads and provide further recs  - provided stroke education about signs and symptoms      Case discussed with Dr. Inman 89y Male former smoker, Pashto speaking, with PMHx of CAD s/p 2 bypasses, HTN, HLD, pre-DM, prostate CA (on observation, follows at [facility]), hard of hearing, presenting with c/o dizziness with unclear LKW. Collateral obtained from patient's son. States patient called him at 2:15PM stating he had been feeling dizzy today, did not specify time. Had follow up call at 5:30PM with patient who stated dizziness had improved. Reported additional episode around 6:00PM that lasted around 5-10 minutes. Patient described dizziness as room-spinning, worse when turning head towards the right and looking to the right. Has had prior episodes of vertigo in the past, however not with this recurrence or severity. Patient states feeling unsteady. Walks with cane at baseline and with 1 person assist. Symptoms resolved at time of examination, now complaining of SOB. NIHSS 0 with steady gait, /78 on arrival. CTH negative.    CT head showed: no acute stroke  CTA/CTP pending final reads  Initial NIHSS: 0      Further management  - recommend meclizine and symptomatic treatment  - will follow up final CTA reads and provide further recs if negative  - provided stroke education about signs and symptoms      Case discussed with Dr. Inman

## 2025-06-10 NOTE — ED ADULT TRIAGE NOTE - LAST KNOWN WELL DATE/TIME
Impression: Other secondary cataract, bilateral: H26.493. Plan: Discussed diagnosis in detail with patient. 10-Alexander-2025 15:14

## 2025-06-10 NOTE — STROKE CODE NOTE - NIH STROKE SCALE: 2. BEST GAZE, QM
Sling and swathe applied to patients left arm after left shoulder reduction. X-ray in room to verify shoulder reduction successful.       Vela Pallas, RN  04/19/19 2006
7
(0) Normal

## 2025-06-10 NOTE — CONSULT NOTE ADULT - SUBJECTIVE AND OBJECTIVE BOX
**STROKE CODE CONSULT NOTE**    Last known well time/Time of onset of symptoms:    HPI: 89y Male with PMHx of     T(C): 36.3 (06-10-25 @ 18:14), Max: 36.3 (06-10-25 @ 18:14)  HR: 76 (06-10-25 @ 18:14) (76 - 76)  BP: 162/78 (06-10-25 @ 18:14) (162/78 - 162/78)  RR: 18 (06-10-25 @ 18:14) (18 - 18)  SpO2: 95% (06-10-25 @ 18:14) (95% - 95%)    PAST MEDICAL & SURGICAL HISTORY:  Type 2 diabetes mellitus  DM (diabetes mellitus), type 2      Malignant neoplasm of prostate  Prostate cancer      Acute myocardial infarction  Patient denies MI      Type 2 diabetes mellitus  Diabetes      Hyperlipidemia  Hyperlipidemia      Essential hypertension  Hypertension      Other postprocedural status  History of penile implant      Status post aorto-coronary artery bypass graft  S/P CABG (coronary artery bypass graft)      Bilateral cataracts      H/O transurethral destruction of bladder lesion      S/p bilateral blepharoplasty          FAMILY HISTORY:  FH: alcohol abuse  mother; father's hx is unknown        SOCIAL HISTORY:   Patient lives with *** at ***.   Smoking status:  Drinking:  Drug Use:     ROS: ***  Constitutional: No fever, weight loss or fatigue  Eyes: No eye pain, visual disturbances, or discharge  ENMT:  No difficulty hearing, tinnitus; No sinus or throat pain  Neck: No pain or stiffness  Respiratory: No cough, wheezing, chills or hemoptysis  Cardiovascular: No chest pain, palpitations, shortness of breath, or leg swelling  Gastrointestinal: No abdominal pain. No nausea, vomiting or hematemesis; No diarrhea or constipation. Nohematochezia.  Genitourinary: No dysuria, frequency, hematuria or incontinence  Neurological: As per HPI  Skin: No itching, burning, rashes or lesions   Endocrine: No heat or cold intolerance; No hair loss  Musculoskeletal: No joint pain or swelling; No muscle, back or extremity pain  Heme/Lymph: No easy bruising or bleeding gums    MEDICATIONS  (STANDING):    MEDICATIONS  (PRN):    Allergies    No Known Drug Allergies  Originally Entered as [Rash-General] reaction to [Other][Ragweed] (Rash)  ragweed and birch (Unknown)  Originally Entered as [Rash-General] reaction to [Other][Bush] (Rash)    Intolerances      Vital Signs Last 24 Hrs  T(C): 36.3 (10 Alexander 2025 18:14), Max: 36.3 (10 Alexander 2025 18:14)  T(F): 97.3 (10 Alexander 2025 18:14), Max: 97.3 (10 Alexander 2025 18:14)  HR: 76 (10 Alexander 2025 18:14) (76 - 76)  BP: 162/78 (10 Alexander 2025 18:14) (162/78 - 162/78)  BP(mean): --  RR: 18 (10 Alexander 2025 18:14) (18 - 18)  SpO2: 95% (10 Alexander 2025 18:14) (95% - 95%)    Parameters below as of 10 Alexander 2025 18:14  Patient On (Oxygen Delivery Method): room air        Physical exam:  Constitutional: No acute distress, conversant  Eyes: Anicteric sclerae, moist conjunctivae, see below for CNs  Neck: trachea midline      Neurologic:  -Mental status: Awake, alert, oriented to person, age, and month. Speech is fluent with intact naming, repetition, and comprehension, no dysarthria. Follows commands.   -Cranial nerves:   II: Visual fields are full to confrontation.  III, IV, VI: Extraocular movements are intact without nystagmus. Pupils equally round and reactive to light  V:  Facial sensation V1-V3 equal and intact   VII: Face is symmetric with normal eye closure and smile  VIII: baseline heard of hearing  XII: Tongue protrudes midline  Motor: Normal bulk and tone. No pronator drift. Strength bilateral upper extremity 4-/5, bilateral lower extremities 4-/5.  Sensation: Intact to light touch bilaterally. No neglect or extinction on double simultaneous testing.  Coordination: No dysmetria on finger-to-nose and heel-to-shin bilaterally  Gait: Narrow gait and steady    NIHSS: 0    Fingerstick Blood Glucose: CAPILLARY BLOOD GLUCOSE      POCT Blood Glucose.: 148 mg/dL (10 Alexander 2025 18:05)    LABS:                        11.1   5.78  )-----------( 157      ( 10 Alexander 2025 18:10 )             32.3                     RADIOLOGY & ADDITIONAL STUDIES:      -----------------------------------------------------------------------------------------------------------------  IV-thrombolytic (Y/N): No                          Reason thrombolytic not given: Low NIHSS - score 0 (symptoms resolved at time of presentation)    **STROKE CODE CONSULT NOTE**    Last known well time/Time of onset of symptoms: unclear LKW    HPI: 89y Male former smoker, Malaysian speaking, with PMHx of CAD s/p 2 bypasses, HTN, HLD, pre-DM, prostate CA (on observation, follows at [facility]), hard of hearing, presenting with c/o dizziness with unclear LKW. Collateral obtained from patient's son. States patient called him at 2:15PM stating he had been feeling dizzy today, did not specify time. Had follow up call at 5:30PM with patient who stated dizziness had improved. Reported additional episode aorund 6:00PM that lasted around 5-10 minutes. Patient described dizziness as room-spinning, worse when turning head towards the right and looking to the right. Has had prior episodes of vertigo in the past, however not with this recurrence or severity. Patient states feeling unsteady. Walks with cane at baseline and with 1 person assist. Symptoms resolved at time of examination, now complaining of SOB.     T(C): 36.3 (06-10-25 @ 18:14), Max: 36.3 (06-10-25 @ 18:14)  HR: 76 (06-10-25 @ 18:14) (76 - 76)  BP: 162/78 (06-10-25 @ 18:14) (162/78 - 162/78)  RR: 18 (06-10-25 @ 18:14) (18 - 18)  SpO2: 95% (06-10-25 @ 18:14) (95% - 95%)    PAST MEDICAL & SURGICAL HISTORY:  Type 2 diabetes mellitus  DM (diabetes mellitus), type 2      Malignant neoplasm of prostate  Prostate cancer      Acute myocardial infarction  Patient denies MI      Type 2 diabetes mellitus  Diabetes      Hyperlipidemia  Hyperlipidemia      Essential hypertension  Hypertension      Other postprocedural status  History of penile implant      Status post aorto-coronary artery bypass graft  S/P CABG (coronary artery bypass graft)      Bilateral cataracts      H/O transurethral destruction of bladder lesion      S/p bilateral blepharoplasty          FAMILY HISTORY:  FH: alcohol abuse  mother; father's hx is unknown        SOCIAL HISTORY:   Patient lives with *** at ***.   Smoking status:  Drinking:  Drug Use:     ROS: ***  Constitutional: No fever, weight loss or fatigue  Eyes: No eye pain, visual disturbances, or discharge  ENMT:  No difficulty hearing, tinnitus; No sinus or throat pain  Neck: No pain or stiffness  Respiratory: No cough, wheezing, chills or hemoptysis  Cardiovascular: No chest pain, palpitations, shortness of breath, or leg swelling  Gastrointestinal: No abdominal pain. No nausea, vomiting or hematemesis; No diarrhea or constipation. Nohematochezia.  Genitourinary: No dysuria, frequency, hematuria or incontinence  Neurological: As per HPI  Skin: No itching, burning, rashes or lesions   Endocrine: No heat or cold intolerance; No hair loss  Musculoskeletal: No joint pain or swelling; No muscle, back or extremity pain  Heme/Lymph: No easy bruising or bleeding gums    MEDICATIONS  (STANDING):    MEDICATIONS  (PRN):    Allergies    No Known Drug Allergies  Originally Entered as [Rash-General] reaction to [Other][Ragweed] (Rash)  ragweed and birch (Unknown)  Originally Entered as [Rash-General] reaction to [Other][Bush] (Rash)    Intolerances      Vital Signs Last 24 Hrs  T(C): 36.3 (10 Alexander 2025 18:14), Max: 36.3 (10 Alexander 2025 18:14)  T(F): 97.3 (10 Alexander 2025 18:14), Max: 97.3 (10 Alexander 2025 18:14)  HR: 76 (10 Alexander 2025 18:14) (76 - 76)  BP: 162/78 (10 Alexander 2025 18:14) (162/78 - 162/78)  BP(mean): --  RR: 18 (10 Alexander 2025 18:14) (18 - 18)  SpO2: 95% (10 Alexander 2025 18:14) (95% - 95%)    Parameters below as of 10 Alexander 2025 18:14  Patient On (Oxygen Delivery Method): room air        Physical exam:  Constitutional: No acute distress, conversant  Eyes: Anicteric sclerae, moist conjunctivae, see below for CNs  Neck: trachea midline      Neurologic:  -Mental status: Awake, alert, oriented to person, age, and month. Speech is fluent with intact naming, repetition, and comprehension, no dysarthria. Follows commands.   -Cranial nerves:   II: Visual fields are full to confrontation.  III, IV, VI: Extraocular movements are intact without nystagmus. Pupils equally round and reactive to light  V:  Facial sensation V1-V3 equal and intact   VII: Face is symmetric with normal eye closure and smile  VIII: baseline heard of hearing  XII: Tongue protrudes midline  Motor: Normal bulk and tone. No pronator drift. Strength bilateral upper extremity 4-/5, bilateral lower extremities 4-/5.  Sensation: Intact to light touch bilaterally. No neglect or extinction on double simultaneous testing.  Coordination: No dysmetria on finger-to-nose and heel-to-shin bilaterally  Gait: Narrow gait and steady    NIHSS: 0    Fingerstick Blood Glucose: CAPILLARY BLOOD GLUCOSE      POCT Blood Glucose.: 148 mg/dL (10 Alexander 2025 18:05)    LABS:                        11.1   5.78  )-----------( 157      ( 10 Alexander 2025 18:10 )             32.3                     RADIOLOGY & ADDITIONAL STUDIES:      -----------------------------------------------------------------------------------------------------------------  IV-thrombolytic (Y/N): No                          Reason thrombolytic not given: Low NIHSS - score 0 (symptoms resolved at time of presentation)    **STROKE CODE CONSULT NOTE**    Last known well time/Time of onset of symptoms: unclear LKW    HPI: 89y Male former smoker, Armenian speaking, with PMHx of CAD s/p 2 bypasses, HTN, HLD, pre-DM, prostate CA (on observation, follows at [facility]), hard of hearing, presenting with c/o dizziness with unclear LKW. Collateral obtained from patient's son. States patient called him at 2:15PM stating he had been feeling dizzy today, did not specify time. Had follow up call at 5:30PM with patient who stated dizziness had improved. Reported additional episode around 6:00PM that lasted around 5-10 minutes. Patient described dizziness as room-spinning, worse when turning head towards the right and looking to the right. Has had prior episodes of vertigo in the past, however not with this recurrence or severity. Patient states feeling unsteady. Walks with cane at baseline and with 1 person assist. Symptoms resolved at time of examination, now complaining of SOB. NIHSS 0 with steady gait, /78 on arrival. CTH negative.    T(C): 36.3 (06-10-25 @ 18:14), Max: 36.3 (06-10-25 @ 18:14)  HR: 76 (06-10-25 @ 18:14) (76 - 76)  BP: 162/78 (06-10-25 @ 18:14) (162/78 - 162/78)  RR: 18 (06-10-25 @ 18:14) (18 - 18)  SpO2: 95% (06-10-25 @ 18:14) (95% - 95%)    PAST MEDICAL & SURGICAL HISTORY:  Type 2 diabetes mellitus  DM (diabetes mellitus), type 2      Malignant neoplasm of prostate  Prostate cancer      Acute myocardial infarction  Patient denies MI      Type 2 diabetes mellitus  Diabetes      Hyperlipidemia  Hyperlipidemia      Essential hypertension  Hypertension      Other postprocedural status  History of penile implant      Status post aorto-coronary artery bypass graft  S/P CABG (coronary artery bypass graft)      Bilateral cataracts      H/O transurethral destruction of bladder lesion      S/p bilateral blepharoplasty          FAMILY HISTORY:  FH: alcohol abuse  mother; father's hx is unknown        SOCIAL HISTORY:   Smoking status: Former smoker  Drinking: denies  Drug Use: denies    ROS:   Constitutional: No fever, weight loss or fatigue  Eyes: No eye pain, visual disturbances, or discharge  ENMT:  No difficulty hearing, tinnitus; No sinus or throat pain  Neck: No pain or stiffness  Respiratory: + SOB, No cough, wheezing, chills or hemoptysis  Cardiovascular: No chest pain, palpitations, shortness of breath, or leg swelling  Gastrointestinal: No abdominal pain. No nausea, vomiting or hematemesis; No diarrhea or constipation. Nohematochezia.  Genitourinary: No dysuria, frequency, hematuria or incontinence  Neurological: As per HPI  Skin: No itching, burning, rashes or lesions   Endocrine: No heat or cold intolerance; No hair loss  Musculoskeletal: No joint pain or swelling; No muscle, back or extremity pain  Heme/Lymph: No easy bruising or bleeding gums    MEDICATIONS  (STANDING):    MEDICATIONS  (PRN):    Allergies    No Known Drug Allergies  Originally Entered as [Rash-General] reaction to [Other][Ragweed] (Rash)  ragweed and birch (Unknown)  Originally Entered as [Rash-General] reaction to [Other][Bush] (Rash)    Intolerances      Vital Signs Last 24 Hrs  T(C): 36.3 (10 Alexander 2025 18:14), Max: 36.3 (10 Alexander 2025 18:14)  T(F): 97.3 (10 Alexander 2025 18:14), Max: 97.3 (10 Alexander 2025 18:14)  HR: 76 (10 Alexander 2025 18:14) (76 - 76)  BP: 162/78 (10 Alexander 2025 18:14) (162/78 - 162/78)  BP(mean): --  RR: 18 (10 Alexander 2025 18:14) (18 - 18)  SpO2: 95% (10 Alexander 2025 18:14) (95% - 95%)    Parameters below as of 10 Alexander 2025 18:14  Patient On (Oxygen Delivery Method): room air        Physical exam:  Constitutional: No acute distress, conversant  Eyes: Anicteric sclerae, moist conjunctivae, see below for CNs  Neck: trachea midline      Neurologic:  -Mental status: Awake, alert, oriented to person, age, and month. Speech is fluent with intact naming, repetition, and comprehension, no dysarthria. Follows commands.   -Cranial nerves:   II: Visual fields are full to confrontation.  III, IV, VI: Extraocular movements are intact without nystagmus. Pupils equally round and reactive to light  V:  Facial sensation V1-V3 equal and intact   VII: Face is symmetric with normal eye closure and smile  VIII: baseline heard of hearing  XII: Tongue protrudes midline  Motor: Normal bulk and tone. No pronator drift. Strength bilateral upper extremity 4-/5, bilateral lower extremities 4-/5.  Sensation: Intact to light touch bilaterally. No neglect or extinction on double simultaneous testing.  Coordination: No dysmetria on finger-to-nose and heel-to-shin bilaterally  Gait: Narrow gait and steady    NIHSS: 0    Fingerstick Blood Glucose: CAPILLARY BLOOD GLUCOSE      POCT Blood Glucose.: 148 mg/dL (10 Alexander 2025 18:05)    LABS:                        11.1   5.78  )-----------( 157      ( 10 Alexander 2025 18:10 )             32.3                     RADIOLOGY & ADDITIONAL STUDIES:    CT HEAD:  No acute intracranial hemorrhage, mass effect or demarcated infarct   comparing to 1/2020.    -----------------------------------------------------------------------------------------------------------------  IV-thrombolytic (Y/N): No                          Reason thrombolytic not given: Low NIHSS - score 0 (symptoms resolved at time of presentation)

## 2025-06-10 NOTE — H&P ADULT - PROBLEM SELECTOR PLAN 7
Home meds: atorvastatin 40mg PO qhs    Plan:  - Cont atorvastatin 80mg PO qhs as recommended per stroke Appears to be at baseline, no signs of active bleeding.    Plan:  - Obtain iron studies  - Maintain active T&S  - Transfusion goal < 7

## 2025-06-10 NOTE — H&P ADULT - NSHPLABSRESULTS_GEN_ALL_CORE
.  LABS:                         11.1   5.78  )-----------( 157      ( 10 Alexander 2025 18:10 )             32.3     06-10    134[L]  |  103  |  43[H]  ----------------------------<  143[H]  4.7   |  18[L]  |  2.14[H]    Ca    8.8      10 Alexander 2025 18:10    TPro  7.2  /  Alb  4.0  /  TBili  0.5  /  DBili  x   /  AST  16  /  ALT  18  /  AlkPhos  69  06-10    PT/INR - ( 10 Alexander 2025 18:10 )   PT: 11.4 sec;   INR: 0.99          PTT - ( 10 Alexander 2025 18:10 )  PTT:28.6 sec  Urinalysis Basic - ( 10 Alexander 2025 18:10 )    Color: x / Appearance: x / SG: x / pH: x  Gluc: 143 mg/dL / Ketone: x  / Bili: x / Urobili: x   Blood: x / Protein: x / Nitrite: x   Leuk Esterase: x / RBC: x / WBC x   Sq Epi: x / Non Sq Epi: x / Bacteria: x            RADIOLOGY, EKG & ADDITIONAL TESTS: Reviewed.

## 2025-06-10 NOTE — STROKE CODE NOTE - NIH STROKE SCALE: 6A. MOTOR LEG, LEFT, QM
Additional History: ** Patient states no to Covid questions (0) No drift; leg holds 30 degree position for full 5 secs

## 2025-06-10 NOTE — H&P ADULT - PROBLEM SELECTOR PLAN 8
F: none  E: replete K<4, Mg<2  N: DASH/TLC  D: Lovenox    Dispo: Cards Tele  Code: FULL s/p TURP  Home med: tamsulosin 0.4mg PO    Plan:  - Cont home med Home meds: atorvastatin 40mg PO qhs    Plan:  - Cont atorvastatin 80mg PO qhs as recommended per stroke Home meds: atorvastatin 40mg PO qhs    Plan:  - Cont atorvastatin 40mg PO qhs as recommended per stroke

## 2025-06-10 NOTE — ED PROVIDER NOTE - OBJECTIVE STATEMENT
89yM former smoker w/ CAD s/p CABG and stents on Plavix, CKD, HTN, HLD, pre-DM, prostate ca, here for chief complaint of dizziness on and off for a few hours with sensation of room spinning. Dizziness is worsened with lying flat or turning to the right. Improved with getting up and moving around. No facial droop, speech deficits, numbness, weakness, vomiting, falls. Pt dizziness has already resolved upon arrival. Now complaining of SOB. Pt walks w/ cane at baseline, lives in a fourth floor walkup, states he has trouble getting up the stairs and has to stop frequently due to shortness of breath. No leg swelling or chest pain. No fever, chills, cough.

## 2025-06-10 NOTE — ED PROVIDER NOTE - CLINICAL SUMMARY MEDICAL DECISION MAKING FREE TEXT BOX
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 lesion at right lung apex also noted like CT from 10/2022." Pt remains asymptomatic while in the ED, stroke team signed off recommend outpt follow up, continue Plavix and start statin.    For SOB workup- pt w/ mild tachypnea and decreased BS bilaterally, saturating >95% on RA. B/l lower extremities no edema or asymmetry    EKG NSR 61bpm w/ a PAC non ischemic  CXR w/ bilateral pleural effusions  labs w/ trop neg x2, BNP [id number]    discussed w/ family who says no known hx of CHF or pulmonary edema, they note that pt has been more short of breath lately especially with going up the stairs to his apt and has been leaving his apt less bc he doesn't want to go back up the stairs  last saw his cardiologist few months ago, unknown last echo results  will admit to card for new onset chf, Lasix ordered Stroke code called from triage  Triage VS unactionable  pt brain imaging with "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 lesion at right lung apex also noted like CT from 10/2022." Pt remains asymptomatic while in the ED, stroke team signed off recommend outpt follow up, continue Plavix and start statin. pt remains asymptomatic from dizziness perspective while in the ED.    For SOB workup- pt w/ mild tachypnea and decreased BS bilaterally, saturating >95% on RA. B/l lower extremities no edema or asymmetry    EKG NSR 61bpm w/ a PAC non ischemic  CXR w/ bilateral pleural effusions  labs w/ trop neg x2, BNP [id number]    discussed w/ family who says no known hx of CHF or pulmonary edema, they note that pt has been more short of breath lately especially with going up the stairs to his apt and has been leaving his apt less bc he doesn't want to go back up the stairs  last saw his cardiologist few months ago, unknown last echo results  will admit to card for new onset chf, Lasix ordered

## 2025-06-10 NOTE — H&P ADULT - PROBLEM SELECTOR PLAN 4
Hx of CAD, MIDCAB 2014. Denies CP on admission  Home meds: plavix 75mg PO, atorvastatin 40mg PO qhs    Plan:  - cont home meds

## 2025-06-10 NOTE — H&P ADULT - PROBLEM SELECTOR PLAN 2
Resolved on presentation to ED, however was pt's presenting complaint. Stroke code called in ED. ss/p meclizine 25mg PO x1  CTA EXTRACRANIAL: Focal moderate-severe stenosis at origin of dominant left vertebral  artery    Plan:  - Cont aspirin 81mg PO qd  - Cont atorvastatin 80mg PO qhs  - Appreciate stroke team recs Resolved on presentation to ED, however was pt's presenting complaint. Stroke code called in ED. ss/p meclizine 25mg PO x1  CTA EXTRACRANIAL: Focal moderate-severe stenosis at origin of dominant left vertebral  artery    Plan:  - Meclizine 25mg PO q8hrs PRN   - Cont aspirin 81mg PO qd  - Cont atorvastatin 80mg PO qhs  - Appreciate stroke team recs Resolved on presentation to ED, however was pt's presenting complaint. Stroke code called in ED, recommending continuation of home meds as NIHSS 0. s/p meclizine 25mg PO x1  CTA EXTRACRANIAL: Focal moderate-severe stenosis at origin of dominant left vertebral  artery    Plan:  - Meclizine 25mg PO q8hrs PRN   - Cont aspirin 81mg PO qd  - Cont atorvastatin 40mg PO qhs  - Appreciate stroke team recs Resolved on presentation to ED, however was pt's presenting complaint. Stroke code called in ED, recommending continuation of home meds as NIHSS 0. s/p meclizine 25mg PO x1  CTA EXTRACRANIAL: Focal moderate-severe stenosis at origin of dominant left vertebral  artery    Plan:  - Meclizine 25mg PO q8hrs PRN   - Cont clopidogrel 75mg PO qd  - Cont atorvastatin 40mg PO qhs  - Appreciate stroke team recs

## 2025-06-11 ENCOUNTER — RESULT REVIEW (OUTPATIENT)
Age: 89
End: 2025-06-11

## 2025-06-11 DIAGNOSIS — I71.21 ANEURYSM OF THE ASCENDING AORTA, WITHOUT RUPTURE: ICD-10-CM

## 2025-06-11 DIAGNOSIS — N17.9 ACUTE KIDNEY FAILURE, UNSPECIFIED: ICD-10-CM

## 2025-06-11 DIAGNOSIS — I10 ESSENTIAL (PRIMARY) HYPERTENSION: ICD-10-CM

## 2025-06-11 DIAGNOSIS — R45.851 SUICIDAL IDEATIONS: ICD-10-CM

## 2025-06-11 DIAGNOSIS — D64.9 ANEMIA, UNSPECIFIED: ICD-10-CM

## 2025-06-11 DIAGNOSIS — I50.9 HEART FAILURE, UNSPECIFIED: ICD-10-CM

## 2025-06-11 DIAGNOSIS — R42 DIZZINESS AND GIDDINESS: ICD-10-CM

## 2025-06-11 DIAGNOSIS — Z85.46 PERSONAL HISTORY OF MALIGNANT NEOPLASM OF PROSTATE: ICD-10-CM

## 2025-06-11 DIAGNOSIS — Z29.9 ENCOUNTER FOR PROPHYLACTIC MEASURES, UNSPECIFIED: ICD-10-CM

## 2025-06-11 DIAGNOSIS — E78.5 HYPERLIPIDEMIA, UNSPECIFIED: ICD-10-CM

## 2025-06-11 LAB
A1C WITH ESTIMATED AVERAGE GLUCOSE RESULT: 5.8 % — HIGH (ref 4–5.6)
ALBUMIN SERPL ELPH-MCNC: 3.7 G/DL — SIGNIFICANT CHANGE UP (ref 3.3–5)
ALP SERPL-CCNC: 67 U/L — SIGNIFICANT CHANGE UP (ref 40–120)
ALT FLD-CCNC: 16 U/L — SIGNIFICANT CHANGE UP (ref 10–45)
ANION GAP SERPL CALC-SCNC: 11 MMOL/L — SIGNIFICANT CHANGE UP (ref 5–17)
APPEARANCE UR: CLEAR — SIGNIFICANT CHANGE UP
AST SERPL-CCNC: 15 U/L — SIGNIFICANT CHANGE UP (ref 10–40)
BASOPHILS # BLD AUTO: 0 K/UL — SIGNIFICANT CHANGE UP (ref 0–0.2)
BASOPHILS NFR BLD AUTO: 0 % — SIGNIFICANT CHANGE UP (ref 0–2)
BILIRUB SERPL-MCNC: 0.7 MG/DL — SIGNIFICANT CHANGE UP (ref 0.2–1.2)
BILIRUB UR-MCNC: NEGATIVE — SIGNIFICANT CHANGE UP
BUN SERPL-MCNC: 42 MG/DL — HIGH (ref 7–23)
CALCIUM SERPL-MCNC: 8.8 MG/DL — SIGNIFICANT CHANGE UP (ref 8.4–10.5)
CHLORIDE SERPL-SCNC: 104 MMOL/L — SIGNIFICANT CHANGE UP (ref 96–108)
CHOLEST SERPL-MCNC: 100 MG/DL — SIGNIFICANT CHANGE UP
CO2 SERPL-SCNC: 18 MMOL/L — LOW (ref 22–31)
COLOR SPEC: YELLOW — SIGNIFICANT CHANGE UP
CREAT ?TM UR-MCNC: 16 MG/DL — SIGNIFICANT CHANGE UP
CREAT SERPL-MCNC: 2.19 MG/DL — HIGH (ref 0.5–1.3)
DIFF PNL FLD: ABNORMAL
EGFR: 28 ML/MIN/1.73M2 — LOW
EGFR: 28 ML/MIN/1.73M2 — LOW
EOSINOPHIL # BLD AUTO: 0.08 K/UL — SIGNIFICANT CHANGE UP (ref 0–0.5)
EOSINOPHIL NFR BLD AUTO: 1.8 % — SIGNIFICANT CHANGE UP (ref 0–6)
ESTIMATED AVERAGE GLUCOSE: 120 MG/DL — HIGH (ref 68–114)
FERRITIN SERPL-MCNC: 257 NG/ML — SIGNIFICANT CHANGE UP (ref 30–400)
GLUCOSE SERPL-MCNC: 89 MG/DL — SIGNIFICANT CHANGE UP (ref 70–99)
GLUCOSE UR QL: NEGATIVE MG/DL — SIGNIFICANT CHANGE UP
HCT VFR BLD CALC: 33.1 % — LOW (ref 39–50)
HDLC SERPL-MCNC: 50 MG/DL — SIGNIFICANT CHANGE UP
HGB BLD-MCNC: 11.1 G/DL — LOW (ref 13–17)
IRON SATN MFR SERPL: 24 % — SIGNIFICANT CHANGE UP (ref 16–55)
IRON SATN MFR SERPL: 55 UG/DL — SIGNIFICANT CHANGE UP (ref 45–165)
KETONES UR QL: NEGATIVE MG/DL — SIGNIFICANT CHANGE UP
LDLC SERPL-MCNC: 36 MG/DL — SIGNIFICANT CHANGE UP
LEUKOCYTE ESTERASE UR-ACNC: NEGATIVE — SIGNIFICANT CHANGE UP
LIPID PNL WITH DIRECT LDL SERPL: 36 MG/DL — SIGNIFICANT CHANGE UP
LYMPHOCYTES # BLD AUTO: 1.35 K/UL — SIGNIFICANT CHANGE UP (ref 1–3.3)
LYMPHOCYTES # BLD AUTO: 31.5 % — SIGNIFICANT CHANGE UP (ref 13–44)
MAGNESIUM SERPL-MCNC: 2 MG/DL — SIGNIFICANT CHANGE UP (ref 1.6–2.6)
MCHC RBC-ENTMCNC: 29.7 PG — SIGNIFICANT CHANGE UP (ref 27–34)
MCHC RBC-ENTMCNC: 33.5 G/DL — SIGNIFICANT CHANGE UP (ref 32–36)
MCV RBC AUTO: 88.5 FL — SIGNIFICANT CHANGE UP (ref 80–100)
MONOCYTES # BLD AUTO: 0.43 K/UL — SIGNIFICANT CHANGE UP (ref 0–0.9)
MONOCYTES NFR BLD AUTO: 9.9 % — SIGNIFICANT CHANGE UP (ref 2–14)
NEUTROPHILS # BLD AUTO: 2.44 K/UL — SIGNIFICANT CHANGE UP (ref 1.8–7.4)
NEUTROPHILS NFR BLD AUTO: 56.8 % — SIGNIFICANT CHANGE UP (ref 43–77)
NITRITE UR-MCNC: NEGATIVE — SIGNIFICANT CHANGE UP
NONHDLC SERPL-MCNC: 50 MG/DL — SIGNIFICANT CHANGE UP
NRBC BLD AUTO-RTO: SIGNIFICANT CHANGE UP /100 WBCS (ref 0–0)
PH UR: 6.5 — SIGNIFICANT CHANGE UP (ref 5–8)
PHOSPHATE SERPL-MCNC: 4.4 MG/DL — SIGNIFICANT CHANGE UP (ref 2.5–4.5)
PLATELET # BLD AUTO: 142 K/UL — LOW (ref 150–400)
POTASSIUM SERPL-MCNC: 5.2 MMOL/L — SIGNIFICANT CHANGE UP (ref 3.5–5.3)
POTASSIUM SERPL-SCNC: 5.2 MMOL/L — SIGNIFICANT CHANGE UP (ref 3.5–5.3)
PROT ?TM UR-MCNC: 36 MG/DL — HIGH (ref 0–12)
PROT SERPL-MCNC: 7.2 G/DL — SIGNIFICANT CHANGE UP (ref 6–8.3)
PROT UR-MCNC: 30 MG/DL
PROT/CREAT UR-RTO: 2.2 RATIO — HIGH (ref 0–0.2)
RBC # BLD: 3.74 M/UL — LOW (ref 4.2–5.8)
RBC # BLD: 3.74 M/UL — LOW (ref 4.2–5.8)
RBC # FLD: 14 % — SIGNIFICANT CHANGE UP (ref 10.3–14.5)
RETICS #: 44.1 K/UL — SIGNIFICANT CHANGE UP (ref 25–125)
RETICS/RBC NFR: 1.2 % — SIGNIFICANT CHANGE UP (ref 0.5–2.5)
SODIUM SERPL-SCNC: 133 MMOL/L — LOW (ref 135–145)
SODIUM UR-SCNC: 106 MMOL/L — SIGNIFICANT CHANGE UP
SP GR SPEC: 1.01 — SIGNIFICANT CHANGE UP (ref 1–1.03)
T4 AB SER-ACNC: 8.15 UG/DL — SIGNIFICANT CHANGE UP (ref 4.5–11.7)
TIBC SERPL-MCNC: 227 UG/DL — SIGNIFICANT CHANGE UP (ref 220–430)
TRIGL SERPL-MCNC: 60 MG/DL — SIGNIFICANT CHANGE UP
TSH SERPL-MCNC: 2.66 UIU/ML — SIGNIFICANT CHANGE UP (ref 0.27–4.2)
UIBC SERPL-MCNC: 172 UG/DL — SIGNIFICANT CHANGE UP (ref 110–370)
UROBILINOGEN FLD QL: 0.2 MG/DL — SIGNIFICANT CHANGE UP (ref 0.2–1)
UUN UR-MCNC: 145 MG/DL — SIGNIFICANT CHANGE UP
WBC # BLD: 4.3 K/UL — SIGNIFICANT CHANGE UP (ref 3.8–10.5)
WBC # FLD AUTO: 4.3 K/UL — SIGNIFICANT CHANGE UP (ref 3.8–10.5)

## 2025-06-11 PROCEDURE — 93306 TTE W/DOPPLER COMPLETE: CPT | Mod: 26

## 2025-06-11 PROCEDURE — 99233 SBSQ HOSP IP/OBS HIGH 50: CPT

## 2025-06-11 PROCEDURE — 99223 1ST HOSP IP/OBS HIGH 75: CPT

## 2025-06-11 RX ORDER — MECLIZINE HCL 12.5 MG
25 TABLET ORAL EVERY 8 HOURS
Refills: 0 | Status: DISCONTINUED | OUTPATIENT
Start: 2025-06-11 | End: 2025-06-13

## 2025-06-11 RX ORDER — SENNA 187 MG
2 TABLET ORAL AT BEDTIME
Refills: 0 | Status: DISCONTINUED | OUTPATIENT
Start: 2025-06-11 | End: 2025-06-13

## 2025-06-11 RX ORDER — HEPARIN SODIUM 1000 [USP'U]/ML
5000 INJECTION INTRAVENOUS; SUBCUTANEOUS EVERY 8 HOURS
Refills: 0 | Status: DISCONTINUED | OUTPATIENT
Start: 2025-06-11 | End: 2025-06-13

## 2025-06-11 RX ORDER — CLOPIDOGREL BISULFATE 75 MG/1
75 TABLET, FILM COATED ORAL DAILY
Refills: 0 | Status: DISCONTINUED | OUTPATIENT
Start: 2025-06-11 | End: 2025-06-13

## 2025-06-11 RX ORDER — ACETAMINOPHEN 500 MG/5ML
650 LIQUID (ML) ORAL EVERY 6 HOURS
Refills: 0 | Status: DISCONTINUED | OUTPATIENT
Start: 2025-06-11 | End: 2025-06-13

## 2025-06-11 RX ORDER — METOPROLOL SUCCINATE 50 MG/1
50 TABLET, EXTENDED RELEASE ORAL EVERY 24 HOURS
Refills: 0 | Status: DISCONTINUED | OUTPATIENT
Start: 2025-06-11 | End: 2025-06-13

## 2025-06-11 RX ORDER — LEVOCETIRIZINE DIHYDROCHLORIDE 5 MG/1
1 TABLET ORAL
Refills: 0 | DISCHARGE

## 2025-06-11 RX ORDER — POLYETHYLENE GLYCOL 3350 17 G/17G
17 POWDER, FOR SOLUTION ORAL EVERY 12 HOURS
Refills: 0 | Status: DISCONTINUED | OUTPATIENT
Start: 2025-06-11 | End: 2025-06-13

## 2025-06-11 RX ORDER — ATORVASTATIN CALCIUM 80 MG/1
40 TABLET, FILM COATED ORAL AT BEDTIME
Refills: 0 | Status: DISCONTINUED | OUTPATIENT
Start: 2025-06-11 | End: 2025-06-13

## 2025-06-11 RX ORDER — LATANOPROST PF 0.05 MG/ML
1 SOLUTION/ DROPS OPHTHALMIC AT BEDTIME
Refills: 0 | Status: DISCONTINUED | OUTPATIENT
Start: 2025-06-11 | End: 2025-06-13

## 2025-06-11 RX ORDER — TAMSULOSIN HYDROCHLORIDE 0.4 MG/1
0.4 CAPSULE ORAL AT BEDTIME
Refills: 0 | Status: DISCONTINUED | OUTPATIENT
Start: 2025-06-11 | End: 2025-06-13

## 2025-06-11 RX ORDER — ONDANSETRON HCL/PF 4 MG/2 ML
4 VIAL (ML) INJECTION EVERY 8 HOURS
Refills: 0 | Status: DISCONTINUED | OUTPATIENT
Start: 2025-06-11 | End: 2025-06-13

## 2025-06-11 RX ORDER — FUROSEMIDE 10 MG/ML
80 INJECTION INTRAMUSCULAR; INTRAVENOUS ONCE
Refills: 0 | Status: COMPLETED | OUTPATIENT
Start: 2025-06-11 | End: 2025-06-11

## 2025-06-11 RX ORDER — METOPROLOL SUCCINATE 50 MG/1
1 TABLET, EXTENDED RELEASE ORAL
Refills: 0 | DISCHARGE

## 2025-06-11 RX ORDER — FUROSEMIDE 10 MG/ML
40 INJECTION INTRAMUSCULAR; INTRAVENOUS ONCE
Refills: 0 | Status: COMPLETED | OUTPATIENT
Start: 2025-06-11 | End: 2025-06-11

## 2025-06-11 RX ORDER — MELATONIN 5 MG
3 TABLET ORAL AT BEDTIME
Refills: 0 | Status: DISCONTINUED | OUTPATIENT
Start: 2025-06-11 | End: 2025-06-13

## 2025-06-11 RX ADMIN — FUROSEMIDE 80 MILLIGRAM(S): 10 INJECTION INTRAMUSCULAR; INTRAVENOUS at 12:23

## 2025-06-11 RX ADMIN — LATANOPROST PF 1 DROP(S): 0.05 SOLUTION/ DROPS OPHTHALMIC at 23:50

## 2025-06-11 RX ADMIN — FUROSEMIDE 40 MILLIGRAM(S): 10 INJECTION INTRAMUSCULAR; INTRAVENOUS at 06:42

## 2025-06-11 RX ADMIN — HEPARIN SODIUM 5000 UNIT(S): 1000 INJECTION INTRAVENOUS; SUBCUTANEOUS at 15:40

## 2025-06-11 RX ADMIN — METOPROLOL SUCCINATE 50 MILLIGRAM(S): 50 TABLET, EXTENDED RELEASE ORAL at 08:38

## 2025-06-11 RX ADMIN — HEPARIN SODIUM 5000 UNIT(S): 1000 INJECTION INTRAVENOUS; SUBCUTANEOUS at 21:16

## 2025-06-11 RX ADMIN — Medication 2 TABLET(S): at 21:15

## 2025-06-11 RX ADMIN — POLYETHYLENE GLYCOL 3350 17 GRAM(S): 17 POWDER, FOR SOLUTION ORAL at 18:14

## 2025-06-11 RX ADMIN — ATORVASTATIN CALCIUM 40 MILLIGRAM(S): 80 TABLET, FILM COATED ORAL at 21:15

## 2025-06-11 RX ADMIN — Medication 10 MILLIGRAM(S): at 12:23

## 2025-06-11 RX ADMIN — HEPARIN SODIUM 5000 UNIT(S): 1000 INJECTION INTRAVENOUS; SUBCUTANEOUS at 06:42

## 2025-06-11 RX ADMIN — CLOPIDOGREL BISULFATE 75 MILLIGRAM(S): 75 TABLET, FILM COATED ORAL at 12:23

## 2025-06-11 RX ADMIN — TAMSULOSIN HYDROCHLORIDE 0.4 MILLIGRAM(S): 0.4 CAPSULE ORAL at 21:16

## 2025-06-11 NOTE — PROGRESS NOTE ADULT - PROBLEM SELECTOR PLAN 10
F: none  E: replete K<4, Mg<2  N: DASH/TLC  D: Lovenox    Dispo: Cards Tele  Code: FULL psych consulted appreciate recs

## 2025-06-11 NOTE — PROGRESS NOTE ADULT - SUBJECTIVE AND OBJECTIVE BOX
OVERNIGHT EVENTS:  Endorsed suicidality to nurse  SUBJECTIVE / INTERVAL HPI: Patient seen and examined at bedside. Confused this morning when he woke up. Denies any acute complaints. States he is waiting for his son.    VITAL SIGNS:  Vital Signs Last 24 Hrs  T(C): 36.6 (2025 06:00), Max: 36.6 (2025 06:00)  T(F): 97.8 (2025 06:00), Max: 97.8 (2025 06:00)  HR: 68 (2025 07:45) (62 - 77)  BP: 150/63 (:45) (134/90 - 173/81)  BP(mean): 88 (:45) (88 - 104)  RR: 19 (:45) (16 - 20)  SpO2: 100% (:45) (95% - 100%)    Parameters below as of 2025 07:45  Patient On (Oxygen Delivery Method): room air      I&O's Summary    10 Alexander 2025 07:01  -  2025 07:00  --------------------------------------------------------  IN: 236 mL / OUT: 1125 mL / NET: -889 mL    2025 07:01  -  2025 09:19  --------------------------------------------------------  IN: 0 mL / OUT: 200 mL / NET: -200 mL        PHYSICAL EXAM:  GENERAL: elederly gentleman in NAD  HEAD:  Atraumatic, Normocephalic  EYES: EOMI, PERRLA, conjunctiva and sclera clear  ENT: Moist mucous membranes  NECK: Supple, -JVD  CHEST/LUNG: Reduced air movement at bases; No rales, rhonchi, wheezing, or rubs. Unlabored respirations  HEART: systolic murmur noted at all auscultation points  ABDOMEN: Bowel sounds present; Soft, Nontender, Nondistended. No hepatomegally  EXTREMITIES:  2+ Peripheral Pulses, brisk capillary refill. No clubbing, cyanosis, or edema  NERVOUS SYSTEM:  Alert & Oriented X3, speech clear. No deficits   MSK: FROM all 4 extremities, full and equal strength  SKIN: No rashes or lesions    MEDICATIONS:  MEDICATIONS  (STANDING):  atorvastatin 40 milliGRAM(s) Oral at bedtime  cetirizine 10 milliGRAM(s) Oral daily  clopidogrel Tablet 75 milliGRAM(s) Oral daily  heparin   Injectable 5000 Unit(s) SubCutaneous every 8 hours  latanoprost 0.005% Ophthalmic Solution 1 Drop(s) Both EYES at bedtime  metoprolol succinate ER 50 milliGRAM(s) Oral every 24 hours  tamsulosin 0.4 milliGRAM(s) Oral at bedtime    MEDICATIONS  (PRN):  acetaminophen     Tablet .. 650 milliGRAM(s) Oral every 6 hours PRN Temp greater or equal to 38C (100.4F), Mild Pain (1 - 3)  meclizine 25 milliGRAM(s) Oral every 8 hours PRN Dizziness  melatonin 3 milliGRAM(s) Oral at bedtime PRN Insomnia  ondansetron Injectable 4 milliGRAM(s) IV Push every 8 hours PRN Nausea and/or Vomiting      ALLERGIES:  Allergies    No Known Drug Allergies  Originally Entered as [Rash-General] reaction to [Other][Ragweed] (Rash)  ragweed and birch (Unknown)  Originally Entered as [Rash-General] reaction to [Other][Bush] (Rash)    Intolerances        LABS:                        11.1   4.30  )-----------( 142      ( 2025 05:40 )             33.1     06-11    133[L]  |  104  |  42[H]  ----------------------------<  89  5.2   |  18[L]  |  2.19[H]    Ca    8.8      2025 05:40  Phos  4.4     06-11  Mg     2.0     -11    TPro  7.2  /  Alb  3.7  /  TBili  0.7  /  DBili  x   /  AST  15  /  ALT  16  /  AlkPhos  67  06-11    PT/INR - ( 10 Alexander 2025 18:10 )   PT: 11.4 sec;   INR: 0.99          PTT - ( 10 Alexander 2025 18:10 )  PTT:28.6 sec  Urinalysis Basic - ( 2025 06:18 )    Color: Yellow / Appearance: Clear / S.013 / pH: x  Gluc: x / Ketone: x  / Bili: Negative / Urobili: 0.2 mg/dL   Blood: x / Protein: 30 mg/dL / Nitrite: Negative   Leuk Esterase: Negative / RBC: 5 /HPF / WBC 0 /HPF   Sq Epi: x / Non Sq Epi: 0 /HPF / Bacteria: Negative /HPF      CAPILLARY BLOOD GLUCOSE      POCT Blood Glucose.: 148 mg/dL (10 Alexander 2025 18:05)      RADIOLOGY & ADDITIONAL TESTS: Reviewed.   OVERNIGHT EVENTS:  Endorsed suicidality to nurse  SUBJECTIVE / INTERVAL HPI: Patient seen and examined at bedside. Confused this morning when he woke up. Denies any acute complaints. States he is waiting for his son.    VITAL SIGNS:  Vital Signs Last 24 Hrs  T(C): 36.6 (2025 06:00), Max: 36.6 (2025 06:00)  T(F): 97.8 (2025 06:00), Max: 97.8 (2025 06:00)  HR: 68 (2025 07:45) (62 - 77)  BP: 150/63 (:45) (134/90 - 173/81)  BP(mean): 88 (:45) (88 - 104)  RR: 19 (:45) (16 - 20)  SpO2: 100% (:45) (95% - 100%)    Parameters below as of 2025 07:45  Patient On (Oxygen Delivery Method): room air      I&O's Summary    10 Alexander 2025 07:01  -  2025 07:00  --------------------------------------------------------  IN: 236 mL / OUT: 1125 mL / NET: -889 mL    2025 07:01  -  2025 09:19  --------------------------------------------------------  IN: 0 mL / OUT: 200 mL / NET: -200 mL        PHYSICAL EXAM:  GENERAL: elederly gentleman in NAD  HEAD:  Atraumatic, Normocephalic  EYES: EOMI, PERRLA, conjunctiva and sclera clear  ENT: Moist mucous membranes  NECK: Supple, +JVD  CHEST/LUNG: Reduced air movement at bases; No rales, rhonchi, wheezing, or rubs. Unlabored respirations  HEART: systolic murmur noted at all auscultation points  ABDOMEN: Bowel sounds present; Soft, Nontender, Nondistended. No hepatomegally  EXTREMITIES:  2+ Peripheral Pulses, brisk capillary refill. No clubbing, cyanosis, or edema  NERVOUS SYSTEM:  Alert & Oriented X3, speech clear. No deficits   MSK: FROM all 4 extremities, full and equal strength  SKIN: No rashes or lesions    MEDICATIONS:  MEDICATIONS  (STANDING):  atorvastatin 40 milliGRAM(s) Oral at bedtime  cetirizine 10 milliGRAM(s) Oral daily  clopidogrel Tablet 75 milliGRAM(s) Oral daily  heparin   Injectable 5000 Unit(s) SubCutaneous every 8 hours  latanoprost 0.005% Ophthalmic Solution 1 Drop(s) Both EYES at bedtime  metoprolol succinate ER 50 milliGRAM(s) Oral every 24 hours  tamsulosin 0.4 milliGRAM(s) Oral at bedtime    MEDICATIONS  (PRN):  acetaminophen     Tablet .. 650 milliGRAM(s) Oral every 6 hours PRN Temp greater or equal to 38C (100.4F), Mild Pain (1 - 3)  meclizine 25 milliGRAM(s) Oral every 8 hours PRN Dizziness  melatonin 3 milliGRAM(s) Oral at bedtime PRN Insomnia  ondansetron Injectable 4 milliGRAM(s) IV Push every 8 hours PRN Nausea and/or Vomiting      ALLERGIES:  Allergies    No Known Drug Allergies  Originally Entered as [Rash-General] reaction to [Other][Ragweed] (Rash)  ragweed and birch (Unknown)  Originally Entered as [Rash-General] reaction to [Other][Bush] (Rash)    Intolerances        LABS:                        11.1   4.30  )-----------( 142      ( 2025 05:40 )             33.1     06-11    133[L]  |  104  |  42[H]  ----------------------------<  89  5.2   |  18[L]  |  2.19[H]    Ca    8.8      2025 05:40  Phos  4.4     06-11  Mg     2.0     -11    TPro  7.2  /  Alb  3.7  /  TBili  0.7  /  DBili  x   /  AST  15  /  ALT  16  /  AlkPhos  67  06-11    PT/INR - ( 10 Alexander 2025 18:10 )   PT: 11.4 sec;   INR: 0.99          PTT - ( 10 Alexander 2025 18:10 )  PTT:28.6 sec  Urinalysis Basic - ( 2025 06:18 )    Color: Yellow / Appearance: Clear / S.013 / pH: x  Gluc: x / Ketone: x  / Bili: Negative / Urobili: 0.2 mg/dL   Blood: x / Protein: 30 mg/dL / Nitrite: Negative   Leuk Esterase: Negative / RBC: 5 /HPF / WBC 0 /HPF   Sq Epi: x / Non Sq Epi: 0 /HPF / Bacteria: Negative /HPF      CAPILLARY BLOOD GLUCOSE      POCT Blood Glucose.: 148 mg/dL (10 Alexander 2025 18:05)      RADIOLOGY & ADDITIONAL TESTS: Reviewed.

## 2025-06-11 NOTE — BH CONSULTATION LIAISON ASSESSMENT NOTE - NSBHATTESTCOMMENTATTENDFT_PSY_A_CORE
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 Psychiatry was consulted as the patient verbalized passive SI. On assessment patient denies SI or prior depressed mood, which is consistent with collateral obtained from son. Patient with baseline chronic progressive cognitive deficits (per son), without functional impairment and worsened confusion in am, likely multifactorial due to underlying medical conditions and recent dosage of diphenhydramine. No current safety concerns; will continue to provide support and psychoeducation. See above for full recs.

## 2025-06-11 NOTE — PROGRESS NOTE ADULT - ASSESSMENT
89 y/o M former smoker w/ PMH of PMH of grade III diastolic dysfunction (pt and family deny dx of HF in past; TTE 11/2023 shows 3 diastolic dysfunction, EF 58%),, CAD s/p PCI and MIDCAB 6/5/2014 on Plavix, HTN, HLD, pre-DM, CKD, prostate Ca s/p TURP, depression and chronic hypoNa, p/w complaints of intermittent dizziness described as "room spinning" for several hours and progressively worsening ALEXANDER and SOB for several weeks. 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 Admitted to [facility] for further diuresis.

## 2025-06-11 NOTE — PROGRESS NOTE ADULT - PROBLEM SELECTOR PLAN 2
Resolved on presentation to ED, however was pt's presenting complaint. Stroke code called in ED, recommending continuation of home meds as NIHSS 0. s/p meclizine 25mg PO x1  CTA EXTRACRANIAL: Focal moderate-severe stenosis at origin of dominant left vertebral  artery    Plan:  - Meclizine 25mg PO q8hrs PRN   - Cont clopidogrel 75mg PO qd  - Cont atorvastatin 40mg PO qhs  - Appreciate stroke team recs

## 2025-06-11 NOTE — PROGRESS NOTE ADULT - PROBLEM SELECTOR PLAN 3
Baseline appears to be 1.7, on admission noted to be 2.17. Likely pre-renal iso vascular congestion due to HF exacerbation.   FeUrea 47.3 suggest intrinsic renal disease    Plan:  - Cautious with nephrotoxic agents  - Trend BMP

## 2025-06-11 NOTE — PROGRESS NOTE ADULT - PROBLEM SELECTOR PLAN 1
TTE 11/29/23: Grade III left ventricular diastolic dysfunction, LVEF 58%. Mild-to-moderate aortic regurgitation.  Mild-to-moderate aortic stenosis. The proximal ascending aorta is dilated measuring 4.20 cm.  EKG: NSR w/ PVC and low voltage @ HR 61, QTc 440  CXR: b/l pleural effusions + pulmonary congestion  Trop 47 << 48; pBNP 14887; s,p lasix 40mg IVx1  Home meds: metoprolol succinate 50mg PO qd (unclear why pt is prescribed)    Plan:  - Lasix 40mg IV in AM  - Cont toprol xl 50mg PO qd  - F/U lipids, A1c, TSH  - Goal net -2L in 24hrs  - Obtain TTE  - Obtain collateral from o/p cardiologist; Keyona Sinha MD   - Confirm med rec -- current based on Surescripts but son and pt unaware of what is taken and what for TTE 11/29/23: Grade III left ventricular diastolic dysfunction, LVEF 58%. Mild-to-moderate aortic regurgitation.  Mild-to-moderate aortic stenosis. The proximal ascending aorta is dilated measuring 4.20 cm.  EKG: NSR w/ PVC and low voltage @ HR 61, QTc 440  CXR: b/l pleural effusions + pulmonary congestion  Trop 47 << 48; pBNP 78188; s,p lasix 40mg IVx1  Home meds: metoprolol succinate 50mg PO qd (unclear why pt is prescribed)    Plan:  - s/p Lasix 40mg IV in AM, gave additional Lasix 80 IVPx1  - Cont toprol xl 50mg PO qd  - F/U lipids, A1c, TSH  - Goal net -2L in 24hrs  - Obtain TTE  - Obtain collateral from o/p cardiologist; Keyona Sinha MD   - Confirm med rec -- current based on Surescripts but son and pt unaware of what is taken and what for

## 2025-06-11 NOTE — PROVIDER CONTACT NOTE (OTHER) - SITUATION
When completing the pt profile, pt expressed that they wanted to end their life via assisted suicide
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
## 2025-06-11 NOTE — BH CONSULTATION LIAISON ASSESSMENT NOTE - CURRENT MEDICATION
MEDICATIONS  (STANDING):  atorvastatin 40 milliGRAM(s) Oral at bedtime  cetirizine 10 milliGRAM(s) Oral daily  clopidogrel Tablet 75 milliGRAM(s) Oral daily  heparin   Injectable 5000 Unit(s) SubCutaneous every 8 hours  latanoprost 0.005% Ophthalmic Solution 1 Drop(s) Both EYES at bedtime  metoprolol succinate ER 50 milliGRAM(s) Oral every 24 hours  tamsulosin 0.4 milliGRAM(s) Oral at bedtime    MEDICATIONS  (PRN):  acetaminophen     Tablet .. 650 milliGRAM(s) Oral every 6 hours PRN Temp greater or equal to 38C (100.4F), Mild Pain (1 - 3)  meclizine 25 milliGRAM(s) Oral every 8 hours PRN Dizziness  melatonin 3 milliGRAM(s) Oral at bedtime PRN Insomnia  ondansetron Injectable 4 milliGRAM(s) IV Push every 8 hours PRN Nausea and/or Vomiting

## 2025-06-11 NOTE — BH CONSULTATION LIAISON ASSESSMENT NOTE - RISK ASSESSMENT
Risk factors include  Risk factor: stated SI to the nurse.   Protective factor: denial of SI, has a good support system      Risk factor: stated SI to the nurse, neurocognitive impairment   Protective factor: denial of SI, afraid of death, motivate for tx, good therapeutic alliance, has a good support system

## 2025-06-11 NOTE — BH CONSULTATION LIAISON ASSESSMENT NOTE - HPI (INCLUDE ILLNESS QUALITY, SEVERITY, DURATION, TIMING, CONTEXT, MODIFYING FACTORS, ASSOCIATED SIGNS AND SYMPTOMS)
The pt was seen sleeping, in hospital attire. Upon awakening, the pt tries to be cooperative to the best of his capabilities as he is hard of hearing. When asked about his mood, the pt states, "I feel lousy, I am sick." He is AAOx4 and was reoriented by the nurse in regards to the date. His registration is 3/3 and recall 1/3. The pt was unable to say the days of the week backwards but states "I came on Tuesday, and today is Wednesday."  When prompted to spell world backwards, he missed one letter possibly due to a language barrier. The pt denies any previous or current SI. The pt denies any feelings of depression of previous psychiatric diagnosis. He has seen a psychiatrist in the past when he first joined  about 45 years ago.      Upon speaking to his son, he informed us that the pt has expressed his interest in "physician assisted suicide" before when he has been frustrated during previous hospital admissions. The son does not know of any PPHx and mentions that he has experienced spells of forgetfulness when he walks into a room but is easily reoriented. This has been happening for many years. The son does not have any psychiatric concerns.      89 y/o, male, with PMHx of CAD s/p 2 bypasses, HTN, HLD, pre-DM, prostate CA, former Alcoholic (full sustained remission for 45 years), who expressed SI to a nurse. The pt is currently admitted in medicine and psychiatry was consulted because of SI. The pt was seen sleeping, in hospital attire. Upon awakening, the pt tries to be cooperative to the best of his capabilities as he is hard of hearing and mild/moderate cognitive impairment as noted by exam and collateral (son). When asked about his mood, the pt states, "I feel lousy, I am sick." He is AAOx4 and was reoriented by the nurse in regards to the date. His registration is 3/3 and recall 1/3. The pt was unable to say the days of the week backwards but states "I came on Tuesday, and today is Wednesday."  When prompted to spell world backwards, he missed one letter possibly due to a language barrier. The pt denies any previous or current SI. When asked if he would hurt himself, the pt states that he could not do it himself and is afraid to hurt himself. The pt denies any feelings of depression of previous psychiatric diagnosis. He has seen a psychiatrist in the past when he first joined  about 45 years ago.      Upon speaking to his son, he informed us that the pt has expressed his interest in "physician assisted suicide" before when he has been frustrated during previous hospital admissions. The son does not know of any PPHx and mentions that he has experienced spells of forgetfulness when he walks into a room but is easily reoriented. This has been happening for many years. The son does not have any psychiatric concerns.      89 y/o, male, with PMHx of CAD s/p 2 bypasses, HTN, HLD, pre-DM, prostate CA, history of alcohol use disorder in full susteined remission who expressed SI to a nurse. The pt was seen sleeping, in hospital attire. Upon awakening, the pt tries to be cooperative to the best of his capabilities as he is hard of hearing and mild/moderate cognitive impairment as noted by exam and collateral (son). When asked about his mood, the pt states, "I feel lousy, I am sick." He is AAOx4 and was reoriented by the nurse in regards to the date. His registration is 3/3 and recall 1/3. The pt was unable to say the days of the week backwards but states "I came on Tuesday, and today is Wednesday."  When prompted to spell world backwards, he missed one letter possibly due to a language barrier. The pt denies any previous or current SI. When asked if he would hurt himself, the pt states that he could not do it himself and is afraid to hurt himself. The pt denies any feelings of depression of previous psychiatric diagnosis. He has seen a psychiatrist in the past when he first joined  about 45 years ago.      Upon speaking to his son, he informed us that the pt has expressed his interest in "physician assisted suicide" before when he has been frustrated during previous hospital admissions. The son does not know of any PPHx and mentions that he has experienced spells of forgetfulness when he walks into a room but is easily reoriented. This has been happening for many years. The son does not have any psychiatric concerns.      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 Patient is currently on the Cardiology service for further workup and treatment. Psychiatry was consulted as the patient verbalized during the night passive SI (told the nursing staff that he would [place] go to another country to access assisted suicide).      Patient was seen at bedside, 1:1 in the room. The pt was seen sleeping, in hospital attire. Upon awakening, the pt tries to be cooperative to the best of his capabilities as he is hard of hearing .When asked about his mood, the pt states, "I feel lousy, I am sick." He is AAOx4 and was reoriented by the nurse in regards to the date. His registration is 3/3 and recall 1/3. The pt was unable to say the days of the week backwards but states "I came on Tuesday, and today is Wednesday."  When prompted to spell world backwards, he missed one letter possibly due to a language barrier. The pt denies any previous or current SI. When asked if he would hurt himself, the pt states that he could not do it himself and is afraid to hurt himself. The pt denies any feelings of depression of previous psychiatric diagnosis. He has seen a psychiatrist in the past when he first joined  about 45 years ago but he was never prescribed any psychotropics.     Upon speaking to his son, he informed us that the pt has expressed his interest in "physician assisted suicide" before when he has been frustrated during previous hospital admissions. The son does not know of any PPHx and mentions that he has experienced spells of forgetfulness when he walks into a room but is easily reoriented. This has been happening for many years. The son does not have any psychiatric concerns.

## 2025-06-11 NOTE — PROGRESS NOTE ADULT - NS ATTEND AMEND GEN_ALL_CORE FT
Mr. Reyes is a 89M with hx of CAD with MIDCAB in 2014, HTN presented with dyspnea and SOB and presented with decompensated HF.     Exam:   NAD  JVP mildly elevated  bilateral crackles  S1, S2 RRR  WWP, no edema      Decompensated hf- hx of diastolic dysfunction on echo w/ hx of mild-mod AS. Repeat TTE. NSR with apc on EKG no ischemic changes- low voltage in limb leads, give additional dose of lasix  CAD- hx of MIDCAB on plavix, TTE  Dizziness- seen by neuro- follow up neuro recs  Asc Ao aneurysm- repeat TTE, on bb  CKD- Cr 2.1, baseline approx 1.7

## 2025-06-11 NOTE — BH CONSULTATION LIAISON ASSESSMENT NOTE - NSBHCHARTREVIEWVS_PSY_A_CORE FT
Vital Signs Last 24 Hrs  T(C): 36.4 (11 Jun 2025 12:16), Max: 36.6 (11 Jun 2025 06:00)  T(F): 97.5 (11 Jun 2025 12:16), Max: 97.8 (11 Jun 2025 06:00)  HR: 71 (11 Jun 2025 12:16) (62 - 78)  BP: 132/63 (11 Jun 2025 12:16) (131/65 - 173/81)  BP(mean): 90 (11 Jun 2025 12:16) (88 - 104)  RR: 18 (11 Jun 2025 12:16) (16 - 20)  SpO2: 98% (11 Jun 2025 12:16) (95% - 100%)    Parameters below as of 11 Jun 2025 12:16  Patient On (Oxygen Delivery Method): room air

## 2025-06-11 NOTE — PATIENT PROFILE ADULT - FUNCTIONAL ASSESSMENT - BASIC MOBILITY 6.
Discharge Summary/Instructions after an Endoscopic Procedure  Patient Name: Nilo Hoover  Patient MRN: [id number]  Patient YOB: 1953 Thursday, April 24, 2025  Sandor Alexander MD  Dear patient,  As a result of recent federal legislation (The Federal Cures Act), you may   receive lab or pathology results from your procedure in your [facility]   account before your physician is able to contact you. Your physician or   their representative will relay the results to you with their   recommendations at their soonest availability.  Thank you,  RESTRICTIONS:  During your procedure today, you received medications for sedation.  These   medications may affect your judgment, balance and coordination.  Therefore,   for 24 hours, you have the following restrictions:   - DO NOT drive a car, operate machinery, make legal/financial decisions,   sign important papers or drink alcohol.    ACTIVITY:  Today: no heavy lifting, straining or running due to procedural   sedation/anesthesia.  The following day: return to full activity including work.  DIET:  Eat and drink normally unless instructed otherwise.     TREATMENT FOR COMMON SIDE EFFECTS:  - Mild abdominal pain, nausea, belching, bloating or excessive gas:  rest,   eat lightly and use a heating pad.  - Sore Throat: treat with throat lozenges and/or gargle with warm salt   water.  - Because air was used during the procedure, expelling large amounts of air   from your rectum or belching is normal.  - If a bowel prep was taken, you may not have a bowel movement for 1-3 days.    This is normal.  SYMPTOMS TO WATCH FOR AND REPORT TO YOUR PHYSICIAN:  1. Abdominal pain or bloating, other than gas cramps.  2. Chest pain.  3. Back pain.  4. Signs of infection such as: chills or fever occurring within 24 hours   after the procedure.  5. Rectal bleeding, which would show as bright red, maroon, or black stools.   (A tablespoon of blood from the rectum is not serious, especially if    hemorrhoids are present.)  6. Vomiting.  7. Weakness or dizziness.  GO DIRECTLY TO THE NEAREST EMERGENCY ROOM IF YOU HAVE ANY OF THE FOLLOWING:      Difficulty breathing              Chills and/or fever over 101 F   Persistent vomiting and/or vomiting blood   Severe abdominal pain   Severe chest pain   Black, tarry stools   Bleeding- more than one tablespoon   Any other symptom or condition that you feel may need urgent attention  Your doctor recommends these additional instructions:  If any biopsies were taken, your doctors clinic will contact you in 1 to 2   weeks with any results.  - Patient has a contact number available for emergencies.  The signs and   symptoms of potential delayed complications were discussed with the   patient.  Return to normal activities tomorrow.  Written discharge   instructions were provided to the patient.   - Discharge patient to home.   - Resume previous diet.   - Continue present medications.   - Await pathology results.   - Repeat colonoscopy in 3 years for surveillance.  For questions, problems or results please call your physician - Sandor Alexander MD at Work:  [phone].  [facility], EMERGENCY ROOM PHONE NUMBER: [phone]  IF A COMPLICATION OR EMERGENCY SITUATION ARISES AND YOU ARE UNABLE TO REACH   YOUR PHYSICIAN - GO DIRECTLY TO THE EMERGENCY ROOM.  Sandor Alexander MD  4/24/2025 3:47:25 PM  This report has been verified and signed electronically.  Dear patient,  As a result of recent federal legislation (The Federal Cures Act), you may   receive lab or pathology results from your procedure in your [facility]   account before your physician is able to contact you. Your physician or   their representative will relay the results to you with their   recommendations at their soonest availability.  Thank you,  [employer]    3-calculated by average/Not able to assess (calculate score using [facility] averaging method)

## 2025-06-11 NOTE — BH CONSULTATION LIAISON ASSESSMENT NOTE - OTHER
difficult to understand possibly due to a language barrier or dental abnormalities.  difficulty to understand,

## 2025-06-11 NOTE — PROVIDER CONTACT NOTE (OTHER) - ACTION/TREATMENT ORDERED:
GINA Hyde made aware - passed information along to Dr. Garcia. No further instructions at this time. EPIFANIO Mercado and EPIFANIO Luke made aware - consulting ADN for further instructions

## 2025-06-11 NOTE — BH CONSULTATION LIAISON ASSESSMENT NOTE - DIFFERENTIAL
Adjustment Disorder   Major Neurocognitive Disorder   MDD delirium   Adjustment Disorder   Major Neurocognitive Disorder   MDD

## 2025-06-11 NOTE — BH CONSULTATION LIAISON ASSESSMENT NOTE - SUMMARY
89 y/o male, seen for suicide ideation without plan. He is AAOx4 and denies any previous or current SI. The pt denies any feelings of depression of previous psychiatric diagnosis. He has seen a psychiatrist in the past when he first joined  about 45 years ago.   89 y/o male, seen for suicide ideation without plan. He is AAOx4 and denies any previous or current SI. The pt denies any feelings of depression of previous psychiatric diagnosis. He has seen a psychiatrist in the past when he first joined  about 45 years ago and no known PPHx. Overall, the pt has a low suicide risk.  89 y/o, male, with PMHx of CAD s/p 2 bypasses, HTN, HLD, pre-DM, prostate CA, former alcoholic (full sustained remission for 45 years), who expressed SI to a nurse. The pt is currently admitted in medicine and psychiatry was consulted because of SI. He is AAOx4 and denies any previous or current SI. The pt denies any feelings of depression, or previous psychiatric diagnosis. He has seen a psychiatrist in the past when he first joined  about 45 years ago and no known PPHx. He is AAOx4 and was reoriented by the nurse in regards to the date. His registration is 3/3 and recall 1/3. The pt was unable to say the days of the week backwards but states "I came on Tuesday, and today is Wednesday."  When prompted to spell world backwards, he missed one letter. Overall, the patient has a low suicide risk.  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 Patient is currently on the Cardiology service for further workup and treatment. Psychiatry was consulted as the patient verbalized during the night passive SI (told the nursing staff that he would like to go to another country to access assisted suicide). On assessment patient presents AAOx4, denies active SI or depressed mood. He endorses frustration with his current medical symptoms and recent decline in functioning.  Per collateral from the primary team, patient was noticed to have some confusion in am, different from his baseline cognition (per son patient presents with mild cognitive deficits, without significant functional decline). Chart review shows that pt received during the night one dosage of diphenhydramine which likely contributed to the morning confusional state.  Plan:  -no current indication for 1:1 observation  -patient is not currently interested in psychiatric treatment, will continue to provide psychoeducation and support  -check folate, vitam B12, RPR;  -avoid deliriogenic medications   -for agitation associated with delirium/dementia, use quetiapine 25mg po q6h prn; monitor for qtc prolongation    - Delirium Precautions:  Orientation protocols: Provision of clocks, calendars, windows with outside views and frequent verbal reorientation of patients.  Avoiding and/or monitoring the use of problematic medications: Avoid benzodiazepines. Use caution when prescribing opioids, dihydropyridines, anticholinergics, antihistamines.  Cognitive stimulation: Regular visits from family and friends. Avoid overstimulation (particularly at night)Facilitation of physiologic sleep: Nursing and medical procedures, including the administration ofmedications, should be avoided during sleeping hours when possible. Night-time noise should be reduced.  Early mobilization and minimized use of physical restraints for patients with limited mobility.   Visual and hearing aids for patients with these impairments

## 2025-06-12 LAB
ALBUMIN SERPL ELPH-MCNC: 3.8 G/DL — SIGNIFICANT CHANGE UP (ref 3.3–5)
ALP SERPL-CCNC: 68 U/L — SIGNIFICANT CHANGE UP (ref 40–120)
ALT FLD-CCNC: 21 U/L — SIGNIFICANT CHANGE UP (ref 10–45)
ANION GAP SERPL CALC-SCNC: 13 MMOL/L — SIGNIFICANT CHANGE UP (ref 5–17)
ANION GAP SERPL CALC-SCNC: 14 MMOL/L — SIGNIFICANT CHANGE UP (ref 5–17)
ANION GAP SERPL CALC-SCNC: 16 MMOL/L — SIGNIFICANT CHANGE UP (ref 5–17)
AST SERPL-CCNC: 18 U/L — SIGNIFICANT CHANGE UP (ref 10–40)
BASOPHILS # BLD AUTO: 0.02 K/UL — SIGNIFICANT CHANGE UP (ref 0–0.2)
BASOPHILS NFR BLD AUTO: 0.4 % — SIGNIFICANT CHANGE UP (ref 0–2)
BILIRUB SERPL-MCNC: 0.4 MG/DL — SIGNIFICANT CHANGE UP (ref 0.2–1.2)
BUN SERPL-MCNC: 62 MG/DL — HIGH (ref 7–23)
BUN SERPL-MCNC: 66 MG/DL — HIGH (ref 7–23)
BUN SERPL-MCNC: 70 MG/DL — HIGH (ref 7–23)
CALCIUM SERPL-MCNC: 8.8 MG/DL — SIGNIFICANT CHANGE UP (ref 8.4–10.5)
CALCIUM SERPL-MCNC: 8.8 MG/DL — SIGNIFICANT CHANGE UP (ref 8.4–10.5)
CALCIUM SERPL-MCNC: 9.3 MG/DL — SIGNIFICANT CHANGE UP (ref 8.4–10.5)
CHLORIDE SERPL-SCNC: 102 MMOL/L — SIGNIFICANT CHANGE UP (ref 96–108)
CHLORIDE SERPL-SCNC: 102 MMOL/L — SIGNIFICANT CHANGE UP (ref 96–108)
CHLORIDE SERPL-SCNC: 104 MMOL/L — SIGNIFICANT CHANGE UP (ref 96–108)
CO2 SERPL-SCNC: 17 MMOL/L — LOW (ref 22–31)
CO2 SERPL-SCNC: 20 MMOL/L — LOW (ref 22–31)
CO2 SERPL-SCNC: 22 MMOL/L — SIGNIFICANT CHANGE UP (ref 22–31)
CREAT SERPL-MCNC: 2.97 MG/DL — HIGH (ref 0.5–1.3)
CREAT SERPL-MCNC: 2.98 MG/DL — HIGH (ref 0.5–1.3)
CREAT SERPL-MCNC: 3.1 MG/DL — HIGH (ref 0.5–1.3)
EGFR: 18 ML/MIN/1.73M2 — LOW
EGFR: 18 ML/MIN/1.73M2 — LOW
EGFR: 19 ML/MIN/1.73M2 — LOW
EOSINOPHIL # BLD AUTO: 0.31 K/UL — SIGNIFICANT CHANGE UP (ref 0–0.5)
EOSINOPHIL NFR BLD AUTO: 6.7 % — HIGH (ref 0–6)
GLUCOSE SERPL-MCNC: 102 MG/DL — HIGH (ref 70–99)
GLUCOSE SERPL-MCNC: 111 MG/DL — HIGH (ref 70–99)
GLUCOSE SERPL-MCNC: 121 MG/DL — HIGH (ref 70–99)
HCT VFR BLD CALC: 33.4 % — LOW (ref 39–50)
HGB BLD-MCNC: 11.4 G/DL — LOW (ref 13–17)
IMM GRANULOCYTES NFR BLD AUTO: 0.2 % — SIGNIFICANT CHANGE UP (ref 0–0.9)
LYMPHOCYTES # BLD AUTO: 1.1 K/UL — SIGNIFICANT CHANGE UP (ref 1–3.3)
LYMPHOCYTES # BLD AUTO: 23.8 % — SIGNIFICANT CHANGE UP (ref 13–44)
MAGNESIUM SERPL-MCNC: 2.1 MG/DL — SIGNIFICANT CHANGE UP (ref 1.6–2.6)
MAGNESIUM SERPL-MCNC: 2.2 MG/DL — SIGNIFICANT CHANGE UP (ref 1.6–2.6)
MCHC RBC-ENTMCNC: 28.8 PG — SIGNIFICANT CHANGE UP (ref 27–34)
MCHC RBC-ENTMCNC: 34.1 G/DL — SIGNIFICANT CHANGE UP (ref 32–36)
MCV RBC AUTO: 84.3 FL — SIGNIFICANT CHANGE UP (ref 80–100)
MONOCYTES # BLD AUTO: 0.47 K/UL — SIGNIFICANT CHANGE UP (ref 0–0.9)
MONOCYTES NFR BLD AUTO: 10.2 % — SIGNIFICANT CHANGE UP (ref 2–14)
NEUTROPHILS # BLD AUTO: 2.71 K/UL — SIGNIFICANT CHANGE UP (ref 1.8–7.4)
NEUTROPHILS NFR BLD AUTO: 58.7 % — SIGNIFICANT CHANGE UP (ref 43–77)
NRBC BLD AUTO-RTO: 0 /100 WBCS — SIGNIFICANT CHANGE UP (ref 0–0)
PHOSPHATE SERPL-MCNC: 4.8 MG/DL — HIGH (ref 2.5–4.5)
PHOSPHATE SERPL-MCNC: 5.3 MG/DL — HIGH (ref 2.5–4.5)
PLATELET # BLD AUTO: 166 K/UL — SIGNIFICANT CHANGE UP (ref 150–400)
POTASSIUM SERPL-MCNC: 4.9 MMOL/L — SIGNIFICANT CHANGE UP (ref 3.5–5.3)
POTASSIUM SERPL-MCNC: 5.1 MMOL/L — SIGNIFICANT CHANGE UP (ref 3.5–5.3)
POTASSIUM SERPL-MCNC: 5.6 MMOL/L — HIGH (ref 3.5–5.3)
POTASSIUM SERPL-SCNC: 4.9 MMOL/L — SIGNIFICANT CHANGE UP (ref 3.5–5.3)
POTASSIUM SERPL-SCNC: 5.1 MMOL/L — SIGNIFICANT CHANGE UP (ref 3.5–5.3)
POTASSIUM SERPL-SCNC: 5.6 MMOL/L — HIGH (ref 3.5–5.3)
PROT SERPL-MCNC: 6.9 G/DL — SIGNIFICANT CHANGE UP (ref 6–8.3)
RBC # BLD: 3.96 M/UL — LOW (ref 4.2–5.8)
RBC # FLD: 14.3 % — SIGNIFICANT CHANGE UP (ref 10.3–14.5)
SODIUM SERPL-SCNC: 135 MMOL/L — SIGNIFICANT CHANGE UP (ref 135–145)
SODIUM SERPL-SCNC: 137 MMOL/L — SIGNIFICANT CHANGE UP (ref 135–145)
SODIUM SERPL-SCNC: 138 MMOL/L — SIGNIFICANT CHANGE UP (ref 135–145)
WBC # BLD: 4.62 K/UL — SIGNIFICANT CHANGE UP (ref 3.8–10.5)
WBC # FLD AUTO: 4.62 K/UL — SIGNIFICANT CHANGE UP (ref 3.8–10.5)

## 2025-06-12 PROCEDURE — 99223 1ST HOSP IP/OBS HIGH 75: CPT

## 2025-06-12 PROCEDURE — 99222 1ST HOSP IP/OBS MODERATE 55: CPT

## 2025-06-12 PROCEDURE — 99233 SBSQ HOSP IP/OBS HIGH 50: CPT

## 2025-06-12 PROCEDURE — 99231 SBSQ HOSP IP/OBS SF/LOW 25: CPT

## 2025-06-12 PROCEDURE — 99358 PROLONG SERVICE W/O CONTACT: CPT | Mod: NC

## 2025-06-12 PROCEDURE — 71045 X-RAY EXAM CHEST 1 VIEW: CPT | Mod: 26

## 2025-06-12 RX ORDER — ASPIRIN 325 MG
81 TABLET ORAL DAILY
Refills: 0 | Status: DISCONTINUED | OUTPATIENT
Start: 2025-06-12 | End: 2025-06-13

## 2025-06-12 RX ADMIN — CLOPIDOGREL BISULFATE 75 MILLIGRAM(S): 75 TABLET, FILM COATED ORAL at 11:46

## 2025-06-12 RX ADMIN — Medication 81 MILLIGRAM(S): at 11:46

## 2025-06-12 RX ADMIN — HEPARIN SODIUM 5000 UNIT(S): 1000 INJECTION INTRAVENOUS; SUBCUTANEOUS at 21:28

## 2025-06-12 RX ADMIN — HEPARIN SODIUM 5000 UNIT(S): 1000 INJECTION INTRAVENOUS; SUBCUTANEOUS at 14:01

## 2025-06-12 RX ADMIN — TAMSULOSIN HYDROCHLORIDE 0.4 MILLIGRAM(S): 0.4 CAPSULE ORAL at 21:29

## 2025-06-12 RX ADMIN — POLYETHYLENE GLYCOL 3350 17 GRAM(S): 17 POWDER, FOR SOLUTION ORAL at 05:12

## 2025-06-12 RX ADMIN — HEPARIN SODIUM 5000 UNIT(S): 1000 INJECTION INTRAVENOUS; SUBCUTANEOUS at 05:12

## 2025-06-12 RX ADMIN — Medication 10 MILLIGRAM(S): at 11:45

## 2025-06-12 RX ADMIN — LATANOPROST PF 1 DROP(S): 0.05 SOLUTION/ DROPS OPHTHALMIC at 21:29

## 2025-06-12 RX ADMIN — Medication 2 TABLET(S): at 21:28

## 2025-06-12 RX ADMIN — METOPROLOL SUCCINATE 50 MILLIGRAM(S): 50 TABLET, EXTENDED RELEASE ORAL at 09:06

## 2025-06-12 RX ADMIN — ATORVASTATIN CALCIUM 40 MILLIGRAM(S): 80 TABLET, FILM COATED ORAL at 21:28

## 2025-06-12 RX ADMIN — Medication 25 MILLIGRAM(S): at 11:46

## 2025-06-12 NOTE — BH CONSULTATION LIAISON PROGRESS NOTE - NSBHFUPINTERVALHXFT_PSY_A_CORE
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 Patient is currently on the Cardiology service for further workup and treatment. Psychiatry was consulted as the patient verbalized during the night passive SI (told the nursing staff that he would like to go to another country to access assisted suicide).    The pt was seen awake, in hospital [facility], [facility]. When asked about his mood, the pt states, "I feel lousy, I am sick." The pt denies SI/HI/AH/VH. When asked if he would hurt himself, the pt states "I would never hurt myself" and he is just feeling frustrated with his acute medical condition. The pt denies any feelings of depression of previous psychiatric diagnosis. He has seen a psychiatrist in the past when he first joined  about 45 years ago but he was never prescribed any psychotropics. Per the son, the pt "gets frustrated when in the hospital" and noted that the pt is at his baseline.        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 Patient is currently on the Cardiology service for further workup and treatment. Psychiatry was consulted as the patient verbalized during the night passive SI (told the nursing staff that he would like to go to another country to access assisted suicide).    [facility] When asked about his mood, the pt states, "I feel lousy, I am sick." The pt denies SI/HI/AH/VH. When asked if he would hurt himself, the pt states "I would never hurt myself" and he is just feeling frustrated with his acute medical condition. The pt denies any feelings of depression of previous psychiatric diagnosis. Per the son, the pt "gets frustrated when in the hospital" and noted that the pt is at his baseline.

## 2025-06-12 NOTE — PHYSICAL THERAPY INITIAL EVALUATION ADULT - PERTINENT HX OF CURRENT PROBLEM, REHAB EVAL
89 year old male presented to [facility] with complaints of intermittent dizziness described as "room spinning" for several hours and progressively worsening ALEXANDER and SOB for several weeks. 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 TTE 6/11/25 revealed EF 45% global left ventricular hypokinesis, grade II LV diastolic dysfunction, left atrium dilation, and severe AS low-flow low-gradient (AV Mean Gradient: 17.0 mmHg). 6/12/25, structural heart consulted for patient's severe AS and current symptoms for possible surgical management.

## 2025-06-12 NOTE — OCCUPATIONAL THERAPY INITIAL EVALUATION ADULT - DIAGNOSIS, OT EVAL
Pt. admitted to [facility] for management of CHF exacerbation. Upon assessment, pt. demo impaired balance and generalized deconditioning impacting engagement in ADLs and functional mob/transfers.

## 2025-06-12 NOTE — BH CONSULTATION LIAISON PROGRESS NOTE - CURRENT MEDICATION
MEDICATIONS  (STANDING):  aspirin enteric coated 81 milliGRAM(s) Oral daily  atorvastatin 40 milliGRAM(s) Oral at bedtime  cetirizine 10 milliGRAM(s) Oral daily  clopidogrel Tablet 75 milliGRAM(s) Oral daily  heparin   Injectable 5000 Unit(s) SubCutaneous every 8 hours  latanoprost 0.005% Ophthalmic Solution 1 Drop(s) Both EYES at bedtime  metoprolol succinate ER 50 milliGRAM(s) Oral every 24 hours  polyethylene glycol 3350 17 Gram(s) Oral every 12 hours  senna 2 Tablet(s) Oral at bedtime  tamsulosin 0.4 milliGRAM(s) Oral at bedtime    MEDICATIONS  (PRN):  acetaminophen     Tablet .. 650 milliGRAM(s) Oral every 6 hours PRN Temp greater or equal to 38C (100.4F), Mild Pain (1 - 3)  meclizine 25 milliGRAM(s) Oral every 8 hours PRN Dizziness  melatonin 3 milliGRAM(s) Oral at bedtime PRN Insomnia  ondansetron Injectable 4 milliGRAM(s) IV Push every 8 hours PRN Nausea and/or Vomiting

## 2025-06-12 NOTE — OCCUPATIONAL THERAPY INITIAL EVALUATION ADULT - PERTINENT HX OF CURRENT PROBLEM, REHAB EVAL
89 year old male presented to [facility] with complaints of intermittent dizziness described as "room spinning" for several hours and progressively worsening ALEXANDER and SOB for several weeks. 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 TTE 6/11/25 revealed EF 45% global left ventricular hypokinesis, grade II LV diastolic dysfunction, left atrium dilation, and severe AS low-flow low-gradient (AV Mean Gradient: 17.0 mmHg). 6/12/25, structural heart consulted for patient's severe AS and current symptoms for possible

## 2025-06-12 NOTE — CONSULT NOTE ADULT - NSCONSULTADDITIONALINFOA_GEN_ALL_CORE
attending attestation: attempted to see pt on rounds on 6/13 but pt was off the floor. team will try again on 6/14.

## 2025-06-12 NOTE — PHYSICAL THERAPY INITIAL EVALUATION ADULT - IMPAIRMENTS CONTRIBUTING IMPAIRED BED MOBILITY, REHAB EVAL
dangle with contact guard, loss of balance posteriorly during LE dynamic activity/impaired balance/impaired postural control/decreased strength

## 2025-06-12 NOTE — CONSULT NOTE ADULT - ASSESSMENT
Assesment:  89 yo M former smoker PMHx CAD s/p PCI and MIDCAB 6/5/2014, HTN, HLD, pre-DM, CKD, prostate cancer s/p TURP, chronic hyponatremia, and depression, presented to [facility] with complaints of intermittent dizziness described as "room spinning" for several hours and progressively worsening ALEXANDER and SOB for several weeks. 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 TTE 6/11/25 revealed EF 45% global left ventricular hypokinesis, grade II LV diastolic dysfunction, left atrium dilation, and severe AS low-flow low-gradient (AV Mean Gradient: 17.0 mmHg, LVOT/aortic valve VTI ratio of 0.33). 6/12/25, structural heart consulted for patient's severe AS and current symptoms for possible surgical management.     Plan:  Problem 1: Severe AS low-flow, low-gradient   -TTE 6/11/25:  EF 45% global left ventricular hypokinesis, grade II LV diastolic dysfunction, left atrium dilation, and severe AS low-flow low-gradient (AV Mean Gradient: 17.0 mmHg, LVOT/aortic valve VTI ratio of 0.33)  -CTA head 6/10/25: no acute intracranial hemorrhage focal moderate-severe stenosis at origin of dominant left vertebral artery  -Discussion to be had with Dr. Gamez for structural heart surgical versus medical management  -c/w diuresis per primary team    Problem 2: acute on chronic CHF   TTE 11/29/23: Grade III left ventricular diastolic dysfunction, LVEF 58%. Mild-to-moderate aortic regurgitation.  Mild-to-moderate aortic stenosis. The proximal ascending aorta is dilated measuring 4.20 cm  -TTE 6/11/25: results above  -CXR 6/12/25: right pleural effusion  -c/w diuresis per primary team  -c/w Toprol 50mg once daily per primary team    Problem 3: CAD  -c/w Toprol 50mg once daily, plavix 75mg, ASA 81mg, atorvastatin 40mg     Problem 4: Ascending Aortic Dilation  -TTE 11/29/23: the proximal ascending aorta is dilated measuring 4.20 cm   -TTE 6/11/25: Aorta Measurements:  Ao Root d 3.80 cm (3.1 - 3.7 cm) 2.19 cm/mï¿½  Ao Asc d, 2D: 4.10  Ao Asc prox: 4.10 cm 2.36 cm/mï¿½  -stable from last TTE, will continue to monitor    I have reviewed clinical labs tests and reports, radiology tests and reports, as well as old patient medical records, and discussed with the referring physician.     Assesment:  89 yo M former smoker PMHx CAD s/p PCI and MIDCAB 6/5/2014, HTN, HLD, pre-DM, CKD, prostate cancer s/p TURP, chronic hyponatremia, and depression, presented to [facility] with complaints of intermittent dizziness described as "room spinning" for several hours and progressively worsening ALEXANDER and SOB for several weeks. 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 TTE 6/11/25 revealed EF 45% global left ventricular hypokinesis, grade II LV diastolic dysfunction, left atrium dilation, and severe AS low-flow low-gradient (AV Mean Gradient: 17.0 mmHg, LVOT/aortic valve VTI ratio of 0.33). 6/12/25, structural heart consulted for patient's severe AS and current symptoms for possible surgical management.     Plan:  Problem 1: Severe AS low-flow, low-gradient   -TTE 6/11/25:  EF 45% global left ventricular hypokinesis, grade II LV diastolic dysfunction, left atrium dilation, and severe AS low-flow low-gradient (AV Mean Gradient: 17.0 mmHg, LVOT/aortic valve VTI ratio of 0.33)  -CTA head 6/10/25: no acute intracranial hemorrhage focal moderate-severe stenosis at origin of dominant left vertebral artery  -c/w diuresis per primary team  -Per Dr. Inman, BAV + LHC possibly planned for 6/13/25 tomorrow with approval from son  -Spoke to son, Israel Cain, health proxy [phone] for possbile BAV + LHC tomorrow 6/13/25  -PST  -need 2x T&S + ABO    Problem 2: acute on chronic CHF   TTE 11/29/23: Grade III left ventricular diastolic dysfunction, LVEF 58%. Mild-to-moderate aortic regurgitation.  Mild-to-moderate aortic stenosis. The proximal ascending aorta is dilated measuring 4.20 cm  -TTE 6/11/25: results above  -CXR 6/12/25: right pleural effusion  -c/w diuresis per primary team  -c/w Toprol 50mg once daily per primary team    Problem 3: CAD  -c/w Toprol 50mg once daily, plavix 75mg, ASA 81mg, atorvastatin 40mg     Problem 4: Ascending Aortic Dilation  -TTE 11/29/23: the proximal ascending aorta is dilated measuring 4.20 cm   -TTE 6/11/25: Aorta Measurements:  Ao Root d 3.80 cm (3.1 - 3.7 cm) 2.19 cm/mï¿½  Ao Asc d, 2D: 4.10  Ao Asc prox: 4.10 cm 2.36 cm/mï¿½  -stable from last TTE, will continue to monitor    I have reviewed clinical labs tests and reports, radiology tests and reports, as well as old patient medical records, and discussed with the referring physician.     Assesment:  89 yo M former smoker PMHx CAD s/p PCI and MIDCAB 6/5/2014, HTN, HLD, pre-DM, CKD, prostate cancer s/p TURP, chronic hyponatremia, and depression, presented to [facility] with complaints of intermittent dizziness described as "room spinning" for several hours and progressively worsening ALEXANDER and SOB for several weeks. 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 TTE 6/11/25 revealed EF 45% global left ventricular hypokinesis, grade II LV diastolic dysfunction, left atrium dilation, and severe AS low-flow low-gradient (AV Mean Gradient: 17.0 mmHg, LVOT/aortic valve VTI ratio of 0.33). 6/12/25, structural heart consulted for patient's severe AS and current symptoms for possible surgical management.     Plan:  Problem 1: Severe AS low-flow, low-gradient   -TTE 6/11/25:  EF 45% global left ventricular hypokinesis, grade II LV diastolic dysfunction, left atrium dilation, and severe AS low-flow low-gradient (AV Mean Gradient: 17.0 mmHg, LVOT/aortic valve VTI ratio of 0.33)  -CTA head 6/10/25: no acute intracranial hemorrhage focal moderate-severe stenosis at origin of dominant left vertebral artery  -c/w diuresis per primary team  -Per Dr. Inman, BAV + LHC possibly planned for 6/13/25 tomorrow with approval from son  -Spoke to son, Israel Cain, health proxy [phone] for possbile BAV + LHC tomorrow 6/13/25  -PST  -need 2x T&S + ABO  -pending structural heart scans, NON CONTRAST  Problem 2: acute on chronic CHF   TTE 11/29/23: Grade III left ventricular diastolic dysfunction, LVEF 58%. Mild-to-moderate aortic regurgitation.  Mild-to-moderate aortic stenosis. The proximal ascending aorta is dilated measuring 4.20 cm  -TTE 6/11/25: results above  -CXR 6/12/25: right pleural effusion  -c/w diuresis per primary team  -c/w Toprol 50mg once daily per primary team    Problem 3: CAD  -c/w Toprol 50mg once daily, plavix 75mg, ASA 81mg, atorvastatin 40mg     Problem 4: Ascending Aortic Dilation  -TTE 11/29/23: the proximal ascending aorta is dilated measuring 4.20 cm   -TTE 6/11/25: Aorta Measurements:  Ao Root d 3.80 cm (3.1 - 3.7 cm) 2.19 cm/mï¿½  Ao Asc d, 2D: 4.10  Ao Asc prox: 4.10 cm 2.36 cm/mï¿½  -stable from last TTE, will continue to monitor    I have reviewed clinical labs tests and reports, radiology tests and reports, as well as old patient medical records, and discussed with the referring physician.     Assesment:  89 yo M former smoker PMHx CAD s/p PCI and MIDCAB 6/5/2014, HTN, HLD, pre-DM, CKD, prostate cancer s/p TURP, chronic hyponatremia, and depression, presented to [facility] with complaints of intermittent dizziness described as "room spinning" for several hours and progressively worsening ALEXANDER and SOB for several weeks. 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 TTE 6/11/25 revealed EF 45% global left ventricular hypokinesis, grade II LV diastolic dysfunction, left atrium dilation, and severe AS low-flow low-gradient (AV Mean Gradient: 17.0 mmHg, LVOT/aortic valve VTI ratio of 0.33). 6/12/25, structural heart consulted for patient's severe AS and current symptoms for possible surgical management.     Plan:  Problem 1: Severe AS low-flow, low-gradient   -TTE 6/11/25:  EF 45% global left ventricular hypokinesis, grade II LV diastolic dysfunction, left atrium dilation, and severe AS low-flow low-gradient (AV Mean Gradient: 17.0 mmHg, LVOT/aortic valve VTI ratio of 0.33)  -CTA head 6/10/25: no acute intracranial hemorrhage focal moderate-severe stenosis at origin of dominant left vertebral artery  -c/w diuresis per primary team  -Per Dr. Inman, BAV + LHC possibly planned for 6/13/25 tomorrow with approval from son  -Spoke to son, Israel Cain, health proxy [phone] for possbile BAV + LHC tomorrow 6/13/25  -PST  -need 2x T&S + ABO  -pending structural heart scans, NON CONTRAST  -NPO at [place]  Problem 2: acute on chronic CHF   TTE 11/29/23: Grade III left ventricular diastolic dysfunction, LVEF 58%. Mild-to-moderate aortic regurgitation.  Mild-to-moderate aortic stenosis. The proximal ascending aorta is dilated measuring 4.20 cm  -TTE 6/11/25: results above  -CXR 6/12/25: right pleural effusion  -c/w diuresis per primary team  -c/w Toprol 50mg once daily per primary team    Problem 3: CAD  -c/w Toprol 50mg once daily, plavix 75mg, ASA 81mg, atorvastatin 40mg     Problem 4: Ascending Aortic Dilation  -TTE 11/29/23: the proximal ascending aorta is dilated measuring 4.20 cm   -TTE 6/11/25: Aorta Measurements:  Ao Root d 3.80 cm (3.1 - 3.7 cm) 2.19 cm/mï¿½  Ao Asc d, 2D: 4.10  Ao Asc prox: 4.10 cm 2.36 cm/mï¿½  -stable from last TTE, will continue to monitor    I have reviewed clinical labs tests and reports, radiology tests and reports, as well as old patient medical records, and discussed with the referring physician.

## 2025-06-12 NOTE — PROGRESS NOTE ADULT - ASSESSMENT
89 y/o M former smoker w/ PMH of PMH of grade III diastolic dysfunction (pt and family deny dx of HF in past; TTE 11/2023 shows 3 diastolic dysfunction, EF 58%),, CAD s/p PCI and MIDCAB 6/5/2014 on Plavix, HTN, HLD, pre-DM, CKD, prostate Ca s/p TURP, depression and chronic hypoNa, p/w complaints of intermittent dizziness described as "room spinning" for several hours and progressively worsening ALEXANDER and SOB for several weeks. 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 Admitted to [facility] for further diuresis.

## 2025-06-12 NOTE — PROGRESS NOTE ADULT - PROBLEM SELECTOR PLAN 3
Baseline appears to be 1.7, on admission noted to be 2.17. Likely pre-renal iso vascular congestion due to HF exacerbation.   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 Likely pre-renal iso vascular congestion due to HF exacerbation. 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

## 2025-06-12 NOTE — PROGRESS NOTE ADULT - NS ATTEND AMEND GEN_ALL_CORE FT
Mr. Reyes is a 89M with hx of CAD with MIDCAB in 2014, HTN presented with dyspnea and SOB and presented with decompensated HF.     Exam:   NAD  JVP mildly elevated  bilateral crackles  S1, S2 RRR  WWP, no edema      Decompensated hf- hx of diastolic dysfunction on echo w/ hx of mild-mod AS. EF mildly reduced, Echo demonstrates severe AS with low flow low gradient AS, SVI 24.9, peak velocity 2.8, MG 17, SUMI 0.9cm2- could be etiology of dizziness. Structural evaluation  CAD- hx of MIDCAB on plavix  Dizziness- seen by neuro- vertebral stenosis  Asc Ao aneurysm- repeat TTE, on bb  CKD- Cr 2.1, baseline approx 1.7. Mr. Reyes is a 89M with hx of CAD with MIDCAB in 2014, HTN presented with dyspnea and SOB and presented with decompensated HF.     Exam:   NAD  JVP mildly elevated  bilateral crackles  S1, S2 RRR  WWP, no edema      Decompensated hf- hx of diastolic dysfunction on echo w/ hx of mild-mod AS. EF mildly reduced, Echo demonstrates severe AS with low flow low gradient AS, SVI 24.9, peak velocity 2.8, MG 17, SUMI 0.9cm2- could be etiology of dizziness. 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 2.9 today.

## 2025-06-12 NOTE — CONSULT NOTE ADULT - SUBJECTIVE AND OBJECTIVE BOX
HPI as per cardiology H&P: 89 y/o M former smoker w/ PMH of grade III diastolic dysfunction (pt and family deny dx of HF in past; TTE 11/2023 shows 3 diastolic dysfunction, EF 58%), CAD s/p PCI and MIDCAB 6/5/2014 on Plavix, ascending AA HTN, HLD, pre-DM, CKD, prostate Ca s/p TURP, depression and chronic hypoNa, p/w complaints of intermittent dizziness described as "room spinning" for several hours. He reports worsening of dizziness with lying flat or turning to the right and improved when upright. Stroke code was called in the ED although symptoms had resolved fully on arrival, imaging was ordered and findings of focal moderate-severe stenosis at origin of dominant left vertebral artery. Stroke recommending continuation of Plavix and statin. Pt then complained of SOB, son at bedside reports noticing today. Pt walks w/ cane at baseline, lives in a fourth floor walkup, states he has trouble getting up the stairs and has to stop frequently due to shortness of breath so he is more homebound in the last few months. On interview in the unit, it is difficult to illicit the nature of the patients symptoms however as described, he has been havening progressively worsening ALEXANDER but denies orthopnea, CP, syncope or edema.     Today, patient resting in chair with son at bedside.  Worked with PT with improvement in dizziness.  Denies HA, CP, SOB, abdominal pain, nausea, vomiting, fever, chills or diarrhea.  States he is urinating more frequently but without dysuria or suprapubic pain.  Denies HA, CP, SOB, abdominal pain, nausea, vomiting, fever, chills or diarrhea.     Objective:   Vital Signs Last 24 Hrs  T(C): 36.6 (12 Jun 2025 11:43), Max: 37 (12 Jun 2025 08:54)  T(F): 97.9 (12 Jun 2025 11:43), Max: 98.6 (12 Jun 2025 08:54)  HR: 71 (12 Jun 2025 11:43) (62 - 72)  BP: 102/52 (12 Jun 2025 11:43) (100/57 - 134/61)  BP(mean): 74 (12 Jun 2025 11:43) (66 - 89)  RR: 18 (12 Jun 2025 11:43) (15 - 18)  SpO2: 99% (12 Jun 2025 11:43) (94% - 100%)    Parameters below as of 12 Jun 2025 11:43  Patient On (Oxygen Delivery Method): room air    Physical Exam:   -Gen: NAD, resting in bed  -HEENT: EOMI, PERRL, no scleral icterus  -CV: normal S1 and S2  -Lungs: CTABL, normal respiratory effort on RA  -Ab: soft, NT, ND, normal BS  -Ext: no LE edema  -Neuro: A&O x 3, no focal deficits     Labs:                        11.4   4.62  )-----------( 166      ( 12 Jun 2025 05:30 )             33.4       06-12    135  |  102  |  62[H]  ----------------------------<  102[H]  5.1   |  20[L]  |  2.98[H]    Ca    8.8      12 Jun 2025 05:30  Phos  4.8     06-12  Mg     2.2     06-12    TPro  6.9  /  Alb  3.8  /  TBili  0.4  /  DBili  x   /  AST  18  /  ALT  21  /  AlkPhos  68  06-12    Medications:  MEDICATIONS  (STANDING):  aspirin enteric coated 81 milliGRAM(s) Oral daily  atorvastatin 40 milliGRAM(s) Oral at bedtime  cetirizine 10 milliGRAM(s) Oral daily  clopidogrel Tablet 75 milliGRAM(s) Oral daily  heparin   Injectable 5000 Unit(s) SubCutaneous every 8 hours  latanoprost 0.005% Ophthalmic Solution 1 Drop(s) Both EYES at bedtime  metoprolol succinate ER 50 milliGRAM(s) Oral every 24 hours  polyethylene glycol 3350 17 Gram(s) Oral every 12 hours  senna 2 Tablet(s) Oral at bedtime  tamsulosin 0.4 milliGRAM(s) Oral at bedtime    MEDICATIONS  (PRN):  acetaminophen     Tablet .. 650 milliGRAM(s) Oral every 6 hours PRN Temp greater or equal to 38C (100.4F), Mild Pain (1 - 3)  meclizine 25 milliGRAM(s) Oral every 8 hours PRN Dizziness  melatonin 3 milliGRAM(s) Oral at bedtime PRN Insomnia  ondansetron Injectable 4 milliGRAM(s) IV Push every 8 hours PRN Nausea and/or Vomiting

## 2025-06-12 NOTE — PHYSICAL THERAPY INITIAL EVALUATION ADULT - IMPAIRMENTS FOUND, PT EVAL
aerobic capacity/endurance/gait, locomotion, and balance/posture/ventilation and respiration/gas exchange

## 2025-06-12 NOTE — OCCUPATIONAL THERAPY INITIAL EVALUATION ADULT - PLANNED THERAPY INTERVENTIONS, OT EVAL
ADL retraining/IADL retraining/balance training/bed mobility training/neuromuscular re-education/parent/caregiver training.../ROM/transfer training

## 2025-06-12 NOTE — CONSULT NOTE ADULT - ASSESSMENT
Patient is a 89 year old male former smoker with PMHx grade III diastolic dysfunction, CAD s/p PCI and MIDCAB 6/5/2014 on Plavix, ascending AA HTN, HLD, pre-DM, CKD, prostate Ca s/p TURP, depression and chronic hypoNa, p/w complaints of intermittent dizziness described as "room spinning" for several hours. Most recent imaging shows left vertebral stenosis.     Peripheral vs central vertigo  Patient reports positional symptoms when turning to the right. BPPV vs Meineires. No hearing changes. Less likely vestibular neuritis since episodes have resolved intermittently.   Central less likely due to lack of focal neurological deficits and patient reports resolution of symptoms on its own.     #Dizziness   Patient is a 89 year old male former smoker with PMHx grade III diastolic dysfunction, CAD s/p PCI and MIDCAB 6/5/2014 on Plavix, ascending AA HTN, HLD, pre-DM, CKD, prostate Ca s/p TURP, depression and chronic hypoNa, p/w complaints of intermittent positional "room spinning" dizziness when turning to the right, each lasting less than a minute. Most recent imaging shows left vertebral stenosis. Suspect BPPV given clinical presentation and exam without hearing. Central etiology unlikely due to lack of focal neurological deficits and patient reports resolution of symptoms on its own, with further improvement with Meclizine use.      Recommendations:  - reeducate patient of triggers of positional vertigo  - meclizine 25mg PO PRN use  - should symptoms persist, outpatient vestibular therapy and ENT evaluation  - obtain orthostatics to further rule out other causes

## 2025-06-12 NOTE — CONSULT NOTE ADULT - ASSESSMENT
89-year-old male with a PMhx of HFpEF, CAD (s/p PCI, on Plavix), HTN, HLD, CKD (baseline Cr 1.7), prostate cancer (s/p TURP), depression and chronic hyponatremia who presented with acute on chronic HFpEF exacerbation.      #Acute on Chronic HFpEF Exacerbation    -further management as per cardiology    -currently on Metoprolol Succinate 50mg daily      #CAD  -continue with ASA 81mg daily, Plavix 75mg daily and Atorvastatin 40mg daily      #Severe AS   -structural heart consulted, follow up recommendations       #MARIYA on CKD    -urine studies suggestive of intrinsic disease, likely related to aggressive diuresis on admission   -obtain bladder scan and renal US, repeat UA/urine electrolytes today    -recommend holding diuresis and monitor Cr, consider nephrology consult pending trend off diuresis    -renally dose medications, avoid nephrotoxic agents      #Dizziness    -stroke code called on admission, stroke signed off, recommend DAPT and statin for stroke prevention    -general neurology consulted, follow up recommendations     -continue with PRN Meclizine      #Prostate Cancer (s/p TURP)   -continue with tamsulosin 0.4mg daily      #Passive SI    -psych consulted, appreciate recommendations

## 2025-06-12 NOTE — CONSULT NOTE ADULT - SUBJECTIVE AND OBJECTIVE BOX
Physician: Storm Latham    Requesting Physician: Iqra Griggs     HISTORY OF PRESENT ILLNESS (Need 4):  89 yo M former smoker PMHx CAD s/p PCI 2015 and MIDCAB 6/5/2014, HTN, HLD, pre-DM, CKD, prostate cancer s/p TURP, chronic hyponatremia, and depression, presented to [facility] with complaints of intermittent dizziness described as "room spinning" for several hours and progressively worsening ALEXANDER and SOB for several weeks. 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 TTE 6/11/25 revealed EF 45% global left ventricular hypokinesis, grade II LV diastolic dysfunction, left atrium dilation, and severe AS low-flow low-gradient (AV Mean Gradient: 17.0 mmHg). 6/12/25, structural heart consulted for patient's severe AS and current symptoms for possible surgical management.     PAST MEDICAL & SURGICAL HISTORY:  Type 2 diabetes mellitus  DM (diabetes mellitus), type 2      Malignant neoplasm of prostate  Prostate cancer      Acute myocardial infarction  Patient denies MI      Type 2 diabetes mellitus  Diabetes      Hyperlipidemia  Hyperlipidemia      Essential hypertension  Hypertension      Other postprocedural status  History of penile implant      Status post aorto-coronary artery bypass graft  S/P CABG (coronary artery bypass graft)      Bilateral cataracts      H/O transurethral destruction of bladder lesion      S/p bilateral blepharoplasty    MEDICATIONS  (STANDING):  aspirin enteric coated 81 milliGRAM(s) Oral daily  atorvastatin 40 milliGRAM(s) Oral at bedtime  cetirizine 10 milliGRAM(s) Oral daily  clopidogrel Tablet 75 milliGRAM(s) Oral daily  heparin   Injectable 5000 Unit(s) SubCutaneous every 8 hours  latanoprost 0.005% Ophthalmic Solution 1 Drop(s) Both EYES at bedtime  metoprolol succinate ER 50 milliGRAM(s) Oral every 24 hours  polyethylene glycol 3350 17 Gram(s) Oral every 12 hours  senna 2 Tablet(s) Oral at bedtime  tamsulosin 0.4 milliGRAM(s) Oral at bedtime    MEDICATIONS  (PRN):  acetaminophen     Tablet .. 650 milliGRAM(s) Oral every 6 hours PRN Temp greater or equal to 38C (100.4F), Mild Pain (1 - 3)  meclizine 25 milliGRAM(s) Oral every 8 hours PRN Dizziness  melatonin 3 milliGRAM(s) Oral at bedtime PRN Insomnia  ondansetron Injectable 4 milliGRAM(s) IV Push every 8 hours PRN Nausea and/or Vomiting      Allergies    No Known Drug Allergies  Originally Entered as [Rash-General] reaction to [Other][Ragweed] (Rash)  ragweed and birch (Unknown)  Originally Entered as [Rash-General] reaction to [Other][Bush] (Rash)    Intolerances        SOCIAL HISTORY:  Smoker:  Former        PACK YEARS:                         WHEN QUIT?  Assisted device use (Cane / Walker): Uses cane    FAMILY HISTORY:  FH: alcohol abuse  mother; father's hx is unknown    Review of Systems (Need 10):  CONSTITUTIONAL: Denies fevers / chills, sweats, fatigue, weight loss, weight gain                                       NEURO:  Denies parathesias, seizures, syncope, confusion                                                                                  EYES:  Denies blurry vision, discharge, pain, loss of vision                                                                                    ENMT:  Denies difficulty hearing, vertigo, dysphagia, epistaxis, recent dental work                                       CV:  Denies chest pain, palpitations, ALEXANDER, orthopnea                                                                                           RESPIRATORY:  Denies wWheezing, SOB, cough / sputum, hemoptysis                                                               GI:  Denies nausea, vomiting, diarrhea, constipation, melena                                                                          : Denies hematuria, dysuria, urgency, incontinence                                                                                          MUSKULOSKELETAL:  Denies arthritis, joint swelling, muscle weakness                                                             SKIN/BREAST:  Denies rash, itching, hair loss, masses                                                                                              PSYCH:  Denies depression, anxiety, suicidal ideation                                                                                                HEME/LYMPH:  Denies bruises easily, enlarged lymph nodes, tender lymph nodes                                          ENDOCRINE:  Denies cold intolerance, heat intolerance, polydipsia                                                                      Vital Signs Last 24 Hrs  T(C): 36.6 (12 Jun 2025 11:43), Max: 37 (12 Jun 2025 08:54)  T(F): 97.9 (12 Jun 2025 11:43), Max: 98.6 (12 Jun 2025 08:54)  HR: 71 (12 Jun 2025 11:43) (62 - 71)  BP: 102/52 (12 Jun 2025 11:43) (100/57 - 134/61)  BP(mean): 74 (12 Jun 2025 11:43) (66 - 89)  RR: 18 (12 Jun 2025 11:43) (15 - 18)  SpO2: 99% (12 Jun 2025 11:43) (94% - 100%)    Parameters below as of 12 Jun 2025 11:43  Patient On (Oxygen Delivery Method): room air    Physical Exam (Need 8)  CONSTITUTIONAL: Well appearing in NAD assessed sitting comfortably in chair  NEURO: A&OX3. No focal deficits noted, moving bilateral upper and lower extremities                    CV: RRR, no murmurs, rubs, gallops  RESPIRATORY: Clear to auscultation bilateral posterior lung fields, no wheezes, rales, rhonchi   GI: +BS, NT/ND  MUSCULOSKELETAL: No peripheral edema or calf tenderness. Full strength and ROM bilateral upper and lower extremities VASCULAR: Bilateral distal pulses palpable   INCISIONS: thoracotomy incision s/p CABG 2014, right femoral incision s/p stent placement 2015                                                             LABS:                        11.4   4.62  )-----------( 166      ( 12 Jun 2025 05:30 )             33.4     06-12    135  |  102  |  62[H]  ----------------------------<  102[H]  5.1   |  20[L]  |  2.98[H]    Ca    8.8      12 Jun 2025 05:30  Phos  4.8     06-12  Mg     2.2     06-12    TPro  6.9  /  Alb  3.8  /  TBili  0.4  /  DBili  x   /  AST  18  /  ALT  21  /  AlkPhos  68  06-12    PT/INR - ( 10 Alexander 2025 18:10 )   PT: 11.4 sec;   INR: 0.99          PTT - ( 10 Alexander 2025 18:10 )  PTT:28.6 sec  Urinalysis Basic - ( 12 Jun 2025 05:30 )    Color: x / Appearance: x / SG: x / pH: x  Gluc: 102 mg/dL / Ketone: x  / Bili: x / Urobili: x   Blood: x / Protein: x / Nitrite: x   Leuk Esterase: x / RBC: x / WBC x   Sq Epi: x / Non Sq Epi: x / Bacteria: x    RADIOLOGY & ADDITIONAL STUDIES:  < from: TTE Echo Complete w/ Contrast w/o Doppler (06.11.25 @ 10:11) >  CONCLUSIONS:     1. Left ventricular wall thickness is severely increased. Left   ventricular systolic function is mildly decreased with an ejection   fraction of 45 % by Alves's method of disks. Global left ventricular   hypokinesis.   2. There is moderate (grade 2) left ventricular diastolic dysfunction,   with elevated left ventricular filling pressure.   3. Normal right ventricular cavity size and normal right ventricular   systolic function.   4. Left atrium is moderately dilated.   5. Severe aortic stenosis. low flow, low gradient aortic stenosis with   reduced EF. Left ventricular stroke volume is 43.2 ml ;left ventricular   stroke volume index is 24.9 ml/m².   6. The peak transaortic velocity is 2.87 m/s, peak transaortic gradient   is 32.9 mmHg and mean transaortic gradient is 17.0 mmHg with an   LVOT/aortic valve VTI ratio of 0.33. The effective orifice area is   estimated at 0.90 cm² by the continuity equation and indexed at 0.52   cm²/m².   7. Mild aortic regurgitation.   8. Compared to the transthoracic echocardiogram performed on 11/29/2023,   aortic valve regurgitation severity and left ventricular systolic   function are worse.    < end of copied text >  < from: TTE Echo Complete w/ Contrast w/o Doppler (06.11.25 @ 10:11) >  Aortic Valve Measurements:  AV Vmax:                2.9 m/s  AV Peak Gradient:       32.9 mmHg  AV Mean Gradient:       17.0 mmHg  AV VTI:                 65.6 cm  AV VTI Ratio:           0.33  AoV EOA, Contin:        0.90 cm²  AoV EOA, Contin i:      0.52 cm²/m²  AoV area, (CE):         0.90 cm²  AoV area, (CE), i:  0.52 cm²/m²  AoV Dimensionless Index 0.33  AR Vmax                 3.89 m/s  AR PHT                  445 msec  AR [place]                2.56 m/s²    < end of copied text >  < from: Xray Chest 1 View- PORTABLE-Urgent (Xray Chest 1 View- PORTABLE-Urgent .) (06.12.25 @ 09:48) >  IMPRESSION:    Pulmonary vascular congestion appears improving compared to prior exam   6/10/2025. Small right pleural effusion similar to partially improved. No   acute infiltrate appearing. No pneumothorax.    < end of copied text >  < from: CT Angio Neck Stroke Protocol w/ IV Cont (06.10.25 @ 18:32) >  FINDINGS:    NONCONTRAST HEAD CT:  There is age-appropriate parenchymal volume loss, mildly increased as   expected since 2020. No hydrocephalus, mass effect or midline shift.    No acute intracranial hemorrhage or extra-axial fluid collection.    Gray to white differentiation appears preserved, without CT evidence of   recent transcortical or territorial infarct. White matter   microangiopathic changes in cerebral hemispheres are approximately   stable. No recent appearing infarct. No gross cerebellar abnormality.    Bone algorithm images show no calvarial fracture or acute abnormality of   the calvarium or skull base. Paranasal sinuses and mastoids included on   this scan show no acute disease.    Negative findings above discussed with GINA Maguire at 6:18 PM on 06/10/2025.    CT PERFUSION:  There is no reported defect in CBF or region of elevated Tmax > 6   seconds, nor mismatch in volume thereof to indicate ischemia or penumbra.    CTA BRAIN:  The internal carotid arteries are patent at the skull base and   intracranial compartment, without occlusion or significant stenosis   despite atherosclerotic calcification.    The anterior and middle cerebral arteries are patent at their 1st and 2nd   order segments, without branch occlusion or high-grade stenosis.    Posterior circulation is similarly patent. Both intracranial vertebral   arteries, the basilar artery and both posterior cerebral arteries are   nonstenotic. Left vertebral artery is dominant.    There is no site of aneurysm within the resolution limitations of CTA.    The dural venous sinuses appear contrast-filled; no filling defect   indicate venous thrombosis.    CTA NECK:  The aortic arch is normal caliber. There is standard 3 vessel   configuration, without great vessel stenosis or evidence of dissection.    Both common carotid arteries are patent and nonstenotic up to the   bifurcations.    Both internal carotid arteries are not significantly stenotic at origins   despite calcified plaque, and both more normal caliber and contour up to   skull base without evidence of dissection.    Vertebral arteries: Patent throughout from origin to foramen magnum. No   hemodynamically significant stenosis or evidence of dissection, aside   from focal moderate-severe stenosis at origin of the left side origin.   Left is dominant.    Partially imaged lungs include right pleural confusion, moderate size in   part loculated. Smaller left pleural effusion present. These are new   findings compared to chest radiographs 12/16/2024. Compared to chest CT   from 10/17/2022, groundglass opacity at right lung apex is not   significantly larger but slightly more solid-appearing. New sites of   groundglass opacity at periphery is likely subsegmental atelectasis.   Small airway infection entirely excluded given some bronchial wall   thickening visible as well.      IMPRESSION:    CT HEAD:  No acute intracranial hemorrhage, mass effect or demarcated infarct   comparing to 1/2020.    CT PERFUSION:  No deficits reported.    CTA INTRACRANIAL:  No arterial steno-occlusive disease or evidence aneurysm.    CTA EXTRACRANIAL:    Focal moderate-severe stenosis at origin of dominant left vertebral   artery. Relation to dizziness is plausible.    No significant carotid stenosis despite plaque.    Right more than left pleural effusions, and right airspace opacities   likely infectious/inflammatory. Lesion at right lung apex also noted like   on CT from 10/2022. **Correlate with symptoms and consider chest CT for   further evaluation.**    < end of copied text >

## 2025-06-12 NOTE — BH CONSULTATION LIAISON PROGRESS NOTE - NSBHCHARTREVIEWVS_PSY_A_CORE FT
Vital Signs Last 24 Hrs  T(C): 37 (12 Jun 2025 08:54), Max: 37 (12 Jun 2025 08:54)  T(F): 98.6 (12 Jun 2025 08:54), Max: 98.6 (12 Jun 2025 08:54)  HR: 71 (12 Jun 2025 08:54) (62 - 71)  BP: 134/61 (12 Jun 2025 08:54) (100/57 - 134/61)  BP(mean): 89 (12 Jun 2025 08:54) (66 - 90)  RR: 18 (12 Jun 2025 08:54) (15 - 18)  SpO2: 100% (12 Jun 2025 08:54) (94% - 100%)    Parameters below as of 12 Jun 2025 08:54  Patient On (Oxygen Delivery Method): room air

## 2025-06-12 NOTE — PROGRESS NOTE ADULT - SUBJECTIVE AND OBJECTIVE BOX
OVERNIGHT EVENTS:    SUBJECTIVE / INTERVAL HPI: Patient seen and examined at bedside.     VITAL SIGNS:  Vital Signs Last 24 Hrs  T(C): 37 (12 Jun 2025 08:54), Max: 37 (12 Jun 2025 08:54)  T(F): 98.6 (12 Jun 2025 08:54), Max: 98.6 (12 Jun 2025 08:54)  HR: 71 (12 Jun 2025 08:54) (62 - 71)  BP: 134/61 (12 Jun 2025 08:54) (100/57 - 134/61)  BP(mean): 89 (12 Jun 2025 08:54) (66 - 90)  RR: 18 (12 Jun 2025 08:54) (15 - 18)  SpO2: 100% (12 Jun 2025 08:54) (94% - 100%)    Parameters below as of 12 Jun 2025 08:54  Patient On (Oxygen Delivery Method): room air      I&O's Summary    11 Jun 2025 07:01  -  12 Jun 2025 07:00  --------------------------------------------------------  IN: 240 mL / OUT: 1325 mL / NET: -1085 mL        PHYSICAL EXAM:    General: NAD  HEENT: NC/AT; PERRL, anicteric sclera; MMM  Neck: supple  Cardiovascular: +S1/S2; RRR  Respiratory: CTA B/L; no W/R/R  Gastrointestinal: soft, NT/ND; +BSx4  Extremities: WWP; no edema, clubbing or cyanosis  Vascular: 2+ radial, DP/PT pulses B/L  Neurological: AAOx3; no focal deficits    MEDICATIONS:  MEDICATIONS  (STANDING):  atorvastatin 40 milliGRAM(s) Oral at bedtime  cetirizine 10 milliGRAM(s) Oral daily  clopidogrel Tablet 75 milliGRAM(s) Oral daily  heparin   Injectable 5000 Unit(s) SubCutaneous every 8 hours  latanoprost 0.005% Ophthalmic Solution 1 Drop(s) Both EYES at bedtime  metoprolol succinate ER 50 milliGRAM(s) Oral every 24 hours  polyethylene glycol 3350 17 Gram(s) Oral every 12 hours  senna 2 Tablet(s) Oral at bedtime  tamsulosin 0.4 milliGRAM(s) Oral at bedtime    MEDICATIONS  (PRN):  acetaminophen     Tablet .. 650 milliGRAM(s) Oral every 6 hours PRN Temp greater or equal to 38C (100.4F), Mild Pain (1 - 3)  meclizine 25 milliGRAM(s) Oral every 8 hours PRN Dizziness  melatonin 3 milliGRAM(s) Oral at bedtime PRN Insomnia  ondansetron Injectable 4 milliGRAM(s) IV Push every 8 hours PRN Nausea and/or Vomiting      ALLERGIES:  Allergies    No Known Drug Allergies  Originally Entered as [Rash-General] reaction to [Other][Ragweed] (Rash)  ragweed and birch (Unknown)  Originally Entered as [Rash-General] reaction to [Other][Bush] (Rash)    Intolerances        LABS:                        11.4   4.62  )-----------( 166      ( 12 Jun 2025 05:30 )             33.4     06-12    135  |  102  |  62[H]  ----------------------------<  102[H]  5.1   |  20[L]  |  2.98[H]    Ca    8.8      12 Jun 2025 05:30  Phos  4.8     06-12  Mg     2.2     06-12    TPro  6.9  /  Alb  3.8  /  TBili  0.4  /  DBili  x   /  AST  18  /  ALT  21  /  AlkPhos  68  06-12    PT/INR - ( 10 Alexander 2025 18:10 )   PT: 11.4 sec;   INR: 0.99          PTT - ( 10 Alexander 2025 18:10 )  PTT:28.6 sec  Urinalysis Basic - ( 12 Jun 2025 05:30 )    Color: x / Appearance: x / SG: x / pH: x  Gluc: 102 mg/dL / Ketone: x  / Bili: x / Urobili: x   Blood: x / Protein: x / Nitrite: x   Leuk Esterase: x / RBC: x / WBC x   Sq Epi: x / Non Sq Epi: x / Bacteria: x      CAPILLARY BLOOD GLUCOSE      POCT Blood Glucose.: 126 mg/dL (11 Jun 2025 12:07)      RADIOLOGY & ADDITIONAL TESTS: Reviewed.   OVERNIGHT EVENTS: no acute events    SUBJECTIVE / INTERVAL HPI: Patient seen and examined at bedside. Still feeling very dizzy this morning. Did not receive meclizine     VITAL SIGNS:  Vital Signs Last 24 Hrs  T(C): 37 (12 Jun 2025 08:54), Max: 37 (12 Jun 2025 08:54)  T(F): 98.6 (12 Jun 2025 08:54), Max: 98.6 (12 Jun 2025 08:54)  HR: 71 (12 Jun 2025 08:54) (62 - 71)  BP: 134/61 (12 Jun 2025 08:54) (100/57 - 134/61)  BP(mean): 89 (12 Jun 2025 08:54) (66 - 90)  RR: 18 (12 Jun 2025 08:54) (15 - 18)  SpO2: 100% (12 Jun 2025 08:54) (94% - 100%)    Parameters below as of 12 Jun 2025 08:54  Patient On (Oxygen Delivery Method): room air      I&O's Summary    11 Jun 2025 07:01  -  12 Jun 2025 07:00  --------------------------------------------------------  IN: 240 mL / OUT: 1325 mL / NET: -1085 mL        PHYSICAL EXAM:  GENERAL: elederly gentleman in NAD  HEAD:  Atraumatic, Normocephalic  EYES: EOMI, PERRLA, conjunctiva and sclera clear  ENT: Moist mucous membranes  NECK: Supple, +JVD  CHEST/LUNG: Reduced air movement at bases; No rales, rhonchi, wheezing, or rubs. Unlabored respirations  HEART: systolic murmur noted at all auscultation points  ABDOMEN: Bowel sounds present; Soft, Nontender, Nondistended. No hepatomegally  EXTREMITIES:  2+ Peripheral Pulses, brisk capillary refill. No clubbing, cyanosis, or edema  NERVOUS SYSTEM:  Alert & Oriented X3, speech clear. No deficits   MSK: FROM all 4 extremities, full and equal strength  SKIN: No rashes or lesions      MEDICATIONS:  MEDICATIONS  (STANDING):  atorvastatin 40 milliGRAM(s) Oral at bedtime  cetirizine 10 milliGRAM(s) Oral daily  clopidogrel Tablet 75 milliGRAM(s) Oral daily  heparin   Injectable 5000 Unit(s) SubCutaneous every 8 hours  latanoprost 0.005% Ophthalmic Solution 1 Drop(s) Both EYES at bedtime  metoprolol succinate ER 50 milliGRAM(s) Oral every 24 hours  polyethylene glycol 3350 17 Gram(s) Oral every 12 hours  senna 2 Tablet(s) Oral at bedtime  tamsulosin 0.4 milliGRAM(s) Oral at bedtime    MEDICATIONS  (PRN):  acetaminophen     Tablet .. 650 milliGRAM(s) Oral every 6 hours PRN Temp greater or equal to 38C (100.4F), Mild Pain (1 - 3)  meclizine 25 milliGRAM(s) Oral every 8 hours PRN Dizziness  melatonin 3 milliGRAM(s) Oral at bedtime PRN Insomnia  ondansetron Injectable 4 milliGRAM(s) IV Push every 8 hours PRN Nausea and/or Vomiting      ALLERGIES:  Allergies    No Known Drug Allergies  Originally Entered as [Rash-General] reaction to [Other][Ragweed] (Rash)  ragweed and birch (Unknown)  Originally Entered as [Rash-General] reaction to [Other][Bush] (Rash)    Intolerances        LABS:                        11.4   4.62  )-----------( 166      ( 12 Jun 2025 05:30 )             33.4     06-12    135  |  102  |  62[H]  ----------------------------<  102[H]  5.1   |  20[L]  |  2.98[H]    Ca    8.8      12 Jun 2025 05:30  Phos  4.8     06-12  Mg     2.2     06-12    TPro  6.9  /  Alb  3.8  /  TBili  0.4  /  DBili  x   /  AST  18  /  ALT  21  /  AlkPhos  68  06-12    PT/INR - ( 10 Aelxander 2025 18:10 )   PT: 11.4 sec;   INR: 0.99          PTT - ( 10 Alexander 2025 18:10 )  PTT:28.6 sec  Urinalysis Basic - ( 12 Jun 2025 05:30 )    Color: x / Appearance: x / SG: x / pH: x  Gluc: 102 mg/dL / Ketone: x  / Bili: x / Urobili: x   Blood: x / Protein: x / Nitrite: x   Leuk Esterase: x / RBC: x / WBC x   Sq Epi: x / Non Sq Epi: x / Bacteria: x      CAPILLARY BLOOD GLUCOSE      POCT Blood Glucose.: 126 mg/dL (11 Jun 2025 12:07)      RADIOLOGY & ADDITIONAL TESTS: Reviewed.

## 2025-06-12 NOTE — PROGRESS NOTE ADULT - PROBLEM SELECTOR PLAN 1
TTE 11/29/23: Grade III left ventricular diastolic dysfunction, LVEF 58%. Mild-to-moderate aortic regurgitation.  Mild-to-moderate aortic stenosis. The proximal ascending aorta is dilated measuring 4.20 cm.  EKG: NSR w/ PVC and low voltage @ HR 61, QTc 440  CXR: b/l pleural effusions + pulmonary congestion  Trop 47 << 48; pBNP 16092; s,p lasix 40mg IVx1  Home meds: metoprolol succinate 50mg PO qd (unclear why pt is prescribed)    Plan:  - s/p Lasix 40mg IV in AM, gave additional Lasix 80 IVPx1  - Cont toprol xl 50mg PO qd  - F/U lipids, A1c, TSH  - Goal net -2L in 24hrs  - Obtain TTE  - Obtain collateral from o/p cardiologist; Keyona Sinha MD   - Confirm med rec -- current based on Surescripts but son and pt unaware of what is taken and what for TTE 6/11/25 EF 45% moderate (grade 2) left ventricular diastolic dysfunction, with elevated left ventricular filling  pressure. Compared to the transthoracic echocardiogram performed on 11/29/2023, aortic valve regurgitation  severity and left ventricular systolic function are worse.  EKG: NSR w/ PVC and low voltage @ HR 61, QTc 440  CXR improved on 6/12  Trop 47 << 48; pBNP 59577; s,p lasix 40mg IVx1  Home meds: metoprolol succinate 50mg PO qd (unclear why pt is prescribed)  Euvolemic on exam  Plan:  - s/p Lasix 40mg IV in AM, gave additional Lasix 80 IVPx1  - Cont toprol xl 50mg PO qd  - Goal net -2L in 24hrs  - Obtain collateral from o/p cardiologist; Keyona Sinha MD   - Confirm med rec -- current based on Surescripts but son and pt unaware of what is taken and what for  - consider structural heart consult based on convo with son

## 2025-06-12 NOTE — PROGRESS NOTE ADULT - PROBLEM SELECTOR PLAN 2
Resolved on presentation to ED, however was pt's presenting complaint. Stroke code called in ED, recommending continuation of home meds as NIHSS 0. s/p meclizine 25mg PO x1  CTA EXTRACRANIAL: Focal moderate-severe stenosis at origin of dominant left vertebral  artery    Plan:  - Meclizine 25mg PO q8hrs PRN   - Cont clopidogrel 75mg PO qd  - Cont atorvastatin 40mg PO qhs  - Appreciate stroke team recs Resolved on presentation to ED, however was pt's presenting complaint. Stroke code called in ED, recommending continuation of home meds as NIHSS 0. s/p meclizine 25mg PO x1  CTA EXTRACRANIAL: Focal moderate-severe stenosis at origin of dominant left vertebral  artery    Plan:  - Meclizine 25mg PO q8hrs PRN   - Cont clopidogrel 75mg PO qd and aspirin 81mg daily  - Cont atorvastatin 40mg PO qhs  - Appreciate stroke team recs

## 2025-06-12 NOTE — OCCUPATIONAL THERAPY INITIAL EVALUATION ADULT - MODIFIED CLINICAL TEST OF SENSORY INTEGRATION IN BALANCE TEST
performed functional mob w. Min Ax2 and BUE support on portable monitor, unsteady, demo reduced safety awareness with turning

## 2025-06-12 NOTE — CONSULT NOTE ADULT - SUBJECTIVE AND OBJECTIVE BOX
---------General Neurology Consult Note----------    Patient is a 89y old male former smoker with PMH grade III diastolic dysfunction (pt and family deny dx of HF in past; TTE 11/2023 shows 3 diastolic dysfunction, EF 58%), CAD s/p PCI and MIDCAB 6/5/2014 on ASA/Plavix, ascending AA HTN, HLD, pre-DM, CKD, prostate Ca s/p TURP, depression and chronic hypoNa, p/w complaints of intermittent dizziness described as "room spinning" for several hours. Neurology consulted for dizziness. Patient stated he began feeling dizzy over the last 2 weeks which is triggered when turning to the right. He states it happens while in bed and he describes the sensation as "room spinning". He says the episodes last a few seconds to minute at a time, but have been recurrent over the last 2 weeks. He has not had any hearing changes, nausea/vomiting, headache or chest pain. He endorses flashes of light, intermittent blurry vision during the episode, all of which self-resolve briefly after.       In the ED:  Initial vital signs: T: 97.3F, HR: 76, BP: 162/78, R: 18, SpO2: 95% on RA  Labs: significant for Hgb/Ht 11.1/32.3, Na 134, HCO3 18, BUN 43, Cr 2.14, GFR 28, pBNP 86017  Imaging:  CXR: B/L pulmonary edema and pleural effusions  CT HEAD: No acute intracranial hemorrhage, mass effect or demarcated infarct comparing to 1/2020.  CT PERFUSION: No deficits reported.  CTA INTRACRANIAL: No arterial steno-occlusive disease or evidence aneurysm.  CTA EXTRACRANIAL: new Focal moderate-severe stenosis at origin of dominant left vertebral  artery  EKG: NSR w/ PVC and low voltage @ HR 61, QTc 440  Medications: meclizine 25mg, furosemide 40mg IV, diphenhydramine 25mg, atorvastatin 80mg  Consults: Stroke (10 Alexander 2025 23:46)      PAST MEDICAL & SURGICAL HISTORY:  Type 2 diabetes mellitus  DM (diabetes mellitus), type 2      Malignant neoplasm of prostate  Prostate cancer      Acute myocardial infarction  Patient denies MI      Type 2 diabetes mellitus  Diabetes      Hyperlipidemia  Hyperlipidemia      Essential hypertension  Hypertension      Other postprocedural status  History of penile implant      Status post aorto-coronary artery bypass graft  S/P CABG (coronary artery bypass graft)      Bilateral cataracts      H/O transurethral destruction of bladder lesion      S/p bilateral blepharoplasty          FAMILY HISTORY:  FH: alcohol abuse  mother; father's hx is unknown        Social History: (-) x 3; lives in a 4th floor walkup alone. Patient's friend expresses concern about this given the patient has been short of breath when walking up stairs.     Allergies    No Known Drug Allergies  Originally Entered as [Rash-General] reaction to [Other][Ragweed] (Rash)  ragweed and birch (Unknown)  Originally Entered as [Rash-General] reaction to [Other][Bush] (Rash)    Intolerances        MEDICATIONS  (STANDING):  aspirin enteric coated 81 milliGRAM(s) Oral daily  atorvastatin 40 milliGRAM(s) Oral at bedtime  cetirizine 10 milliGRAM(s) Oral daily  clopidogrel Tablet 75 milliGRAM(s) Oral daily  heparin   Injectable 5000 Unit(s) SubCutaneous every 8 hours  latanoprost 0.005% Ophthalmic Solution 1 Drop(s) Both EYES at bedtime  metoprolol succinate ER 50 milliGRAM(s) Oral every 24 hours  polyethylene glycol 3350 17 Gram(s) Oral every 12 hours  senna 2 Tablet(s) Oral at bedtime  tamsulosin 0.4 milliGRAM(s) Oral at bedtime    MEDICATIONS  (PRN):  acetaminophen     Tablet .. 650 milliGRAM(s) Oral every 6 hours PRN Temp greater or equal to 38C (100.4F), Mild Pain (1 - 3)  meclizine 25 milliGRAM(s) Oral every 8 hours PRN Dizziness  melatonin 3 milliGRAM(s) Oral at bedtime PRN Insomnia  ondansetron Injectable 4 milliGRAM(s) IV Push every 8 hours PRN Nausea and/or Vomiting      Review of systems:    Constitutional: No chest pain, fever, night sweats, chills  Eyes: No eye pain or discharge  ENMT:  No difficulty hearing; No sinus or throat pain  Neck: No pain or stiffness  Respiratory: Cough with clear sputum; No wheezing, chills or hemoptysis  Cardiovascular: No chest pain, palpitations; endorses shortness of breath, dyspnea on exertion  Gastrointestinal: No abdominal pain, nausea, vomiting or hematemesis; No diarrhea or constipation.   Genitourinary: No dysuria, frequency, hematuria or incontinence  Neurological: As per HPI  Skin: No rashes or lesions   Endocrine: No heat or cold intolerance; No hair loss  Musculoskeletal: No joint pain or swelling  Psychiatric: No depression, anxiety, mood swings  Heme/Lymph: No easy bruising or bleeding gums    Vital Signs Last 24 Hrs  T(C): 36.6 (12 Jun 2025 11:43), Max: 37 (12 Jun 2025 08:54)  T(F): 97.9 (12 Jun 2025 11:43), Max: 98.6 (12 Jun 2025 08:54)  HR: 71 (12 Jun 2025 11:43) (62 - 71)  BP: 102/52 (12 Jun 2025 11:43) (100/57 - 134/61)  BP(mean): 74 (12 Jun 2025 11:43) (66 - 89)  RR: 18 (12 Jun 2025 11:43) (15 - 18)  SpO2: 99% (12 Jun 2025 11:43) (94% - 100%)    Parameters below as of 12 Jun 2025 11:43  Patient On (Oxygen Delivery Method): room air        Examination:  General: Appearance is consistent with chronologic age. No abnormal facies. Gross skin survey within normal limits.    Cognitive/Language: The patient is oriented to person, place, date, and age. Recent and remote memory intact. Fund of knowledge is intact and normal. Language with normal repetition, comprehension and naming. Patient is missing teeth which affects his speech.    Eyes: intact VA, VFF. EOMI w/o nystagmus, skew or reported double vision. PERRL. No ptosis/weakness of eyelid closure.    Face: Facial sensation normal V1 - 3, no facial asymmetry.    Ears/Nose/Throat: Hearing grossly intact b/l. Palate elevates midline. Tongue and uvula midline.   Motor examination: Normal tone, bulk and range of motion. No tenderness, twitching, tremors or involuntary movements.  Muscle Strength Testing (MRC grade R/L): 5/5 UE; 5/5 LE. No observable drift.  Reflexes: 2+ b/l biceps, triceps, brachioradialis, patella and achilles. Plantar response downgoing b/l.    Sensory examination: Intact to light touch and pinprick, pain, temperature and proprioception and vibration in all extremities.  Cerebellum: FTN/HKS intact with normal KO in all limbs. No dysmetria or dysdiadochokinesia.    Gait: deferred due to patient safety     Respiratory:  no audible wheezing or inspiratory stridor.  no use of accessory muscles.       Labs:   CBC Full  -  ( 12 Jun 2025 05:30 )  WBC Count : 4.62 K/uL  RBC Count : 3.96 M/uL  Hemoglobin : 11.4 g/dL  Hematocrit : 33.4 %  Platelet Count - Automated : 166 K/uL  Mean Cell Volume : 84.3 fl  Mean Cell Hemoglobin : 28.8 pg  Mean Cell Hemoglobin Concentration : 34.1 g/dL  Auto Neutrophil # : x  Auto Lymphocyte # : x  Auto Monocyte # : x  Auto Eosinophil # : x  Auto Basophil # : x  Auto Neutrophil % : x  Auto Lymphocyte % : x  Auto Monocyte % : x  Auto Eosinophil % : x  Auto Basophil % : x    06-12    135  |  102  |  62[H]  ----------------------------<  102[H]  5.1   |  20[L]  |  2.98[H]    Ca    8.8      12 Jun 2025 05:30  Phos  4.8     06-12  Mg     2.2     06-12    TPro  6.9  /  Alb  3.8  /  TBili  0.4  /  DBili  x   /  AST  18  /  ALT  21  /  AlkPhos  68  06-12    LIVER FUNCTIONS - ( 12 Jun 2025 05:30 )  Alb: 3.8 g/dL / Pro: 6.9 g/dL / ALK PHOS: 68 U/L / ALT: 21 U/L / AST: 18 U/L / GGT: x           PT/INR - ( 10 Alexander 2025 18:10 )   PT: 11.4 sec;   INR: 0.99          PTT - ( 10 Alexander 2025 18:10 )  PTT:28.6 sec  Urinalysis Basic - ( 12 Jun 2025 05:30 )    Color: x / Appearance: x / SG: x / pH: x  Gluc: 102 mg/dL / Ketone: x  / Bili: x / Urobili: x   Blood: x / Protein: x / Nitrite: x   Leuk Esterase: x / RBC: x / WBC x   Sq Epi: x / Non Sq Epi: x / Bacteria: x          Neuroimaging:  NCT:     06-12-25 @ 13:43

## 2025-06-12 NOTE — PHYSICAL THERAPY INITIAL EVALUATION ADULT - ADDITIONAL COMMENTS
patient was independent with amb in his home with a cane. Was not able to amb in the community without assist. Son reported patient has been having more difficulty with mobility at home. Patient has a small tub shower with grab bar, per son he has been sponge bathing.

## 2025-06-12 NOTE — BH CONSULTATION LIAISON PROGRESS NOTE - NSBHATTESTCOMMENTATTENDFT_PSY_A_CORE
Patient is a 89 y/o man, with no prior PPH, remote history of alcohol use disorder in full sustained remission of 45 years and multiple medical comorbidities, admitted to the Cardiology service for dizziness and SOB, found to have moderate-severe stenosis at origin of dominant left vertebral artery, B/L pleural effusions and pulmonary congestion sec to HF exacerbation. Psychiatry was consulted as the patient verbalized passive SI. On the initial assessment patient denied SI or prior depressed mood, which was consistent with collateral obtained from son. On re-assessment, he remains stable, with improved orientation and baseline chronic memory deficits- per son, at baseline.  No current safety concerns.

## 2025-06-12 NOTE — CHART NOTE - NSCHARTNOTEFT_GEN_A_CORE
Chart reviewed, patient tentatively planned for cardiac intervention tomorrow. Full Consult to follow tomorrow for further advance care planning, symptom management, and support.    Jordan Inman, Palliative Care Attending  Questions/Concerns: Call [phone] (including Nights/Weekend) or message on [employer] Teams (Jordan Inman) Chart reviewed, patient tentatively planned for cardiac intervention tomorrow. Full Consult to follow tomorrow for further advance care planning, symptom management, and support.    Jordan Inman, Palliative Care Attending  Questions/Concerns: Call [phone] (including Nights/Weekend) or message on [employer] Teams (Jordan Inman)          PALLIATIVE MEDICINE COORDINATION OF CARE DOCUMENTATION: [x] Inpatient Consult  Non-Face-to-Face prolonged service provided that relates to (face-to-face) care that has or will occur and ongoing patient management, including one or more of the following: -Documentation review from other physicians and health care professional services -Review of medical records and diagnostic/radiology study results -Coordination with patient's support system    Medical Necessity: Advance Care Planning  Date of Related E/M Service: 6/13/25  ************************************************************************  HPI:  89 y/o M former smoker w/ PMH of grade III diastolic dysfunction (pt and family deny dx of HF in past; TTE 11/2023 shows 3 diastolic dysfunction, EF 58%), CAD s/p PCI and MIDCAB 6/5/2014 on Plavix, ascending AA HTN, HLD, pre-DM, CKD, prostate Ca s/p TURP, depression and chronic hypoNa, p/w complaints of intermittent dizziness described as "room spinning" for several hours. He reports worsening of dizziness with lying flat or turning to the right and improved when upright. Stroke code was called in the ED although symptoms had resolved fully on arrival, imaging was ordered and findings of focal moderate-severe stenosis at origin of dominant left vertebral artery. Stroke recommending continuation of Plavix and statin.  Pt then complained of SOB, son at bedside reports noticing today. Pt walks w/ cane at baseline, lives in a fourth floor walkup, states he has trouble getting up the stairs and has to stop frequently due to shortness of breath so he is more homebound in the last few months. On interview in the unit, it is difficult to illicit the nature of the patients symptoms however as described, he has been havening progressively worsening ALEXANDER but denies orthopnea, CP, syncope or edema.   ------------------------------------------------------------------------  MEDICATION REVIEW:  MEDICATIONS  (STANDING):  atorvastatin 40 milliGRAM(s) Oral at bedtime  cetirizine 10 milliGRAM(s) Oral daily  chlorhexidine 2% Cloths 1 Application(s) Topical <User Schedule>  clopidogrel Tablet 75 milliGRAM(s) Oral daily  heparin   Injectable 5000 Unit(s) SubCutaneous every 8 hours  latanoprost 0.005% Ophthalmic Solution 1 Drop(s) Both EYES at bedtime  metoprolol succinate ER 50 milliGRAM(s) Oral daily  polyethylene glycol 3350 17 Gram(s) Oral two times a day  senna 2 Tablet(s) Oral at bedtime  tamsulosin 0.4 milliGRAM(s) Oral at bedtime    MEDICATIONS  (PRN):  meclizine 25 milliGRAM(s) Oral every 8 hours PRN Dizziness  melatonin 3 milliGRAM(s) Oral at bedtime PRN Insomnia    ISTOP REFERENCE:   - no active Rx's    ANALGESIC USE (Scheduled & PRNs) PAST 24 HOURS:  - none  ------------------------------------------------------------------------  COORDINATION OF CARE:  - Palliative Care consulted for: GOC / Symptom Management  - Patient (to be) assessed: 6/13/25  - Patient previously seen by Palliative Care service: NO    ADVANCE CARE PLANNING  - Code status: Full Code  - MOLST: NONE found in EMR  - GOC documents: NONE found in EMR  - HCP/Living Will/Other Advanced Directives: NONE found in EMR  ------------------------------------------------------------------------  CARE PROVIDER DOCUMENTATION:  - SW/CM notes: Remains medically active  - Primary Team and Consultant Notes reviewed    PLAN OF CARE  - Current Admit Date: 6/10/25  - LOS: 2  - Current Dispo Plan: TO BE DETERMINED    Full Consult to follow within 24hrs  ------------------------------------------------------------------------  - Time Spent/Chart reviewed: 31 Minutes [including time used to gather, review and transfer data]  - Start: 5:00PM  - End: 5:31PM    Prolonged services rendered, as part of this patient's care provided by Palliative Medicine, include: i. chart review for provider and ancillary service documentation, ii. pertinent diagnostics including laboratory and imaging studies, iii. medication review including PRN use, iv. admission history including previous palliative care encounters and GOC notes, v. advance care planning documents including HCP and MOLST forms in [facility]. Part of Palliative Medicine extended evaluation and management also involves coordination of care with our IDT, the primary and consulting teams, and unit CM/SW and Hospice if eligible. Recommendations based on the information gathered and discussed are outlined in the A/P of Palliative notes.

## 2025-06-12 NOTE — OCCUPATIONAL THERAPY INITIAL EVALUATION ADULT - BALANCE DISTURBANCE, SYSTEM IMPAIRMENT CONTRIBUTE, REHAB EVAL
Speech Therapy Daily Treatment    Visit Count: 11  Referred by: Huy León DO, next visit (if known): unknown  Medical Diagnosis (from order):   Diagnosis Information      Diagnosis    315.39 (ICD-9-CM) - F80.9 (ICD-10-CM) - Speech delay              Treatment Diagnosis: Other Speech Disturbances; Mixed receptive/expressive language disorder     Date of Onset/Injury: 5/11/22  Precautions: None  Chart reviewed: Relevant co-morbidities, allergies, tests and medications: Patient was born full-term via vaginal delivery.  Reportedly, there were no complications during pregnancy, delivery or after delivery. Reportedly, patient has no known allergies and currently takes no medications. Patient is currently home-schooled and completed the  curriculum.     SUBJECTIVE:    Patient was cooperative and attended. Mom was present, but did not sit in on the session.     Pain: patient caregiver reports pain is not an issue/concern    OBJECTIVE:        Treatment   Receptive language skills: Patient demonstrated an understanding of what, who, where questions with 60% accuracy.   Patient demonstrates an understanding of location (in front/back, next to) with 60% accuracy.    Expressive language skills: Patient used pronouns \"he/she\" with 100% accuracy and present progressive verb tense with 100% accuracy with mod cues.     Speech skills: Not addressed this visit.      Current Home Program (not performed this date except as noted above):  Exercises: language exercises for pronouns and verb tense    ASSESSMENT:    Patient presents with a receptive/expressive language disorder and speech sound disorder. Improved understanding of location words. Consistent understanding of wh- questions. Improved use of pronouns and verb tense with mod cues.      Result of above outlined education: Verbalizes understanding    Goals:   To be obtained by end of this plan of care:  1.Correctly produce /s/ blends in words with 80% accuracy.  Goal progressing  2. Correctly produce /l/ in all positions of words with 80% accuracy. Goal progressing  3. Correctly produce /l/ blends in words with 80% accuracy. Goal progressing  4.  Demonstrate an understanding and use of pronouns (his, her, he, she, they) in structured language activities with 80% accuracy. Goal progressing  5. Demonstrate an understanding of and use of spatial concepts (under, in back of, next to, in front of) in structured language activities with 80% accuracy.  Goal progressing  6.  Use present progressive verb tense in structured language activities, 80% of opportunities. Goal progressing  7.  Understand and respond to wh- questions with 80% accuracy. Goal progressing    PLAN:    Suggestions for next session as indicated: directions, vocabulary, combining words, use of linguistic structures, target speech sounds  Therapy procedure time and total treatment time can be found documented on the Time Entry flowsheet   cognitive/musculoskeletal

## 2025-06-12 NOTE — BH CONSULTATION LIAISON PROGRESS NOTE - NSBHASSESSMENTFT_PSY_ALL_CORE
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 Patient is currently on the Cardiology service for further workup and treatment. Psychiatry was consulted as the patient verbalized during the night passive SI (told the nursing staff that he would like to go to another country to access assisted suicide).     On assessment patient presents AAOx4, denies active SI or depressed mood. He endorses frustration with his current medical symptoms and recent decline in functioning.  Per collateral (pt son), the patient presents with mild cognitive deficits, without significant functional decline, and is at his baseline in terms of his mental state.     Chart review shows that pt received during the night one dosage of diphenhydramine which likely contributed to the morning confusional state.      Plan:  -no current indication for 1:1 observation  -patient is not currently interested in psychiatric treatment, will continue to provide psychoeducation and support  -check folate, vitam B12, RPR;  -avoid deliriogenic medications   -for agitation associated with delirium/dementia, use quetiapine 25mg po q6h prn; monitor for qtc prolongation    - Delirium Precautions:  Orientation protocols: Provision of clocks, calendars, windows with outside views and frequent verbal reorientation of patients.  Avoiding and/or monitoring the use of problematic medications: Avoid benzodiazepines. Use caution when prescribing opioids, dihydropyridines, anticholinergics, antihistamines.  Cognitive stimulation: Regular visits from family and friends. Avoid overstimulation (particularly at night)Facilitation of physiologic sleep: Nursing and medical procedures, including the administration of medications, should be avoided during sleeping hours when possible. Night-time noise should be reduced.  Early mobilization and minimized use of physical restraints for patients with limited mobility.   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 Patient is currently on the Cardiology service for further workup and treatment. Psychiatry was consulted as the patient verbalized during the night passive SI (told the nursing staff that he would like to go to another country to access assisted suicide).     On assessment patient presents AAOx4, denies active SI or depressed mood. He endorses frustration with his current medical symptoms and recent decline in functioning. Per collateral (pt son), the patient presents with mild cognitive deficits, without significant functional decline, and is at his baseline in terms of his mental state.         Plan:  -no current indication for 1:1 observation  -patient is not currently interested in psychiatric treatment, will continue to provide psychoeducation and support  -check folate, vitam B12, RPR;  -avoid deliriogenic medications   -for agitation associated with delirium/dementia, use quetiapine 25mg po q6h prn; monitor for qtc prolongation    - Delirium Precautions:  Orientation protocols: Provision of clocks, calendars, windows with outside views and frequent verbal reorientation of patients.  Avoiding and/or monitoring the use of problematic medications: Avoid benzodiazepines. Use caution when prescribing opioids, dihydropyridines, anticholinergics, antihistamines.  Cognitive stimulation: Regular visits from family and friends. Avoid overstimulation (particularly at night) Facilitation of physiologic sleep: Nursing and medical procedures, including the administration of medications, should be avoided during sleeping hours when possible. Night-time noise should be reduced.  Early mobilization and minimized use of physical restraints for patients with limited mobility.   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 Patient is currently on the Cardiology service for further workup and treatment. Psychiatry was consulted as the patient verbalized during the night passive SI (told the nursing staff that he would like to go to another country to access assisted suicide).     On assessment patient presents AAOx4, denies active SI or depressed mood. He endorses frustration with his current medical symptoms and recent decline in functioning. Per collateral (pt son), the patient presents with mild cognitive deficits, without significant functional decline, and is at his baseline in terms of his mental state.         Plan:  -no current indication for 1:1 observation  -patient is not currently interested in psychiatric treatment, will continue to provide psychoeducation and support  -check folate, vitam B12, RPR;  -avoid deliriogenic medications   -for agitation associated with delirium/dementia, use quetiapine 25mg po q6h prn; monitor for qtc prolongation  -CL to follow as needed, please call with questions/concerns    - Delirium Precautions:  Orientation protocols: Provision of clocks, calendars, windows with outside views and frequent verbal reorientation of patients.  Avoiding and/or monitoring the use of problematic medications: Avoid benzodiazepines. Use caution when prescribing opioids, dihydropyridines, anticholinergics, antihistamines.  Cognitive stimulation: Regular visits from family and friends. Avoid overstimulation (particularly at night) Facilitation of physiologic sleep: Nursing and medical procedures, including the administration of medications, should be avoided during sleeping hours when possible. Night-time noise should be reduced.  Early mobilization and minimized use of physical restraints for patients with limited mobility.   Visual and hearing aids for patients with these impairments

## 2025-06-12 NOTE — OCCUPATIONAL THERAPY INITIAL EVALUATION ADULT - ADDITIONAL COMMENTS
resides in a 4th floor walkup alone where he was I in ADLs and functional mob w. SC but recently not leaving apartment without assist, reports tub/shower. is [facility]

## 2025-06-12 NOTE — OCCUPATIONAL THERAPY INITIAL EVALUATION ADULT - GENERAL OBSERVATIONS, REHAB EVAL
OT IE complete. KATE Mcgovern clearing pt. for session. PT Reshma present. Pt. received semi-supine in bed, +tele, +heplock in NAD, agreeable to session.

## 2025-06-13 ENCOUNTER — APPOINTMENT (OUTPATIENT)
Dept: CARDIOTHORACIC SURGERY | Facility: CLINIC | Age: 89
End: 2025-06-13

## 2025-06-13 LAB
ALBUMIN SERPL ELPH-MCNC: 3.9 G/DL — SIGNIFICANT CHANGE UP (ref 3.3–5)
ALBUMIN SERPL ELPH-MCNC: 4.2 G/DL — SIGNIFICANT CHANGE UP (ref 3.3–5)
ALP SERPL-CCNC: 64 U/L — SIGNIFICANT CHANGE UP (ref 40–120)
ALP SERPL-CCNC: 69 U/L — SIGNIFICANT CHANGE UP (ref 40–120)
ALT FLD-CCNC: 22 U/L — SIGNIFICANT CHANGE UP (ref 10–45)
ALT FLD-CCNC: 27 U/L — SIGNIFICANT CHANGE UP (ref 10–45)
ANION GAP SERPL CALC-SCNC: 16 MMOL/L — SIGNIFICANT CHANGE UP (ref 5–17)
ANION GAP SERPL CALC-SCNC: 16 MMOL/L — SIGNIFICANT CHANGE UP (ref 5–17)
APTT BLD: 34.8 SEC — SIGNIFICANT CHANGE UP (ref 26.1–36.8)
APTT BLD: >200 SEC — CRITICAL HIGH (ref 26.1–36.8)
AST SERPL-CCNC: 18 U/L — SIGNIFICANT CHANGE UP (ref 10–40)
AST SERPL-CCNC: 27 U/L — SIGNIFICANT CHANGE UP (ref 10–40)
BASOPHILS # BLD AUTO: 0.03 K/UL — SIGNIFICANT CHANGE UP (ref 0–0.2)
BASOPHILS NFR BLD AUTO: 0.7 % — SIGNIFICANT CHANGE UP (ref 0–2)
BILIRUB SERPL-MCNC: 0.4 MG/DL — SIGNIFICANT CHANGE UP (ref 0.2–1.2)
BILIRUB SERPL-MCNC: 0.5 MG/DL — SIGNIFICANT CHANGE UP (ref 0.2–1.2)
BLD GP AB SCN SERPL QL: NEGATIVE — SIGNIFICANT CHANGE UP
BUN SERPL-MCNC: 60 MG/DL — HIGH (ref 7–23)
BUN SERPL-MCNC: 66 MG/DL — HIGH (ref 7–23)
CALCIUM SERPL-MCNC: 9.1 MG/DL — SIGNIFICANT CHANGE UP (ref 8.4–10.5)
CALCIUM SERPL-MCNC: 9.4 MG/DL — SIGNIFICANT CHANGE UP (ref 8.4–10.5)
CHLORIDE SERPL-SCNC: 104 MMOL/L — SIGNIFICANT CHANGE UP (ref 96–108)
CHLORIDE SERPL-SCNC: 108 MMOL/L — SIGNIFICANT CHANGE UP (ref 96–108)
CO2 SERPL-SCNC: 16 MMOL/L — LOW (ref 22–31)
CO2 SERPL-SCNC: 19 MMOL/L — LOW (ref 22–31)
CREAT SERPL-MCNC: 2.67 MG/DL — HIGH (ref 0.5–1.3)
CREAT SERPL-MCNC: 2.83 MG/DL — HIGH (ref 0.5–1.3)
EGFR: 20 ML/MIN/1.73M2 — LOW
EGFR: 20 ML/MIN/1.73M2 — LOW
EGFR: 22 ML/MIN/1.73M2 — LOW
EGFR: 22 ML/MIN/1.73M2 — LOW
EOSINOPHIL # BLD AUTO: 0.31 K/UL — SIGNIFICANT CHANGE UP (ref 0–0.5)
EOSINOPHIL NFR BLD AUTO: 7.7 % — HIGH (ref 0–6)
GLUCOSE SERPL-MCNC: 108 MG/DL — HIGH (ref 70–99)
GLUCOSE SERPL-MCNC: 98 MG/DL — SIGNIFICANT CHANGE UP (ref 70–99)
HCT VFR BLD CALC: 31.5 % — LOW (ref 39–50)
HCT VFR BLD CALC: 36.7 % — LOW (ref 39–50)
HGB BLD-MCNC: 10.9 G/DL — LOW (ref 13–17)
HGB BLD-MCNC: 12.5 G/DL — LOW (ref 13–17)
IMM GRANULOCYTES NFR BLD AUTO: 0.2 % — SIGNIFICANT CHANGE UP (ref 0–0.9)
INR BLD: 0.96 — SIGNIFICANT CHANGE UP (ref 0.85–1.16)
INR BLD: 1.1 — SIGNIFICANT CHANGE UP (ref 0.85–1.16)
LACTATE SERPL-SCNC: 0.8 MMOL/L — SIGNIFICANT CHANGE UP (ref 0.5–2)
LYMPHOCYTES # BLD AUTO: 1.08 K/UL — SIGNIFICANT CHANGE UP (ref 1–3.3)
LYMPHOCYTES # BLD AUTO: 26.8 % — SIGNIFICANT CHANGE UP (ref 13–44)
MAGNESIUM SERPL-MCNC: 2.1 MG/DL — SIGNIFICANT CHANGE UP (ref 1.6–2.6)
MAGNESIUM SERPL-MCNC: 2.3 MG/DL — SIGNIFICANT CHANGE UP (ref 1.6–2.6)
MCHC RBC-ENTMCNC: 29 PG — SIGNIFICANT CHANGE UP (ref 27–34)
MCHC RBC-ENTMCNC: 29.3 PG — SIGNIFICANT CHANGE UP (ref 27–34)
MCHC RBC-ENTMCNC: 34.1 G/DL — SIGNIFICANT CHANGE UP (ref 32–36)
MCHC RBC-ENTMCNC: 34.6 G/DL — SIGNIFICANT CHANGE UP (ref 32–36)
MCV RBC AUTO: 84.7 FL — SIGNIFICANT CHANGE UP (ref 80–100)
MCV RBC AUTO: 85.2 FL — SIGNIFICANT CHANGE UP (ref 80–100)
MONOCYTES # BLD AUTO: 0.4 K/UL — SIGNIFICANT CHANGE UP (ref 0–0.9)
MONOCYTES NFR BLD AUTO: 9.9 % — SIGNIFICANT CHANGE UP (ref 2–14)
NEUTROPHILS # BLD AUTO: 2.2 K/UL — SIGNIFICANT CHANGE UP (ref 1.8–7.4)
NEUTROPHILS NFR BLD AUTO: 54.7 % — SIGNIFICANT CHANGE UP (ref 43–77)
NRBC BLD AUTO-RTO: 0 /100 WBCS — SIGNIFICANT CHANGE UP (ref 0–0)
NRBC BLD AUTO-RTO: 0 /100 WBCS — SIGNIFICANT CHANGE UP (ref 0–0)
PHOSPHATE SERPL-MCNC: 4.9 MG/DL — HIGH (ref 2.5–4.5)
PHOSPHATE SERPL-MCNC: 5.4 MG/DL — HIGH (ref 2.5–4.5)
PLATELET # BLD AUTO: 155 K/UL — SIGNIFICANT CHANGE UP (ref 150–400)
PLATELET # BLD AUTO: 178 K/UL — SIGNIFICANT CHANGE UP (ref 150–400)
POTASSIUM SERPL-MCNC: 4.7 MMOL/L — SIGNIFICANT CHANGE UP (ref 3.5–5.3)
POTASSIUM SERPL-MCNC: 5.2 MMOL/L — SIGNIFICANT CHANGE UP (ref 3.5–5.3)
POTASSIUM SERPL-SCNC: 4.7 MMOL/L — SIGNIFICANT CHANGE UP (ref 3.5–5.3)
POTASSIUM SERPL-SCNC: 5.2 MMOL/L — SIGNIFICANT CHANGE UP (ref 3.5–5.3)
PROT SERPL-MCNC: 6.7 G/DL — SIGNIFICANT CHANGE UP (ref 6–8.3)
PROT SERPL-MCNC: 7.7 G/DL — SIGNIFICANT CHANGE UP (ref 6–8.3)
PROTHROM AB SERPL-ACNC: 11.1 SEC — SIGNIFICANT CHANGE UP (ref 9.9–13.4)
PROTHROM AB SERPL-ACNC: 12.8 SEC — SIGNIFICANT CHANGE UP (ref 9.9–13.4)
RBC # BLD: 3.72 M/UL — LOW (ref 4.2–5.8)
RBC # BLD: 4.31 M/UL — SIGNIFICANT CHANGE UP (ref 4.2–5.8)
RBC # FLD: 14.4 % — SIGNIFICANT CHANGE UP (ref 10.3–14.5)
RBC # FLD: 14.4 % — SIGNIFICANT CHANGE UP (ref 10.3–14.5)
RH IG SCN BLD-IMP: POSITIVE — SIGNIFICANT CHANGE UP
SODIUM SERPL-SCNC: 139 MMOL/L — SIGNIFICANT CHANGE UP (ref 135–145)
SODIUM SERPL-SCNC: 140 MMOL/L — SIGNIFICANT CHANGE UP (ref 135–145)
WBC # BLD: 4.03 K/UL — SIGNIFICANT CHANGE UP (ref 3.8–10.5)
WBC # BLD: 4.94 K/UL — SIGNIFICANT CHANGE UP (ref 3.8–10.5)
WBC # FLD AUTO: 4.03 K/UL — SIGNIFICANT CHANGE UP (ref 3.8–10.5)
WBC # FLD AUTO: 4.94 K/UL — SIGNIFICANT CHANGE UP (ref 3.8–10.5)

## 2025-06-13 PROCEDURE — 92986 REVISION OF AORTIC VALVE: CPT

## 2025-06-13 PROCEDURE — 99233 SBSQ HOSP IP/OBS HIGH 50: CPT

## 2025-06-13 PROCEDURE — 75574 CT ANGIO HRT W/3D IMAGE: CPT | Mod: 26

## 2025-06-13 PROCEDURE — 71045 X-RAY EXAM CHEST 1 VIEW: CPT | Mod: 26

## 2025-06-13 PROCEDURE — 74174 CTA ABD&PLVS W/CONTRAST: CPT | Mod: 26

## 2025-06-13 PROCEDURE — 71275 CT ANGIOGRAPHY CHEST: CPT | Mod: 26

## 2025-06-13 DEVICE — CATH DX AL1 INFIN 5FRX100CM: Type: IMPLANTABLE DEVICE | Status: FUNCTIONAL

## 2025-06-13 DEVICE — DRYSEAL FLEX INTRO SHEATH 12FR 33CM: Type: IMPLANTABLE DEVICE | Status: FUNCTIONAL

## 2025-06-13 DEVICE — PACING CATH PACEL RIGHT HEART CURVE 5FR KIT: Type: IMPLANTABLE DEVICE | Status: FUNCTIONAL

## 2025-06-13 DEVICE — SUT PERCLOSE PROGLIDE 6FR: Type: IMPLANTABLE DEVICE | Status: FUNCTIONAL

## 2025-06-13 DEVICE — CATH DX PIG 145 INFIN 5FRX110CM: Type: IMPLANTABLE DEVICE | Status: FUNCTIONAL

## 2025-06-13 DEVICE — SHEATH INTRODUCER TERUMO PINNACLE CORONARY 11FR X 10CM X 0.038" MINI WIRE: Type: IMPLANTABLE DEVICE | Status: FUNCTIONAL

## 2025-06-13 DEVICE — IMPLANTABLE DEVICE: Type: IMPLANTABLE DEVICE | Status: FUNCTIONAL

## 2025-06-13 DEVICE — WIRE GD SAFARI2 275CM XSML CRV: Type: IMPLANTABLE DEVICE | Status: FUNCTIONAL

## 2025-06-13 DEVICE — KIT PACE ELCTR BIPOLAR 5FR: Type: IMPLANTABLE DEVICE | Status: FUNCTIONAL

## 2025-06-13 DEVICE — SHEATH INTRODUCER TERUMO PINNACLE CORONARY 8FR X 10CM X 0.038" MINI WIRE: Type: IMPLANTABLE DEVICE | Status: FUNCTIONAL

## 2025-06-13 DEVICE — GUIDEWIRE STANDARD STRAIGHT .035" X 180CM: Type: IMPLANTABLE DEVICE | Status: FUNCTIONAL

## 2025-06-13 DEVICE — INTRO MICROPUNC 4FRX10CM SS: Type: IMPLANTABLE DEVICE | Status: FUNCTIONAL

## 2025-06-13 DEVICE — CATH BLLN TRU FLOW 20MMX3.5CM: Type: IMPLANTABLE DEVICE | Status: FUNCTIONAL

## 2025-06-13 DEVICE — SYS VASCADE MVP VASCULAR CLOSURE 6-12F: Type: IMPLANTABLE DEVICE | Status: FUNCTIONAL

## 2025-06-13 DEVICE — GWIRE GUID  0.035INX150CM: Type: IMPLANTABLE DEVICE | Status: FUNCTIONAL

## 2025-06-13 DEVICE — GUIDEWIRE TERUMO GLIDEWIRE STIFF STRAGHT .035" X 180CM: Type: IMPLANTABLE DEVICE | Status: FUNCTIONAL

## 2025-06-13 RX ORDER — SENNA 187 MG
2 TABLET ORAL AT BEDTIME
Refills: 0 | Status: DISCONTINUED | OUTPATIENT
Start: 2025-06-14 | End: 2025-06-16

## 2025-06-13 RX ORDER — ATORVASTATIN CALCIUM 80 MG/1
40 TABLET, FILM COATED ORAL AT BEDTIME
Refills: 0 | Status: DISCONTINUED | OUTPATIENT
Start: 2025-06-14 | End: 2025-06-16

## 2025-06-13 RX ORDER — TAMSULOSIN HYDROCHLORIDE 0.4 MG/1
0.4 CAPSULE ORAL AT BEDTIME
Refills: 0 | Status: DISCONTINUED | OUTPATIENT
Start: 2025-06-13 | End: 2025-06-16

## 2025-06-13 RX ORDER — CLOTRIMAZOLE 1 G/100G
1 CREAM TOPICAL
Refills: 0 | Status: DISCONTINUED | OUTPATIENT
Start: 2025-06-13 | End: 2025-06-13

## 2025-06-13 RX ORDER — METOPROLOL SUCCINATE 50 MG/1
50 TABLET, EXTENDED RELEASE ORAL DAILY
Refills: 0 | Status: DISCONTINUED | OUTPATIENT
Start: 2025-06-14 | End: 2025-06-16

## 2025-06-13 RX ORDER — POLYETHYLENE GLYCOL 3350 17 G/17G
17 POWDER, FOR SOLUTION ORAL
Refills: 0 | Status: DISCONTINUED | OUTPATIENT
Start: 2025-06-14 | End: 2025-06-16

## 2025-06-13 RX ORDER — MECLIZINE HCL 12.5 MG
25 TABLET ORAL EVERY 8 HOURS
Refills: 0 | Status: DISCONTINUED | OUTPATIENT
Start: 2025-06-14 | End: 2025-06-16

## 2025-06-13 RX ORDER — LATANOPROST PF 0.05 MG/ML
1 SOLUTION/ DROPS OPHTHALMIC AT BEDTIME
Refills: 0 | Status: DISCONTINUED | OUTPATIENT
Start: 2025-06-13 | End: 2025-06-16

## 2025-06-13 RX ORDER — MELATONIN 5 MG
3 TABLET ORAL AT BEDTIME
Refills: 0 | Status: DISCONTINUED | OUTPATIENT
Start: 2025-06-13 | End: 2025-06-16

## 2025-06-13 RX ADMIN — Medication 1 APPLICATION(S): at 18:50

## 2025-06-13 RX ADMIN — TAMSULOSIN HYDROCHLORIDE 0.4 MILLIGRAM(S): 0.4 CAPSULE ORAL at 21:21

## 2025-06-13 RX ADMIN — Medication 1 APPLICATION(S): at 12:18

## 2025-06-13 RX ADMIN — HEPARIN SODIUM 5000 UNIT(S): 1000 INJECTION INTRAVENOUS; SUBCUTANEOUS at 06:00

## 2025-06-13 RX ADMIN — HEPARIN SODIUM 5000 UNIT(S): 1000 INJECTION INTRAVENOUS; SUBCUTANEOUS at 13:33

## 2025-06-13 RX ADMIN — CLOPIDOGREL BISULFATE 75 MILLIGRAM(S): 75 TABLET, FILM COATED ORAL at 12:23

## 2025-06-13 RX ADMIN — LATANOPROST PF 1 DROP(S): 0.05 SOLUTION/ DROPS OPHTHALMIC at 21:22

## 2025-06-13 RX ADMIN — METOPROLOL SUCCINATE 50 MILLIGRAM(S): 50 TABLET, EXTENDED RELEASE ORAL at 07:28

## 2025-06-13 RX ADMIN — Medication 81 MILLIGRAM(S): at 12:23

## 2025-06-13 RX ADMIN — Medication 10 MILLIGRAM(S): at 12:23

## 2025-06-13 RX ADMIN — Medication 12 MILLILITER(S): at 12:28

## 2025-06-13 NOTE — DIETITIAN INITIAL EVALUATION ADULT - OTHER CALCULATIONS
Actual body weight was utilized as pt is within % ideal body weight (%). Needs adjusted for CKD and maintenance. Fluid needs deferred to team for HF; currently noted to be restricted to 1500-2000mL/day.

## 2025-06-13 NOTE — PRE-ANESTHESIA EVALUATION ADULT - NSANTHADDINFOFT_GEN_ALL_CORE
Progress Note Adult-Hospitalist Attending [Charted Location: [facility] [id number] [id number]] [Authored: 13-Jun-2025 11:51]- for Visit: [id number], Complete, Revised, Signed in Full, Available to Patient as of 13-Jun-2025 12:10    Progress Note:   · Provider Specialty	Hospitalist    Reason for Admission:    Reason for Admission:  · Reason for Admission	HF exacerbation      · Subjective and Objective:   Subjective:  No acute events overnight.  Main complaint is itchiness of back.  Denies HA, CP, SOB, abdominal pain, nausea, vomiting, fever, chills or diarrhea.     Objective:   Vital Signs Last 24 Hrs  T(C): 36.6 (13 Jun 2025 08:43), Max: 36.7 (13 Jun 2025 05:57)  T(F): 97.8 (13 Jun 2025 08:43), Max: 98.1 (13 Jun 2025 05:57)  HR: 77 (13 Jun 2025 08:43) (66 - 84)  BP: 132/60 (13 Jun 2025 08:43) (116/53 - 133/64)  BP(mean): 87 (13 Jun 2025 08:43) (77 - 92)  RR: 18 (13 Jun 2025 08:43) (18 - 18)  SpO2: 100% (13 Jun 2025 08:43) (96% - 100%)    Parameters below as of 13 Jun 2025 08:43  Patient On (Oxygen Delivery Method): room air    Physical Exam:   -Gen: NAD, resting in bed  -HEENT: EOMI, PERRL, no scleral icterus  -CV: normal S1 and S2  -Lungs: CTABL, normal respiratory effort on RA  -Ab: soft, NT, ND, normal BS  -Ext: no LE edema  -Neuro: A&O x 3, no focal deficits     Labs:                        12.5   4.03  )-----------( 178      ( 13 Jun 2025 07:12 )             36.7       06-13    139  |  104  |  66[H]  ----------------------------<  98  5.2   |  19[L]  |  2.83[H]    Ca    9.1      13 Jun 2025 07:12  Phos  5.4     06-13  Mg     2.3     06-13    TPro  7.7  /  Alb  4.2  /  TBili  0.4  /  DBili  x   /  AST  27  /  ALT  27  /  AlkPhos  69  06-13     Medications:  MEDICATIONS  (STANDING):  aspirin enteric coated 81 milliGRAM(s) Oral daily  atorvastatin 40 milliGRAM(s) Oral at bedtime  cetirizine 10 milliGRAM(s) Oral daily  chlorhexidine 0.12% Liquid 12 milliLiter(s) Swish and Spit once  chlorhexidine 4% Liquid 1 Application(s) Topical once  chlorhexidine 4% Liquid 1 Application(s) Topical once  chlorhexidine 4% Liquid 1 Application(s) Topical once  clopidogrel Tablet 75 milliGRAM(s) Oral daily  clotrimazole 1% Cream 1 Application(s) Topical two times a day  heparin   Injectable 5000 Unit(s) SubCutaneous every 8 hours  latanoprost 0.005% Ophthalmic Solution 1 Drop(s) Both EYES at bedtime  metoprolol succinate ER 50 milliGRAM(s) Oral every 24 hours  polyethylene glycol 3350 17 Gram(s) Oral every 12 hours  senna 2 Tablet(s) Oral at bedtime  tamsulosin 0.4 milliGRAM(s) Oral at bedtime    MEDICATIONS  (PRN):  acetaminophen     Tablet .. 650 milliGRAM(s) Oral every 6 hours PRN Temp greater or equal to 38C (100.4F), Mild Pain (1 - 3)  meclizine 25 milliGRAM(s) Oral every 8 hours PRN Dizziness  melatonin 3 milliGRAM(s) Oral at bedtime PRN Insomnia  ondansetron Injectable 4 milliGRAM(s) IV Push every 8 hours PRN Nausea and/or Vomiting        Assessment and Plan:   · Assessment	  89-year-old male with a PMHx of HFpEF, CAD (s/p PCI, on Plavix), HTN, HLD, CKD (baseline Cr 1.7), prostate cancer (s/p TURP), depression and chronic hyponatremia who presented with acute on chronic HFpEF exacerbation.       #Acute on Chronic HFpEF Exacerbation     -further management as per cardiology     -currently on Metoprolol Succinate 50mg daily        #CAD   -continue with ASA 81mg daily, Plavix 75mg daily and Atorvastatin 40mg daily     -possible C this admission pending kidney function and GOC    #Severe AS    -structural heart consulted, tentative plan for BAV today     #GOC  -palliative consulted, follow up recommendations     #MARIYA on CKD     -urine studies suggestive of intrinsic disease, likely related to aggressive diuresis on admission, Cr now slowly improving   -continue bladder scan protocol, may require Kenyon placement   -obtain renal US and repeat UA/urine electrolytes   -recommend holding diuresis and monitor Cr, consider nephrology consult especially if planning for [facility] this admission   -renally dose medications, avoid nephrotoxic agents       #Dizziness     -stroke code called on admission, stroke signed off, recommend DAPT and statin for stroke prevention     -general neurology consulted, etiology likely BPPV, continue with PRN Meclizine       #Rash  -concern for possible topical fungal infection, can trial Clotrimazole BID to affected area  -consider outpatient dermatology referral     #Prostate Cancer (s/p TURP)    -continue with tamsulosin 0.4mg daily       #Passive SI     -psych consulted, appreciate recommendations           Attestation Statements:    Attestation Statements:  Time-based billing (NON-critical care).     53 minutes spent on total encounter. The necessity of the time spent during the encounter on this date of service was due to:     Review of hospital course, labs, vitals and medical records  Bedside exam and patient interview   Discussion of plan with housestaff and consultants   Documenting the encounter.      Electronic Signatures:  Jasper Mendoza)  (Signed 13-Jun-2025 12:10)  	Authored: Progress Note, Reason for Admission, Subjective and Objective, Assessment and Plan, Attestation Statements      Last Updated: 13-Jun-2025 12:10 by Jasper Mendoza)

## 2025-06-13 NOTE — DIETITIAN INITIAL EVALUATION ADULT - PROBLEM SELECTOR PLAN 7
Appears to be at baseline, no signs of active bleeding.    Plan:  - Obtain iron studies  - Maintain active T&S  - Transfusion goal < 7

## 2025-06-13 NOTE — PROGRESS NOTE ADULT - SUBJECTIVE AND OBJECTIVE BOX
************************************TRANSFER TO CCU************************************    HOSPITAL COURSE:    SUBJECTIVE:   Patient seen and examined at bedside. Condition largely unchanged from yesterday. No acute complaints at this time.    ROS:  Negative unless otherwise stated above.    PHYSICAL EXAM:  General: Alert and oriented x 3. No acute distress.   HEENT: Moist mucous membranes. Anicteric. No cervical lymphadenopathy.  Cardiovascular: Regular rate and rhythm. No murmur. Normal JVP.  Lungs: Clear to auscultation bilaterally. No accessory muscle use.  Abdomen: Soft, non-tender and non-distended. No palpable masses.  Extremities: No edema. Non-tender. Warm.  Skin: No rashes or lesions.   Neurologic: No apparent focal neurological deficits. CN II-XII grossly intact, but not individually tested.  Psychiatric: Cooperative. Appropriate mood and affect.    VITAL SIGNS:  Vital Signs Last 24 Hrs  T(C): 36.7 (13 Jun 2025 14:41), Max: 36.7 (13 Jun 2025 05:57)  T(F): 98 (13 Jun 2025 14:41), Max: 98.1 (13 Jun 2025 05:57)  HR: 74 (13 Jun 2025 14:40) (66 - 77)  BP: 130/59 (13 Jun 2025 14:41) (115/56 - 145/69)  BP(mean): 81 (13 Jun 2025 14:28) (77 - 90)  RR: 16 (13 Jun 2025 14:40) (16 - 18)  SpO2: 98% (13 Jun 2025 14:41) (96% - 100%)    Parameters below as of 13 Jun 2025 12:23  Patient On (Oxygen Delivery Method): room air      INPATIENT MEDICATIONS:   MEDICATIONS  (STANDING):  chlorhexidine 2% Cloths 1 Application(s) Topical <User Schedule>    MEDICATIONS  (PRN):    ALLERGIES:  Allergies    No Known Drug Allergies  Originally Entered as [Rash-General] reaction to [Other][Ragweed] (Rash)  ragweed and birch (Unknown)  Originally Entered as [Rash-General] reaction to [Other][Bush] (Rash)    Intolerances    LABS:                       12.5   4.03  )-----------( 178      ( 13 Jun 2025 07:12 )             36.7     06-13    139  |  104  |  66[H]  ----------------------------<  98  5.2   |  19[L]  |  2.83[H]    Ca    9.1      13 Jun 2025 07:12  Phos  5.4     06-13  Mg     2.3     06-13    TPro  7.7  /  Alb  4.2  /  TBili  0.4  /  DBili  x   /  AST  27  /  ALT  27  /  AlkPhos  69  06-13    PT/INR - ( 13 Jun 2025 07:12 )   PT: 11.1 sec;   INR: 0.96          PTT - ( 13 Jun 2025 07:12 )  PTT:34.8 sec  Urinalysis Basic - ( 13 Jun 2025 07:12 )    Color: x / Appearance: x / SG: x / pH: x  Gluc: 98 mg/dL / Ketone: x  / Bili: x / Urobili: x   Blood: x / Protein: x / Nitrite: x   Leuk Esterase: x / RBC: x / WBC x   Sq Epi: x / Non Sq Epi: x / Bacteria: x      CAPILLARY BLOOD GLUCOSE        RADIOLOGY & ADDITIONAL TESTS: Reviewed. ************************************TRANSFER TO CCU************************************    HOSPITAL COURSE: 89M former smoker w/ PMH of grade III diastolic dysfunction (pt and family deny dx of HF in past; TTE 11/2023 shows 3 diastolic dysfunction, EF 58%), CAD s/p PCI and MIDCAB 6/5/2014 on Plavix, HTN, HLD, pre-DM, CKD, prostate Ca s/p TURP, depression and chronic hypoNa, p/w complaints of intermittent dizziness described as "room spinning" for several hours and progressively worsening ALEXANDER and SOB for several weeks. Stroke code 6/10/25 CTA head and neck showed focal moderate-severe stenosis at origin of dominant left vertebral artery. TTE 6/11/25 revealed EF 45% global left ventricular hypokinesis, grade II LV diastolic dysfunction, left atrium dilation, and severe AS low-flow low-gradient (AV Mean Gradient: 17.0 mmHg, LVOT/aortic valve VTI ratio of 0.33). 6/12/25, structural heart consulted for patient's severe AS and current symptoms. Found to have B/L pleural effusions and pulmonary congestion iso HF exacerbation as well . Admitted to [facility] for further diuresis. Of note, psychiatry consulted for passive suicidality. 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     SUBJECTIVE:       ROS:  Negative unless otherwise stated above.    PHYSICAL EXAM:  General: Alert and oriented x 3. No acute distress.   HEENT: Moist mucous membranes. Anicteric. No cervical lymphadenopathy.  Cardiovascular: Regular rate and rhythm. No murmur. Normal JVP.  Lungs: Clear to auscultation bilaterally. No accessory muscle use.  Abdomen: Soft, non-tender and non-distended. No palpable masses.  Extremities: No edema. Non-tender. Warm.  Skin: No rashes or lesions.   Neurologic: No apparent focal neurological deficits. CN II-XII grossly intact, but not individually tested.  Psychiatric: Cooperative. Appropriate mood and affect.    VITAL SIGNS:  Vital Signs Last 24 Hrs  T(C): 36.7 (13 Jun 2025 14:41), Max: 36.7 (13 Jun 2025 05:57)  T(F): 98 (13 Jun 2025 14:41), Max: 98.1 (13 Jun 2025 05:57)  HR: 74 (13 Jun 2025 14:40) (66 - 77)  BP: 130/59 (13 Jun 2025 14:41) (115/56 - 145/69)  BP(mean): 81 (13 Jun 2025 14:28) (77 - 90)  RR: 16 (13 Jun 2025 14:40) (16 - 18)  SpO2: 98% (13 Jun 2025 14:41) (96% - 100%)    Parameters below as of 13 Jun 2025 12:23  Patient On (Oxygen Delivery Method): room air      INPATIENT MEDICATIONS:   MEDICATIONS  (STANDING):  chlorhexidine 2% Cloths 1 Application(s) Topical <User Schedule>    MEDICATIONS  (PRN):    ALLERGIES:  Allergies    No Known Drug Allergies  Originally Entered as [Rash-General] reaction to [Other][Ragweed] (Rash)  ragweed and birch (Unknown)  Originally Entered as [Rash-General] reaction to [Other][Bush] (Rash)    Intolerances    LABS:                       12.5   4.03  )-----------( 178      ( 13 Jun 2025 07:12 )             36.7     06-13    139  |  104  |  66[H]  ----------------------------<  98  5.2   |  19[L]  |  2.83[H]    Ca    9.1      13 Jun 2025 07:12  Phos  5.4     06-13  Mg     2.3     06-13    TPro  7.7  /  Alb  4.2  /  TBili  0.4  /  DBili  x   /  AST  27  /  ALT  27  /  AlkPhos  69  06-13    PT/INR - ( 13 Jun 2025 07:12 )   PT: 11.1 sec;   INR: 0.96          PTT - ( 13 Jun 2025 07:12 )  PTT:34.8 sec  Urinalysis Basic - ( 13 Jun 2025 07:12 )    Color: x / Appearance: x / SG: x / pH: x  Gluc: 98 mg/dL / Ketone: x  / Bili: x / Urobili: x   Blood: x / Protein: x / Nitrite: x   Leuk Esterase: x / RBC: x / WBC x   Sq Epi: x / Non Sq Epi: x / Bacteria: x      CAPILLARY BLOOD GLUCOSE        RADIOLOGY & ADDITIONAL TESTS: Reviewed. ************************************TRANSFER TO CCU************************************    HOSPITAL COURSE: 89M former smoker w/ PMH of grade III diastolic dysfunction (pt and family deny dx of HF in past; TTE 11/2023 shows 3 diastolic dysfunction, EF 58%), CAD s/p PCI and MIDCAB 6/5/2014 on Plavix, HTN, HLD, pre-DM, CKD, prostate Ca s/p TURP, depression and chronic hypoNa, p/w complaints of intermittent dizziness described as "room spinning" for several hours and progressively worsening ALEXANDER and SOB for several weeks. Stroke code 6/10/25 CTA head and neck showed focal moderate-severe stenosis at origin of dominant left vertebral artery. TTE 6/11/25 revealed EF 45% global left ventricular hypokinesis, grade II LV diastolic dysfunction, left atrium dilation, and severe AS low-flow low-gradient (AV Mean Gradient: 17.0 mmHg, LVOT/aortic valve VTI ratio of 0.33). 6/12/25, structural heart consulted for patient's severe AS and current symptoms. Found to have B/L pleural effusions and pulmonary congestion iso HF exacerbation as well . Admitted to [facility] for further diuresis. Of note, psychiatry consulted for passive suicidality. 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     SUBJECTIVE:       ROS:  Negative unless otherwise stated above.    PHYSICAL EXAM:  General: Alert and oriented x 3. No acute distress.   HEENT: Moist mucous membranes. Anicteric. No cervical lymphadenopathy.  Cardiovascular: Regular rate and rhythm. No murmur. Normal JVP.  Lungs: Clear to auscultation bilaterally. No accessory muscle use.  Abdomen: Soft, non-tender and non-distended. No palpable masses.  Extremities: No edema. Non-tender. Warm.  Skin: No rashes or lesions.   Neurologic: No apparent focal neurological deficits. CN II-XII grossly intact, but not individually tested.  Psychiatric: Cooperative. Appropriate mood and affect.    VITAL SIGNS:  Vital Signs Last 24 Hrs  T(C): 36.7 (13 Jun 2025 14:41), Max: 36.7 (13 Jun 2025 05:57)  T(F): 98 (13 Jun 2025 14:41), Max: 98.1 (13 Jun 2025 05:57)  HR: 74 (13 Jun 2025 14:40) (66 - 77)  BP: 130/59 (13 Jun 2025 14:41) (115/56 - 145/69)  BP(mean): 81 (13 Jun 2025 14:28) (77 - 90)  RR: 16 (13 Jun 2025 14:40) (16 - 18)  SpO2: 98% (13 Jun 2025 14:41) (96% - 100%)    Parameters below as of 13 Jun 2025 12:23  Patient On (Oxygen Delivery Method): room air      INPATIENT MEDICATIONS:   MEDICATIONS  (STANDING):  chlorhexidine 2% Cloths 1 Application(s) Topical <User Schedule>    MEDICATIONS  (PRN):    ALLERGIES:  Allergies    No Known Drug Allergies  Originally Entered as [Rash-General] reaction to [Other][Ragweed] (Rash)  ragweed and birch (Unknown)  Originally Entered as [Rash-General] reaction to [Other][Bush] (Rash)    Intolerances    LABS:                       12.5   4.03  )-----------( 178      ( 13 Jun 2025 07:12 )             36.7     06-13    139  |  104  |  66[H]  ----------------------------<  98  5.2   |  19[L]  |  2.83[H]    Ca    9.1      13 Jun 2025 07:12  Phos  5.4     06-13  Mg     2.3     06-13    TPro  7.7  /  Alb  4.2  /  TBili  0.4  /  DBili  x   /  AST  27  /  ALT  27  /  AlkPhos  69  06-13    PT/INR - ( 13 Jun 2025 07:12 )   PT: 11.1 sec;   INR: 0.96          PTT - ( 13 Jun 2025 07:12 )  PTT:34.8 sec  Urinalysis Basic - ( 13 Jun 2025 07:12 )    Color: x / Appearance: x / SG: x / pH: x  Gluc: 98 mg/dL / Ketone: x  / Bili: x / Urobili: x   Blood: x / Protein: x / Nitrite: x   Leuk Esterase: x / RBC: x / WBC x   Sq Epi: x / Non Sq Epi: x / Bacteria: x      CAPILLARY BLOOD GLUCOSE        RADIOLOGY & ADDITIONAL TESTS: Reviewed.

## 2025-06-13 NOTE — DIETITIAN INITIAL EVALUATION ADULT - SIGNS/SYMPTOMS
Unilateral inguinal hernia, without obstruction or gangrene, not specified as recurrent
as evidenced by Phos of 5.4

## 2025-06-13 NOTE — DIETITIAN INITIAL EVALUATION ADULT - PERTINENT MEDS FT
MEDICATIONS  (STANDING):  aspirin enteric coated 81 milliGRAM(s) Oral daily  atorvastatin 40 milliGRAM(s) Oral at bedtime  cetirizine 10 milliGRAM(s) Oral daily  chlorhexidine 4% Liquid 1 Application(s) Topical once  chlorhexidine 4% Liquid 1 Application(s) Topical once  clopidogrel Tablet 75 milliGRAM(s) Oral daily  clotrimazole 1% Cream 1 Application(s) Topical two times a day  heparin   Injectable 5000 Unit(s) SubCutaneous every 8 hours  latanoprost 0.005% Ophthalmic Solution 1 Drop(s) Both EYES at bedtime  metoprolol succinate ER 50 milliGRAM(s) Oral every 24 hours  polyethylene glycol 3350 17 Gram(s) Oral every 12 hours  senna 2 Tablet(s) Oral at bedtime  tamsulosin 0.4 milliGRAM(s) Oral at bedtime    MEDICATIONS  (PRN):  acetaminophen     Tablet .. 650 milliGRAM(s) Oral every 6 hours PRN Temp greater or equal to 38C (100.4F), Mild Pain (1 - 3)  meclizine 25 milliGRAM(s) Oral every 8 hours PRN Dizziness  melatonin 3 milliGRAM(s) Oral at bedtime PRN Insomnia  ondansetron Injectable 4 milliGRAM(s) IV Push every 8 hours PRN Nausea and/or Vomiting

## 2025-06-13 NOTE — PROGRESS NOTE ADULT - SUBJECTIVE AND OBJECTIVE BOX
CARDIOLOGY PA PROGRESS NOTE    Interval Events: None Overnight some PAT  Overnight Events: NONE NOTED  Subjective: Feels well this morning in BED NAD    Physical Exam:     General: Sitting in Bed, NAD, Well Nourished   HEENT: Conjunctiva clear; MMM  Neck::  -JVD,   Cardiovascular: RRR, No murmurs  Respiratory: CTA B/L/Rales/Decreased Breath Sounds  Gastrointestinal: soft, NT/ND, +BS  Extremities: WWP,  Vascular: Peripheral Pulses palpable 2+ bilaterally carotid 2+ b/l, no bruits; radial 2+ b/l; femoral 2+ b/l no bruits; DP/PT 2+ b/l  Neurological: AAOx3, no focal deficitS  ROS: Negative except per HPI and Subjective     Tele: NSR/AFIB, No signicnifcant events    VITAL SIGNS:  T(C): 36.1 (06-13-25 @ 17:52), Max: 36.7 (06-13-25 @ 05:57)  HR: 74 (06-13-25 @ 14:40) (66 - 77)  BP: 130/59 (06-13-25 @ 14:41) (115/56 - 145/69)  RR: 16 (06-13-25 @ 14:40) (16 - 18)  SpO2: 98% (06-13-25 @ 14:41) (96% - 100%)  Wt(kg): --    I&O's Summary    12 Jun 2025 07:01  -  13 Jun 2025 07:00  --------------------------------------------------------  IN: 360 mL / OUT: 750 mL / NET: -390 mL    13 Jun 2025 07:01  -  13 Jun 2025 18:10  --------------------------------------------------------  IN: 0 mL / OUT: 200 mL / NET: -200 mL      Height (cm): 167.6 (06-13 @ 14:28)  Weight (kg): 65.8 (06-13 @ 14:28)  BMI (kg/m2): 23.4 (06-13 @ 14:28)  BSA (m2): 1.74 (06-13 @ 14:28)    MEDICATIONS  melatonin 3 milliGRAM(s) Oral at bedtime PRN  chlorhexidine 2% Cloths 1 Application(s) Topical <User Schedule>  latanoprost 0.005% Ophthalmic Solution 1 Drop(s) Both EYES at bedtime  tamsulosin 0.4 milliGRAM(s) Oral at bedtime        LABS:	                         12.5   4.03  )-----------( 178      ( 13 Jun 2025 07:12 )             36.7     06-13    139  |  104  |  66[H]  ----------------------------<  98  5.2   |  19[L]  |  2.83[H]    Ca    9.1      13 Jun 2025 07:12  Phos  5.4     06-13  Mg     2.3     06-13    TPro  7.7  /  Alb  4.2  /  TBili  0.4  /  DBili  x   /  AST  27  /  ALT  27  /  AlkPhos  69  06-13    PT/INR - ( 13 Jun 2025 07:12 )   PT: 11.1 sec;   INR: 0.96          PTT - ( 13 Jun 2025 07:12 )  PTT:34.8 sec

## 2025-06-13 NOTE — DIETITIAN INITIAL EVALUATION ADULT - PROBLEM SELECTOR PLAN 5
TTE 11/29/23: Grade III left ventricular diastolic dysfunction, LVEF 58%. Mild-to-moderate aortic regurgitation.  Mild-to-moderate aortic stenosis. The proximal ascending aorta is dilated measuring 4.20 cm.  Pt follows with Dr. Keyona Sinha o/p    Plan:  - Cont Toprol XL 50mg PO qd  - F/U TTE

## 2025-06-13 NOTE — PROGRESS NOTE ADULT - PROBLEM SELECTOR PLAN 1
TTE 6/11/25 EF 45% moderate (grade 2) left ventricular diastolic dysfunction, with elevated left ventricular filling  pressure. Compared to the transthoracic echocardiogram performed on 11/29/2023, aortic valve regurgitation  severity and left ventricular systolic function are worse.  EKG: NSR w/ PVC and low voltage @ HR 61, QTc 440  CXR improved on 6/12  Trop 47 << 48; pBNP 47721; s,p lasix 40mg IVx1  Home meds: metoprolol succinate 50mg PO qd (unclear why pt is prescribed)  Euvolemic on exam  Plan:  - s/p Lasix 40mg IV in AM, gave additional Lasix 80 IVPx1  - Cont toprol xl 50mg PO qd  - Goal net -2L in 24hrs  - Obtain collateral from o/p cardiologist; Keyona Sinha MD   - Confirm med rec -- current based on Surescripts but son and pt unaware of what is taken and what for  - consider structural heart consult based on convo with son

## 2025-06-13 NOTE — DIETITIAN INITIAL EVALUATION ADULT - ADD RECOMMEND
Plan:  1. Resume current diet regimen when appropriate; DASH/TLC, limited phos, 1500-2000mL fluid restriction.  2. Monitor electrolytes and renal trends.  3. Monitor PO intake and tolerance.  4. RD to follow clinical course.

## 2025-06-13 NOTE — DIETITIAN INITIAL EVALUATION ADULT - PROBLEM SELECTOR PLAN 1
TTE 11/29/23: Grade III left ventricular diastolic dysfunction, LVEF 58%. Mild-to-moderate aortic regurgitation.  Mild-to-moderate aortic stenosis. The proximal ascending aorta is dilated measuring 4.20 cm.  EKG: NSR w/ PVC and low voltage @ HR 61, QTc 440  CXR: b/l pleural effusions + pulmonary congestion  Trop 47 << 48; pBNP 62624; s,p lasix 40mg IVx1  Home meds: metoprolol succinate 50mg PO qd (unclear why pt is prescribed)    Plan:  - Lasix 40mg IV in AM  - Cont toprol xl 50mg PO qd  - F/U lipids, A1c, TSH  - Goal net -2L in 24hrs  - Obtain TTE  - Obtain collateral from o/p cardiologist; Keyona Sinha MD   - Confirm med rec -- current based on Surescripts but son and pt unaware of what is taken and what for

## 2025-06-13 NOTE — PROGRESS NOTE ADULT - ASSESSMENT
89 y/o M former smoker w/ PMH of PMH of grade III diastolic dysfunction (pt and family deny dx of HF in past; TTE 11/2023 shows 3 diastolic dysfunction, EF 58%),, CAD s/p PCI and MIDCAB 6/5/2014 on Plavix, HTN, HLD, pre-DM, CKD, prostate Ca s/p TURP, depression and chronic hypoNa, p/w complaints of intermittent dizziness described as "room spinning" for several hours and progressively worsening ALEXANDER and SOB for several weeks. 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 Inman

## 2025-06-13 NOTE — PROGRESS NOTE ADULT - PROBLEM SELECTOR PLAN 3
Baseline appears to be 1.7, on admission noted to be 2.17. Likely pre-renal iso vascular congestion due to HF exacerbation. 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

## 2025-06-13 NOTE — DIETITIAN INITIAL EVALUATION ADULT - PROBLEM SELECTOR PLAN 8
Home meds: atorvastatin 40mg PO qhs    Plan:  - Cont atorvastatin 40mg PO qhs as recommended per stroke

## 2025-06-13 NOTE — DIETITIAN INITIAL EVALUATION ADULT - OTHER INFO
89 y/o M former smoker w/ PMH of PMH of grade III diastolic dysfunction (pt and family deny dx of HF in past; TTE 11/2023 shows 3 diastolic dysfunction, EF 58%),, CAD s/p PCI and MIDCAB 6/5/2014 on Plavix, HTN, HLD, pre-DM, CKD, prostate Ca s/p TURP, depression and chronic hypoNa, p/w complaints of intermittent dizziness described as "room spinning" for several hours and progressively worsening ALEXANDER and SOB for several weeks. 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     Patient visited on 5UR for RD consult and +MST. Patient in bed and [place], reports feeling very hungry. Patient NPO at time of visit for possible LHC with BAV. Patient reports very good intake prior to admission. Denies nausea/vomiting or issues chew/swallowing. When asked about pain, patient states he is itchy. Last bowel movement yesterday, reports loose/soft stools. Patient reports receiving meals on wheels prior to admit. States he receives more than enough food delivered and does not feel he needs additional food services. Denies having any dietary restrictions/preferences/allergies. States usual body weight ~60-61kg. Patient current/dosing weight 65.8kg (145 pounds). BMI 23.4. ideal body weight 142 pounds (102% ideal body weight). Weights obtained since admit; 135.8 pounds (6/13/25), 140.6 pounds (6/12/25). No skin impairments noted. Nutritionally focused physical exam conducted; patient with no fat or muscle wasting findings. BUN 66, Creat 2.83, Phos 5.4. Informed patient of dietary restrictions of DASH, Phos and fluid restriction. Patient agreeable to restrictions.

## 2025-06-13 NOTE — PROGRESS NOTE ADULT - SUBJECTIVE AND OBJECTIVE BOX
Patient discussed on morning rounds with       OPERATION & DATE:    SUBJECTIVE ASSESSMENT:    VITAL SIGNS:  Vital Signs Last 24 Hrs  T(C): 36.7 (13 Jun 2025 05:57), Max: 37 (12 Jun 2025 08:54)  T(F): 98.1 (13 Jun 2025 05:57), Max: 98.6 (12 Jun 2025 08:54)  HR: 66 (13 Jun 2025 07:25) (64 - 84)  BP: 131/63 (13 Jun 2025 07:25) (102/52 - 134/61)  BP(mean): 90 (13 Jun 2025 07:25) (74 - 92)  RR: 18 (13 Jun 2025 07:25) (18 - 18)  SpO2: 97% (13 Jun 2025 07:25) (96% - 100%)    Parameters below as of 13 Jun 2025 07:25  Patient On (Oxygen Delivery Method): room air      I&O's Detail    12 Jun 2025 07:01  -  13 Jun 2025 07:00  --------------------------------------------------------  IN:    Oral Fluid: 360 mL  Total IN: 360 mL    OUT:    Voided (mL): 750 mL  Total OUT: 750 mL    Total NET: -390 mL        CHEST TUBE:    JANETH DRAIN:    EPICARDIAL WIRES:   STITCHES:  STAPLES:  BAUTISTA:   CENTRAL LINE:  MIDLINE/PICC:  WOUND VAC:    PHYSICAL EXAM:  General:  HEENT:  Cardio:  Pulm:  GI:  Extremities:  Vascular:  Incisions:    LABS:                        12.5   4.03  )-----------( 178      ( 13 Jun 2025 07:12 )             36.7     PT/INR - ( 13 Jun 2025 07:12 )   PT: 11.1 sec;   INR: 0.96          PTT - ( 13 Jun 2025 07:12 )  PTT:34.8 sec  06-13    139  |  104  |  66[H]  ----------------------------<  98  5.2   |  19[L]  |  2.83[H]    Ca    9.1      13 Jun 2025 07:12  Phos  5.4     06-13  Mg     2.3     06-13    TPro  7.7  /  Alb  4.2  /  TBili  0.4  /  DBili  x   /  AST  27  /  ALT  27  /  AlkPhos  69  06-13    Urinalysis Basic - ( 13 Jun 2025 07:12 )    Color: x / Appearance: x / SG: x / pH: x  Gluc: 98 mg/dL / Ketone: x  / Bili: x / Urobili: x   Blood: x / Protein: x / Nitrite: x   Leuk Esterase: x / RBC: x / WBC x   Sq Epi: x / Non Sq Epi: x / Bacteria: x      MEDICATIONS  (STANDING):  aspirin enteric coated 81 milliGRAM(s) Oral daily  atorvastatin 40 milliGRAM(s) Oral at bedtime  cetirizine 10 milliGRAM(s) Oral daily  clopidogrel Tablet 75 milliGRAM(s) Oral daily  heparin   Injectable 5000 Unit(s) SubCutaneous every 8 hours  latanoprost 0.005% Ophthalmic Solution 1 Drop(s) Both EYES at bedtime  metoprolol succinate ER 50 milliGRAM(s) Oral every 24 hours  polyethylene glycol 3350 17 Gram(s) Oral every 12 hours  senna 2 Tablet(s) Oral at bedtime  tamsulosin 0.4 milliGRAM(s) Oral at bedtime    MEDICATIONS  (PRN):  acetaminophen     Tablet .. 650 milliGRAM(s) Oral every 6 hours PRN Temp greater or equal to 38C (100.4F), Mild Pain (1 - 3)  meclizine 25 milliGRAM(s) Oral every 8 hours PRN Dizziness  melatonin 3 milliGRAM(s) Oral at bedtime PRN Insomnia  ondansetron Injectable 4 milliGRAM(s) IV Push every 8 hours PRN Nausea and/or Vomiting    RADIOLOGY & ADDITIONAL TESTS:       Patient discussed on morning rounds with Dr. Noa Inman and Dr. Gamez    OPERATION & DATE: BAV 6/13/25    SUBJECTIVE ASSESSMENT:  Patient states he feels "lousy" this morning without any other complaints. Denies CP, abdominal pain, SOB, N/V, headache, dizziness.     VITAL SIGNS:  Vital Signs Last 24 Hrs  T(C): 36.7 (13 Jun 2025 05:57), Max: 37 (12 Jun 2025 08:54)  T(F): 98.1 (13 Jun 2025 05:57), Max: 98.6 (12 Jun 2025 08:54)  HR: 66 (13 Jun 2025 07:25) (64 - 84)  BP: 131/63 (13 Jun 2025 07:25) (102/52 - 134/61)  BP(mean): 90 (13 Jun 2025 07:25) (74 - 92)  RR: 18 (13 Jun 2025 07:25) (18 - 18)  SpO2: 97% (13 Jun 2025 07:25) (96% - 100%)    Parameters below as of 13 Jun 2025 07:25  Patient On (Oxygen Delivery Method): room air      I&O's Detail    12 Jun 2025 07:01  -  13 Jun 2025 07:00  --------------------------------------------------------  IN:    Oral Fluid: 360 mL  Total IN: 360 mL    OUT:    Voided (mL): 750 mL  Total OUT: 750 mL    Total NET: -390 mL    PHYSICAL EXAM:  CONSTITUTIONAL: Well appearing in NAD assessed laying comfortably in bed   NEURO: A&OX3. No focal deficits noted, moving bilateral upper and lower extremities with equal strength                    CV: RRR, no murmurs, rubs, gallops  RESPIRATORY: Clear to auscultation bilateral posterior lung fields, no wheezes, rales, rhonchi   GI: +BS, NT/ND  MUSCULOSKELETAL: No peripheral edema or calf tenderness. Full strength and ROM bilateral upper and lower extremities   VASCULAR: Bilateral distal pulses palpable    LABS:                        12.5   4.03  )-----------( 178      ( 13 Jun 2025 07:12 )             36.7     PT/INR - ( 13 Jun 2025 07:12 )   PT: 11.1 sec;   INR: 0.96          PTT - ( 13 Jun 2025 07:12 )  PTT:34.8 sec  06-13    139  |  104  |  66[H]  ----------------------------<  98  5.2   |  19[L]  |  2.83[H]    Ca    9.1      13 Jun 2025 07:12  Phos  5.4     06-13  Mg     2.3     06-13    TPro  7.7  /  Alb  4.2  /  TBili  0.4  /  DBili  x   /  AST  27  /  ALT  27  /  AlkPhos  69  06-13    Urinalysis Basic - ( 13 Jun 2025 07:12 )    Color: x / Appearance: x / SG: x / pH: x  Gluc: 98 mg/dL / Ketone: x  / Bili: x / Urobili: x   Blood: x / Protein: x / Nitrite: x   Leuk Esterase: x / RBC: x / WBC x   Sq Epi: x / Non Sq Epi: x / Bacteria: x      MEDICATIONS  (STANDING):  aspirin enteric coated 81 milliGRAM(s) Oral daily  atorvastatin 40 milliGRAM(s) Oral at bedtime  cetirizine 10 milliGRAM(s) Oral daily  clopidogrel Tablet 75 milliGRAM(s) Oral daily  heparin   Injectable 5000 Unit(s) SubCutaneous every 8 hours  latanoprost 0.005% Ophthalmic Solution 1 Drop(s) Both EYES at bedtime  metoprolol succinate ER 50 milliGRAM(s) Oral every 24 hours  polyethylene glycol 3350 17 Gram(s) Oral every 12 hours  senna 2 Tablet(s) Oral at bedtime  tamsulosin 0.4 milliGRAM(s) Oral at bedtime    MEDICATIONS  (PRN):  acetaminophen     Tablet .. 650 milliGRAM(s) Oral every 6 hours PRN Temp greater or equal to 38C (100.4F), Mild Pain (1 - 3)  meclizine 25 milliGRAM(s) Oral every 8 hours PRN Dizziness  melatonin 3 milliGRAM(s) Oral at bedtime PRN Insomnia  ondansetron Injectable 4 milliGRAM(s) IV Push every 8 hours PRN Nausea and/or Vomiting    RADIOLOGY & ADDITIONAL TESTS:    < from: CT Angio Abdomen and Pelvis TAVR GAURAV w/wo IV Cont (06.13.25 @ 12:27) >  IMPRESSION:    Aortic valvular calcifications. Small pleural effusions. Pulmonary   congestion.    Stable dilatation of aortic root and ascending aorta. Stable aneurysmal   right internal iliac artery. Patency of the arteries cannot be evaluated.    2.3 cm groundglass nodule right upper lobe, stable in size but increased   in density since 2020.    < end of copied text >  < from: CT Angio Coronary TAVR GAURAV w/ IV Cont (06.13.25 @ 12:27) >  IMPRESSION:    Cardiac:      1.  Noncontrast study unable to evaluated the coronaries.        Non-cardiac:  1.  The right upper lobe groundglass and dense opacity can represent   primary lung cancer.    < end of copied text >

## 2025-06-13 NOTE — CHART NOTE - NSCHARTNOTEFT_GEN_A_CORE
89 yo M former smoker PMHx CAD s/p PCI and MIDCAB 6/5/2014, HTN, HLD, pre-DM, CKD, prostate cancer s/p TURP, chronic hyponatremia, and depression, presented to [facility] with complaints of intermittent dizziness described as "room spinning" for several hours and progressively worsening ALEXANDER and SOB for several weeks. 6/10/25 CTA head and neck showed focal moderate-severe stenosis at origin of dominant left vertebral artery. TTE 6/11/25 revealed EF 45% global left ventricular hypokinesis, grade II LV diastolic dysfunction, left atrium dilation, and severe AS low-flow low-gradient (AV Mean Gradient: 17.0 mmHg, LVOT/aortic valve VTI ratio of 0.33). 6/12/25, structural heart consulted for patient's severe AS and current symptoms. He underwent a BAV with Dr. Inman today.    Plan s/p BAV  - Access: RCFA 12F, 2 Perclose                RCFV 6F TVP, 1 Vascaid  - no contrast administered 2/2 CKD  - No rhythm issues with balloon dilation, TVP removed  - Gradient reduced from 18 to 4-5 post BAV  - Please resume aspirin and plavix tomorrow  - Hold Toprol tonight, may resume tomorrow  - please obtain an AM EKG, CXR and TTE to assess aortic valve, presence of effusion and Function tomorrow  - please obtain a post procedure CXR, EKG, CBC, CMP, Coags today 89 yo M former smoker PMHx CAD s/p PCI and MIDCAB 6/5/2014, HTN, HLD, pre-DM, CKD, prostate cancer s/p TURP, chronic hyponatremia, and depression, presented to [facility] with complaints of intermittent dizziness described as "room spinning" for several hours and progressively worsening ALEXANDER and SOB for several weeks. 6/10/25 CTA head and neck showed focal moderate-severe stenosis at origin of dominant left vertebral artery. TTE 6/11/25 revealed EF 45% global left ventricular hypokinesis, grade II LV diastolic dysfunction, left atrium dilation, and severe AS low-flow low-gradient (AV Mean Gradient: 17.0 mmHg, LVOT/aortic valve VTI ratio of 0.33). 6/12/25, structural heart consulted for patient's severe AS and current symptoms. He underwent a BAV with Dr. Inman today.    Plan s/p BAV with 20 Trueflow balloon   - Patient must be flat for 4 hours   - Access: RCFA 12F, 2 Perclose                RCFV 6F TVP, 1 Vascaid  - no contrast administered 2/2 CKD  - No rhythm issues with balloon dilation, TVP removed  - Mean trans-aortic gradient reduced from 17-18 to 6-7 post BAV  - Please resume aspirin and plavix tomorrow  - Hold Toprol tonight, may resume tomorrow  - please obtain an AM EKG, CXR and TTE to assess aortic valve, presence of effusion and Function tomorrow  - please obtain a post procedure CXR, EKG, CBC, CMP, Coags today

## 2025-06-13 NOTE — DIETITIAN INITIAL EVALUATION ADULT - PERTINENT LABORATORY DATA
06-13    139  |  104  |  66[H]  ----------------------------<  98  5.2   |  19[L]  |  2.83[H]    Ca    9.1      13 Jun 2025 07:12  Phos  5.4     06-13  Mg     2.3     06-13    TPro  7.7  /  Alb  4.2  /  TBili  0.4  /  DBili  x   /  AST  27  /  ALT  27  /  AlkPhos  69  06-13  A1C with Estimated Average Glucose Result: 5.8 % (06-11-25 @ 05:40)

## 2025-06-13 NOTE — DIETITIAN INITIAL EVALUATION ADULT - NS FNS DIET ORDER
Diet, DASH/TLC:   Sodium & Cholesterol Restricted  1500mL Fluid Restriction ([id number])  No Concentrated Phosphorus     Special Instructions for Nursin milliLiter(s) to 2000 milliLiter(s) fluid restriction (25 @ 22:17)  Diet, NPO after Midnight:      NPO Start Date: 2025,   NPO Start Time: 23:59 (25 @ 17:19)

## 2025-06-13 NOTE — PROGRESS NOTE ADULT - ASSESSMENT
89-year-old male with a PMhx of HFpEF, CAD (s/p PCI, on Plavix), HTN, HLD, CKD (baseline Cr 1.7), prostate cancer (s/p TURP), depression and chronic hyponatremia who presented with acute on chronic HFpEF exacerbation.             #Acute on Chronic HFpEF Exacerbation       -further management as per cardiology       -currently on Metoprolol Succinate 50mg daily             #CAD     -continue with ASA 81mg daily, Plavix 75mg daily and Atorvastatin 40mg daily             #Severe AS      -structural heart consulted, possible LHC and BAV           #MARIYA on CKD       -urine studies suggestive of intrinsic disease, likely related to aggressive diuresis on admission      -obtain bladder scan and renal US, repeat UA/urine electrolytes today       -recommend holding diuresis and monitor Cr, consider nephrology consult pending trend off diuresis       -renally dose medications, avoid nephrotoxic agents             #Dizziness       -stroke code called on admission, stroke signed off, recommend DAPT and statin for stroke prevention       -general neurology consulted, appreciate recommendations        -continue with PRN Meclizine             #Prostate Cancer (s/p TURP)      -continue with tamsulosin 0.4mg daily             #Passive SI       -psych consulted, appreciate recommendations      89-year-old male with a PMHx of HFpEF, CAD (s/p PCI, on Plavix), HTN, HLD, CKD (baseline Cr 1.7), prostate cancer (s/p TURP), depression and chronic hyponatremia who presented with acute on chronic HFpEF exacerbation.       #Acute on Chronic HFpEF Exacerbation     -further management as per cardiology     -currently on Metoprolol Succinate 50mg daily        #CAD   -continue with ASA 81mg daily, Plavix 75mg daily and Atorvastatin 40mg daily     -possible C this admission pending kidney function and GOC    #Severe AS    -structural heart consulted, tentative plan for BAV today     #GOC  -palliative consulted, follow up recommendations     #MARIYA on CKD     -urine studies suggestive of intrinsic disease, likely related to aggressive diuresis on admission, Cr now slowly improving   -continue bladder scan protocol, may require [place] placement   -obtain renal US and repeat UA/urine electrolytes   -recommend holding diuresis and monitor Cr, consider nephrology consult especially if planning for C this admission   -renally dose medications, avoid nephrotoxic agents       #Dizziness     -stroke code called on admission, stroke signed off, recommend DAPT and statin for stroke prevention     -general neurology consulted, etiology likely BPPV, continue with PRN Meclizine       #Rash  -concern for possible topical fungal infection, can trial Clotrimazole BID to affected area  -consider outpatient dermatology referral     #Prostate Cancer (s/p TURP)    -continue with tamsulosin 0.4mg daily       #Passive SI     -psych consulted, appreciate recommendations

## 2025-06-13 NOTE — PROGRESS NOTE ADULT - PROBLEM SELECTOR PLAN 2
Resolved on presentation to ED, however was pt's presenting complaint. Stroke code called in ED, recommending continuation of home meds as NIHSS 0. s/p meclizine 25mg PO x1  CTA EXTRACRANIAL: Focal moderate-severe stenosis at origin of dominant left vertebral  artery    Plan:  - Meclizine 25mg PO q8hrs PRN   - Cont clopidogrel 75mg PO qd and aspirin 81mg daily  - Cont atorvastatin 40mg PO qhs  - Appreciate stroke team recs

## 2025-06-13 NOTE — PROGRESS NOTE ADULT - ASSESSMENT
89M with a PMHx of HFpEF, CAD (s/p PCI, on Plavix), HTN, HLD, CKD (baseline Cr 1.7), prostate cancer (s/p TURP), depression and chronic hyponatremia who presented with acute on chronic HFpEF exacerbation. Now s/p BAV and transferred to CCU for further monitoring.     NEURO  #Dizziness.   Resolved on presentation to ED, however was pt's presenting complaint. Stroke code called in ED, recommending continuation of home meds as NIHSS 0. 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    PULM    CARDIO  #Acute exacerbation of congestive heart failure.   TTE 6/11/25 EF 45% moderate (grade 2) left ventricular diastolic dysfunction, with elevated left ventricular filling  pressure. Compared to the transthoracic echocardiogram performed on 11/29/2023, aortic valve regurgitation  severity and left ventricular systolic function are worse.  EKG: NSR w/ PVC and low voltage @ HR 61, QTc 440  CXR improved on 6/12  Trop 47 << 48; pBNP 64207; s,p lasix 40mg IVx1  Home meds: metoprolol succinate 50mg PO qd (unclear why pt is prescribed)  Euvolemic on exam    Plan:  - s/p Lasix 40mg IV in AM, gave additional Lasix 80 IVPx1  - Cont toprol xl 50mg PO qd  - Goal net -2L in 24hrs  - Obtain collateral from o/p cardiologist; Keyona Sinha MD   - Confirm med rec -- current based on Surescripts but son and pt unaware of what is taken and what for  - consider structural heart consult based on convo with son.    #Ascending aortic aneurysm.   TTE 11/29/23: Grade III left ventricular diastolic dysfunction, LVEF 58%. Mild-to-moderate aortic regurgitation. Mild-to-moderate aortic stenosis. The proximal ascending aorta is dilated measuring 4.20 cm.  Pt follows with Dr. Keyona Sinha o/p  Repeat TTE:     Plan:  - Restart Toprol XL 50mg PO qd on 6/14 AM    #CAD (coronary artery disease).   Hx of CAD, MIDCAB 2014. Denies CP on admission  Home meds: plavix 75mg PO, atorvastatin 40mg PO qhs    Plan:  - C/w atorvastatin 40mg   - Hold plavix iso increased bleeding risk d/t BAV -- f/u stroke recs     #HTN (hypertension)   BP elevated on admission, no noted antihypertensives in surescripts    Plan:  - Monitor BP.    HLD (hyperlipidemia).   ·  Plan: Home meds: atorvastatin 40mg PO qhs    Plan:  - Cont atorvastatin 40mg PO qhs as recommended per stroke.    GI    RENAL  #Acute kidney injury superimposed on CKD.   Baseline appears to be 1.7, on admission noted to be 2.17. Likely pre-renal iso vascular congestion due to HF exacerbation. 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   Appears to be at baseline, no signs of active bleeding.    Plan:  - Obtain iron studies  - Maintain active T&S  - Transfusion goal < 7.    PPX  F:  E: Replete PRN  N:   GI ppx:   DVT ppx:   Code Status:      89M with a PMHx of HFpEF, CAD (s/p PCI, on Plavix), HTN, HLD, CKD (baseline Cr 1.7), prostate cancer (s/p TURP), depression and chronic hyponatremia who presented with acute on chronic HFpEF exacerbation. Now s/p BAV and transferred to CCU for further monitoring.     NEURO  #Dizziness.   Resolved on presentation to ED, however was pt's presenting complaint. Stroke code called in ED, recommending continuation of home meds as NIHSS 0. 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   - Repeat CXR in AM    CARDIO  #Acute exacerbation of congestive heart failure.   TTE 6/11/25 EF 45% moderate (grade 2) left ventricular diastolic dysfunction, with elevated left ventricular filling pressure. Compared to the transthoracic echocardiogram performed on 11/29/2023, aortic valve regurgitation severity and left ventricular systolic function are worse.  EKG: NSR w/ PVC and low voltage @ HR 61, QTc 440  CXR improved on 6/12  Trop 48>>47; pBNP 92692; s/p Lasix 40mg IVx1  Home meds: metoprolol succinate 50mg PO qd (unclear why pt is prescribed)  Euvolemic on exam    Plan:  - Repeat TTE in AM  - s/p diuresis with Lasix on 6/12  - Cont toprol xl 50mg PO qd  - Obtain collateral from o/p cardiologist; Keyona Sinha MD   - Confirm med rec -- current based on Surescripts but son and pt unaware of what is taken and what for    #Ascending aortic aneurysm.   TTE 11/29/23: Grade III left ventricular diastolic dysfunction, LVEF 58%. Mild-to-moderate aortic regurgitation. Mild-to-moderate aortic stenosis. The proximal ascending aorta is dilated measuring 4.20 cm.  TTE 6/11/25 EF 45% moderate (grade 2) left ventricular diastolic dysfunction, with elevated left ventricular filling pressure. Compared to the transthoracic echocardiogram performed on 11/29/2023, aortic valve regurgitation severity and left ventricular systolic function are worse.  Pt follows with Dr. Keyona Sinha o/p    Plan:  - Restart Toprol XL 50mg PO qd on 6/14 AM    #CAD (coronary artery disease).   Hx of CAD, MIDCAB 2014. Denies CP on admission  Home meds: plavix 75mg PO, atorvastatin 40mg PO qhs    Plan:  - C/w atorvastatin 40mg on 6/14 AM  - Hold plavix iso increased bleeding risk d/t BAV -- f/u stroke recs     #HTN (hypertension)   BP elevated on admission, no noted antihypertensives in surescripts    Plan:  - Monitor BP.    HLD (hyperlipidemia).   ·  Plan: Home meds: atorvastatin 40mg PO qhs    Plan:  - Cont atorvastatin 40mg PO qhs on 6/14 AM    GI  s/p BAV  - restart DASH diet    RENAL  #Acute kidney injury superimposed on CKD.   Baseline appears to be 1.7, on admission noted to be 2.17. Likely pre-renal iso vascular congestion due to HF exacerbation. 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   Appears to be at baseline Hgb 12.5 prior to procedure, no signs of active bleeding.    Plan:  - CTM Hgb s/p BAV  - Holding plavix/aspirin at this time  - Obtain iron studies  - Maintain active T&S  - Transfusion goal < 7    PPX  F: --  E: Replete PRN  N: DASH diet  GI ppx: --  DVT ppx: holding s/p BAV to monitor for bleeding   Code Status:       89M with a PMHx of HFpEF, CAD (s/p PCI, on Plavix), HTN, HLD, CKD (baseline Cr 1.7), prostate cancer (s/p TURP), depression and chronic hyponatremia who presented with acute on chronic HFpEF exacerbation. Now s/p BAV and transferred to CCU for further monitoring.     NEURO  #Dizziness.   Resolved on presentation to ED, however was pt's presenting complaint. Stroke code called in ED, recommending continuation of home meds as NIHSS 0. 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   - Repeat CXR in AM    CARDIO  #Severe AS low-flow, low-gradient   #Acute exacerbation of congestive heart failure  TTE 6/11/25 EF 45% moderate (grade 2) left ventricular diastolic dysfunction, with elevated left ventricular filling pressure. Compared to the transthoracic echocardiogram performed on 11/29/2023, aortic valve regurgitation severity and left ventricular systolic function are worse.  EKG: NSR w/ PVC and low voltage @ HR 61, QTc 440  CXR improved on 6/12  Trop 48>>47; pBNP 23350; s/p Lasix 40mg IVx1  Home meds: metoprolol succinate 50mg PO qd (unclear why pt is prescribed)  s/p BAV on 6/13/25 by structural heart team. Euvolemic on exam. s/p diuresis with Lasix on 6/12    Plan:  - Repeat EKG s/p BAV  - Repeat TTE in AM  - Cont toprol xl 50mg PO qd  - Obtain collateral from o/p cardiologist; Keyona Sinha MD   - Confirm med rec -- current based on Surescripts but son and pt unaware of what is taken and what for    #Ascending aortic aneurysm.   TTE 11/29/23: Grade III left ventricular diastolic dysfunction, LVEF 58%. Mild-to-moderate aortic regurgitation. Mild-to-moderate aortic stenosis. The proximal ascending aorta is dilated measuring 4.20 cm.  TTE 6/11/25 EF 45% moderate (grade 2) LV diastolic dysfunction, with elevated LV filling pressure. Compared to the TTE performed on 11/29/2023, AR severity and LV systolic function are worse.  Pt follows with Dr. Keyona Sinha o/p    Plan:  - Restart Toprol XL 50mg PO qd on 6/14 AM    #CAD (coronary artery disease).   Hx of CAD, MIDCAB 2014. Denies CP on admission  Home meds: plavix 75mg PO, atorvastatin 40mg PO qhs    Plan:  - C/w atorvastatin 40mg on 6/14 AM  - Hold plavix iso increased bleeding risk d/t BAV -- f/u stroke recs     #HTN (hypertension)   BP elevated on admission, no noted antihypertensives in surescripts    Plan:  - Monitor BP.    HLD (hyperlipidemia).   ·  Plan: Home meds: atorvastatin 40mg PO qhs    Plan:  - Cont atorvastatin 40mg PO qhs on 6/14 AM    GI  s/p BAV  - restart DASH diet    RENAL  #Acute kidney injury superimposed on CKD.   Baseline appears to be 1.7, on admission noted to be 2.17. Likely pre-renal iso vascular congestion due to HF exacerbation. 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   Appears to be at baseline Hgb 12.5 prior to procedure, no signs of active bleeding.    Plan:  - CTM Hgb s/p BAV  - Holding plavix/aspirin at this time  - Obtain iron studies  - Maintain active T&S  - Transfusion goal < 7    PPX  F: --  E: Replete PRN  N: DASH diet  GI ppx: --  DVT ppx: holding s/p BAV to monitor for bleeding   Code Status:

## 2025-06-13 NOTE — PROGRESS NOTE ADULT - ASSESSMENT
Assessment:  89 yo M former smoker PMHx CAD s/p PCI and MIDCAB 6/5/2014, HTN, HLD, pre-DM, CKD, prostate cancer s/p TURP, chronic hyponatremia, and depression, presented to [facility] with complaints of intermittent dizziness described as "room spinning" for several hours and progressively worsening ALEXANDER and SOB for several weeks. 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 TTE 6/11/25 revealed EF 45% global left ventricular hypokinesis, grade II LV diastolic dysfunction, left atrium dilation, and severe AS low-flow low-gradient (AV Mean Gradient: 17.0 mmHg, LVOT/aortic valve VTI ratio of 0.33). 6/12/25, structural heart consulted for patient's severe AS and current symptoms. Planned BAV + LHC 6/13/25, pending CT scans non-con and consent.     Plan:  Problem 1: Severe AS low-flow, low-gradient   -TTE 6/11/25:  EF 45% global left ventricular hypokinesis, grade II LV diastolic dysfunction, left atrium dilation, and severe AS low-flow low-gradient (AV Mean Gradient: 17.0 mmHg, LVOT/aortic valve VTI ratio of 0.33)  -CTA head 6/10/25: no acute intracranial hemorrhage focal moderate-severe stenosis at origin of dominant left vertebral artery  -c/w diuresis per primary team  -Per Dr. Inman, BAV + LHC, planned for 6/13/25 with approval from son  -Spoke to son, Israel Cain, [facility] proxy [phone] for possbile BAV + LHC tomorrow 6/13/25  -PST  -pending 2x T&S + ABO  -pending structural heart scans, NON CONTRAST  -NPO    Problem 2: acute on chronic CHF   TTE 11/29/23: Grade III left ventricular diastolic dysfunction, LVEF 58%. Mild-to-moderate aortic regurgitation.  Mild-to-moderate aortic stenosis. The proximal ascending aorta is dilated measuring 4.20 cm  -TTE 6/11/25: results above  -CXR 6/12/25: right pleural effusion  -c/w diuresis per primary team  -c/w Toprol 50mg once daily per primary team      Problem 3: CAD  -c/w Toprol 50mg once daily, plavix 75mg, ASA 81mg, atorvastatin 40mg CAD    Problem 4: Ascending Aortic Dilation  -TTE 11/29/23: the proximal ascending aorta is dilated measuring 4.20 cm   -TTE 6/11/25: Aorta Measurements:  Ao Root d 3.80 cm (3.1 - 3.7 cm) 2.19 cm/mï¿½  Ao Asc d, 2D: 4.10  Ao Asc prox: 4.10 cm 2.36 cm/mï¿½  -stable from last TTE, will continue to monitor    I have reviewed clinical labs tests and reports, radiology tests and reports, as well as old patient medical records, and discussed with the referring physician.     Assessment:  89 yo M former smoker PMHx CAD s/p PCI and MIDCAB 6/5/2014, HTN, HLD, pre-DM, CKD, prostate cancer s/p TURP, chronic hyponatremia, and depression, presented to [facility] with complaints of intermittent dizziness described as "room spinning" for several hours and progressively worsening ALEXANDER and SOB for several weeks. 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 TTE 6/11/25 revealed EF 45% global left ventricular hypokinesis, grade II LV diastolic dysfunction, left atrium dilation, and severe AS low-flow low-gradient (AV Mean Gradient: 17.0 mmHg, LVOT/aortic valve VTI ratio of 0.33). 6/12/25, structural heart consulted for patient's severe AS and current symptoms. Planned BAV 6/13/25, pending CT scans non-con and consent.     Plan:  Problem 1: Severe AS low-flow, low-gradient   -TTE 6/11/25:  EF 45% global left ventricular hypokinesis, grade II LV diastolic dysfunction, left atrium dilation, and severe AS low-flow low-gradient (AV Mean Gradient: 17.0 mmHg, LVOT/aortic valve VTI ratio of 0.33)  -CTA head 6/10/25: no acute intracranial hemorrhage focal moderate-severe stenosis at origin of dominant left vertebral artery  -c/w diuresis per primary team  -Per Dr. Inman, BAV, planned for 6/13/25 with approval from son  -Spoke to son, Israel Reyes, health proxy [phone] for BAV 6/13/25  -PST  -pending 2x T&S + ABO  -pending structural heart scans, NON CONTRAST  -NPO    Problem 2: acute on chronic CHF   TTE 11/29/23: Grade III left ventricular diastolic dysfunction, LVEF 58%. Mild-to-moderate aortic regurgitation.  Mild-to-moderate aortic stenosis. The proximal ascending aorta is dilated measuring 4.20 cm  -TTE 6/11/25: results above  -CXR 6/12/25: right pleural effusion  -c/w diuresis per primary team  -c/w Toprol 50mg once daily per primary team    Problem 3: CAD  -c/w Toprol 50mg once daily, plavix 75mg, ASA 81mg, atorvastatin 40mg CAD    Problem 4: Ascending Aortic Dilation  -TTE 11/29/23: the proximal ascending aorta is dilated measuring 4.20 cm   -TTE 6/11/25: Aorta Measurements:  Ao Root d 3.80 cm (3.1 - 3.7 cm) 2.19 cm/mï¿½  Ao Asc d, 2D: 4.10  Ao Asc prox: 4.10 cm 2.36 cm/mï¿½  -stable from last TTE, will continue to monitor    I have reviewed clinical labs tests and reports, radiology tests and reports, as well as old patient medical records, and discussed with the referring physician.     Assessment:  89 yo M former smoker PMHx CAD s/p PCI and MIDCAB 6/5/2014, HTN, HLD, pre-DM, CKD, prostate cancer s/p TURP, chronic hyponatremia, and depression, presented to [facility] with complaints of intermittent dizziness described as "room spinning" for several hours and progressively worsening ALEXANDER and SOB for several weeks. 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 TTE 6/11/25 revealed EF 45% global left ventricular hypokinesis, grade II LV diastolic dysfunction, left atrium dilation, and severe AS low-flow low-gradient (AV Mean Gradient: 17.0 mmHg, LVOT/aortic valve VTI ratio of 0.33). 6/12/25, structural heart consulted for patient's severe AS and current symptoms. Planned BAV 6/13/25, pending CT scans non-con and consent.     Plan:  Problem 1: Severe AS low-flow, low-gradient   -TTE 6/11/25:  EF 45% global left ventricular hypokinesis, grade II LV diastolic dysfunction, left atrium dilation, and severe AS low-flow low-gradient (AV Mean Gradient: 17.0 mmHg, LVOT/aortic valve VTI ratio of 0.33)  -CTA head 6/10/25: no acute intracranial hemorrhage focal moderate-severe stenosis at origin of dominant left vertebral artery  -c/w diuresis per primary team  -Per Dr. Inman, BAV, planned for 6/13/25 with approval from son  -Spoke to son, Israel Reyes, health proxy [phone] for BAV 6/13/25  -PST  -pending 2x T&S + ABO  -pending structural heart scans, NON CONTRAST  -NPO  -will be transferred to CCU after BAV    Problem 2: acute on chronic CHF   TTE 11/29/23: Grade III left ventricular diastolic dysfunction, LVEF 58%. Mild-to-moderate aortic regurgitation.  Mild-to-moderate aortic stenosis. The proximal ascending aorta is dilated measuring 4.20 cm  -TTE 6/11/25: results above  -CXR 6/12/25: right pleural effusion  -c/w diuresis per primary team  -c/w Toprol 50mg once daily per primary team    Problem 3: CAD  -c/w Toprol 50mg once daily, plavix 75mg, ASA 81mg, atorvastatin 40mg CAD    Problem 4: Ascending Aortic Dilation  -TTE 11/29/23: the proximal ascending aorta is dilated measuring 4.20 cm   -TTE 6/11/25: Aorta Measurements:  Ao Root d 3.80 cm (3.1 - 3.7 cm) 2.19 cm/mï¿½  Ao Asc d, 2D: 4.10  Ao Asc prox: 4.10 cm 2.36 cm/mï¿½  -stable from last TTE, will continue to monitor    I have reviewed clinical labs tests and reports, radiology tests and reports, as well as old patient medical records, and discussed with the referring physician.     Assessment:  89 yo M former smoker PMHx CAD s/p PCI and MIDCAB 6/5/2014, HTN, HLD, pre-DM, CKD, prostate cancer s/p TURP, chronic hyponatremia, and depression, presented to [facility] with complaints of intermittent dizziness described as "room spinning" for several hours and progressively worsening ALEXANDER and SOB for several weeks. 6/10/25 CTA head and neck showed focal moderate-severe stenosis at origin of dominant left vertebral artery. TTE 6/11/25 revealed EF 45% global left ventricular hypokinesis, grade II LV diastolic dysfunction, left atrium dilation, and severe AS low-flow low-gradient (AV Mean Gradient: 17.0 mmHg, LVOT/aortic valve VTI ratio of 0.33). 6/12/25, structural heart consulted for patient's severe AS and current symptoms. To undergo BAV today     Plan:  Problem 1: Severe AS low-flow, low-gradient   -TTE 6/11/25:  EF 45% global left ventricular hypokinesis, grade II LV diastolic dysfunction, left atrium dilation, and severe AS low-flow low-gradient (AV Mean Gradient: 17.0 mmHg, LVOT/aortic valve VTI ratio of 0.33)  -CTA head 6/10/25: no acute intracranial hemorrhage focal moderate-severe stenosis at origin of dominant left vertebral artery  -c/w diuresis per primary team  -Per Dr. Inman, BAV, planned for 6/13/25 with approval from son  -Spoke to son, Israel Reyes, health proxy [phone] for BAV 6/13/25  -will be transferred to CCU after BAV    Problem 2: acute on chronic CHF   TTE 11/29/23: Grade III left ventricular diastolic dysfunction, LVEF 58%. Mild-to-moderate aortic regurgitation.  Mild-to-moderate aortic stenosis. The proximal ascending aorta is dilated measuring 4.20 cm  -TTE 6/11/25: results above  -CXR 6/12/25: right pleural effusion  -c/w diuresis per primary team  -c/w Toprol 50mg once daily per primary team    Problem 3: CAD  -c/w Toprol 50mg once daily, plavix 75mg, ASA 81mg, atorvastatin 40mg CAD    Problem 4: Ascending Aortic Dilation  -TTE 11/29/23: the proximal ascending aorta is dilated measuring 4.20 cm   -TTE 6/11/25: Aorta Measurements:  Ao Root d 3.80 cm (3.1 - 3.7 cm) 2.19 cm/mï¿½  Ao Asc d, 2D: 4.10  Ao Asc prox: 4.10 cm 2.36 cm/mï¿½  -stable from last TTE, will continue to monitor    I have reviewed clinical labs tests and reports, radiology tests and reports, as well as old patient medical records, and discussed with the referring physician.     Assessment:  89 yo M former smoker PMHx CAD s/p PCI and MIDCAB 6/5/2014, HTN, HLD, pre-DM, CKD, prostate cancer s/p TURP, chronic hyponatremia, and depression, presented to [facility] with complaints of intermittent dizziness described as "room spinning" for several hours and progressively worsening ALEXANDER and SOB for several weeks. 6/10/25 CTA head and neck showed focal moderate-severe stenosis at origin of dominant left vertebral artery. TTE 6/11/25 revealed EF 45% global left ventricular hypokinesis, grade II LV diastolic dysfunction, left atrium dilation, and severe AS low-flow low-gradient (AV Mean Gradient: 17.0 mmHg, LVOT/aortic valve VTI ratio of 0.33). 6/12/25, structural heart consulted for patient's severe AS and current symptoms. To undergo BAV today     Plan:  Problem 1: Severe AS low-flow, low-gradient   -TTE 6/11/25:  EF 45% global left ventricular hypokinesis, grade II LV diastolic dysfunction, left atrium dilation, and severe AS low-flow low-gradient (AV Mean Gradient: 17.0 mmHg, LVOT/aortic valve VTI ratio of 0.33)  -CTA head 6/10/25: no acute intracranial hemorrhage focal moderate-severe stenosis at origin of dominant left vertebral artery  -c/w diuresis per primary team  -Per Dr. Inman, BAV, planned for 6/13/25 with approval from son  -Spoke to son, Israel Reyes, health proxy [phone] for BAV 6/13/25  -will be transferred to CCU after BAV  -needs follow-up TTE 6/14/25    Problem 2: acute on chronic CHF   TTE 11/29/23: Grade III left ventricular diastolic dysfunction, LVEF 58%. Mild-to-moderate aortic regurgitation.  Mild-to-moderate aortic stenosis. The proximal ascending aorta is dilated measuring 4.20 cm  -TTE 6/11/25: results above  -CXR 6/12/25: right pleural effusion  -c/w diuresis per primary team  -c/w Toprol 50mg once daily per primary team    Problem 3: CAD  -c/w Toprol 50mg once daily, plavix 75mg, ASA 81mg, atorvastatin 40mg CAD    Problem 4: Ascending Aortic Dilation  -TTE 11/29/23: the proximal ascending aorta is dilated measuring 4.20 cm   -TTE 6/11/25: Aorta Measurements:  Ao Root d 3.80 cm (3.1 - 3.7 cm) 2.19 cm/mï¿½  Ao Asc d, 2D: 4.10  Ao Asc prox: 4.10 cm 2.36 cm/mï¿½  -stable from last TTE, will continue to monitor    I have reviewed clinical labs tests and reports, radiology tests and reports, as well as old patient medical records, and discussed with the referring physician.     Assessment:  89 yo M former smoker PMHx CAD s/p PCI and MIDCAB 6/5/2014, HTN, HLD, pre-DM, CKD, prostate cancer s/p TURP, chronic hyponatremia, and depression, presented to [facility] with complaints of intermittent dizziness described as "room spinning" for several hours and progressively worsening ALEXANDER and SOB for several weeks. 6/10/25 CTA head and neck showed focal moderate-severe stenosis at origin of dominant left vertebral artery. TTE 6/11/25 revealed EF 45% global left ventricular hypokinesis, grade II LV diastolic dysfunction, left atrium dilation, and severe AS low-flow low-gradient (AV Mean Gradient: 17.0 mmHg, LVOT/aortic valve VTI ratio of 0.33). 6/12/25, structural heart consulted for patient's severe AS and current symptoms. To undergo BAV today     Plan:  Problem 1: Severe AS low-flow, low-gradient   -TTE 6/11/25:  EF 45% global left ventricular hypokinesis, grade II LV diastolic dysfunction, left atrium dilation, and severe AS low-flow low-gradient (AV Mean Gradient: 17.0 mmHg, LVOT/aortic valve VTI ratio of 0.33)  -CTA head 6/10/25: no acute intracranial hemorrhage focal moderate-severe stenosis at origin of dominant left vertebral artery  -c/w diuresis per primary team  -Per Dr. Imnan, BAV, planned for 6/13/25 with approval from son  -Spoke to son, Israel Reyes, health proxy [phone] for BAV 6/13/25  -CCU transfer  -needs follow-up TTE, EKG, groin checks 6/14/25  -restart ASA, plavix 6/14/25  -follow-up outpatient in 2 weeks after discharge    Problem 2: acute on chronic CHF   TTE 11/29/23: Grade III left ventricular diastolic dysfunction, LVEF 58%. Mild-to-moderate aortic regurgitation.  Mild-to-moderate aortic stenosis. The proximal ascending aorta is dilated measuring 4.20 cm  -TTE 6/11/25: results above  -CXR 6/12/25: right pleural effusion  -c/w diuresis per primary team  -c/w Toprol 50mg once daily per primary team    Problem 3: CAD  -c/w Toprol 50mg once daily, plavix 75mg, ASA 81mg, atorvastatin 40mg CAD    Problem 4: Ascending Aortic Dilation  -TTE 11/29/23: the proximal ascending aorta is dilated measuring 4.20 cm   -TTE 6/11/25: Aorta Measurements:  Ao Root d 3.80 cm (3.1 - 3.7 cm) 2.19 cm/mï¿½  Ao Asc d, 2D: 4.10  Ao Asc prox: 4.10 cm 2.36 cm/mï¿½  -stable from last TTE, will continue to monitor    I have reviewed clinical labs tests and reports, radiology tests and reports, as well as old patient medical records, and discussed with the referring physician.

## 2025-06-13 NOTE — PROGRESS NOTE ADULT - SUBJECTIVE AND OBJECTIVE BOX
Subjective:  No acute events overnight.  Main complaint is itchiness of back.  Denies HA, CP, SOB, abdominal pain, nausea, vomiting, fever, chills or diarrhea.     Objective:   Vital Signs Last 24 Hrs  T(C): 36.6 (13 Jun 2025 08:43), Max: 36.7 (13 Jun 2025 05:57)  T(F): 97.8 (13 Jun 2025 08:43), Max: 98.1 (13 Jun 2025 05:57)  HR: 77 (13 Jun 2025 08:43) (66 - 84)  BP: 132/60 (13 Jun 2025 08:43) (116/53 - 133/64)  BP(mean): 87 (13 Jun 2025 08:43) (77 - 92)  RR: 18 (13 Jun 2025 08:43) (18 - 18)  SpO2: 100% (13 Jun 2025 08:43) (96% - 100%)    Parameters below as of 13 Jun 2025 08:43  Patient On (Oxygen Delivery Method): room air    Physical Exam:   -Gen: NAD, resting in bed  -HEENT: EOMI, PERRL, no scleral icterus  -CV: normal S1 and S2  -Lungs: CTABL, normal respiratory effort on RA  -Ab: soft, NT, ND, normal BS  -Ext: no LE edema  -Neuro: A&O x 3, no focal deficits     Labs:                        12.5   4.03  )-----------( 178      ( 13 Jun 2025 07:12 )             36.7       06-13    139  |  104  |  66[H]  ----------------------------<  98  5.2   |  19[L]  |  2.83[H]    Ca    9.1      13 Jun 2025 07:12  Phos  5.4     06-13  Mg     2.3     06-13    TPro  7.7  /  Alb  4.2  /  TBili  0.4  /  DBili  x   /  AST  27  /  ALT  27  /  AlkPhos  69  06-13     Medications:  MEDICATIONS  (STANDING):  aspirin enteric coated 81 milliGRAM(s) Oral daily  atorvastatin 40 milliGRAM(s) Oral at bedtime  cetirizine 10 milliGRAM(s) Oral daily  chlorhexidine 0.12% Liquid 12 milliLiter(s) Swish and Spit once  chlorhexidine 4% Liquid 1 Application(s) Topical once  chlorhexidine 4% Liquid 1 Application(s) Topical once  chlorhexidine 4% Liquid 1 Application(s) Topical once  clopidogrel Tablet 75 milliGRAM(s) Oral daily  clotrimazole 1% Cream 1 Application(s) Topical two times a day  heparin   Injectable 5000 Unit(s) SubCutaneous every 8 hours  latanoprost 0.005% Ophthalmic Solution 1 Drop(s) Both EYES at bedtime  metoprolol succinate ER 50 milliGRAM(s) Oral every 24 hours  polyethylene glycol 3350 17 Gram(s) Oral every 12 hours  senna 2 Tablet(s) Oral at bedtime  tamsulosin 0.4 milliGRAM(s) Oral at bedtime    MEDICATIONS  (PRN):  acetaminophen     Tablet .. 650 milliGRAM(s) Oral every 6 hours PRN Temp greater or equal to 38C (100.4F), Mild Pain (1 - 3)  meclizine 25 milliGRAM(s) Oral every 8 hours PRN Dizziness  melatonin 3 milliGRAM(s) Oral at bedtime PRN Insomnia  ondansetron Injectable 4 milliGRAM(s) IV Push every 8 hours PRN Nausea and/or Vomiting

## 2025-06-14 ENCOUNTER — RESULT REVIEW (OUTPATIENT)
Age: 89
End: 2025-06-14

## 2025-06-14 DIAGNOSIS — Z51.5 ENCOUNTER FOR PALLIATIVE CARE: ICD-10-CM

## 2025-06-14 DIAGNOSIS — I35.0 NONRHEUMATIC AORTIC (VALVE) STENOSIS: ICD-10-CM

## 2025-06-14 DIAGNOSIS — R53.81 OTHER MALAISE: ICD-10-CM

## 2025-06-14 DIAGNOSIS — Z71.89 OTHER SPECIFIED COUNSELING: ICD-10-CM

## 2025-06-14 LAB
ALBUMIN SERPL ELPH-MCNC: 3.9 G/DL — SIGNIFICANT CHANGE UP (ref 3.3–5)
ALP SERPL-CCNC: 64 U/L — SIGNIFICANT CHANGE UP (ref 40–120)
ALT FLD-CCNC: 22 U/L — SIGNIFICANT CHANGE UP (ref 10–45)
ANION GAP SERPL CALC-SCNC: 14 MMOL/L — SIGNIFICANT CHANGE UP (ref 5–17)
AST SERPL-CCNC: 20 U/L — SIGNIFICANT CHANGE UP (ref 10–40)
BASOPHILS # BLD AUTO: 0.01 K/UL — SIGNIFICANT CHANGE UP (ref 0–0.2)
BASOPHILS NFR BLD AUTO: 0.3 % — SIGNIFICANT CHANGE UP (ref 0–2)
BILIRUB SERPL-MCNC: 0.3 MG/DL — SIGNIFICANT CHANGE UP (ref 0.2–1.2)
BUN SERPL-MCNC: 65 MG/DL — HIGH (ref 7–23)
CALCIUM SERPL-MCNC: 9 MG/DL — SIGNIFICANT CHANGE UP (ref 8.4–10.5)
CHLORIDE SERPL-SCNC: 108 MMOL/L — SIGNIFICANT CHANGE UP (ref 96–108)
CO2 SERPL-SCNC: 17 MMOL/L — LOW (ref 22–31)
CREAT SERPL-MCNC: 2.76 MG/DL — HIGH (ref 0.5–1.3)
EGFR: 21 ML/MIN/1.73M2 — LOW
EGFR: 21 ML/MIN/1.73M2 — LOW
EOSINOPHIL # BLD AUTO: 0 K/UL — SIGNIFICANT CHANGE UP (ref 0–0.5)
EOSINOPHIL NFR BLD AUTO: 0 % — SIGNIFICANT CHANGE UP (ref 0–6)
GLUCOSE SERPL-MCNC: 151 MG/DL — HIGH (ref 70–99)
HCT VFR BLD CALC: 31.3 % — LOW (ref 39–50)
HGB BLD-MCNC: 10.6 G/DL — LOW (ref 13–17)
IMM GRANULOCYTES NFR BLD AUTO: 0.6 % — SIGNIFICANT CHANGE UP (ref 0–0.9)
LYMPHOCYTES # BLD AUTO: 0.53 K/UL — LOW (ref 1–3.3)
LYMPHOCYTES # BLD AUTO: 17.2 % — SIGNIFICANT CHANGE UP (ref 13–44)
MAGNESIUM SERPL-MCNC: 2.1 MG/DL — SIGNIFICANT CHANGE UP (ref 1.6–2.6)
MCHC RBC-ENTMCNC: 29.3 PG — SIGNIFICANT CHANGE UP (ref 27–34)
MCHC RBC-ENTMCNC: 33.9 G/DL — SIGNIFICANT CHANGE UP (ref 32–36)
MCV RBC AUTO: 86.5 FL — SIGNIFICANT CHANGE UP (ref 80–100)
MONOCYTES # BLD AUTO: 0.05 K/UL — SIGNIFICANT CHANGE UP (ref 0–0.9)
MONOCYTES NFR BLD AUTO: 1.6 % — LOW (ref 2–14)
NEUTROPHILS # BLD AUTO: 2.48 K/UL — SIGNIFICANT CHANGE UP (ref 1.8–7.4)
NEUTROPHILS NFR BLD AUTO: 80.3 % — HIGH (ref 43–77)
NRBC BLD AUTO-RTO: 0 /100 WBCS — SIGNIFICANT CHANGE UP (ref 0–0)
PHOSPHATE SERPL-MCNC: 4.6 MG/DL — HIGH (ref 2.5–4.5)
PLATELET # BLD AUTO: 152 K/UL — SIGNIFICANT CHANGE UP (ref 150–400)
POTASSIUM SERPL-MCNC: 5 MMOL/L — SIGNIFICANT CHANGE UP (ref 3.5–5.3)
POTASSIUM SERPL-SCNC: 5 MMOL/L — SIGNIFICANT CHANGE UP (ref 3.5–5.3)
PROT SERPL-MCNC: 6.8 G/DL — SIGNIFICANT CHANGE UP (ref 6–8.3)
RBC # BLD: 3.62 M/UL — LOW (ref 4.2–5.8)
RBC # FLD: 14.2 % — SIGNIFICANT CHANGE UP (ref 10.3–14.5)
SODIUM SERPL-SCNC: 139 MMOL/L — SIGNIFICANT CHANGE UP (ref 135–145)
WBC # BLD: 3.09 K/UL — LOW (ref 3.8–10.5)
WBC # FLD AUTO: 3.09 K/UL — LOW (ref 3.8–10.5)

## 2025-06-14 PROCEDURE — 93306 TTE W/DOPPLER COMPLETE: CPT | Mod: 26

## 2025-06-14 PROCEDURE — 71045 X-RAY EXAM CHEST 1 VIEW: CPT | Mod: 26

## 2025-06-14 RX ORDER — CLOPIDOGREL BISULFATE 75 MG/1
75 TABLET, FILM COATED ORAL DAILY
Refills: 0 | Status: DISCONTINUED | OUTPATIENT
Start: 2025-06-14 | End: 2025-06-16

## 2025-06-14 RX ORDER — HEPARIN SODIUM 1000 [USP'U]/ML
5000 INJECTION INTRAVENOUS; SUBCUTANEOUS EVERY 8 HOURS
Refills: 0 | Status: DISCONTINUED | OUTPATIENT
Start: 2025-06-14 | End: 2025-06-16

## 2025-06-14 RX ADMIN — Medication 250 MILLILITER(S): at 10:15

## 2025-06-14 RX ADMIN — POLYETHYLENE GLYCOL 3350 17 GRAM(S): 17 POWDER, FOR SOLUTION ORAL at 18:28

## 2025-06-14 RX ADMIN — CLOPIDOGREL BISULFATE 75 MILLIGRAM(S): 75 TABLET, FILM COATED ORAL at 09:14

## 2025-06-14 RX ADMIN — Medication 1 APPLICATION(S): at 06:42

## 2025-06-14 RX ADMIN — Medication 2 TABLET(S): at 22:46

## 2025-06-14 RX ADMIN — HEPARIN SODIUM 5000 UNIT(S): 1000 INJECTION INTRAVENOUS; SUBCUTANEOUS at 23:28

## 2025-06-14 RX ADMIN — Medication 10 MILLIGRAM(S): at 15:16

## 2025-06-14 RX ADMIN — ATORVASTATIN CALCIUM 40 MILLIGRAM(S): 80 TABLET, FILM COATED ORAL at 22:13

## 2025-06-14 RX ADMIN — TAMSULOSIN HYDROCHLORIDE 0.4 MILLIGRAM(S): 0.4 CAPSULE ORAL at 22:12

## 2025-06-14 RX ADMIN — POLYETHYLENE GLYCOL 3350 17 GRAM(S): 17 POWDER, FOR SOLUTION ORAL at 05:59

## 2025-06-14 RX ADMIN — HEPARIN SODIUM 5000 UNIT(S): 1000 INJECTION INTRAVENOUS; SUBCUTANEOUS at 15:16

## 2025-06-14 RX ADMIN — METOPROLOL SUCCINATE 50 MILLIGRAM(S): 50 TABLET, EXTENDED RELEASE ORAL at 05:59

## 2025-06-14 RX ADMIN — LATANOPROST PF 1 DROP(S): 0.05 SOLUTION/ DROPS OPHTHALMIC at 22:13

## 2025-06-14 NOTE — PROGRESS NOTE ADULT - SUBJECTIVE AND OBJECTIVE BOX
Neurology Progress Note    Interval History: patient reported no dizziness even with head turning, doing well    Medications:  atorvastatin 40 milliGRAM(s) Oral at bedtime  cetirizine 10 milliGRAM(s) Oral daily  chlorhexidine 2% Cloths 1 Application(s) Topical <User Schedule>  clopidogrel Tablet 75 milliGRAM(s) Oral daily  heparin   Injectable 5000 Unit(s) SubCutaneous every 8 hours  latanoprost 0.005% Ophthalmic Solution 1 Drop(s) Both EYES at bedtime  meclizine 25 milliGRAM(s) Oral every 8 hours PRN  melatonin 3 milliGRAM(s) Oral at bedtime PRN  metoprolol succinate ER 50 milliGRAM(s) Oral daily  polyethylene glycol 3350 17 Gram(s) Oral two times a day  senna 2 Tablet(s) Oral at bedtime  tamsulosin 0.4 milliGRAM(s) Oral at bedtime    Vital Signs Last 24 Hrs  T(C): 36.7 (14 Jun 2025 11:56), Max: 36.8 (14 Jun 2025 09:19)  T(F): 98 (14 Jun 2025 11:56), Max: 98.2 (14 Jun 2025 09:19)  HR: 87 (14 Jun 2025 13:34) (79 - 91)  BP: 132/61 (14 Jun 2025 13:34) (117/56 - 133/68)  BP(mean): 94 (14 Jun 2025 11:56) (83 - 94)  RR: 18 (14 Jun 2025 11:56) (13 - 30)  SpO2: 98% (14 Jun 2025 13:34) (93% - 100%)    Parameters below as of 14 Jun 2025 13:34  Patient On (Oxygen Delivery Method): room air      Physical Exam  GENERAL: well-developed, well-nourished, in no acute distress    NEUROLOGIC:  Mental status: AOx3  Language: Speech with intact fluency , naming , repetition , comprehension, absent dysarthria  Cranial nerves:   II: Visual fields are full to confrontation  III, IV, VI: Extraocular movements intact, no noticeable nystagmus  V:  Facial sensation V1-V3 equal and intact   VII: Face is symmetric with normal eye closure and smile  XI: Head turning and shoulder shrug are intact and symmetric  Motor:  - shoulder abduction     R 5/5     L 5/5  - elbow flexion     R 5/5     L 5/5  - elbow extension     R 5/5     L 5/5  - hand      R 5/5     L 5/5   - hip flexion     R 5/5     L 5/5  - knee flexion     R 5/5     L 5/5  - knee extension     R 5/5     L 5/5  - dorsiflexion     R 5/5     L 5/5  - plantarflexion     R 5/5     L 5/5  - pronator drift     absent  Sensation: Intact to light touch bilaterally.  Coordination: No dysmetria on finger-to-nose and heel-to-shin bilaterally, rapid alternating movements intact and symmetric  Gait: Deferred at this encounter  HINTS; no nystagmus, or skew, patient not cooperating with head impulse test    Labs:  CBC Full  -  ( 14 Jun 2025 05:06 )  WBC Count : 3.09 K/uL  RBC Count : 3.62 M/uL  Hemoglobin : 10.6 g/dL  Hematocrit : 31.3 %  Platelet Count - Automated : 152 K/uL  Mean Cell Volume : 86.5 fl  Mean Cell Hemoglobin : 29.3 pg  Mean Cell Hemoglobin Concentration : 33.9 g/dL  Auto Neutrophil # : x  Auto Lymphocyte # : x  Auto Monocyte # : x  Auto Eosinophil # : x  Auto Basophil # : x  Auto Neutrophil % : x  Auto Lymphocyte % : x  Auto Monocyte % : x  Auto Eosinophil % : x  Auto Basophil % : x    06-14    139  |  108  |  65[H]  ----------------------------<  151[H]  5.0   |  17[L]  |  2.76[H]    Ca    9.0      14 Jun 2025 05:06  Phos  4.6     06-14  Mg     2.1     06-14    TPro  6.8  /  Alb  3.9  /  TBili  0.3  /  DBili  x   /  AST  20  /  ALT  22  /  AlkPhos  64  06-14    LIVER FUNCTIONS - ( 14 Jun 2025 05:06 )  Alb: 3.9 g/dL / Pro: 6.8 g/dL / ALK PHOS: 64 U/L / ALT: 22 U/L / AST: 20 U/L / GGT: x           PT/INR - ( 13 Jun 2025 18:03 )   PT: 12.8 sec;   INR: 1.10          PTT - ( 13 Jun 2025 18:03 )  PTT:>200.0 sec  Urinalysis Basic - ( 14 Jun 2025 05:06 )    Color: x / Appearance: x / SG: x / pH: x  Gluc: 151 mg/dL / Ketone: x  / Bili: x / Urobili: x   Blood: x / Protein: x / Nitrite: x   Leuk Esterase: x / RBC: x / WBC x   Sq Epi: x / Non Sq Epi: x / Bacteria: x

## 2025-06-14 NOTE — PROGRESS NOTE ADULT - PROBLEM SELECTOR PLAN 11
F: none  E: replete K<4, Mg<2  N: DASH/TLC  D: Lovenox    Dispo: Cards Tele  Code: FULL
Psych consulted appreciate recs as patient made comment about taking a Pill   NTD was Passive SI
F: none  E: replete K<4, Mg<2  N: DASH/TLC  D: Lovenox    Dispo: Cards Tele  Code: FULL
F: none  E: replete K<4, Mg<2  N: DASH/TLC  D: Lovenox    Dispo: Cards Tele  Code: FULL

## 2025-06-14 NOTE — PROGRESS NOTE ADULT - PROBLEM SELECTOR PLAN 9
s/p TURP  Home med: tamsulosin 0.4mg PO    Plan:  - Cont home med
Home meds: atorvastatin 40mg PO qhs  Plan:  - Cont atorvastatin 40mg PO qhs as recommended per stroke

## 2025-06-14 NOTE — PROGRESS NOTE ADULT - PROBLEM SELECTOR PLAN 1
TTE 6/11/25 EF 45% moderate (grade 2) left ventricular diastolic dysfunction, with elevated left ventricular filling  pressure. Compared to the transthoracic echocardiogram performed on 11/29/2023, aortic valve regurgitation  severity and left ventricular systolic function are worse.  EKG: NSR w/ PVC and low voltage @ HR 61, QTc 440, Trop 47 << 48; pBNP 81987; s,p lasix 40mg IVx1,   Home meds: metoprolol succinate 50mg PO qd (unclear why pt is prescribed)  Euvolemic on exam  - Cont Toprol xl 50mg PO qd  - Goal net -2L in 24hrs  - Obtain collateral from o/p cardiologist; Keyona Sinha MD   - Confirm med rec -- current based on Sures scripts but son and pt unaware of what is taken and what for  - consider structural heart consult based on convo with son

## 2025-06-14 NOTE — PROGRESS NOTE ADULT - SUBJECTIVE AND OBJECTIVE BOX
CARDIOLOGY PA PROGRESS NOTE    Interval Events: Doing well post procedure   Overnight Events: NONE NOTED  Subjective: Feels well Groins site stable pulses palpable     Physical Exam:     General: Sitting in Bed, NAD, Well Nourished   HEENT: Conjunctiva clear; MMM  Neck::  -JVD,   Cardiovascular: RRR, No murmurs  Respiratory: CTA B/L/Rales/Decreased Breath Sounds  Gastrointestinal: soft, NT/ND, +BS  Extremities: WWP,   Vascular: Peripheral Pulses palpable 2+ bilaterally carotid 2+ b/l, no bruits; radial 2+ b/l; femoral 2+ b/l no bruits; DP/PT 2+ b/l  Neurological: AAOx3, no focal deficitS  ROS: Negative except per HPI and Subjective     Tele: NSR/AFIB, No signicnifcant events    VITAL SIGNS:  T(C): 36.8 (06-14-25 @ 18:00), Max: 36.8 (06-14-25 @ 09:19)  HR: 79 (06-14-25 @ 18:00) (79 - 91)  BP: 123/58 (06-14-25 @ 18:00) (122/58 - 133/68)  RR: 19 (06-14-25 @ 18:00) (13 - 22)  SpO2: 95% (06-14-25 @ 18:00) (95% - 99%)  Wt(kg): --    I&O's Summary    13 Jun 2025 07:01  -  14 Jun 2025 07:00  --------------------------------------------------------  IN: 240 mL / OUT: 950 mL / NET: -710 mL    14 Jun 2025 07:01  -  14 Jun 2025 20:01  --------------------------------------------------------  IN: 840 mL / OUT: 1150 mL / NET: -310 mL          MEDICATIONS:  metoprolol succinate ER 50 milliGRAM(s) Oral daily      cetirizine 10 milliGRAM(s) Oral daily    meclizine 25 milliGRAM(s) Oral every 8 hours PRN  melatonin 3 milliGRAM(s) Oral at bedtime PRN    polyethylene glycol 3350 17 Gram(s) Oral two times a day  senna 2 Tablet(s) Oral at bedtime    atorvastatin 40 milliGRAM(s) Oral at bedtime    chlorhexidine 2% Cloths 1 Application(s) Topical <User Schedule>  clopidogrel Tablet 75 milliGRAM(s) Oral daily  heparin   Injectable 5000 Unit(s) SubCutaneous every 8 hours  latanoprost 0.005% Ophthalmic Solution 1 Drop(s) Both EYES at bedtime  tamsulosin 0.4 milliGRAM(s) Oral at bedtime        LABS:	 	                                  10.6   3.09  )-----------( 152      ( 14 Jun 2025 05:06 )             31.3     06-14    139  |  108  |  65[H]  ----------------------------<  151[H]  5.0   |  17[L]  |  2.76[H]    Ca    9.0      14 Jun 2025 05:06  Phos  4.6     06-14  Mg     2.1     06-14    TPro  6.8  /  Alb  3.9  /  TBili  0.3  /  DBili  x   /  AST  20  /  ALT  22  /  AlkPhos  64  06-14    PT/INR - ( 13 Jun 2025 18:03 )   PT: 12.8 sec;   INR: 1.10          PTT - ( 13 Jun 2025 18:03 )  PTT:>200.0 sec      VTE PPX:  DISPO:  CASE DISCUSSED WITH ATTENDING

## 2025-06-14 NOTE — CHART NOTE - NSCHARTNOTEFT_GEN_A_CORE
89 yo M former smoker PMHx CAD s/p PCI and MIDCAB 6/5/2014, HTN, HLD, pre-DM, CKD, prostate cancer s/p TURP, chronic hyponatremia, and depression, presented to [facility] with complaints of intermittent dizziness described as "room spinning" for several hours and progressively worsening ALEXANDER and SOB for several weeks. 6/10/25 CTA head and neck showed focal moderate-severe stenosis at origin of dominant left vertebral artery. TTE 6/11/25 revealed EF 45% global left ventricular hypokinesis, grade II LV diastolic dysfunction, left atrium dilation, and severe AS low-flow low-gradient (AV Mean Gradient: 17.0 mmHg, LVOT/aortic valve VTI ratio of 0.33). 6/12/25, structural heart consulted for patient's severe AS and current symptoms. BAV on 6/13    Plan:  Problem 1: Severe AS low-flow, low-gradient   -TTE 6/11/25:  EF 45% global left ventricular hypokinesis, grade II LV diastolic dysfunction, left atrium dilation, and severe AS low-flow low-gradient (AV Mean Gradient: 17.0 mmHg, LVOT/aortic valve VTI ratio of 0.33)  -CTA head 6/10/25: no acute intracranial hemorrhage focal moderate-severe stenosis at origin of dominant left vertebral artery  -c/w diuresis per primary team  -Per Dr. Inman, BAV, planned for 6/13/25 with approval from son  -Spoke to son, Israel Reyes, health proxy [phone] for BAV 6/13/25  -CCU transfer  -ECHO 6/14 without effusion, mild AS  -restart ASA, plavix 6/14/25  -SHD will sign off at this time, please have patient follow up with Dr. Inman upon DC     Problem 2: acute on chronic CHF   TTE 11/29/23: Grade III left ventricular diastolic dysfunction, LVEF 58%. Mild-to-moderate aortic regurgitation.  Mild-to-moderate aortic stenosis. The proximal ascending aorta is dilated measuring 4.20 cm  -TTE 6/11/25: results above  -CXR 6/12/25: right pleural effusion  -c/w diuresis per primary team  -c/w Toprol 50mg once daily per primary team    Problem 3: CAD  -c/w Toprol 50mg once daily, plavix 75mg, ASA 81mg, atorvastatin 40mg CAD    Problem 4: Ascending Aortic Dilation  -TTE 11/29/23: the proximal ascending aorta is dilated measuring 4.20 cm   -TTE 6/11/25: Aorta Measurements:  Ao Root d 3.80 cm (3.1 - 3.7 cm) 2.19 cm/mï¿½  Ao Asc d, 2D: 4.10  Ao Asc prox: 4.10 cm 2.36 cm/mï¿½  -stable from last TTE, will continue to monitor

## 2025-06-14 NOTE — PROGRESS NOTE ADULT - PROBLEM SELECTOR PLAN 7
Appears to be at baseline, no signs of active bleeding.    Plan:  - Obtain iron studies  - Maintain active T&S  - Transfusion goal < 7
BP elevated on admission, no noted antihypertensives in surescripts  Plan:  - Monitor BP

## 2025-06-14 NOTE — PROGRESS NOTE ADULT - ASSESSMENT
89 year old male former smoker with PMHx grade III diastolic dysfunction, CAD s/p PCI and MIDCAB 6/5/2014 on Plavix, ascending AA HTN, HLD, pre-DM, CKD, prostate Ca s/p TURP, depression and chronic hypoNa, p/w complaints of intermittent positional "room spinning" dizziness when turning to the right, each lasting less than a minute. Most recent imaging shows left vertebral stenosis. Suspect BPPV given clinical presentation and exam without hearing. Central etiology unlikely due to lack of focal neurological deficits and patient reported resolution of symptoms on its own, with further improvement with Meclizine use. Re-evaluated on 6/14 and patient reported continued resolution of symptoms.     Recommendations:  - continue meclizine 25mg PO PRN use  - should symptoms return, outpatient vestibular therapy and ENT evaluation, also orthostatic testing  - contact back neurology if any further questions    Case discussed with neurology attending Dr. Vazquez

## 2025-06-14 NOTE — PROGRESS NOTE ADULT - ASSESSMENT
89M with a PMHx of HFpEF, CAD (s/p PCI, on Plavix), HTN, HLD, CKD (baseline Cr 1.7), prostate cancer (s/p TURP), depression and chronic hyponatremia who presented with acute on chronic HFpEF exacerbation. Now s/p BAV and transferred to CCU for further monitoring.     NEURO  #Dizziness.   Resolved on presentation to ED, however was pt's presenting complaint. Stroke code called in ED, recommending continuation of home meds as NIHSS 0. 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   - Repeat CXR in AM    CARDIO  #Severe AS low-flow, low-gradient   #Acute exacerbation of congestive heart failure  TTE 6/11/25 EF 45% moderate (grade 2) left ventricular diastolic dysfunction, with elevated left ventricular filling pressure. Compared to the transthoracic echocardiogram performed on 11/29/2023, aortic valve regurgitation severity and left ventricular systolic function are worse.  EKG: NSR w/ PVC and low voltage @ HR 61, QTc 440  CXR improved on 6/12  Trop 48>>47; pBNP 56744; s/p Lasix 40mg IVx1  Home meds: metoprolol succinate 50mg PO qd (unclear why pt is prescribed)  s/p BAV on 6/13/25 by structural heart team. Euvolemic on exam. s/p diuresis with Lasix on 6/12    Plan:  - Repeat EKG s/p BAV  - Repeat TTE in AM  - Cont toprol xl 50mg PO qd  - Obtain collateral from o/p cardiologist; Keyona Sinha MD   - Confirm med rec -- current based on Surescripts but son and pt unaware of what is taken and what for    #Ascending aortic aneurysm.   TTE 11/29/23: Grade III left ventricular diastolic dysfunction, LVEF 58%. Mild-to-moderate aortic regurgitation. Mild-to-moderate aortic stenosis. The proximal ascending aorta is dilated measuring 4.20 cm.  TTE 6/11/25 EF 45% moderate (grade 2) LV diastolic dysfunction, with elevated LV filling pressure. Compared to the TTE performed on 11/29/2023, AR severity and LV systolic function are worse.  Pt follows with Dr. Keyona Sinha o/p    Plan:  - Restart Toprol XL 50mg PO qd on 6/14 AM    #CAD (coronary artery disease).   Hx of CAD, MIDCAB 2014. Denies CP on admission  Home meds: plavix 75mg PO, atorvastatin 40mg PO qhs    Plan:  - C/w atorvastatin 40mg on 6/14 AM  - Hold plavix iso increased bleeding risk d/t BAV -- f/u stroke recs     #HTN (hypertension)   BP elevated on admission, no noted antihypertensives in surescripts    Plan:  - Monitor BP.    HLD (hyperlipidemia).   ·  Plan: Home meds: atorvastatin 40mg PO qhs    Plan:  - Cont atorvastatin 40mg PO qhs on 6/14 AM    GI  s/p BAV  - restart DASH diet    RENAL  #Acute kidney injury superimposed on CKD.   Baseline appears to be 1.7, on admission noted to be 2.17. Likely pre-renal iso vascular congestion due to HF exacerbation. 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   Appears to be at baseline Hgb 12.5 prior to procedure, no signs of active bleeding.    Plan:  - CTM Hgb s/p BAV  - Holding plavix/aspirin at this time  - Obtain iron studies  - Maintain active T&S  - Transfusion goal < 7    PPX  F: NS 250cc   E: Replete PRN  N: DASH diet  GI ppx: --  DVT ppx: heparin 5000U q8h   89M with a PMHx of HFpEF, CAD (s/p PCI, on Plavix), HTN, HLD, CKD (baseline Cr 1.7), prostate cancer (s/p TURP), depression and chronic hyponatremia who presented with acute on chronic HFpEF exacerbation. Now s/p BAV transferred to CCU for further monitoring, stable for stepdown.     NEURO  #Dizziness.   Resolved on presentation to ED, however was pt's presenting complaint. Stroke code called in ED, recommending continuation of home meds as NIHSS 0. s/p meclizine 25mg PO x1  CTA Head 6/10/25: no acute intracranial hemorrhage focal moderate-severe stenosis at origin of dominant left vertebral artery  Neuro consulted.     Plan:  - Meclizine 25mg PO q8hrs PRN   - As per stroke recs, restarted Plavix s/p BAV today  - Discontinue aspirin 81mg   - C/w atorvastatin 40mg PO qhs on 6/14  - Appreciate stroke team recs    #Passive suicidality  States he would like to go to another country to have assisted suicide.   Psych consulted, note on 6/11    Plan:  - no current indication for 1:1 observation  - patient is not currently interested in psychiatric treatment, will continue to provide psychoeducation and support  - avoid deliriogenic medications   - for agitation associated with delirium/dementia, use quetiapine 25mg po q6h prn; monitor for qtc prolongation    PULM  Satting % on RA.   CXR 6/13: Bibasilar pleural effusions on right greater than left unchanged. Persistent cardiomegaly.     CARDIO  #Severe AS low-flow, low-gradient   #Acute exacerbation of congestive heart failure  TTE 6/11/25 EF 45% moderate (grade 2) left ventricular diastolic dysfunction, with elevated left ventricular filling pressure. Compared to the transthoracic echocardiogram performed on 11/29/2023, aortic valve regurgitation severity and left ventricular systolic function are worse.  EKG: NSR w/ PVC and low voltage @ HR 61, QTc 440  CXR improved on 6/12  Trop 48>>47; pBNP 46531; s/p Lasix 40mg IVx1  Home meds: metoprolol succinate 50mg PO qd (unclear why pt is prescribed)  s/p BAV on 6/13/25 by structural heart team. Euvolemic on exam. s/p diuresis with Lasix on 6/12  EKG last night showing ST depression in V6 and T wave inversions in leads I and avL, repeat EKG showed resolution    Plan:  - F/u TTE read (performed this AM)  - C/w toprol xl 50mg PO qd  - Obtain collateral from o/p cardiologist; Keyona Sinha MD   - Confirm med rec -- current based on Surescripts but son and pt unaware of what is taken and what for    #Ascending aortic aneurysm.   TTE 11/29/23: Grade III left ventricular diastolic dysfunction, LVEF 58%. Mild-to-moderate aortic regurgitation. Mild-to-moderate aortic stenosis. The proximal ascending aorta is dilated measuring 4.20 cm.  TTE 6/11/25 EF 45% moderate (grade 2) LV diastolic dysfunction, with elevated LV filling pressure. Compared to the TTE performed on 11/29/2023, AR severity and LV systolic function are worse.  Pt follows with Dr. Keyona Sinha o/p    Plan:  - C/w Toprol XL 50mg PO qd on 6/14 AM    #CAD (coronary artery disease).   Hx of CAD, MIDCAB 2014. Denies CP on admission  Home meds: plavix 75mg PO, atorvastatin 40mg PO qhs    Plan:  - C/w atorvastatin 40mg   - Restart plavix today 6/14     #HTN (hypertension)   BP elevated on admission, no noted antihypertensives in surescripts    Plan:  - Monitor BP.    #HLD (hyperlipidemia).  Home meds: atorvastatin 40mg PO qhs    Plan:  - C/w atorvastatin 40mg PO qhs on 6/14 AM    GI  s/p BAV  - c/w DASH diet    RENAL  #Acute kidney injury superimposed on CKD.   #Urinary retention  Baseline appears to be 1.7, on admission noted to be 2.17. Likely pre-renal iso vascular congestion due to HF exacerbation. uptrending 2.98 today   FeUrea 47.3 suggest intrinsic renal disease  Cr 2.76 today     Plan:  - NS 250cc  - s/p straight cath x2, will need laguna if continues to retain  - OOBTC  - Cautious with nephrotoxic agents  - Trend BMP    ENDO  MOHINDER - monitor FSG    HEME/ONC  #Anemia  Hgb 12.5 > 10.6 prior to procedure, no signs of active bleeding.  Iron studies wnl, performed 6/11.    Plan:  - CTM Hgb s/p BAV  - Maintain active T&S  - Transfusion goal < 7    PPX  F: NS 250cc   E: Replete PRN  N: DASH diet  GI ppx: --  DVT ppx: heparin 5000U q8h  Dispo: [facility]    PT recommending DAMIAN (6/12/25 note)

## 2025-06-14 NOTE — PROGRESS NOTE ADULT - PROBLEM SELECTOR PLAN 8
-Appears to be at baseline, no signs of active bleeding.  Iron Studies Normal   - Maintain active T&S  - Transfusion goal < 7
Home meds: atorvastatin 40mg PO qhs    Plan:  - Cont atorvastatin 40mg PO qhs as recommended per stroke

## 2025-06-14 NOTE — PROGRESS NOTE ADULT - SUBJECTIVE AND OBJECTIVE BOX
Progress Note  INCOMPLETE NOTE    INTERVAL EVENTS:   No acute events overnight.    SUBJECTIVE:   Patient seen and examined at bedside. Condition largely unchanged from yesterday. No acute complaints at this time.    ROS:  Negative unless otherwise stated above.    PHYSICAL EXAM:  General: Alert and oriented x 3. No acute distress.   HEENT: Moist mucous membranes. Anicteric. No cervical lymphadenopathy.  Cardiovascular: Regular rate and rhythm. No murmur. Normal JVP.  Lungs: Clear to auscultation bilaterally. No accessory muscle use.  Abdomen: Soft, non-tender and non-distended. No palpable masses.  Extremities: No edema. Non-tender. Warm.  Skin: No rashes or lesions.   Neurologic: No apparent focal neurological deficits. CN II-XII grossly intact, but not individually tested.  Psychiatric: Cooperative. Appropriate mood and affect.    VITAL SIGNS:  Vital Signs Last 24 Hrs  T(C): 36.4 (14 Jun 2025 05:47), Max: 36.7 (13 Jun 2025 12:23)  T(F): 97.6 (14 Jun 2025 05:47), Max: 98.1 (13 Jun 2025 12:23)  HR: 81 (14 Jun 2025 09:00) (71 - 91)  BP: 117/56 (13 Jun 2025 17:40) (115/56 - 145/69)  BP(mean): 86 (13 Jun 2025 17:40) (81 - 86)  RR: 22 (14 Jun 2025 09:00) (13 - 30)  SpO2: 98% (14 Jun 2025 09:00) (93% - 100%)    Parameters below as of 14 Jun 2025 09:00  Patient On (Oxygen Delivery Method): room air      INPATIENT MEDICATIONS:   MEDICATIONS  (STANDING):  atorvastatin 40 milliGRAM(s) Oral at bedtime  cetirizine 10 milliGRAM(s) Oral daily  chlorhexidine 2% Cloths 1 Application(s) Topical <User Schedule>  clopidogrel Tablet 75 milliGRAM(s) Oral daily  latanoprost 0.005% Ophthalmic Solution 1 Drop(s) Both EYES at bedtime  metoprolol succinate ER 50 milliGRAM(s) Oral daily  polyethylene glycol 3350 17 Gram(s) Oral two times a day  senna 2 Tablet(s) Oral at bedtime  tamsulosin 0.4 milliGRAM(s) Oral at bedtime    MEDICATIONS  (PRN):  meclizine 25 milliGRAM(s) Oral every 8 hours PRN Dizziness  melatonin 3 milliGRAM(s) Oral at bedtime PRN Insomnia    ALLERGIES:  Allergies    No Known Drug Allergies  Originally Entered as [Rash-General] reaction to [Other][Ragweed] (Rash)  ragweed and birch (Unknown)  Originally Entered as [Rash-General] reaction to [Other][Bush] (Rash)    Intolerances    LABS:                       10.6   3.09  )-----------( 152      ( 14 Jun 2025 05:06 )             31.3     06-14    139  |  108  |  65[H]  ----------------------------<  151[H]  5.0   |  17[L]  |  2.76[H]    Ca    9.0      14 Jun 2025 05:06  Phos  4.6     06-14  Mg     2.1     06-14    TPro  6.8  /  Alb  3.9  /  TBili  0.3  /  DBili  x   /  AST  20  /  ALT  22  /  AlkPhos  64  06-14    PT/INR - ( 13 Jun 2025 18:03 )   PT: 12.8 sec;   INR: 1.10          PTT - ( 13 Jun 2025 18:03 )  PTT:>200.0 sec  Urinalysis Basic - ( 14 Jun 2025 05:06 )    Color: x / Appearance: x / SG: x / pH: x  Gluc: 151 mg/dL / Ketone: x  / Bili: x / Urobili: x   Blood: x / Protein: x / Nitrite: x   Leuk Esterase: x / RBC: x / WBC x   Sq Epi: x / Non Sq Epi: x / Bacteria: x      CAPILLARY BLOOD GLUCOSE        RADIOLOGY & ADDITIONAL TESTS: Reviewed. *** STEPDOWN FROM CCU TO 5URIS ***    HOSPITAL COURSE:   89M former smoker w/ PMH of grade III diastolic dysfunction (pt and family deny dx of HF in past; TTE 11/2023 shows 3 diastolic dysfunction, EF 58%), CAD s/p PCI and MIDCAB 6/5/2014 on Plavix, HTN, HLD, pre-DM, CKD, prostate Ca s/p TURP, depression and chronic hypoNa, p/w complaints of intermittent dizziness described as "room spinning" for several hours and progressively worsening ALEXANDER and SOB for several weeks. Stroke code 6/10/25 CTA head and neck showed focal moderate-severe stenosis at origin of dominant left vertebral artery. TTE 6/11/25 revealed EF 45% global left ventricular hypokinesis, grade II LV diastolic dysfunction, left atrium dilation, and severe AS low-flow low-gradient (AV Mean Gradient: 17.0 mmHg, LVOT/aortic valve VTI ratio of 0.33). 6/12/25, structural heart consulted for patient's severe AS and current symptoms. Found to have B/L pleural effusions and pulmonary congestion iso HF exacerbation as well . Admitted to [facility] for further diuresis. Of note, psychiatry consulted for passive suicidality. 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 Doing well s/p BAV, vitally stable, no active bleeding. DVT ppx, home meds, and Plavix restarted this AM.     INTERVAL EVENTS:   Groin dressing changed, no hematoma or active bleeding. EKG at the start of the night showed ST depression in V6 and T wave inversions in lead I and avL, repeat EKG showed improvement. Straight cathed x1 ovn, now making it a total of 2 times.     SUBJECTIVE:   Patient seen and examined at bedside. No acute complaints at this time.    ROS:  Negative unless otherwise stated above.    PHYSICAL EXAM:  Gen: NAD, resting in bed  HEENT: EOMI, PERRL, no scleral icterus  CV: normal S1 and S2  Lungs: CTABL, normal respiratory effort on RA  Ab: soft, NT, ND, normal BS  Ext: no LE edema  Neuro: A&O x 3, no focal deficits     VITAL SIGNS:  Vital Signs Last 24 Hrs  T(C): 36.4 (14 Jun 2025 05:47), Max: 36.7 (13 Jun 2025 12:23)  T(F): 97.6 (14 Jun 2025 05:47), Max: 98.1 (13 Jun 2025 12:23)  HR: 81 (14 Jun 2025 09:00) (71 - 91)  BP: 117/56 (13 Jun 2025 17:40) (115/56 - 145/69)  BP(mean): 86 (13 Jun 2025 17:40) (81 - 86)  RR: 22 (14 Jun 2025 09:00) (13 - 30)  SpO2: 98% (14 Jun 2025 09:00) (93% - 100%)    Parameters below as of 14 Jun 2025 09:00  Patient On (Oxygen Delivery Method): room air      INPATIENT MEDICATIONS:   MEDICATIONS  (STANDING):  atorvastatin 40 milliGRAM(s) Oral at bedtime  cetirizine 10 milliGRAM(s) Oral daily  chlorhexidine 2% Cloths 1 Application(s) Topical <User Schedule>  clopidogrel Tablet 75 milliGRAM(s) Oral daily  latanoprost 0.005% Ophthalmic Solution 1 Drop(s) Both EYES at bedtime  metoprolol succinate ER 50 milliGRAM(s) Oral daily  polyethylene glycol 3350 17 Gram(s) Oral two times a day  senna 2 Tablet(s) Oral at bedtime  tamsulosin 0.4 milliGRAM(s) Oral at bedtime    MEDICATIONS  (PRN):  meclizine 25 milliGRAM(s) Oral every 8 hours PRN Dizziness  melatonin 3 milliGRAM(s) Oral at bedtime PRN Insomnia    ALLERGIES:  Allergies    No Known Drug Allergies  Originally Entered as [Rash-General] reaction to [Other][Ragweed] (Rash)  ragweed and birch (Unknown)  Originally Entered as [Rash-General] reaction to [Other][Bush] (Rash)    Intolerances    LABS:                       10.6   3.09  )-----------( 152      ( 14 Jun 2025 05:06 )             31.3     06-14    139  |  108  |  65[H]  ----------------------------<  151[H]  5.0   |  17[L]  |  2.76[H]    Ca    9.0      14 Jun 2025 05:06  Phos  4.6     06-14  Mg     2.1     06-14    TPro  6.8  /  Alb  3.9  /  TBili  0.3  /  DBili  x   /  AST  20  /  ALT  22  /  AlkPhos  64  06-14    PT/INR - ( 13 Jun 2025 18:03 )   PT: 12.8 sec;   INR: 1.10          PTT - ( 13 Jun 2025 18:03 )  PTT:>200.0 sec  Urinalysis Basic - ( 14 Jun 2025 05:06 )    Color: x / Appearance: x / SG: x / pH: x  Gluc: 151 mg/dL / Ketone: x  / Bili: x / Urobili: x   Blood: x / Protein: x / Nitrite: x   Leuk Esterase: x / RBC: x / WBC x   Sq Epi: x / Non Sq Epi: x / Bacteria: x      CAPILLARY BLOOD GLUCOSE        RADIOLOGY & ADDITIONAL TESTS: Reviewed. *** STEPDOWN FROM CCU TO 5URIS ***    HOSPITAL COURSE:   89M former smoker w/ PMH of grade III diastolic dysfunction (pt and family deny dx of HF in past; TTE 11/2023 shows 3 diastolic dysfunction, EF 58%), CAD s/p PCI and MIDCAB 6/5/2014 on Plavix, HTN, HLD, pre-DM, CKD, prostate Ca s/p TURP, depression and chronic hypoNa, p/w complaints of intermittent dizziness described as "room spinning" for several hours and progressively worsening ALEXANDER and SOB for several weeks. Stroke code 6/10/25 CTA head and neck showed focal moderate-severe stenosis at origin of dominant left vertebral artery. TTE 6/11/25 revealed EF 45% global left ventricular hypokinesis, grade II LV diastolic dysfunction, left atrium dilation, and severe AS low-flow low-gradient (AV Mean Gradient: 17.0 mmHg, LVOT/aortic valve VTI ratio of 0.33). 6/12/25, structural heart consulted for patient's severe AS and current symptoms. Found to have B/L pleural effusions and pulmonary congestion iso HF exacerbation as well . Admitted to [facility] for further diuresis. Of note, psychiatry consulted for passive suicidality. 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 Doing well s/p BAV, vitally stable, no active bleeding. DVT ppx, home meds, and Plavix restarted this AM.     INTERVAL EVENTS:   Groin dressing changed, no hematoma or active bleeding. EKG at the start of the night showed ST depression in V6 and T wave inversions in lead I and avL, repeat EKG showed improvement. Straight cathed x1 ovn, now making it a total of 2 times.     SUBJECTIVE:   Patient seen and examined at bedside. No acute complaints at this time.    ROS:  Negative unless otherwise stated above.    PHYSICAL EXAM:  Gen: NAD, resting in bed  HEENT: EOMI, PERRL, no scleral icterus  CV: normal S1 and S2  Lungs: CTABL, normal respiratory effort on RA  Ab: soft, NT, ND, normal BS  Ext: no LE edema. Groin dressing clean, dry, intact.   Neuro: A&O x 3, no focal deficits     VITAL SIGNS:  Vital Signs Last 24 Hrs  T(C): 36.4 (14 Jun 2025 05:47), Max: 36.7 (13 Jun 2025 12:23)  T(F): 97.6 (14 Jun 2025 05:47), Max: 98.1 (13 Jun 2025 12:23)  HR: 81 (14 Jun 2025 09:00) (71 - 91)  BP: 117/56 (13 Jun 2025 17:40) (115/56 - 145/69)  BP(mean): 86 (13 Jun 2025 17:40) (81 - 86)  RR: 22 (14 Jun 2025 09:00) (13 - 30)  SpO2: 98% (14 Jun 2025 09:00) (93% - 100%)    Parameters below as of 14 Jun 2025 09:00  Patient On (Oxygen Delivery Method): room air      INPATIENT MEDICATIONS:   MEDICATIONS  (STANDING):  atorvastatin 40 milliGRAM(s) Oral at bedtime  cetirizine 10 milliGRAM(s) Oral daily  chlorhexidine 2% Cloths 1 Application(s) Topical <User Schedule>  clopidogrel Tablet 75 milliGRAM(s) Oral daily  latanoprost 0.005% Ophthalmic Solution 1 Drop(s) Both EYES at bedtime  metoprolol succinate ER 50 milliGRAM(s) Oral daily  polyethylene glycol 3350 17 Gram(s) Oral two times a day  senna 2 Tablet(s) Oral at bedtime  tamsulosin 0.4 milliGRAM(s) Oral at bedtime    MEDICATIONS  (PRN):  meclizine 25 milliGRAM(s) Oral every 8 hours PRN Dizziness  melatonin 3 milliGRAM(s) Oral at bedtime PRN Insomnia    ALLERGIES:  Allergies    No Known Drug Allergies  Originally Entered as [Rash-General] reaction to [Other][Ragweed] (Rash)  ragweed and birch (Unknown)  Originally Entered as [Rash-General] reaction to [Other][Bush] (Rash)    Intolerances    LABS:                       10.6   3.09  )-----------( 152      ( 14 Jun 2025 05:06 )             31.3     06-14    139  |  108  |  65[H]  ----------------------------<  151[H]  5.0   |  17[L]  |  2.76[H]    Ca    9.0      14 Jun 2025 05:06  Phos  4.6     06-14  Mg     2.1     06-14    TPro  6.8  /  Alb  3.9  /  TBili  0.3  /  DBili  x   /  AST  20  /  ALT  22  /  AlkPhos  64  06-14    PT/INR - ( 13 Jun 2025 18:03 )   PT: 12.8 sec;   INR: 1.10          PTT - ( 13 Jun 2025 18:03 )  PTT:>200.0 sec  Urinalysis Basic - ( 14 Jun 2025 05:06 )    Color: x / Appearance: x / SG: x / pH: x  Gluc: 151 mg/dL / Ketone: x  / Bili: x / Urobili: x   Blood: x / Protein: x / Nitrite: x   Leuk Esterase: x / RBC: x / WBC x   Sq Epi: x / Non Sq Epi: x / Bacteria: x      CAPILLARY BLOOD GLUCOSE        RADIOLOGY & ADDITIONAL TESTS: Reviewed.

## 2025-06-14 NOTE — PROGRESS NOTE ADULT - PROBLEM SELECTOR PLAN 2
s/p BAV on 6/13/25 with Dr. Noa Inman by structural heart team.   Repeat TTE 6/14/2025 post BAV showing: Moderate LVH, LVEF 47%, Mild to Moderate Aortic regurgitation.  No pericardial effusion seen.** Compared to the transthoracic echocardiogram performed on 6/11/2025,   the SV is normal and the aortic stenosis appears to be mild    Euvolemic on exam. s/p diuresis with Lasix on 6/12  EKG last night showing ST depression in V6 and T wave inversions in leads I and avL, repeat EKG showed resolution  Home meds: Metoprolol succinate 50mg PO qd (unclear why pt is prescribed)     Plan   SHD signed off please have patient follow up with Dr. Inman upon DC

## 2025-06-14 NOTE — PROGRESS NOTE ADULT - PROBLEM SELECTOR PLAN 6
TTE 11/29/23: Grade III left ventricular diastolic dysfunction, LVEF 58%. Mild-to-moderate aortic regurgitation.  Mild-to-moderate aortic stenosis. The proximal ascending aorta is dilated measuring 4.20 cm.  CT Chest TAVR non Contrast 6/13/2025: Aortic valvular calcifications. Small pleural effusions. Pulmonary   congestion. Stable dilatation of aortic root and ascending aorta. Stable aneurysmal right internal iliac artery.  2.3 cm groundglass nodule right upper lobe, stable in size but increased in density since 2020.  Pt follows with Dr. Keyona Sinha o/p    Plan:  - Cont Toprol XL 50mg PO qd
BP elevated on admission, no noted antihypertensives in surescripts    Plan:  - Monitor BP

## 2025-06-14 NOTE — PROGRESS NOTE ADULT - ASSESSMENT
89 y/o M former smoker w/ PMH of PMH of grade III diastolic dysfunction (pt and family deny dx of HF in past; TTE 11/2023 shows 3 diastolic dysfunction, EF 58%),, CAD s/p PCI and MIDCAB 6/5/2014 on Plavix, HTN, HLD, pre-DM, CKD, prostate Ca s/p TURP, depression and chronic hypoNa, p/w complaints of intermittent dizziness described as "room spinning" for several hours and progressively worsening ALEXANDER and SOB for several weeks. Found to have moderate-severe stenosis at origin of dominant left vertebral  artery and B/L pleural effusions and pulmonary congestion iso HF exacerbation now s/p IV diuresis and s/p BAV with Dr. Noa Inman 6/13/2025, stepped up to CCU post BAV and now stepped down for Dispo Planning and continued med Optimization

## 2025-06-15 LAB
ALBUMIN SERPL ELPH-MCNC: 3.7 G/DL — SIGNIFICANT CHANGE UP (ref 3.3–5)
ALP SERPL-CCNC: 55 U/L — SIGNIFICANT CHANGE UP (ref 40–120)
ALT FLD-CCNC: 21 U/L — SIGNIFICANT CHANGE UP (ref 10–45)
ANION GAP SERPL CALC-SCNC: 11 MMOL/L — SIGNIFICANT CHANGE UP (ref 5–17)
APPEARANCE UR: CLEAR — SIGNIFICANT CHANGE UP
AST SERPL-CCNC: 27 U/L — SIGNIFICANT CHANGE UP (ref 10–40)
BILIRUB SERPL-MCNC: 0.3 MG/DL — SIGNIFICANT CHANGE UP (ref 0.2–1.2)
BILIRUB UR-MCNC: NEGATIVE — SIGNIFICANT CHANGE UP
BUN SERPL-MCNC: 57 MG/DL — HIGH (ref 7–23)
CALCIUM SERPL-MCNC: 8.6 MG/DL — SIGNIFICANT CHANGE UP (ref 8.4–10.5)
CHLORIDE SERPL-SCNC: 108 MMOL/L — SIGNIFICANT CHANGE UP (ref 96–108)
CO2 SERPL-SCNC: 17 MMOL/L — LOW (ref 22–31)
COLOR SPEC: YELLOW — SIGNIFICANT CHANGE UP
CREAT ?TM UR-MCNC: 67 MG/DL — SIGNIFICANT CHANGE UP
CREAT SERPL-MCNC: 2.5 MG/DL — HIGH (ref 0.5–1.3)
DIFF PNL FLD: ABNORMAL
EGFR: 24 ML/MIN/1.73M2 — LOW
EGFR: 24 ML/MIN/1.73M2 — LOW
GLUCOSE SERPL-MCNC: 123 MG/DL — HIGH (ref 70–99)
GLUCOSE UR QL: NEGATIVE MG/DL — SIGNIFICANT CHANGE UP
HCT VFR BLD CALC: 29 % — LOW (ref 39–50)
HGB BLD-MCNC: 9.6 G/DL — LOW (ref 13–17)
KETONES UR QL: NEGATIVE MG/DL — SIGNIFICANT CHANGE UP
LEUKOCYTE ESTERASE UR-ACNC: NEGATIVE — SIGNIFICANT CHANGE UP
MAGNESIUM SERPL-MCNC: 2 MG/DL — SIGNIFICANT CHANGE UP (ref 1.6–2.6)
MCHC RBC-ENTMCNC: 28.9 PG — SIGNIFICANT CHANGE UP (ref 27–34)
MCHC RBC-ENTMCNC: 33.1 G/DL — SIGNIFICANT CHANGE UP (ref 32–36)
MCV RBC AUTO: 87.3 FL — SIGNIFICANT CHANGE UP (ref 80–100)
NITRITE UR-MCNC: NEGATIVE — SIGNIFICANT CHANGE UP
NRBC BLD AUTO-RTO: 0 /100 WBCS — SIGNIFICANT CHANGE UP (ref 0–0)
OSMOLALITY UR: 484 MOSM/KG — SIGNIFICANT CHANGE UP (ref 300–900)
PH UR: 6.5 — SIGNIFICANT CHANGE UP (ref 5–8)
PLATELET # BLD AUTO: 135 K/UL — LOW (ref 150–400)
POTASSIUM SERPL-MCNC: 5.3 MMOL/L — SIGNIFICANT CHANGE UP (ref 3.5–5.3)
POTASSIUM SERPL-SCNC: 5.3 MMOL/L — SIGNIFICANT CHANGE UP (ref 3.5–5.3)
POTASSIUM UR-SCNC: 32 MMOL/L — SIGNIFICANT CHANGE UP
PROT ?TM UR-MCNC: 87 MG/DL — HIGH (ref 0–12)
PROT SERPL-MCNC: 6.3 G/DL — SIGNIFICANT CHANGE UP (ref 6–8.3)
PROT UR-MCNC: 100 MG/DL
PROT/CREAT UR-RTO: 1.3 RATIO — HIGH (ref 0–0.2)
RBC # BLD: 3.32 M/UL — LOW (ref 4.2–5.8)
RBC # FLD: 14.5 % — SIGNIFICANT CHANGE UP (ref 10.3–14.5)
SODIUM SERPL-SCNC: 136 MMOL/L — SIGNIFICANT CHANGE UP (ref 135–145)
SODIUM UR-SCNC: 80 MMOL/L — SIGNIFICANT CHANGE UP
SP GR SPEC: 1.02 — SIGNIFICANT CHANGE UP (ref 1–1.03)
UROBILINOGEN FLD QL: 0.2 MG/DL — SIGNIFICANT CHANGE UP (ref 0.2–1)
UUN UR-MCNC: 690 MG/DL — SIGNIFICANT CHANGE UP
WBC # BLD: 5.55 K/UL — SIGNIFICANT CHANGE UP (ref 3.8–10.5)
WBC # FLD AUTO: 5.55 K/UL — SIGNIFICANT CHANGE UP (ref 3.8–10.5)

## 2025-06-15 PROCEDURE — 99232 SBSQ HOSP IP/OBS MODERATE 35: CPT

## 2025-06-15 PROCEDURE — 99233 SBSQ HOSP IP/OBS HIGH 50: CPT

## 2025-06-15 RX ORDER — BISACODYL 5 MG
10 TABLET, DELAYED RELEASE (ENTERIC COATED) ORAL DAILY
Refills: 0 | Status: DISCONTINUED | OUTPATIENT
Start: 2025-06-15 | End: 2025-06-16

## 2025-06-15 RX ADMIN — CLOPIDOGREL BISULFATE 75 MILLIGRAM(S): 75 TABLET, FILM COATED ORAL at 11:39

## 2025-06-15 RX ADMIN — POLYETHYLENE GLYCOL 3350 17 GRAM(S): 17 POWDER, FOR SOLUTION ORAL at 13:55

## 2025-06-15 RX ADMIN — Medication 10 MILLIGRAM(S): at 11:39

## 2025-06-15 RX ADMIN — METOPROLOL SUCCINATE 50 MILLIGRAM(S): 50 TABLET, EXTENDED RELEASE ORAL at 05:40

## 2025-06-15 RX ADMIN — Medication 2 TABLET(S): at 21:08

## 2025-06-15 RX ADMIN — HEPARIN SODIUM 5000 UNIT(S): 1000 INJECTION INTRAVENOUS; SUBCUTANEOUS at 13:55

## 2025-06-15 RX ADMIN — HEPARIN SODIUM 5000 UNIT(S): 1000 INJECTION INTRAVENOUS; SUBCUTANEOUS at 21:08

## 2025-06-15 RX ADMIN — HEPARIN SODIUM 5000 UNIT(S): 1000 INJECTION INTRAVENOUS; SUBCUTANEOUS at 06:36

## 2025-06-15 RX ADMIN — ATORVASTATIN CALCIUM 40 MILLIGRAM(S): 80 TABLET, FILM COATED ORAL at 21:08

## 2025-06-15 RX ADMIN — TAMSULOSIN HYDROCHLORIDE 0.4 MILLIGRAM(S): 0.4 CAPSULE ORAL at 21:08

## 2025-06-15 RX ADMIN — POLYETHYLENE GLYCOL 3350 17 GRAM(S): 17 POWDER, FOR SOLUTION ORAL at 05:40

## 2025-06-15 RX ADMIN — LATANOPROST PF 1 DROP(S): 0.05 SOLUTION/ DROPS OPHTHALMIC at 21:09

## 2025-06-15 RX ADMIN — Medication 10 MILLIGRAM(S): at 16:22

## 2025-06-15 NOTE — PROGRESS NOTE ADULT - PROBLEM SELECTOR PROBLEM 1
Acute exacerbation of congestive heart failure

## 2025-06-15 NOTE — PROGRESS NOTE ADULT - NSPROGADDITIONALINFOA_GEN_ALL_CORE
Attending Attestation:  I was physically present for the key portions of the evaluation and management (E/M) service provided (time spent by attending >50% of total combined time in qualifying patient care activities). During non face-to-face time, I reviewed relevant data including imaging, labs and prior procedure reports available to assist in high level medical decision making. I answered all questions related to the patient's medical conditions and coordinated necessary care. I agree with the above history, physical, and plan which I have reviewed with the following edits/addendum:    89M w HFpEF, CAD s/p PCI and MIDCAB 6/5/2014, CKD, prostate Ca, chronic hypoNa, p/w dizziness, worsening ALEXANDER x weeks. Stroke code moderate-severe stenosis at origin of dominant left vertebral artery.   Admitted for decompensated HF. TTE 6/11/25 EF 45% severe AS low-flow low-gradient now s/p BAV on 6/13/25. On plavix. Stepped down from CCU 6/14  Euvolemic on exam. DC planning to [facility]anner MD  Cardiology

## 2025-06-15 NOTE — PROGRESS NOTE ADULT - PROBLEM SELECTOR PLAN 4
Baseline appears to be 1.7, on admission noted to be 2.17. Creatinine today 2.76 Peak of 2.98  Likely pre-renal iso vascular congestion due to HF exacerbation.   FeUrea 47.3 suggest intrinsic renal disease    Plan:  - Cautious with nephrotoxic agents  - Trend BMP
-b/l Cr ~1.7; 2.1 on presentation and peaked at 3.1 6/12/25; Cr today 2.5.    - Continue to monitor. Holding ACE/ARB/ARNI/MRA/SGLT2i as above.
Hx of CAD, MIDCAB 2014. Denies CP on admission  Home meds: plavix 75mg PO, atorvastatin 40mg PO qhs    Plan:  - cont home meds

## 2025-06-15 NOTE — PROGRESS NOTE ADULT - PROBLEM SELECTOR PLAN 5
Hx of CAD, MIDCAB 2014. Denies CP on admission  Home meds: plavix 75mg PO, atorvastatin 40mg PO qhs    Plan:  - Cont home meds
-Pt had endorsed subtle comments about wanting to die; psych was consulted and signed off, SI passive in setting of acute sickness.       DVT ppx: Hep SQ   Dispo: DC to [facility]
TTE 11/29/23: Grade III left ventricular diastolic dysfunction, LVEF 58%. Mild-to-moderate aortic regurgitation.  Mild-to-moderate aortic stenosis. The proximal ascending aorta is dilated measuring 4.20 cm.  Pt follows with Dr. Keyona Sinha o/p    Plan:  - Cont Toprol XL 50mg PO qd  - F/U TTE
TTE 11/29/23: Grade III left ventricular diastolic dysfunction, LVEF 58%. Mild-to-moderate aortic regurgitation.  Mild-to-moderate aortic stenosis. The proximal ascending aorta is dilated measuring 4.20 cm.  Pt follows with Dr. Keyona Sinha o/p    Plan:  - Cont Toprol XL 50mg PO qd  - F/U TTE
TTE 11/29/23: Grade III left ventricular diastolic dysfunction, LVEF 58%. Mild-to-moderate aortic regurgitation.  Mild-to-moderate aortic stenosis. The proximal ascending aorta is dilated measuring 4.20 cm.  Pt follows with Dr. Keyona Sinha o/p    Plan:  - Cont Toprol XL 50mg PO qd  CT NON CON pending for Aneurysm assessment

## 2025-06-15 NOTE — PROGRESS NOTE ADULT - SUBJECTIVE AND OBJECTIVE BOX
OVERNIGHT EVENTS: NAEO    SUBJECTIVE / INTERVAL HPI: Patient seen and examined at bedside. Patient denying chest pain, SOB, palpitations, cough. Patient denies fever, chills.   Remaining ROS negative       PHYSICAL EXAM:  General: Sitting in Bed, NAD, Well Nourished   HEENT: Conjunctiva clear; MMM  Neck::  -JVD,   Cardiovascular: RRR, No murmurs  Respiratory: CTA B/L/Rales/Decreased Breath Sounds  Gastrointestinal: soft, NT/ND, +BS  Extremities: WWP,   Vascular: Peripheral Pulses palpable   Neurological: AAOx3, no focal deficitS  ROS: Negative except per HPI and Subjective     VITAL SIGNS:  Vital Signs Last 24 Hrs  T(C): 36.7 (15 Alexander 2025 08:54), Max: 36.8 (14 Jun 2025 18:00)  T(F): 98.1 (15 Alexander 2025 08:54), Max: 98.3 (14 Jun 2025 18:00)  HR: 68 (15 Alexander 2025 08:54) (68 - 87)  BP: 136/63 (15 Alexander 2025 08:54) (123/58 - 147/73)  BP(mean): 91 (15 Alexander 2025 08:54) (8 - 103)  RR: 18 (15 Alexander 2025 08:54) (17 - 19)  SpO2: 100% (15 Alexander 2025 08:54) (95% - 100%)    Parameters below as of 15 Alexander 2025 08:54  Patient On (Oxygen Delivery Method): room air          MEDICATIONS:  MEDICATIONS  (STANDING):  atorvastatin 40 milliGRAM(s) Oral at bedtime  cetirizine 10 milliGRAM(s) Oral daily  clopidogrel Tablet 75 milliGRAM(s) Oral daily  heparin   Injectable 5000 Unit(s) SubCutaneous every 8 hours  latanoprost 0.005% Ophthalmic Solution 1 Drop(s) Both EYES at bedtime  metoprolol succinate ER 50 milliGRAM(s) Oral daily  polyethylene glycol 3350 17 Gram(s) Oral two times a day  senna 2 Tablet(s) Oral at bedtime  tamsulosin 0.4 milliGRAM(s) Oral at bedtime    MEDICATIONS  (PRN):  meclizine 25 milliGRAM(s) Oral every 8 hours PRN Dizziness  melatonin 3 milliGRAM(s) Oral at bedtime PRN Insomnia      ALLERGIES:  Allergies    No Known Drug Allergies  Originally Entered as [Rash-General] reaction to [Other][Ragweed] (Rash)  ragweed and birch (Unknown)  Originally Entered as [Rash-General] reaction to [Other][Bush] (Rash)    Intolerances        LABS:                        9.6    5.55  )-----------( 135      ( 15 Alexander 2025 10:00 )             29.0     06-15    136  |  108  |  57[H]  ----------------------------<  123[H]  5.3   |  17[L]  |  2.50[H]    Ca    8.6      15 Alexander 2025 10:00  Phos  4.6     06-14  Mg     2.0     06-15    TPro  6.3  /  Alb  3.7  /  TBili  0.3  /  DBili  x   /  AST  27  /  ALT  21  /  AlkPhos  55  06-15    PT/INR - ( 13 Jun 2025 18:03 )   PT: 12.8 sec;   INR: 1.10          PTT - ( 13 Jun 2025 18:03 )  PTT:>200.0 sec  Urinalysis Basic - ( 15 Alexander 2025 10:00 )    Color: x / Appearance: x / SG: x / pH: x  Gluc: 123 mg/dL / Ketone: x  / Bili: x / Urobili: x   Blood: x / Protein: x / Nitrite: x   Leuk Esterase: x / RBC: x / WBC x   Sq Epi: x / Non Sq Epi: x / Bacteria: x      CAPILLARY BLOOD GLUCOSE      POCT Blood Glucose.: 161 mg/dL (14 Jun 2025 12:29)      RADIOLOGY & ADDITIONAL TESTS: Reviewed.

## 2025-06-15 NOTE — PROGRESS NOTE ADULT - ASSESSMENT
89yo M w/ PMHxof HFpEF, CAD (s/p PCI and MIDCAB), HTN, HLD, preDM, CKD, prostate CA (s/p TURP), chronic hyponatremia who presented with intermittent dizziness and ALEXANDER/SOB for several weeks. s/p Stroke code 6/10 and found to have mod-severe stenosis L vertebral artery on CTA. TTE 6/11 w/ severe LFLG AS. Pt also w/ pulm vas congestion and pleural effusion now s/p IV diuresis. Pt s/p BAV w/ structural heart on 6/13/25 w/ subsequent TTE showing signficant improvement.       ### HFmrEF    -Pt s/p IV diuresis, now euvolemic.    - TTE (6/11): EF 45%, G II DD, severe LFLG AS.    - GDMT: Toprol 50mg PO QD.         o HOLD ARB/ARNI/MRA/SGLT2i i/s/o renal function.       ### SEVERE AORTIC STENOSIS   -TTE 6/11 w/ LFLG AS.    - s/p BAV 6/13 --> subsequent TTE showing mild AS.    - Patient to follow up outpatient w/ structural heart Dr. Noa Inman for further planning of future treatment options.       ### DIZZINESS   -Resolved on presentation to ED – stroke code was called and no further recommendations given NIHSS 0; c/w Meclizin PRN, CLopidogrel, and Atorvastatin.       ### MARIYA on CKD   -b/l Cr ~1.7; 2.1 on presentation and peaked at 3.1 6/12/25; Cr today 2.5.    - Continue to monitor. Holding ACE/ARB/ARNI/MRA/SGLT2i as above.       ### PASSIVE SI   -Pt had endorsed subtle comments about wanting to die; psych was consulted and signed off, SI passive in setting of acute sickness.       DVT ppx: Hep SQ   Dispo: DC to [facility]

## 2025-06-15 NOTE — PROGRESS NOTE ADULT - PROBLEM SELECTOR PLAN 3
-Resolved on presentation to ED – stroke code was called and no further recommendations given NIHSS 0; c/w Meclizin PRN, CLopidogrel, and Atorvastatin.

## 2025-06-15 NOTE — PROGRESS NOTE ADULT - PROBLEM SELECTOR PROBLEM 3
Acute kidney injury superimposed on CKD
Acute kidney injury superimposed on CKD
Dizziness
Dizziness
Acute kidney injury superimposed on CKD

## 2025-06-15 NOTE — PROGRESS NOTE ADULT - ASSESSMENT
89-year-old male with a PMHx of HFpEF, CAD (s/p PCI, on Plavix), HTN, HLD, CKD (baseline Cr 1.7), prostate cancer (s/p TURP), depression and chronic hyponatremia who presented with acute on chronic HFpEF exacerbation now s/p BAV.        #Acute on Chronic HFpEF Exacerbation     -further management as per cardiology     -currently on Metoprolol Succinate 50mg daily        #CAD   -continue with ASA 81mg daily, Plavix 75mg daily and Atorvastatin 40mg daily     -possible C this admission pending kidney function and GOC    #Severe AS    -s/p BAV, care per primary team.     #GOC  -palliative consulted, follow up recommendations     #MARIYA on CKD     -urine studies suggestive of intrinsic disease, likely related to aggressive diuresis on admission, Cr now slowly improving   -continue bladder scan protocol, may require [place] placement   -recommend holding diuresis and monitor Cr, consider nephrology consult especially if planning for [facility] this admission   -renally dose medications, avoid nephrotoxic agents       #Dizziness     -stroke code called on admission, stroke signed off, recommend DAPT and statin for stroke prevention     -general neurology consulted, etiology likely BPPV, continue with PRN Meclizine       #Rash  -consider outpatient dermatology referral     #Prostate Cancer (s/p TURP)    -continue with tamsulosin 0.4mg daily       #Passive SI     -psych consulted, appreciate recommendations

## 2025-06-15 NOTE — PROGRESS NOTE ADULT - PROBLEM SELECTOR PLAN 1
-Pt s/p IV diuresis, now euvolemic.    - TTE (6/11): EF 45%, G II DD, severe LFLG AS.    - GDMT: Toprol 50mg PO QD.         o HOLD ARB/ARNI/MRA/SGLT2i i/s/o renal function.

## 2025-06-15 NOTE — PROGRESS NOTE ADULT - PROBLEM SELECTOR PROBLEM 5
Passive suicidal ideations
Ascending aortic aneurysm
CAD (coronary artery disease)

## 2025-06-15 NOTE — PROGRESS NOTE ADULT - PROBLEM SELECTOR PROBLEM 4
CAD (coronary artery disease)
Acute kidney injury superimposed on CKD
CAD (coronary artery disease)
Acute kidney injury superimposed on CKD
CAD (coronary artery disease)

## 2025-06-15 NOTE — PROGRESS NOTE ADULT - SUBJECTIVE AND OBJECTIVE BOX
Cardiology PA Adult Progress Note    Subjective Assessment: No acute complaints. Pt well appearing and reports he feels tired.  	  MEDICATIONS:  metoprolol succinate ER 50 milliGRAM(s) Oral daily  cetirizine 10 milliGRAM(s) Oral daily  meclizine 25 milliGRAM(s) Oral every 8 hours PRN  melatonin 3 milliGRAM(s) Oral at bedtime PRN  polyethylene glycol 3350 17 Gram(s) Oral two times a day  senna 2 Tablet(s) Oral at bedtime  atorvastatin 40 milliGRAM(s) Oral at bedtime  clopidogrel Tablet 75 milliGRAM(s) Oral daily  heparin   Injectable 5000 Unit(s) SubCutaneous every 8 hours  latanoprost 0.005% Ophthalmic Solution 1 Drop(s) Both EYES at bedtime  tamsulosin 0.4 milliGRAM(s) Oral at bedtime      PHYSICAL EXAM:  TELEMETRY: sinus rhythm; no events  T(C): 36.6 (06-15-25 @ 13:17), Max: 36.8 (06-14-25 @ 18:00)  HR: 69 (06-15-25 @ 13:17) (68 - 81)  BP: 148/66 (06-15-25 @ 13:17) (123/58 - 148/66)  RR: 18 (06-15-25 @ 13:17) (17 - 19)  SpO2: 96% (06-15-25 @ 13:17) (95% - 100%)  Wt(kg): --  I&O's Summary    14 Jun 2025 07:01  -  15 Alexander 2025 07:00  --------------------------------------------------------  IN: 1310 mL / OUT: 1450 mL / NET: -140 mL    15 Alexander 2025 07:01  -  15 Alexander 2025 14:16  --------------------------------------------------------  IN: 180 mL / OUT: 650 mL / NET: -470 mL      General: Sitting in Bed, NAD, Well Nourished   HEENT: Conjunctiva clear; MMM  Neck::  -JVD,   Cardiovascular: RRR, No murmurs  Respiratory: CTA B/L no Rales/Decreased Breath Sounds  Gastrointestinal: soft, NT/ND, +BS  Extremities: WWP,   Vascular: Peripheral Pulses palpable 2+ bilaterally carotid 2+ b/l, no bruits; radial 2+ b/l; femoral 2+ b/l no bruits; DP/PT 2+ b/l  Neurological: AAOx3, no focal deficitS  ROS: Negative except per HPI and Subjective       LABS:	 	  CARDIAC MARKERS:                        9.6    5.55  )-----------( 135      ( 15 Alexander 2025 10:00 )             29.0     136  |  108  |  57[H]  ----------------------------<  123[H]  5.3   |  17[L]  |  2.50[H]    Ca    8.6      15 Alexander 2025 10:00    Phos  4.6     06-14    Mg     2.0     06-15    TPro  6.3  /  Alb  3.7  /  TBili  0.3  /  DBili  x   /  AST  27  /  ALT  21  /  AlkPhos  55  06-15    PT/INR - ( 13 Jun 2025 18:03 )   PT: 12.8 sec;   INR: 1.10       PTT - ( 13 Jun 2025 18:03 )  PTT:>200.0 sec

## 2025-06-15 NOTE — PROGRESS NOTE ADULT - PROBLEM SELECTOR PLAN 2
-TTE 6/11 w/ LFLG AS.    - s/p BAV 6/13 --> subsequent TTE showing mild AS.    - Patient to follow up outpatient w/ structural heart Dr. Noa Inman for further planning of future treatment options.       ### DIZZINESS

## 2025-06-16 ENCOUNTER — TRANSCRIPTION ENCOUNTER (OUTPATIENT)
Age: 89
End: 2025-06-16

## 2025-06-16 VITALS
DIASTOLIC BLOOD PRESSURE: 56 MMHG | RESPIRATION RATE: 18 BRPM | OXYGEN SATURATION: 97 % | SYSTOLIC BLOOD PRESSURE: 117 MMHG | TEMPERATURE: 98 F | HEART RATE: 76 BPM

## 2025-06-16 DIAGNOSIS — J90 PLEURAL EFFUSION, NOT ELSEWHERE CLASSIFIED: ICD-10-CM

## 2025-06-16 DIAGNOSIS — R06.02 SHORTNESS OF BREATH: ICD-10-CM

## 2025-06-16 LAB
ANION GAP SERPL CALC-SCNC: 12 MMOL/L — SIGNIFICANT CHANGE UP (ref 5–17)
BUN SERPL-MCNC: 59 MG/DL — HIGH (ref 7–23)
CALCIUM SERPL-MCNC: 8.8 MG/DL — SIGNIFICANT CHANGE UP (ref 8.4–10.5)
CHLORIDE SERPL-SCNC: 105 MMOL/L — SIGNIFICANT CHANGE UP (ref 96–108)
CO2 SERPL-SCNC: 15 MMOL/L — LOW (ref 22–31)
CREAT SERPL-MCNC: 2.27 MG/DL — HIGH (ref 0.5–1.3)
EGFR: 26 ML/MIN/1.73M2 — LOW
EGFR: 26 ML/MIN/1.73M2 — LOW
GLUCOSE SERPL-MCNC: 114 MG/DL — HIGH (ref 70–99)
HCT VFR BLD CALC: 31.8 % — LOW (ref 39–50)
HGB BLD-MCNC: 10.3 G/DL — LOW (ref 13–17)
MAGNESIUM SERPL-MCNC: 1.9 MG/DL — SIGNIFICANT CHANGE UP (ref 1.6–2.6)
MCHC RBC-ENTMCNC: 28.7 PG — SIGNIFICANT CHANGE UP (ref 27–34)
MCHC RBC-ENTMCNC: 32.4 G/DL — SIGNIFICANT CHANGE UP (ref 32–36)
MCV RBC AUTO: 88.6 FL — SIGNIFICANT CHANGE UP (ref 80–100)
NRBC BLD AUTO-RTO: 0 /100 WBCS — SIGNIFICANT CHANGE UP (ref 0–0)
PLATELET # BLD AUTO: 146 K/UL — LOW (ref 150–400)
POTASSIUM SERPL-MCNC: 5.2 MMOL/L — SIGNIFICANT CHANGE UP (ref 3.5–5.3)
POTASSIUM SERPL-SCNC: 5.2 MMOL/L — SIGNIFICANT CHANGE UP (ref 3.5–5.3)
RBC # BLD: 3.59 M/UL — LOW (ref 4.2–5.8)
RBC # FLD: 14.4 % — SIGNIFICANT CHANGE UP (ref 10.3–14.5)
SODIUM SERPL-SCNC: 132 MMOL/L — LOW (ref 135–145)
WBC # BLD: 4.36 K/UL — SIGNIFICANT CHANGE UP (ref 3.8–10.5)
WBC # FLD AUTO: 4.36 K/UL — SIGNIFICANT CHANGE UP (ref 3.8–10.5)

## 2025-06-16 PROCEDURE — 84100 ASSAY OF PHOSPHORUS: CPT

## 2025-06-16 PROCEDURE — C1887: CPT

## 2025-06-16 PROCEDURE — 80053 COMPREHEN METABOLIC PANEL: CPT

## 2025-06-16 PROCEDURE — C8923: CPT

## 2025-06-16 PROCEDURE — 83550 IRON BINDING TEST: CPT

## 2025-06-16 PROCEDURE — 84484 ASSAY OF TROPONIN QUANT: CPT

## 2025-06-16 PROCEDURE — 86901 BLOOD TYPING SEROLOGIC RH(D): CPT

## 2025-06-16 PROCEDURE — 97165 OT EVAL LOW COMPLEX 30 MIN: CPT

## 2025-06-16 PROCEDURE — 71045 X-RAY EXAM CHEST 1 VIEW: CPT

## 2025-06-16 PROCEDURE — 86923 COMPATIBILITY TEST ELECTRIC: CPT

## 2025-06-16 PROCEDURE — 85027 COMPLETE CBC AUTOMATED: CPT

## 2025-06-16 PROCEDURE — 80061 LIPID PANEL: CPT

## 2025-06-16 PROCEDURE — 97164 PT RE-EVAL EST PLAN CARE: CPT

## 2025-06-16 PROCEDURE — 84443 ASSAY THYROID STIM HORMONE: CPT

## 2025-06-16 PROCEDURE — 75574 CT ANGIO HRT W/3D IMAGE: CPT

## 2025-06-16 PROCEDURE — 96374 THER/PROPH/DIAG INJ IV PUSH: CPT

## 2025-06-16 PROCEDURE — 70450 CT HEAD/BRAIN W/O DYE: CPT

## 2025-06-16 PROCEDURE — 84133 ASSAY OF URINE POTASSIUM: CPT

## 2025-06-16 PROCEDURE — 83605 ASSAY OF LACTIC ACID: CPT

## 2025-06-16 PROCEDURE — 84300 ASSAY OF URINE SODIUM: CPT

## 2025-06-16 PROCEDURE — 83935 ASSAY OF URINE OSMOLALITY: CPT

## 2025-06-16 PROCEDURE — C1725: CPT

## 2025-06-16 PROCEDURE — C1894: CPT

## 2025-06-16 PROCEDURE — 85610 PROTHROMBIN TIME: CPT

## 2025-06-16 PROCEDURE — 36415 COLL VENOUS BLD VENIPUNCTURE: CPT

## 2025-06-16 PROCEDURE — 85045 AUTOMATED RETICULOCYTE COUNT: CPT

## 2025-06-16 PROCEDURE — 0042T: CPT

## 2025-06-16 PROCEDURE — 83735 ASSAY OF MAGNESIUM: CPT

## 2025-06-16 PROCEDURE — 82962 GLUCOSE BLOOD TEST: CPT

## 2025-06-16 PROCEDURE — C1889: CPT

## 2025-06-16 PROCEDURE — 84436 ASSAY OF TOTAL THYROXINE: CPT

## 2025-06-16 PROCEDURE — 71275 CT ANGIOGRAPHY CHEST: CPT

## 2025-06-16 PROCEDURE — 97161 PT EVAL LOW COMPLEX 20 MIN: CPT

## 2025-06-16 PROCEDURE — 83036 HEMOGLOBIN GLYCOSYLATED A1C: CPT

## 2025-06-16 PROCEDURE — 82728 ASSAY OF FERRITIN: CPT

## 2025-06-16 PROCEDURE — 83540 ASSAY OF IRON: CPT

## 2025-06-16 PROCEDURE — 99239 HOSP IP/OBS DSCHRG MGMT >30: CPT

## 2025-06-16 PROCEDURE — 84540 ASSAY OF URINE/UREA-N: CPT

## 2025-06-16 PROCEDURE — 84156 ASSAY OF PROTEIN URINE: CPT

## 2025-06-16 PROCEDURE — 85025 COMPLETE CBC W/AUTO DIFF WBC: CPT

## 2025-06-16 PROCEDURE — 85730 THROMBOPLASTIN TIME PARTIAL: CPT

## 2025-06-16 PROCEDURE — 86900 BLOOD TYPING SEROLOGIC ABO: CPT

## 2025-06-16 PROCEDURE — 99291 CRITICAL CARE FIRST HOUR: CPT

## 2025-06-16 PROCEDURE — 83880 ASSAY OF NATRIURETIC PEPTIDE: CPT

## 2025-06-16 PROCEDURE — 80048 BASIC METABOLIC PNL TOTAL CA: CPT

## 2025-06-16 PROCEDURE — 99232 SBSQ HOSP IP/OBS MODERATE 35: CPT

## 2025-06-16 PROCEDURE — 70498 CT ANGIOGRAPHY NECK: CPT

## 2025-06-16 PROCEDURE — C1769: CPT

## 2025-06-16 PROCEDURE — 70496 CT ANGIOGRAPHY HEAD: CPT

## 2025-06-16 PROCEDURE — 93005 ELECTROCARDIOGRAM TRACING: CPT

## 2025-06-16 PROCEDURE — 82570 ASSAY OF URINE CREATININE: CPT

## 2025-06-16 PROCEDURE — 74174 CTA ABD&PLVS W/CONTRAST: CPT

## 2025-06-16 PROCEDURE — 81001 URINALYSIS AUTO W/SCOPE: CPT

## 2025-06-16 PROCEDURE — C1760: CPT

## 2025-06-16 PROCEDURE — 93306 TTE W/DOPPLER COMPLETE: CPT

## 2025-06-16 PROCEDURE — 86850 RBC ANTIBODY SCREEN: CPT

## 2025-06-16 RX ORDER — MECLIZINE HCL 12.5 MG
1 TABLET ORAL
Qty: 30 | Refills: 1
Start: 2025-06-16

## 2025-06-16 RX ORDER — POLYETHYLENE GLYCOL 3350 17 G/17G
17 POWDER, FOR SOLUTION ORAL
Qty: 0 | Refills: 0 | DISCHARGE
Start: 2025-06-16

## 2025-06-16 RX ORDER — BISACODYL 5 MG
1 TABLET, DELAYED RELEASE (ENTERIC COATED) ORAL
Qty: 0 | Refills: 0 | DISCHARGE
Start: 2025-06-16

## 2025-06-16 RX ORDER — MAGNESIUM OXIDE 400 MG
400 TABLET ORAL ONCE
Refills: 0 | Status: COMPLETED | OUTPATIENT
Start: 2025-06-16 | End: 2025-06-16

## 2025-06-16 RX ADMIN — HEPARIN SODIUM 5000 UNIT(S): 1000 INJECTION INTRAVENOUS; SUBCUTANEOUS at 05:15

## 2025-06-16 RX ADMIN — HEPARIN SODIUM 5000 UNIT(S): 1000 INJECTION INTRAVENOUS; SUBCUTANEOUS at 14:21

## 2025-06-16 RX ADMIN — Medication 400 MILLIGRAM(S): at 14:21

## 2025-06-16 RX ADMIN — METOPROLOL SUCCINATE 50 MILLIGRAM(S): 50 TABLET, EXTENDED RELEASE ORAL at 05:20

## 2025-06-16 RX ADMIN — CLOPIDOGREL BISULFATE 75 MILLIGRAM(S): 75 TABLET, FILM COATED ORAL at 14:21

## 2025-06-16 RX ADMIN — POLYETHYLENE GLYCOL 3350 17 GRAM(S): 17 POWDER, FOR SOLUTION ORAL at 05:15

## 2025-06-16 RX ADMIN — Medication 10 MILLIGRAM(S): at 14:21

## 2025-06-16 NOTE — DISCHARGE NOTE PROVIDER - HOSPITAL COURSE
incomplete    89 y/o M former smoker w/ PMH of PMH of grade III diastolic dysfunction (pt and family deny dx of HF in past; TTE 11/2023 shows 3 diastolic dysfunction, EF 58%),, CAD s/p PCI and MIDCAB 6/5/2014 on Plavix, HTN, HLD, pre-DM, CKD, prostate Ca s/p TURP, depression and chronic hypoNa, p/w complaints of intermittent dizziness described as "room spinning" for several hours and progressively worsening ALEXANDER and SOB for several weeks. 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 Patient with history of decompensated HF and hx of diastolic dysfunction with hx of mild-mod AS. EF mildly reduced; echo demonstrates LFLG AS, SVI 24.9, peak velocity 2.8, MG 17, SUMI 0.9 cm2. Structural consulted,     SHD consulted, plan for medical optimization, BAV, non-contrast TAVR studies inpatient and then plan for contrast TAVR studies in outpatient setting once Cr back to baseline vs [facility]/MRI.     Patient underwent BAV/LHC on ___ with Dr. Inman -____    MARIYA; baseline sCr 1.7, currently ___     Ascending aortic aneurysm (4.1 cm2) stable from last TTE, per SHD will continue to monitor.     Behavioral health consulted for statement of SI to RN; per behavioral health there are no current safety concerns.    Physical therapy consulted, patient recommended for sub-acute rehab.     Diagnostic studies:   -TTE (6/11/25):  EF 45% global left ventricular hypokinesis, grade II LV diastolic dysfunction, left atrium dilation, and severe AS low-flow low-gradient (AV Mean Gradient: 17.0 mmHg, LVOT/aortic valve VTI ratio of 0.33)  -CTA head (6/10/25): no acute intracranial hemorrhage focal moderate-severe stenosis at origin of dominant left vertebral artery  -CXR 6/12/25: right pleural effusion   incomplete    89 y/o M former smoker w/ PMH of PMH of grade III diastolic dysfunction (pt and family deny dx of HF in past; TTE 11/2023 shows 3 diastolic dysfunction, EF 58%),, CAD s/p PCI and MIDCAB 6/5/2014 on Plavix, HTN, HLD, pre-DM, CKD, prostate Ca s/p TURP, depression and chronic hypoNa, p/w complaints of intermittent dizziness described as "room spinning" for several hours and progressively worsening ALEXANDER and SOB for several weeks. 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 Patient with history of decompensated HF and hx of diastolic dysfunction with hx of mild-mod AS. EF mildly reduced; echo demonstrates LFLG AS, SVI 24.9, peak velocity 2.8, MG 17, SUMI 0.9 cm2. Structural consulted,     SHD consulted, plan for medical optimization, BAV, non-contrast TAVR studies inpatient and then plan for contrast TAVR studies in outpatient setting once Cr back to baseline vs GENARO/MRI.     Patient underwent BAV on 6/13/25 with Dr. Inman -____    MARIYA; baseline sCr 1.7, currently 2.27, downtrending from     Ascending aortic aneurysm (4.1 cm2) stable from last TTE, per SHD will continue to monitor.     Behavioral health consulted for statement of SI to RN; per behavioral health there are no current safety concerns.    Physical therapy consulted, patient recommended for sub-acute rehab.     Diagnostic studies:   -TTE (6/11/25):  EF 45% global left ventricular hypokinesis, grade II LV diastolic dysfunction, left atrium dilation, and severe AS low-flow low-gradient (AV Mean Gradient: 17.0 mmHg, LVOT/aortic valve VTI ratio of 0.33)  -CTA head (6/10/25): no acute intracranial hemorrhage focal moderate-severe stenosis at origin of dominant left vertebral artery  -CXR 6/12/25: right pleural effusion   incomplete    89 y/o M former smoker w/ PMH of PMH of grade III diastolic dysfunction (pt and family deny dx of HF in past; TTE 11/2023 shows 3 diastolic dysfunction, EF 58%),, CAD s/p PCI and MIDCAB 6/5/2014 on Plavix, HTN, HLD, pre-DM, CKD, prostate Ca s/p TURP, depression and chronic hypoNa, p/w complaints of intermittent dizziness described as "room spinning" for several hours and progressively worsening ALEXANDER and SOB for several weeks. 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 Patient with history of decompensated HF and hx of diastolic dysfunction with hx of mild-mod AS. EF mildly reduced; echo demonstrates LFLG AS, SVI 24.9, peak velocity 2.8, MG 17, SUMI 0.9 cm2. Structural consulted,     SHD consulted, plan for medical optimization, BAV, non-contrast TAVR studies inpatient and then plan for contrast TAVR studies in outpatient setting once Cr back to baseline vs GENARO/MRI.     Patient underwent BAV on 6/13/25 with Dr. Inman -____    MARIYA; baseline sCr 1.7, currently 2.27, downtrending from 2.50.     Ascending aortic aneurysm (4.1 cm2) stable from last TTE, per SHD will continue to monitor.     Behavioral health consulted for statement of SI to RN; per behavioral health there are no current safety concerns.    Physical therapy consulted, patient recommended for sub-acute rehab.     Diagnostic studies:   -TTE (6/11/25):  EF 45% global left ventricular hypokinesis, grade II LV diastolic dysfunction, left atrium dilation, and severe AS low-flow low-gradient (AV Mean Gradient: 17.0 mmHg, LVOT/aortic valve VTI ratio of 0.33)  -CTA head (6/10/25): no acute intracranial hemorrhage focal moderate-severe stenosis at origin of dominant left vertebral artery  -CXR 6/12/25: right pleural effusion   incomplete    89 y/o M former smoker w/ PMH of PMH of grade III diastolic dysfunction (pt and family deny dx of HF in past; TTE 11/2023 shows 3 diastolic dysfunction, EF 58%),, CAD s/p PCI and MIDCAB 6/5/2014 on Plavix, HTN, HLD, pre-DM, CKD, prostate Ca s/p TURP, depression and chronic hypoNa, p/w complaints of intermittent dizziness described as "room spinning" for several hours and progressively worsening ALEXANDER and SOB for several weeks. 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 Patient with history of decompensated HF and hx of diastolic dysfunction with hx of mild-mod AS. EF mildly reduced; echo demonstrates LFLG AS, SVI 24.9, peak velocity 2.8, MG 17, SUMI 0.9 cm2. Structural consulted,     SHD consulted, plan for medical optimization, BAV, non-contrast TAVR studies inpatient and then plan for contrast TAVR studies in outpatient setting once Cr back to baseline vs GENARO/MRI.     Patient underwent BAV on 6/13/25 with Dr. Inman.    MARIYA; baseline sCr 1.7, currently 2.27, downtrending from 2.50.     Ascending aortic aneurysm (4.1 cm2) stable from last TTE, per SHD will continue to monitor.     Behavioral health consulted for statement of SI to RN; per behavioral health there are no current safety concerns.    Physical therapy consulted, patient recommended for sub-acute rehab.     Diagnostic studies:   -TTE (6/11/25):  EF 45% global left ventricular hypokinesis, grade II LV diastolic dysfunction, left atrium dilation, and severe AS low-flow low-gradient (AV Mean Gradient: 17.0 mmHg, LVOT/aortic valve VTI ratio of 0.33)  -CTA head (6/10/25): no acute intracranial hemorrhage focal moderate-severe stenosis at origin of dominant left vertebral artery  -CXR 6/12/25: right pleural effusion   incomplete    89 y/o M former smoker w/ PMH of PMH of grade III diastolic dysfunction (pt and family deny dx of HF in past; TTE 11/2023 shows 3 diastolic dysfunction, EF 58%),, CAD s/p PCI and MIDCAB 6/5/2014 on Plavix, HTN, HLD, pre-DM, CKD, prostate Ca s/p TURP, depression and chronic hypoNa, p/w complaints of intermittent dizziness described as "room spinning" for several hours and progressively worsening ALEXANDER and SOB for several weeks. Found to have moderate-severe stenosis at origin of dominant left vertebral artery and B/L pleural effusions and pulmonary congestion iso HF exacerbation. Patient with history of decompensated HF and hx of diastolic dysfunction with hx of mild-mod AS. EF mildly reduced; echo demonstrates LFLG AS, SVI 24.9, peak velocity 2.8, MG 17, SUMI 0.9 cm2. Structural consulted, pt s/p BAV w/ structural heart on 6/13/25 w/ subsequent TTE showing significant improvement.      SHD consulted, plan for medical optimization, BAV, non-contrast TAVR studies inpatient and then plan for contrast TAVR studies in outpatient setting once Cr back to baseline vs GENARO/MRI.     Patient underwent BAV on 6/13/25 with Dr. Inman.    MARIYA; baseline sCr 1.7, currently 2.27, downtrending from 2.50.     Ascending aortic aneurysm (4.1 cm2) stable from last TTE, per [facility]D will continue to monitor.     Behavioral health consulted for statement of SI to RN; per behavioral health there are no current safety concerns.    Physical therapy consulted, patient recommended for sub-acute rehab.     Diagnostic studies:   -TTE (6/11/25):  EF 45% global left ventricular hypokinesis, grade II LV diastolic dysfunction, left atrium dilation, and severe AS low-flow low-gradient (AV Mean Gradient: 17.0 mmHg, LVOT/aortic valve VTI ratio of 0.33)  -CTA head (6/10/25): no acute intracranial hemorrhage focal moderate-severe stenosis at origin of dominant left vertebral artery  -CXR 6/12/25: right pleural effusion   incomplete    89 y/o M former smoker w/ PMH of PMH of grade III diastolic dysfunction (pt and family deny dx of HF in past; TTE 11/2023 shows 3 diastolic dysfunction, EF 58%),, CAD s/p PCI and MIDCAB 6/5/2014 on Plavix, HTN, HLD, pre-DM, CKD, prostate Ca s/p TURP, depression and chronic hypoNa, p/w complaints of intermittent dizziness described as "room spinning" for several hours and progressively worsening ALEXANDER and SOB for several weeks. Found to have moderate-severe stenosis at origin of dominant left vertebral artery and B/L pleural effusions and pulmonary congestion iso HF exacerbation. Neurology consulted, attributing dizziness to BPPV. Patient with history of decompensated HF and hx of diastolic dysfunction with hx of mild-mod AS. EF mildly reduced; echo demonstrates LFLG AS, SVI 24.9, peak velocity 2.8, MG 17, SUMI 0.9 cm2. Structural consulted, pt s/p BAV w/ structural heart on 6/13/25 w/ subsequent TTE showing significant improvement.      SHD consulted, plan for medical optimization, BAV, non-contrast TAVR studies inpatient and then plan for contrast TAVR studies in outpatient setting once Cr back to baseline vs GENARO/MRI.     Patient underwent BAV on 6/13/25 with Dr. Inman.    MARIYA; baseline sCr 1.7, currently 2.27, downtrending from 2.50.     Ascending aortic aneurysm (4.1 cm2) stable from last TTE, per [facility]D will continue to monitor.     Behavioral health consulted for statement of SI to RN; per behavioral health there are no current safety concerns.    Physical therapy consulted, patient recommended for sub-acute rehab.     Diagnostic studies:   -TTE (6/11/25):  EF 45% global left ventricular hypokinesis, grade II LV diastolic dysfunction, left atrium dilation, and severe AS low-flow low-gradient (AV Mean Gradient: 17.0 mmHg, LVOT/aortic valve VTI ratio of 0.33)  -CTA head (6/10/25): no acute intracranial hemorrhage focal moderate-severe stenosis at origin of dominant left vertebral artery  -CXR 6/12/25: right pleural effusion   89 y/o M former smoker w/ PMH of PMH of grade III diastolic dysfunction (pt and family deny dx of HF in past; TTE 11/2023 shows 3 diastolic dysfunction, EF 58%),, CAD s/p PCI and MIDCAB 6/5/2014 on Plavix, HTN, HLD, pre-DM, CKD, prostate Ca s/p TURP, depression and chronic hypoNa, p/w complaints of intermittent dizziness described as "room spinning" for several hours and progressively worsening ALEXANDER and SOB for several weeks. Code stroke called, evaluation was negative, team signed off.  Found to have moderate-severe stenosis at origin of dominant left vertebral artery and B/L pleural effusions and pulmonary congestion iso HF exacerbation. Neurology consulted, attributing dizziness to BPPV, symptoms improved Meclizine. Patient with history of decompensated HF and hx of diastolic dysfunction with hx of mild-mod AS. EF mildly reduced; echo demonstrates LFLG AS, SVI 24.9, peak velocity 2.8, MG 17, SUMI 0.9 cm2. Structural consulted, pt s/p BAV w/ structural heart on 6/13/25 w/ subsequent TTE showing significant improvement.      SHD consulted, plan for medical optimization, BAV, non-contrast TAVR studies inpatient and then plan for contrast TAVR studies in outpatient setting once Cr back to baseline vs GENARO/MRI.     Patient underwent BAV on 6/13/25 with Dr. Inman.    MARIYA; baseline sCr 1.7, currently 2.27, downtrending from 2.50.     Ascending aortic aneurysm (4.1 cm2) stable from last TTE, per SHD will continue to monitor.     Behavioral health consulted for statement of SI to RN; per behavioral health there are no current safety concerns.    Physical therapy consulted, patient recommended for sub-acute rehab.     Diagnostic studies:   -TTE (6/11/25):  EF 45% global left ventricular hypokinesis, grade II LV diastolic dysfunction, left atrium dilation, and severe AS low-flow low-gradient (AV Mean Gradient: 17.0 mmHg, LVOT/aortic valve VTI ratio of 0.33)  -TTE (6/14/25) Left ventricular systolic function   is mildly decreased with an EFof 47 %. There is moderate (grade 2) left ventricular diastolic dysfunction,   with elevated left ventricular filling pressure. Left atrium is severely dilated, Mild aortic stenosis, Mild to moderate aortic regurgitation.  -CTA head (6/10/25): no acute intracranial hemorrhage focal moderate-severe stenosis at origin of dominant left vertebral artery  -CXR 6/12/25: right pleural effusion      Patient seen and examined today, doing well. No events overnight. Patients labs, vitals, telemetry and medications reviewed. Discussed with Dr. Griggs, patient is cleared for discharge to [facility] today and will continue on current medication regimen. Patient will follow up with Dr. Sinha within 1 week. Patient will follow up with structural heart team Dr. Inman within 1-2 weeks. Patient will follow up with Dr. Bautista July 1st at 1120 AM.

## 2025-06-16 NOTE — PROGRESS NOTE ADULT - SUBJECTIVE AND OBJECTIVE BOX
Subjective:  No acute events overnight.  Patient is doing well today without new complaints.  Denies HA, CP, SOB, abdominal pain, nausea, vomiting, fever, chills or diarrhea.     Objective:   Vital Signs Last 24 Hrs  T(C): 36.6 (16 Jun 2025 08:51), Max: 36.6 (15 Alexander 2025 13:17)  T(F): 97.8 (16 Jun 2025 08:51), Max: 97.9 (15 Alexander 2025 13:17)  HR: 76 (16 Jun 2025 08:51) (69 - 78)  BP: 122/59 (16 Jun 2025 08:51) (113/56 - 148/66)  BP(mean): 83 (16 Jun 2025 08:51) (81 - 95)  RR: 18 (16 Jun 2025 08:51) (17 - 18)  SpO2: 99% (16 Jun 2025 08:51) (95% - 99%)    Parameters below as of 16 Jun 2025 08:51  Patient On (Oxygen Delivery Method): room air    Physical Exam:   -Gen: NAD, resting in bed  -HEENT: EOMI, PERRL, no scleral icterus  -CV: normal S1 and S2  -Lungs: CTABL, normal respiratory effort on RA  -Ab: soft, NT, ND, normal BS  -Ext: no LE edema, no rashes  -Neuro: A&O x 3, no focal deficits     Labs:                        10.3   4.36  )-----------( 146      ( 16 Jun 2025 05:30 )             31.8       06-16    132[L]  |  105  |  59[H]  ----------------------------<  114[H]  5.2   |  15[L]  |  2.27[H]    Ca    8.8      16 Jun 2025 05:30  Mg     1.9     06-16    TPro  6.3  /  Alb  3.7  /  TBili  0.3  /  DBili  x   /  AST  27  /  ALT  21  /  AlkPhos  55  06-15    Medications:  MEDICATIONS  (STANDING):  atorvastatin 40 milliGRAM(s) Oral at bedtime  cetirizine 10 milliGRAM(s) Oral daily  clopidogrel Tablet 75 milliGRAM(s) Oral daily  heparin   Injectable 5000 Unit(s) SubCutaneous every 8 hours  latanoprost 0.005% Ophthalmic Solution 1 Drop(s) Both EYES at bedtime  magnesium oxide 400 milliGRAM(s) Oral once  metoprolol succinate ER 50 milliGRAM(s) Oral daily  polyethylene glycol 3350 17 Gram(s) Oral two times a day  senna 2 Tablet(s) Oral at bedtime  tamsulosin 0.4 milliGRAM(s) Oral at bedtime    MEDICATIONS  (PRN):  bisacodyl Suppository 10 milliGRAM(s) Rectal daily PRN Constipation  meclizine 25 milliGRAM(s) Oral every 8 hours PRN Dizziness  melatonin 3 milliGRAM(s) Oral at bedtime PRN Insomnia

## 2025-06-16 NOTE — DISCHARGE NOTE PROVIDER - NSDCCPCAREPLAN_GEN_ALL_CORE_FT
PRINCIPAL DISCHARGE DIAGNOSIS  Diagnosis: Acute exacerbation of congestive heart failure  Assessment and Plan of Treatment: You have a weak heart, also known as Congestive Heart Failure (CHF). Heart failure is a condition in which the heart does not pump or fill with blood well. As a result, the heart lags behind in its job of moving blood throughout the body. This can lead to symptoms such as swelling, trouble breathing, and feeling tired. Your Ejection Fraction (EF) is 45%, a normal EF is 55-60%.  -Please continue Metoprolol succinate ER 50mg daily to help strenghten your heart muscle  -Avoid drinking more than 1.5L of fluid daily and maintain a low salt diet (max 2grams daily).  -Please weigh yourself daily, for any significant increases in daily weight of 2lbs/day or 5lbs/week with associated swelling in the legs or abdomen and/or shortness of breath, please call your doctor or go to the emergency room.  -Follow up with Dr. Sinha in 1-2 weeks.         SECONDARY DISCHARGE DIAGNOSES  Diagnosis: Acute kidney injury superimposed on CKD  Assessment and Plan of Treatment: You have a known history of chronic kidney disease and your baseline Creatinine (kidney function) is 1.7. While you were in the hospital, your kidney function was elevated due to the heart failure and volume overload. Your kidney function has now improved , prior to dicharge your Creatinine was  2.27. Please follow up with your nephrologist Dr. Bautista on Tuesday July 1st at 11:20AM.       Diagnosis: Dizziness  Assessment and Plan of Treatment: During your presentation to the hospital, you had an episode of dizziness in the Emergency Department which resolved. You were examined and evaluated for neurology deficits, which was concluded there were no neurology deficits or events and no further workup or recommendations were needed.    Diagnosis: Severe aortic stenosis  Assessment and Plan of Treatment: You have a condition called aortic stenosis that was seen on echocardiogram of your heart. This is a condition where your aortic valve in your heart doesn't fully open making the valve opening narrow. When this happens, not as much blood can flow out of the heart to the rest of the body and the heart has to work harder than usual to pump blood out. You may experience shortness of breath, dizziness, or chest pain during physical exertion because of this condition.   Please follow up with Dr. Sinha to discuss next steps to repair this heart valve issue.       PRINCIPAL DISCHARGE DIAGNOSIS  Diagnosis: Acute exacerbation of congestive heart failure  Assessment and Plan of Treatment: You have a weak heart, also known as Congestive Heart Failure (CHF). Heart failure is a condition in which the heart does not pump or fill with blood well. As a result, the heart lags behind in its job of moving blood throughout the body. This can lead to symptoms such as swelling, trouble breathing, and feeling tired. Your Ejection Fraction (EF) is 45%, a normal EF is 55-60%.  -Please continue Metoprolol succinate ER 50mg daily to help strenghten your heart muscle  -Avoid drinking more than 1.5L of fluid daily and maintain a low salt diet (max 2grams daily).  -Please weigh yourself daily, for any significant increases in daily weight of 2lbs/day or 5lbs/week with associated swelling in the legs or abdomen and/or shortness of breath, please call your doctor or go to the emergency room.  -Follow up with Dr. Sinha in 1-2 weeks.         SECONDARY DISCHARGE DIAGNOSES  Diagnosis: Acute kidney injury superimposed on CKD  Assessment and Plan of Treatment: You have a known history of chronic kidney disease and your baseline Creatinine (kidney function) is 1.7. While you were in the hospital, your kidney function was elevated due to the heart failure and volume overload. Your kidney function has now improved , prior to dicharge your Creatinine was  2.27. Please follow up with your nephrologist Dr. Bautista on Tuesday July 1st at 11:20AM.       Diagnosis: Dizziness  Assessment and Plan of Treatment: During your presentation to the hospital, you had an episode of dizziness in the Emergency Department which resolved. You were examined and evaluated for neurology deficits, which was concluded there were no neurology deficits or events. Imaging showed left vertebral stenosis, which central etiology unlikely due to lack of focal neurological deficits. Symptoms improve with use of Meclizine.  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 Should symptoms of dizziness persist, Neurology recommends follow up with outpatient neurology for vestibular therapy and ENT evaluation.    Diagnosis: Severe aortic stenosis  Assessment and Plan of Treatment: You have a condition called aortic stenosis that was seen on echocardiogram of your heart. This is a condition where your aortic valve in your heart doesn't fully open making the valve opening narrow. When this happens, not as much blood can flow out of the heart to the rest of the body and the heart has to work harder than usual to pump blood out. You may experience shortness of breath, dizziness, or chest pain during physical exertion because of this condition.   Please follow up with Dr. Sinha to discuss next steps to repair this heart valve issue.       PRINCIPAL DISCHARGE DIAGNOSIS  Diagnosis: Acute exacerbation of congestive heart failure  Assessment and Plan of Treatment: You have a weak heart, also known as Congestive Heart Failure (CHF). Heart failure is a condition in which the heart does not pump or fill with blood well. As a result, the heart lags behind in its job of moving blood throughout the body. This can lead to symptoms such as swelling, trouble breathing, and feeling tired. Your Ejection Fraction (EF) is 45%, a normal EF is 55-60%.  -Please continue Metoprolol succinate ER 50mg daily to help strenghten your heart muscle  -Avoid drinking more than 1.5L of fluid daily and maintain a low salt diet (max 2grams daily).  -Please weigh yourself daily, for any significant increases in daily weight of 2lbs/day or 5lbs/week with associated swelling in the legs or abdomen and/or shortness of breath, please call your doctor or go to the emergency room.  -Follow up with Dr. Sinha in 1-2 weeks.         SECONDARY DISCHARGE DIAGNOSES  Diagnosis: Acute kidney injury superimposed on CKD  Assessment and Plan of Treatment: You have a known history of chronic kidney disease and your baseline Creatinine (kidney function) is 1.7. While you were in the hospital, your kidney function was elevated due to the heart failure and volume overload. Your kidney function has now improved , prior to dicharge your Creatinine was  2.27. Please follow up with your nephrologist Dr. Bautista on Tuesday July 1st at 11:20AM.       Diagnosis: Dizziness  Assessment and Plan of Treatment: During your presentation to the hospital, you had an episode of dizziness in the Emergency Department which resolved. You were examined and evaluated for neurology deficits, which was concluded there were no neurology deficits or events. Imaging showed left vertebral stenosis, which central etiology unlikely due to lack of focal neurological deficits. Symptoms improve with use of Meclizine.  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 Should symptoms of dizziness persist, Neurology recommends follow up with outpatient neurology for vestibular therapy and ENT evaluation.    Diagnosis: Severe aortic stenosis  Assessment and Plan of Treatment: You have a condition called aortic stenosis that was seen on echocardiogram of your heart. This is a condition where your aortic valve in your heart doesn't fully open making the valve opening narrow. When this happens, not as much blood can flow out of the heart to the rest of the body and the heart has to work harder than usual to pump blood out. You may experience shortness of breath, dizziness, or chest pain during physical exertion because of this condition. 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 This is a minimally invasive procedure to open up a stenotic aortic valve, to improve blood flow. Please continue to take your Plavix 75mg daily, Atorvastatin 40mg daily and metoprolol succinate 50mg daily. THESE MEDICATIONS ARE VERY IMPORTANT TO TAKE AS PRESCRIBED.   Please follow up with Dr. Sinha to discuss next steps to repair this heart valve issue.       PRINCIPAL DISCHARGE DIAGNOSIS  Diagnosis: Acute exacerbation of congestive heart failure  Assessment and Plan of Treatment: You have a weak heart, also known as Congestive Heart Failure (CHF). Heart failure is a condition in which the heart does not pump or fill with blood well. As a result, the heart lags behind in its job of moving blood throughout the body. This can lead to symptoms such as swelling, trouble breathing, and feeling tired. Your Ejection Fraction (EF) is 45%, a normal EF is 55-60%.  -Please continue Metoprolol succinate ER 50mg daily to help strenghten your heart muscle  -Avoid drinking more than 1.5L of fluid daily and maintain a low salt diet (max 2grams daily).  -Please weigh yourself daily, for any significant increases in daily weight of 2lbs/day or 5lbs/week with associated swelling in the legs or abdomen and/or shortness of breath, please call your doctor or go to the emergency room.  -Follow up with Dr. Inman in 1-2 weeks. The office will call you to set up an appointment. If the office does not reach out within 1-2 weeks, please call Dr. Inman to make an appointment.         SECONDARY DISCHARGE DIAGNOSES  Diagnosis: Acute kidney injury superimposed on CKD  Assessment and Plan of Treatment: You have a known history of chronic kidney disease and your baseline Creatinine (kidney function) is 1.7. While you were in the hospital, your kidney function was elevated due to the heart failure and volume overload. Your kidney function has now improved , prior to dicharge your Creatinine was  2.27. Please follow up with your nephrologist Dr. Bautista on Tuesday July 1st at 11:20AM.       Diagnosis: Dizziness  Assessment and Plan of Treatment: During your presentation to the hospital, you had an episode of dizziness in the Emergency Department which resolved. You were examined and evaluated for neurology deficits, which was concluded there were no neurology deficits or events. Imaging showed left vertebral stenosis, which central etiology unlikely due to lack of focal neurological deficits. Symptoms improve with use of Meclizine.  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 Should symptoms of dizziness persist, Neurology recommends follow up with outpatient neurology for vestibular therapy and ENT evaluation.    Diagnosis: Severe aortic stenosis  Assessment and Plan of Treatment: You have a condition called aortic stenosis that was seen on echocardiogram of your heart. This is a condition where your aortic valve in your heart doesn't fully open making the valve opening narrow. When this happens, not as much blood can flow out of the heart to the rest of the body and the heart has to work harder than usual to pump blood out. You may experience shortness of breath, dizziness, or chest pain during physical exertion because of this condition. 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 This is a minimally invasive procedure to open up a stenotic aortic valve, to improve blood flow. Please continue to take your Plavix 75mg daily, Atorvastatin 40mg daily and metoprolol succinate 50mg daily. THESE MEDICATIONS ARE VERY IMPORTANT TO TAKE AS PRESCRIBED.   Please follow up with Dr. Inman in 1-2 weeks.

## 2025-06-16 NOTE — DISCHARGE NOTE PROVIDER - NSDCFUSCHEDAPPT_GEN_ALL_CORE_FT
Freddy Bautista  [facility]  NEPHRO [address] [facility] [address]  Scheduled Appointment: 07/01/2025

## 2025-06-16 NOTE — DISCHARGE NOTE NURSING/CASE MANAGEMENT/SOCIAL WORK - PATIENT PORTAL LINK FT
You can access the FollowMyHealth Patient Portal offered by [facility] by registering at the following website: [email or web address]. By joining [facility]’s FollowMyHealth portal, you will also be able to view your health information using other applications (apps) compatible with our system.

## 2025-06-16 NOTE — DISCHARGE NOTE PROVIDER - CARE PROVIDER_API CALL
Keyona Sinha J  Cardiovascular Disease  [address] [place] [address]  Phone: [phone]  Fax: [phone]  Follow Up Time:     Freddy Bautista  Nephrology  [address],  [address] [place] [address]  Phone: [phone]  Fax: [phone]  Scheduled Appointment: 07/01/2025 11:20 AM   Keyona Sinha J  Cardiovascular Disease  [address] [place] [address]  Phone: [phone]  Fax: [phone]  Follow Up Time: 1 week    Freddy Bautista  Nephrology  [address],  [address] [place] [address]  Phone: [phone]  Fax: [phone]  Scheduled Appointment: 07/01/2025 11:20 AM   Freddy Bautista.  Nephrology  [address], [address] [place] [address]  Phone: [phone]  Fax: [phone]  Scheduled Appointment: 07/01/2025 11:20 AM    Keyona Sinha  Cardiovascular Disease  [address] [place] [address]  Phone: [phone]  Fax: (   )    -  Established Patient  Scheduled Appointment: 06/20/2025 09:15 AM

## 2025-06-16 NOTE — DISCHARGE NOTE PROVIDER - CARE PROVIDERS DIRECT ADDRESSES
[email or web address]

## 2025-06-16 NOTE — PROGRESS NOTE ADULT - TIME BILLING
Review of hospital course, labs, vitals and medical records  Bedside exam and patient interview   Discussion of plan with housestaff and consultants   Documenting the encounter
Review of hospital course, labs, vitals, medical records.   Bedside exam and interview   Discussed plan of care with primary team ACP and housestaff   Documenting the encounter  Excludes teaching time and/or separately reported services
Review of hospital course, labs, vitals and medical records  Bedside exam and patient interview   Discussion of plan with housestaff and consultants   Documenting the encounter

## 2025-06-16 NOTE — DISCHARGE NOTE NURSING/CASE MANAGEMENT/SOCIAL WORK - FINANCIAL ASSISTANCE
[facility] provides services at a reduced cost to those who are determined to be eligible through [facility]’s financial assistance program. Information regarding [facility]’s financial assistance program can be found by going to [email or web address] or by calling [phone].

## 2025-06-16 NOTE — PROGRESS NOTE ADULT - PROVIDER SPECIALTY LIST ADULT
CCU
CCU
Cardiology
Structural Heart
Cardiology
Cardiology
Hospitalist
Neurology
Hospitalist
Hospitalist
Cardiology

## 2025-06-16 NOTE — DISCHARGE NOTE PROVIDER - NSDCMRMEDTOKEN_GEN_ALL_CORE_FT
atorvastatin 40 mg oral tablet: 1 tab(s) orally once a day  clopidogrel 75 mg oral tablet: 1 tab(s) orally once a day  latanoprost 0.005% ophthalmic solution: 1 drop(s) to each affected eye once a day (in the evening)  levocetirizine 5 mg oral tablet: 1 tab(s) orally once a day  metoprolol succinate 50 mg oral tablet, extended release: 1 tab(s) orally once a day  tamsulosin 0.4 mg oral capsule: 1 cap(s) orally once a day   atorvastatin 40 mg oral tablet: 1 tab(s) orally once a day  clopidogrel 75 mg oral tablet: 1 tab(s) orally once a day  Dulcolax Laxative 10 mg rectal suppository: 1 suppository(ies) rectal once a day As needed Constipation  latanoprost 0.005% ophthalmic solution: 1 drop(s) to each affected eye once a day (in the evening)  levocetirizine 5 mg oral tablet: 1 tab(s) orally once a day  meclizine 25 mg oral tablet: 1 tab(s) orally every 8 hours as needed for Dizziness AS NEEDED FOR DIZZINESS/ BPPV- Not a part of daily medication  metoprolol succinate 50 mg oral tablet, extended release: 1 tab(s) orally once a day  MiraLax oral powder for reconstitution: 17 gram(s) orally 2 times a day  tamsulosin 0.4 mg oral capsule: 1 cap(s) orally once a day

## 2025-06-16 NOTE — PROGRESS NOTE ADULT - REASON FOR ADMISSION
HF exacerbation

## 2025-06-16 NOTE — DISCHARGE NOTE PROVIDER - NSDCFUADDAPPT_GEN_ALL_CORE_FT
Please bring your Insurance card, Photo ID and Discharge paperwork to the following appointment:    (1) Please follow up with your Cardiology Provider, Dr. Keyona Sinha at [address] [place] [address] on 6/20/2025 at 9:15am.    Appointment was confirmed by Ms. MARIAM Gomez, Referral Coordinator.

## 2025-06-16 NOTE — PROGRESS NOTE ADULT - ASSESSMENT
89-year-old male with a PMHx of HFpEF, CAD (s/p PCI, on Plavix), HTN, HLD, CKD (baseline Cr 1.7), prostate cancer (s/p TURP), depression and chronic hyponatremia who presented with acute on chronic HFpEF exacerbation now s/p BAV.         #Acute on Chronic HFpEF Exacerbation      -further management as per cardiology, currently on Metoprolol Succinate 50mg daily        #CAD    -continue with Plavix 75mg daily and Atorvastatin 40mg daily      -defer indication of ASA  81mg daily to primary team       #Severe AS     -further management as per cardiology, s/p BAV (6/13), outpatient follow up with Dr. Inman       #MARIYA on CKD      -urine studies suggestive of intrinsic disease, likely related to aggressive diuresis on admission, Cr now improving    -recommend nephrology follow up for repeat labs within 1-2 weeks of discharge   -consider initiation of sodium bicarbonate 650mg BID upon discharge   -renally dose medications, avoid nephrotoxic agents        #Dizziness      -stroke code called on admission, stroke signed off, recommend DAPT and statin for stroke prevention      -general neurology consulted, etiology likely BPPV, continue with PRN Meclizine        #Rash   -consider outpatient dermatology referral      #Prostate Cancer (s/p TURP)     -continue with tamsulosin 0.4mg daily        #Passive SI      -psych consulted, appreciate recommendations

## 2025-06-16 NOTE — DISCHARGE NOTE PROVIDER - ATTENDING DISCHARGE PHYSICAL EXAMINATION:
Mr. Reyes is a 89M with hx of CAD with MIDCAB in 2014, HTN presented with dyspnea and SOB and presented with decompensated HF.     Exam:   NAD  JVP normal  CTA bl  S1, S2 RRR  WWP, no edema  RFA access site stable, pulse intact.    Aortic stenosis s/p BAV, tolerated well. Groin site stable  HFmrEF- in setting of AS, mild diuresis. Stable off diuretics. PO bb. Add ARB when stable from cr as outpatient.  CAD- hx of MIDCAB on plavix  Dizziness- seen by neuro- vertebral stenosis, follow up.  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  CKD- MARIYA post diuresis+ contrast load. Improving, will initiate arb as outpatient

## 2025-06-16 NOTE — DISCHARGE NOTE PROVIDER - PROVIDER TOKENS
PROVIDER:[TOKEN:[[id number]]],PROVIDER:[TOKEN:[[id number]],SCHEDULEDAPPT:[07/01/2025],SCHEDULEDAPPTTIME:[11:20 AM]] PROVIDER:[TOKEN:[[id number]],FOLLOWUP:[1 week]],PROVIDER:[TOKEN:[[id number]],SCHEDULEDAPPT:[07/01/2025],SCHEDULEDAPPTTIME:[11:20 AM]] PROVIDER:[TOKEN:[[id number]],SCHEDULEDAPPT:[07/01/2025],SCHEDULEDAPPTTIME:[11:20 AM]],FREE:[LAST:[Bharath],FIRST:[Keyona MARTINEZ],PHONE:[phone]],FAX:[(   )    -],ADDRESS:[Cardiovascular Disease  [address] [place] [address]],SCHEDULEDAPPT:[06/20/2025],SCHEDULEDAPPTTIME:[09:15 AM],ESTABLISHEDPATIENT:[T]]

## 2025-06-20 ENCOUNTER — APPOINTMENT (OUTPATIENT)
Dept: HEART AND VASCULAR | Facility: CLINIC | Age: 89
End: 2025-06-20

## 2025-06-20 NOTE — CONSULT NOTE ADULT - TIME-BASED
77 Gen: no acute distress  Head: normocephalic  Lung: CTAB, no respiratory distress, no wheezing, rales, rhonchi  CV: normal s1/s2, rrr,   Abd: soft,   Tender to palpation in the bilateral lower quadrants, no rebound no guarding no peritoneal signs  MSK: full range of motion in all 4 extremities  Neuro: No focal neurologic deficits

## 2025-06-24 DIAGNOSIS — R45.851 SUICIDAL IDEATIONS: ICD-10-CM

## 2025-06-24 DIAGNOSIS — R33.9 RETENTION OF URINE, UNSPECIFIED: ICD-10-CM

## 2025-06-24 DIAGNOSIS — Z95.1 PRESENCE OF AORTOCORONARY BYPASS GRAFT: ICD-10-CM

## 2025-06-24 DIAGNOSIS — R21 RASH AND OTHER NONSPECIFIC SKIN ERUPTION: ICD-10-CM

## 2025-06-24 DIAGNOSIS — Z88.8 ALLERGY STATUS TO OTHER DRUGS, MEDICAMENTS AND BIOLOGICAL SUBSTANCES: ICD-10-CM

## 2025-06-24 DIAGNOSIS — C61 MALIGNANT NEOPLASM OF PROSTATE: ICD-10-CM

## 2025-06-24 DIAGNOSIS — Z98.61 CORONARY ANGIOPLASTY STATUS: ICD-10-CM

## 2025-06-24 DIAGNOSIS — D63.1 ANEMIA IN CHRONIC KIDNEY DISEASE: ICD-10-CM

## 2025-06-24 DIAGNOSIS — F03.A11 UNSPECIFIED DEMENTIA, MILD, WITH AGITATION: ICD-10-CM

## 2025-06-24 DIAGNOSIS — I71.21 ANEURYSM OF THE ASCENDING AORTA, WITHOUT RUPTURE: ICD-10-CM

## 2025-06-24 DIAGNOSIS — I50.33 ACUTE ON CHRONIC DIASTOLIC (CONGESTIVE) HEART FAILURE: ICD-10-CM

## 2025-06-24 DIAGNOSIS — I35.0 NONRHEUMATIC AORTIC (VALVE) STENOSIS: ICD-10-CM

## 2025-06-24 DIAGNOSIS — E87.1 HYPO-OSMOLALITY AND HYPONATREMIA: ICD-10-CM

## 2025-06-24 DIAGNOSIS — I25.2 OLD MYOCARDIAL INFARCTION: ICD-10-CM

## 2025-06-24 DIAGNOSIS — N17.9 ACUTE KIDNEY FAILURE, UNSPECIFIED: ICD-10-CM

## 2025-06-24 DIAGNOSIS — Z95.5 PRESENCE OF CORONARY ANGIOPLASTY IMPLANT AND GRAFT: ICD-10-CM

## 2025-06-24 DIAGNOSIS — F10.10 ALCOHOL ABUSE, UNCOMPLICATED: ICD-10-CM

## 2025-06-24 DIAGNOSIS — Z85.46 PERSONAL HISTORY OF MALIGNANT NEOPLASM OF PROSTATE: ICD-10-CM

## 2025-06-24 DIAGNOSIS — E11.22 TYPE 2 DIABETES MELLITUS WITH DIABETIC CHRONIC KIDNEY DISEASE: ICD-10-CM

## 2025-06-24 DIAGNOSIS — I13.0 HYPERTENSIVE HEART AND CHRONIC KIDNEY DISEASE WITH HEART FAILURE AND STAGE 1 THROUGH STAGE 4 CHRONIC KIDNEY DISEASE, OR UNSPECIFIED CHRONIC KIDNEY DISEASE: ICD-10-CM

## 2025-06-24 DIAGNOSIS — Z87.891 PERSONAL HISTORY OF NICOTINE DEPENDENCE: ICD-10-CM

## 2025-06-24 DIAGNOSIS — I25.10 ATHEROSCLEROTIC HEART DISEASE OF NATIVE CORONARY ARTERY WITHOUT ANGINA PECTORIS: ICD-10-CM

## 2025-06-24 DIAGNOSIS — F32.A DEPRESSION, UNSPECIFIED: ICD-10-CM

## 2025-06-24 DIAGNOSIS — E78.5 HYPERLIPIDEMIA, UNSPECIFIED: ICD-10-CM

## 2025-06-24 DIAGNOSIS — Z79.02 LONG TERM (CURRENT) USE OF ANTITHROMBOTICS/ANTIPLATELETS: ICD-10-CM

## 2025-07-01 ENCOUNTER — APPOINTMENT (OUTPATIENT)
Dept: NEPHROLOGY | Facility: CLINIC | Age: 89
End: 2025-07-01

## 2025-07-15 ENCOUNTER — TRANSCRIPTION ENCOUNTER (OUTPATIENT)
Age: 89
End: 2025-07-15

## 2025-07-22 DIAGNOSIS — I35.0 NONRHEUMATIC AORTIC (VALVE) STENOSIS: ICD-10-CM

## 2025-07-25 ENCOUNTER — OUTPATIENT (OUTPATIENT)
Dept: OUTPATIENT SERVICES | Facility: HOSPITAL | Age: 89
LOS: 1 days | End: 2025-07-25
Payer: MEDICARE

## 2025-07-25 ENCOUNTER — RESULT REVIEW (OUTPATIENT)
Age: 89
End: 2025-07-25

## 2025-07-25 DIAGNOSIS — Z98.890 OTHER SPECIFIED POSTPROCEDURAL STATES: Chronic | ICD-10-CM

## 2025-07-25 DIAGNOSIS — I35.0 NONRHEUMATIC AORTIC (VALVE) STENOSIS: ICD-10-CM

## 2025-07-25 DIAGNOSIS — H26.9 UNSPECIFIED CATARACT: Chronic | ICD-10-CM

## 2025-07-25 DIAGNOSIS — Z98.89 OTHER SPECIFIED POSTPROCEDURAL STATES: Chronic | ICD-10-CM

## 2025-07-25 PROCEDURE — 93306 TTE W/DOPPLER COMPLETE: CPT

## 2025-07-25 PROCEDURE — 93306 TTE W/DOPPLER COMPLETE: CPT | Mod: 26

## 2025-07-28 ENCOUNTER — APPOINTMENT (OUTPATIENT)
Dept: CARDIOTHORACIC SURGERY | Facility: CLINIC | Age: 89
End: 2025-07-28

## 2025-08-22 ENCOUNTER — OUTPATIENT (OUTPATIENT)
Dept: OUTPATIENT SERVICES | Facility: HOSPITAL | Age: 89
LOS: 1 days | End: 2025-08-22
Payer: MEDICARE

## 2025-08-22 ENCOUNTER — RESULT REVIEW (OUTPATIENT)
Age: 89
End: 2025-08-22

## 2025-08-22 DIAGNOSIS — I35.0 NONRHEUMATIC AORTIC (VALVE) STENOSIS: ICD-10-CM

## 2025-08-22 DIAGNOSIS — Z98.89 OTHER SPECIFIED POSTPROCEDURAL STATES: Chronic | ICD-10-CM

## 2025-08-22 DIAGNOSIS — Z98.890 OTHER SPECIFIED POSTPROCEDURAL STATES: Chronic | ICD-10-CM

## 2025-08-22 DIAGNOSIS — H26.9 UNSPECIFIED CATARACT: Chronic | ICD-10-CM

## 2025-08-22 PROCEDURE — 76377 3D RENDER W/INTRP POSTPROCES: CPT | Mod: 26

## 2025-08-22 PROCEDURE — 93312 ECHO TRANSESOPHAGEAL: CPT

## 2025-08-22 PROCEDURE — 93306 TTE W/DOPPLER COMPLETE: CPT | Mod: 26

## 2025-09-02 ENCOUNTER — RESULT REVIEW (OUTPATIENT)
Age: 89
End: 2025-09-02

## 2025-09-03 ENCOUNTER — APPOINTMENT (OUTPATIENT)
Dept: MRI IMAGING | Facility: CLINIC | Age: 89
End: 2025-09-03
Payer: MEDICARE

## 2025-09-03 ENCOUNTER — OUTPATIENT (OUTPATIENT)
Dept: OUTPATIENT SERVICES | Facility: HOSPITAL | Age: 89
LOS: 1 days | End: 2025-09-03

## 2025-09-03 DIAGNOSIS — Z98.890 OTHER SPECIFIED POSTPROCEDURAL STATES: Chronic | ICD-10-CM

## 2025-09-03 DIAGNOSIS — H26.9 UNSPECIFIED CATARACT: Chronic | ICD-10-CM

## 2025-09-03 DIAGNOSIS — Z98.89 OTHER SPECIFIED POSTPROCEDURAL STATES: Chronic | ICD-10-CM

## 2025-09-03 PROCEDURE — 72195 MRI PELVIS W/O DYE: CPT | Mod: 26

## 2025-09-03 PROCEDURE — 75557 CARDIAC MRI FOR MORPH: CPT | Mod: 26

## 2025-09-03 PROCEDURE — 74181 MRI ABDOMEN W/O CONTRAST: CPT | Mod: 26

## 2025-09-03 PROCEDURE — 71555 MRI ANGIO CHEST W OR W/O DYE: CPT | Mod: 26

## 2025-09-24 PROBLEM — N18.4 CKD (CHRONIC KIDNEY DISEASE) STAGE 4, GFR 15-29 ML/MIN: Status: ACTIVE | Noted: 2025-09-24

## 2025-09-24 PROBLEM — I71.21 ASCENDING AORTIC ANEURYSM: Status: ACTIVE | Noted: 2019-12-13

## 2025-09-24 PROBLEM — N18.30 CKD (CHRONIC KIDNEY DISEASE), STAGE III: Status: RESOLVED | Noted: 2021-08-12 | Resolved: 2025-09-24

## (undated) DEVICE — PACING CABLE (BLUE) ATRIAL TEMP SCREW DOWN 12FT

## (undated) DEVICE — POSITIONER FOAM EGG CRATE ULNAR 2PCS (PINK)

## (undated) DEVICE — MANIFOLD ANGIO AUTOMD TRNDCR

## (undated) DEVICE — CLIPPER BLADE GENERAL USE

## (undated) DEVICE — DRAPE SMALL W ADHESIVE APERTURE

## (undated) DEVICE — STOPCOCK SINGLE

## (undated) DEVICE — BAND RADIAL COMPRESSION DEVICE REG 24CM

## (undated) DEVICE — ELCTR ZOLL DEFIBRILLATOR PAD NO REPLACEMENT

## (undated) DEVICE — PACK HYBRID LHH

## (undated) DEVICE — PACK CATH CARDIAC KIT DSG SCSR FRCP

## (undated) DEVICE — SYS DEL CONTROLLER ANGIOTOUCH

## (undated) DEVICE — PACING CABLE (BROWN) A/V TEMP SCREW DOWN 12FT

## (undated) DEVICE — TUBING EXTENSION HI PRESSURE FLEX 48"

## (undated) DEVICE — TUBING IV EXTENSION 30"

## (undated) DEVICE — SYR MED A2000 SYRINGE KIT ACIST REUS

## (undated) DEVICE — CATH CARDIAC RT CUSET 601201319

## (undated) DEVICE — NDL PERCU ECHOTIP 21G X 4CM